# Patient Record
Sex: FEMALE | Race: WHITE | NOT HISPANIC OR LATINO | Employment: OTHER | URBAN - METROPOLITAN AREA
[De-identification: names, ages, dates, MRNs, and addresses within clinical notes are randomized per-mention and may not be internally consistent; named-entity substitution may affect disease eponyms.]

---

## 2017-01-11 ENCOUNTER — COMMUNICATION - HEALTHEAST (OUTPATIENT)
Dept: CARDIOLOGY | Facility: CLINIC | Age: 57
End: 2017-01-11

## 2017-01-11 DIAGNOSIS — I47.10 SVT (SUPRAVENTRICULAR TACHYCARDIA) (H): ICD-10-CM

## 2017-01-25 ENCOUNTER — COMMUNICATION - HEALTHEAST (OUTPATIENT)
Dept: CARDIOLOGY | Facility: CLINIC | Age: 57
End: 2017-01-25

## 2017-01-25 DIAGNOSIS — I25.10 CAD (CORONARY ARTERY DISEASE): ICD-10-CM

## 2017-01-25 DIAGNOSIS — I27.20 PULMONARY HTN (H): ICD-10-CM

## 2017-02-16 ENCOUNTER — OFFICE VISIT - HEALTHEAST (OUTPATIENT)
Dept: CARDIOLOGY | Facility: CLINIC | Age: 57
End: 2017-02-16

## 2017-02-16 ENCOUNTER — AMBULATORY - HEALTHEAST (OUTPATIENT)
Dept: CARDIOLOGY | Facility: CLINIC | Age: 57
End: 2017-02-16

## 2017-02-16 DIAGNOSIS — E66.9 RESTRICTIVE LUNG DISEASE SECONDARY TO OBESITY: ICD-10-CM

## 2017-02-16 DIAGNOSIS — I27.20 PULMONARY HTN (H): ICD-10-CM

## 2017-02-16 DIAGNOSIS — R94.30 ELEVATED LEFT VENTRICULAR END-DIASTOLIC PRESSURE (LVEDP): ICD-10-CM

## 2017-02-16 DIAGNOSIS — I27.20 PULMONARY HYPERTENSION, MILD (H): ICD-10-CM

## 2017-02-16 DIAGNOSIS — J98.4 RESTRICTIVE LUNG DISEASE SECONDARY TO OBESITY: ICD-10-CM

## 2017-02-16 DIAGNOSIS — I51.89 RIGHT VENTRICULAR SYSTOLIC DYSFUNCTION: ICD-10-CM

## 2017-02-16 DIAGNOSIS — G47.33 OSA ON CPAP: ICD-10-CM

## 2017-02-16 DIAGNOSIS — I25.10 CORONARY ARTERY DISEASE INVOLVING NATIVE CORONARY ARTERY OF NATIVE HEART WITHOUT ANGINA PECTORIS: ICD-10-CM

## 2017-02-16 ASSESSMENT — MIFFLIN-ST. JEOR: SCORE: 2161.61

## 2017-03-02 ENCOUNTER — AMBULATORY - HEALTHEAST (OUTPATIENT)
Dept: PULMONOLOGY | Facility: OTHER | Age: 57
End: 2017-03-02

## 2017-03-13 ENCOUNTER — COMMUNICATION - HEALTHEAST (OUTPATIENT)
Dept: ADMINISTRATIVE | Facility: CLINIC | Age: 57
End: 2017-03-13

## 2017-08-14 ENCOUNTER — COMMUNICATION - HEALTHEAST (OUTPATIENT)
Dept: CARDIOLOGY | Facility: CLINIC | Age: 57
End: 2017-08-14

## 2017-08-14 DIAGNOSIS — R94.30 ELEVATED LEFT VENTRICULAR END-DIASTOLIC PRESSURE (LVEDP): ICD-10-CM

## 2017-08-14 DIAGNOSIS — I27.20 PULMONARY HYPERTENSION, MILD (H): ICD-10-CM

## 2017-08-14 DIAGNOSIS — I51.89 RIGHT VENTRICULAR SYSTOLIC DYSFUNCTION: ICD-10-CM

## 2017-09-05 ENCOUNTER — COMMUNICATION - HEALTHEAST (OUTPATIENT)
Dept: CARDIOLOGY | Facility: CLINIC | Age: 57
End: 2017-09-05

## 2017-09-05 ENCOUNTER — AMBULATORY - HEALTHEAST (OUTPATIENT)
Dept: CARDIOLOGY | Facility: CLINIC | Age: 57
End: 2017-09-05

## 2017-09-05 DIAGNOSIS — I25.10 CAD (CORONARY ARTERY DISEASE): ICD-10-CM

## 2017-09-06 ENCOUNTER — OFFICE VISIT - HEALTHEAST (OUTPATIENT)
Dept: CARDIOLOGY | Facility: CLINIC | Age: 57
End: 2017-09-06

## 2017-09-06 DIAGNOSIS — R94.30 ELEVATED LEFT VENTRICULAR END-DIASTOLIC PRESSURE (LVEDP): ICD-10-CM

## 2017-09-06 DIAGNOSIS — I10 ESSENTIAL HYPERTENSION: ICD-10-CM

## 2017-09-06 DIAGNOSIS — R06.09 EXERTIONAL DYSPNEA: ICD-10-CM

## 2017-09-06 DIAGNOSIS — J98.4 RESTRICTIVE LUNG DISEASE SECONDARY TO OBESITY: ICD-10-CM

## 2017-09-06 DIAGNOSIS — E66.9 RESTRICTIVE LUNG DISEASE SECONDARY TO OBESITY: ICD-10-CM

## 2017-09-06 DIAGNOSIS — I27.20 PULMONARY HYPERTENSION, MILD (H): ICD-10-CM

## 2017-09-06 DIAGNOSIS — G47.33 OSA ON CPAP: ICD-10-CM

## 2017-09-06 ASSESSMENT — MIFFLIN-ST. JEOR: SCORE: 2154.8

## 2017-09-07 ENCOUNTER — COMMUNICATION - HEALTHEAST (OUTPATIENT)
Dept: CARDIOLOGY | Facility: CLINIC | Age: 57
End: 2017-09-07

## 2017-09-07 ENCOUNTER — AMBULATORY - HEALTHEAST (OUTPATIENT)
Dept: CARDIOLOGY | Facility: CLINIC | Age: 57
End: 2017-09-07

## 2017-09-07 DIAGNOSIS — R94.30 CARDIOVASCULAR FUNCTION STUDY, ABNORMAL: ICD-10-CM

## 2017-09-07 DIAGNOSIS — E87.5 HYPERKALEMIA: ICD-10-CM

## 2017-09-21 ENCOUNTER — AMBULATORY - HEALTHEAST (OUTPATIENT)
Dept: CARDIOLOGY | Facility: CLINIC | Age: 57
End: 2017-09-21

## 2017-09-21 DIAGNOSIS — E87.5 HYPERKALEMIA: ICD-10-CM

## 2017-11-17 ENCOUNTER — COMMUNICATION - HEALTHEAST (OUTPATIENT)
Dept: CARDIOLOGY | Facility: CLINIC | Age: 57
End: 2017-11-17

## 2017-11-17 DIAGNOSIS — I51.89 RIGHT VENTRICULAR SYSTOLIC DYSFUNCTION: ICD-10-CM

## 2017-11-17 DIAGNOSIS — I27.20 PULMONARY HYPERTENSION, MILD (H): ICD-10-CM

## 2017-11-17 DIAGNOSIS — R94.30 ELEVATED LEFT VENTRICULAR END-DIASTOLIC PRESSURE (LVEDP): ICD-10-CM

## 2017-12-13 ENCOUNTER — COMMUNICATION - HEALTHEAST (OUTPATIENT)
Dept: PULMONOLOGY | Facility: OTHER | Age: 57
End: 2017-12-13

## 2017-12-13 DIAGNOSIS — R06.00 DYSPNEA: ICD-10-CM

## 2017-12-20 ENCOUNTER — COMMUNICATION - HEALTHEAST (OUTPATIENT)
Dept: PULMONOLOGY | Facility: OTHER | Age: 57
End: 2017-12-20

## 2017-12-20 DIAGNOSIS — R06.00 DYSPNEA: ICD-10-CM

## 2018-01-23 ENCOUNTER — RECORDS - HEALTHEAST (OUTPATIENT)
Dept: LAB | Facility: CLINIC | Age: 58
End: 2018-01-23

## 2018-01-23 LAB
ALBUMIN SERPL-MCNC: 3.5 G/DL (ref 3.5–5)
ALP SERPL-CCNC: 114 U/L (ref 45–120)
ALT SERPL W P-5'-P-CCNC: 11 U/L (ref 0–45)
ANION GAP SERPL CALCULATED.3IONS-SCNC: 9 MMOL/L (ref 5–18)
AST SERPL W P-5'-P-CCNC: 12 U/L (ref 0–40)
BILIRUB SERPL-MCNC: 0.4 MG/DL (ref 0–1)
BUN SERPL-MCNC: 26 MG/DL (ref 8–22)
CALCIUM SERPL-MCNC: 9.6 MG/DL (ref 8.5–10.5)
CHLORIDE BLD-SCNC: 105 MMOL/L (ref 98–107)
CO2 SERPL-SCNC: 28 MMOL/L (ref 22–31)
CREAT SERPL-MCNC: 1.12 MG/DL (ref 0.6–1.1)
GFR SERPL CREATININE-BSD FRML MDRD: 50 ML/MIN/1.73M2
GLUCOSE BLD-MCNC: 114 MG/DL (ref 70–125)
POTASSIUM BLD-SCNC: 4.9 MMOL/L (ref 3.5–5)
PROT SERPL-MCNC: 7.4 G/DL (ref 6–8)
SODIUM SERPL-SCNC: 142 MMOL/L (ref 136–145)

## 2018-02-02 ENCOUNTER — COMMUNICATION - HEALTHEAST (OUTPATIENT)
Dept: ADMINISTRATIVE | Facility: CLINIC | Age: 58
End: 2018-02-02

## 2018-02-20 ENCOUNTER — AMBULATORY - HEALTHEAST (OUTPATIENT)
Dept: CARDIOLOGY | Facility: CLINIC | Age: 58
End: 2018-02-20

## 2018-02-20 DIAGNOSIS — I27.20 PULMONARY HYPERTENSION, MILD (H): ICD-10-CM

## 2018-02-20 DIAGNOSIS — I25.10 CORONARY ARTERY DISEASE INVOLVING NATIVE CORONARY ARTERY OF NATIVE HEART WITHOUT ANGINA PECTORIS: ICD-10-CM

## 2018-02-20 DIAGNOSIS — R94.30 ELEVATED LEFT VENTRICULAR END-DIASTOLIC PRESSURE (LVEDP): ICD-10-CM

## 2018-02-20 DIAGNOSIS — I51.89 RIGHT VENTRICULAR SYSTOLIC DYSFUNCTION: ICD-10-CM

## 2018-03-13 ENCOUNTER — COMMUNICATION - HEALTHEAST (OUTPATIENT)
Dept: CARDIOLOGY | Facility: CLINIC | Age: 58
End: 2018-03-13

## 2018-03-13 DIAGNOSIS — I27.20 PULMONARY HYPERTENSION, MILD (H): ICD-10-CM

## 2018-03-13 DIAGNOSIS — I51.89 RIGHT VENTRICULAR SYSTOLIC DYSFUNCTION: ICD-10-CM

## 2018-03-13 DIAGNOSIS — R94.30 ELEVATED LEFT VENTRICULAR END-DIASTOLIC PRESSURE (LVEDP): ICD-10-CM

## 2018-03-22 ENCOUNTER — COMMUNICATION - HEALTHEAST (OUTPATIENT)
Dept: CARDIOLOGY | Facility: CLINIC | Age: 58
End: 2018-03-22

## 2018-03-22 DIAGNOSIS — I51.89 RIGHT VENTRICULAR SYSTOLIC DYSFUNCTION: ICD-10-CM

## 2018-03-22 DIAGNOSIS — R94.30 ELEVATED LEFT VENTRICULAR END-DIASTOLIC PRESSURE (LVEDP): ICD-10-CM

## 2018-03-22 DIAGNOSIS — I27.20 PULMONARY HYPERTENSION, MILD (H): ICD-10-CM

## 2018-03-28 ENCOUNTER — COMMUNICATION - HEALTHEAST (OUTPATIENT)
Dept: CARDIOLOGY | Facility: CLINIC | Age: 58
End: 2018-03-28

## 2018-03-28 DIAGNOSIS — R94.30 CARDIOVASCULAR FUNCTION STUDY, ABNORMAL: ICD-10-CM

## 2018-03-28 DIAGNOSIS — R94.30 ELEVATED LEFT VENTRICULAR END-DIASTOLIC PRESSURE (LVEDP): ICD-10-CM

## 2018-03-28 DIAGNOSIS — I27.20 PULMONARY HYPERTENSION, MILD (H): ICD-10-CM

## 2018-03-28 DIAGNOSIS — I25.10 CORONARY ARTERY DISEASE INVOLVING NATIVE CORONARY ARTERY OF NATIVE HEART WITHOUT ANGINA PECTORIS: ICD-10-CM

## 2018-03-28 DIAGNOSIS — I51.89 RIGHT VENTRICULAR SYSTOLIC DYSFUNCTION: ICD-10-CM

## 2018-03-28 RX ORDER — BUMETANIDE 1 MG/1
1 TABLET ORAL
Qty: 180 TABLET | Refills: 1 | Status: SHIPPED | OUTPATIENT
Start: 2018-03-28 | End: 2022-08-19 | Stop reason: ALTCHOICE

## 2018-03-28 RX ORDER — LISINOPRIL 20 MG/1
20 TABLET ORAL DAILY
Qty: 90 TABLET | Refills: 1 | Status: SHIPPED | OUTPATIENT
Start: 2018-03-28

## 2018-04-12 ENCOUNTER — RECORDS - HEALTHEAST (OUTPATIENT)
Dept: LAB | Facility: CLINIC | Age: 58
End: 2018-04-12

## 2018-04-12 LAB
ALBUMIN SERPL-MCNC: 3.5 G/DL (ref 3.5–5)
ALP SERPL-CCNC: 110 U/L (ref 45–120)
ALT SERPL W P-5'-P-CCNC: <9 U/L (ref 0–45)
ANION GAP SERPL CALCULATED.3IONS-SCNC: 11 MMOL/L (ref 5–18)
AST SERPL W P-5'-P-CCNC: 11 U/L (ref 0–40)
BILIRUB SERPL-MCNC: 0.4 MG/DL (ref 0–1)
BUN SERPL-MCNC: 33 MG/DL (ref 8–22)
CALCIUM SERPL-MCNC: 9.2 MG/DL (ref 8.5–10.5)
CHLORIDE BLD-SCNC: 102 MMOL/L (ref 98–107)
CO2 SERPL-SCNC: 28 MMOL/L (ref 22–31)
CREAT SERPL-MCNC: 1.12 MG/DL (ref 0.6–1.1)
GFR SERPL CREATININE-BSD FRML MDRD: 50 ML/MIN/1.73M2
GLUCOSE BLD-MCNC: 82 MG/DL (ref 70–125)
POTASSIUM BLD-SCNC: 4.5 MMOL/L (ref 3.5–5)
PROT SERPL-MCNC: 7.2 G/DL (ref 6–8)
SODIUM SERPL-SCNC: 141 MMOL/L (ref 136–145)

## 2018-04-20 ENCOUNTER — COMMUNICATION - HEALTHEAST (OUTPATIENT)
Dept: ADMINISTRATIVE | Facility: CLINIC | Age: 58
End: 2018-04-20

## 2018-05-16 ENCOUNTER — HOSPITAL ENCOUNTER (OUTPATIENT)
Dept: MAMMOGRAPHY | Facility: CLINIC | Age: 58
Discharge: HOME | End: 2018-05-16
Attending: FAMILY MEDICINE

## 2018-05-16 DIAGNOSIS — Z12.31 VISIT FOR SCREENING MAMMOGRAM: ICD-10-CM

## 2018-05-18 ENCOUNTER — COMMUNICATION - HEALTHEAST (OUTPATIENT)
Dept: ADMINISTRATIVE | Facility: CLINIC | Age: 58
End: 2018-05-18

## 2018-05-22 ENCOUNTER — COMMUNICATION - HEALTHEAST (OUTPATIENT)
Dept: CARDIOLOGY | Facility: CLINIC | Age: 58
End: 2018-05-22

## 2018-05-22 DIAGNOSIS — R94.30 ELEVATED LEFT VENTRICULAR END-DIASTOLIC PRESSURE (LVEDP): ICD-10-CM

## 2018-05-22 DIAGNOSIS — I51.89 RIGHT VENTRICULAR SYSTOLIC DYSFUNCTION: ICD-10-CM

## 2018-05-22 DIAGNOSIS — I27.20 PULMONARY HYPERTENSION, MILD (H): ICD-10-CM

## 2018-05-24 ENCOUNTER — COMMUNICATION - HEALTHEAST (OUTPATIENT)
Dept: CARDIOLOGY | Facility: CLINIC | Age: 58
End: 2018-05-24

## 2018-05-24 ENCOUNTER — RECORDS - HEALTHEAST (OUTPATIENT)
Dept: ADMINISTRATIVE | Facility: OTHER | Age: 58
End: 2018-05-24

## 2018-05-24 DIAGNOSIS — I51.89 RIGHT VENTRICULAR SYSTOLIC DYSFUNCTION: ICD-10-CM

## 2018-05-24 DIAGNOSIS — R94.30 ELEVATED LEFT VENTRICULAR END-DIASTOLIC PRESSURE (LVEDP): ICD-10-CM

## 2018-05-24 DIAGNOSIS — I27.20 PULMONARY HYPERTENSION, MILD (H): ICD-10-CM

## 2018-05-24 RX ORDER — SPIRONOLACTONE 25 MG/1
25 TABLET ORAL DAILY
Qty: 90 TABLET | Refills: 3 | Status: SHIPPED | OUTPATIENT
Start: 2018-05-24 | End: 2022-01-03

## 2018-05-29 ENCOUNTER — OFFICE VISIT - HEALTHEAST (OUTPATIENT)
Dept: CARDIOLOGY | Facility: CLINIC | Age: 58
End: 2018-05-29

## 2018-05-29 DIAGNOSIS — I51.89 RIGHT VENTRICULAR SYSTOLIC DYSFUNCTION: ICD-10-CM

## 2018-05-29 DIAGNOSIS — G47.33 OSA ON CPAP: ICD-10-CM

## 2018-05-29 DIAGNOSIS — J98.4 RESTRICTIVE LUNG DISEASE SECONDARY TO OBESITY: ICD-10-CM

## 2018-05-29 DIAGNOSIS — I10 ESSENTIAL HYPERTENSION: ICD-10-CM

## 2018-05-29 DIAGNOSIS — E66.9 RESTRICTIVE LUNG DISEASE SECONDARY TO OBESITY: ICD-10-CM

## 2018-05-29 DIAGNOSIS — Z01.810 PREOPERATIVE CARDIOVASCULAR EXAMINATION: ICD-10-CM

## 2018-05-29 DIAGNOSIS — I25.10 CORONARY ARTERY DISEASE INVOLVING NATIVE CORONARY ARTERY OF NATIVE HEART WITHOUT ANGINA PECTORIS: ICD-10-CM

## 2018-05-29 DIAGNOSIS — R06.01 ORTHOPNEA: ICD-10-CM

## 2018-05-29 LAB
ATRIAL RATE - MUSE: 65 BPM
DIASTOLIC BLOOD PRESSURE - MUSE: NORMAL MMHG
INTERPRETATION ECG - MUSE: NORMAL
P AXIS - MUSE: 13 DEGREES
PR INTERVAL - MUSE: 136 MS
QRS DURATION - MUSE: 114 MS
QT - MUSE: 402 MS
QTC - MUSE: 418 MS
R AXIS - MUSE: -2 DEGREES
SYSTOLIC BLOOD PRESSURE - MUSE: NORMAL MMHG
T AXIS - MUSE: 2 DEGREES
VENTRICULAR RATE- MUSE: 65 BPM

## 2018-05-29 ASSESSMENT — MIFFLIN-ST. JEOR: SCORE: 2095.83

## 2018-05-31 ENCOUNTER — RECORDS - HEALTHEAST (OUTPATIENT)
Dept: LAB | Facility: CLINIC | Age: 58
End: 2018-05-31

## 2018-05-31 LAB
ALBUMIN SERPL-MCNC: 3.7 G/DL (ref 3.5–5)
ALP SERPL-CCNC: 103 U/L (ref 45–120)
ALT SERPL W P-5'-P-CCNC: 10 U/L (ref 0–45)
ANION GAP SERPL CALCULATED.3IONS-SCNC: 12 MMOL/L (ref 5–18)
AST SERPL W P-5'-P-CCNC: 13 U/L (ref 0–40)
BILIRUB SERPL-MCNC: 0.6 MG/DL (ref 0–1)
BUN SERPL-MCNC: 31 MG/DL (ref 8–22)
CALCIUM SERPL-MCNC: 9.7 MG/DL (ref 8.5–10.5)
CHLORIDE BLD-SCNC: 102 MMOL/L (ref 98–107)
CO2 SERPL-SCNC: 26 MMOL/L (ref 22–31)
CREAT SERPL-MCNC: 1.4 MG/DL (ref 0.6–1.1)
GFR SERPL CREATININE-BSD FRML MDRD: 38 ML/MIN/1.73M2
GLUCOSE BLD-MCNC: 110 MG/DL (ref 70–125)
POTASSIUM BLD-SCNC: 4.8 MMOL/L (ref 3.5–5)
PROT SERPL-MCNC: 7.1 G/DL (ref 6–8)
SODIUM SERPL-SCNC: 140 MMOL/L (ref 136–145)

## 2018-06-04 ENCOUNTER — SURGERY - HEALTHEAST (OUTPATIENT)
Dept: SURGERY | Facility: CLINIC | Age: 58
End: 2018-06-04

## 2018-06-04 ENCOUNTER — ANESTHESIA - HEALTHEAST (OUTPATIENT)
Dept: SURGERY | Facility: CLINIC | Age: 58
End: 2018-06-04

## 2018-06-04 ASSESSMENT — MIFFLIN-ST. JEOR: SCORE: 2102.64

## 2018-11-27 ENCOUNTER — COMMUNICATION - HEALTHEAST (OUTPATIENT)
Dept: ADMINISTRATIVE | Facility: CLINIC | Age: 58
End: 2018-11-27

## 2019-04-22 ENCOUNTER — RECORDS - HEALTHEAST (OUTPATIENT)
Dept: LAB | Facility: CLINIC | Age: 59
End: 2019-04-22

## 2019-04-22 LAB
ALBUMIN SERPL-MCNC: 3.5 G/DL (ref 3.5–5)
ALP SERPL-CCNC: 106 U/L (ref 45–120)
ALT SERPL W P-5'-P-CCNC: <9 U/L (ref 0–45)
ANION GAP SERPL CALCULATED.3IONS-SCNC: 9 MMOL/L (ref 5–18)
AST SERPL W P-5'-P-CCNC: 11 U/L (ref 0–40)
BASOPHILS # BLD AUTO: 0.1 THOU/UL (ref 0–0.2)
BASOPHILS NFR BLD AUTO: 1 % (ref 0–2)
BILIRUB SERPL-MCNC: 0.6 MG/DL (ref 0–1)
BUN SERPL-MCNC: 34 MG/DL (ref 8–22)
CALCIUM SERPL-MCNC: 9.1 MG/DL (ref 8.5–10.5)
CHLORIDE BLD-SCNC: 102 MMOL/L (ref 98–107)
CHOLEST SERPL-MCNC: 145 MG/DL
CO2 SERPL-SCNC: 29 MMOL/L (ref 22–31)
CREAT SERPL-MCNC: 1.35 MG/DL (ref 0.6–1.1)
EOSINOPHIL # BLD AUTO: 0.2 THOU/UL (ref 0–0.4)
EOSINOPHIL NFR BLD AUTO: 3 % (ref 0–6)
ERYTHROCYTE [DISTWIDTH] IN BLOOD BY AUTOMATED COUNT: 15.6 % (ref 11–14.5)
FASTING STATUS PATIENT QL REPORTED: ABNORMAL
GFR SERPL CREATININE-BSD FRML MDRD: 40 ML/MIN/1.73M2
GLUCOSE BLD-MCNC: 111 MG/DL (ref 70–125)
HCT VFR BLD AUTO: 34.4 % (ref 35–47)
HDLC SERPL-MCNC: 40 MG/DL
HGB BLD-MCNC: 10.3 G/DL (ref 12–16)
LDLC SERPL CALC-MCNC: 86 MG/DL
LYMPHOCYTES # BLD AUTO: 1.7 THOU/UL (ref 0.8–4.4)
LYMPHOCYTES NFR BLD AUTO: 23 % (ref 20–40)
MCH RBC QN AUTO: 26.7 PG (ref 27–34)
MCHC RBC AUTO-ENTMCNC: 29.9 G/DL (ref 32–36)
MCV RBC AUTO: 89 FL (ref 80–100)
MONOCYTES # BLD AUTO: 0.5 THOU/UL (ref 0–0.9)
MONOCYTES NFR BLD AUTO: 7 % (ref 2–10)
NEUTROPHILS # BLD AUTO: 4.9 THOU/UL (ref 2–7.7)
NEUTROPHILS NFR BLD AUTO: 66 % (ref 50–70)
PLATELET # BLD AUTO: 304 THOU/UL (ref 140–440)
PMV BLD AUTO: 9.9 FL (ref 8.5–12.5)
POTASSIUM BLD-SCNC: 4.9 MMOL/L (ref 3.5–5)
PROT SERPL-MCNC: 6.9 G/DL (ref 6–8)
RBC # BLD AUTO: 3.86 MILL/UL (ref 3.8–5.4)
SODIUM SERPL-SCNC: 140 MMOL/L (ref 136–145)
TRIGL SERPL-MCNC: 97 MG/DL
TSH SERPL DL<=0.005 MIU/L-ACNC: 1.03 UIU/ML (ref 0.3–5)
WBC: 7.4 THOU/UL (ref 4–11)

## 2019-05-03 ENCOUNTER — RECORDS - HEALTHEAST (OUTPATIENT)
Dept: LAB | Facility: CLINIC | Age: 59
End: 2019-05-03

## 2019-05-03 ENCOUNTER — RECORDS - HEALTHEAST (OUTPATIENT)
Dept: ADMINISTRATIVE | Facility: OTHER | Age: 59
End: 2019-05-03

## 2019-05-03 LAB
IRON SATN MFR SERPL: 18 % (ref 20–50)
IRON SERPL-MCNC: 75 UG/DL (ref 42–175)
TIBC SERPL-MCNC: 407 UG/DL (ref 313–563)
TRANSFERRIN SERPL-MCNC: 325 MG/DL (ref 212–360)

## 2019-07-02 ENCOUNTER — RECORDS - HEALTHEAST (OUTPATIENT)
Dept: ADMINISTRATIVE | Facility: OTHER | Age: 59
End: 2019-07-02

## 2019-07-02 ENCOUNTER — RECORDS - HEALTHEAST (OUTPATIENT)
Dept: LAB | Facility: CLINIC | Age: 59
End: 2019-07-02

## 2019-07-03 ENCOUNTER — AMBULATORY - HEALTHEAST (OUTPATIENT)
Dept: VASCULAR SURGERY | Facility: CLINIC | Age: 59
End: 2019-07-03

## 2019-07-03 DIAGNOSIS — R60.0 PERIPHERAL EDEMA: ICD-10-CM

## 2019-07-07 LAB
BACTERIA SPEC CULT: ABNORMAL

## 2019-07-23 ENCOUNTER — OFFICE VISIT - HEALTHEAST (OUTPATIENT)
Dept: VASCULAR SURGERY | Facility: CLINIC | Age: 59
End: 2019-07-23

## 2019-07-23 DIAGNOSIS — E66.01 OBESITY, MORBID, BMI 50 OR HIGHER (H): ICD-10-CM

## 2019-07-23 DIAGNOSIS — L97.811 VENOUS STASIS ULCER OF OTHER PART OF RIGHT LOWER LEG LIMITED TO BREAKDOWN OF SKIN WITHOUT VARICOSE VEINS (H): ICD-10-CM

## 2019-07-23 DIAGNOSIS — I89.0 SECONDARY LYMPHEDEMA: ICD-10-CM

## 2019-07-23 DIAGNOSIS — G47.33 OSA ON CPAP: ICD-10-CM

## 2019-07-23 DIAGNOSIS — I87.2 VENOUS STASIS ULCER OF OTHER PART OF RIGHT LOWER LEG LIMITED TO BREAKDOWN OF SKIN WITHOUT VARICOSE VEINS (H): ICD-10-CM

## 2019-07-23 DIAGNOSIS — I87.303 VENOUS HYPERTENSION OF BOTH LOWER EXTREMITIES: ICD-10-CM

## 2019-07-23 DIAGNOSIS — Z98.890 HISTORY OF VEIN STRIPPING: ICD-10-CM

## 2019-07-23 DIAGNOSIS — L90.5 SCAR CONDITION AND FIBROSIS OF SKIN: ICD-10-CM

## 2019-07-23 ASSESSMENT — MIFFLIN-ST. JEOR: SCORE: 2219.21

## 2019-07-26 ENCOUNTER — AMBULATORY - HEALTHEAST (OUTPATIENT)
Dept: VASCULAR SURGERY | Facility: CLINIC | Age: 59
End: 2019-07-26

## 2019-07-30 ENCOUNTER — AMBULATORY - HEALTHEAST (OUTPATIENT)
Dept: VASCULAR SURGERY | Facility: CLINIC | Age: 59
End: 2019-07-30

## 2019-07-30 ENCOUNTER — RECORDS - HEALTHEAST (OUTPATIENT)
Dept: ADMINISTRATIVE | Facility: OTHER | Age: 59
End: 2019-07-30

## 2019-08-02 ENCOUNTER — AMBULATORY - HEALTHEAST (OUTPATIENT)
Dept: VASCULAR SURGERY | Facility: CLINIC | Age: 59
End: 2019-08-02

## 2019-08-02 DIAGNOSIS — L97.811 VENOUS STASIS ULCER OF OTHER PART OF RIGHT LOWER LEG LIMITED TO BREAKDOWN OF SKIN WITHOUT VARICOSE VEINS (H): ICD-10-CM

## 2019-08-02 DIAGNOSIS — I89.0 SECONDARY LYMPHEDEMA: ICD-10-CM

## 2019-08-02 DIAGNOSIS — I87.2 VENOUS STASIS ULCER OF OTHER PART OF RIGHT LOWER LEG LIMITED TO BREAKDOWN OF SKIN WITHOUT VARICOSE VEINS (H): ICD-10-CM

## 2019-08-02 ASSESSMENT — MIFFLIN-ST. JEOR: SCORE: 2218.31

## 2019-08-05 ENCOUNTER — AMBULATORY - HEALTHEAST (OUTPATIENT)
Dept: VASCULAR SURGERY | Facility: CLINIC | Age: 59
End: 2019-08-05

## 2019-08-05 ENCOUNTER — COMMUNICATION - HEALTHEAST (OUTPATIENT)
Dept: VASCULAR SURGERY | Facility: CLINIC | Age: 59
End: 2019-08-05

## 2019-08-05 DIAGNOSIS — I89.0 SECONDARY LYMPHEDEMA: ICD-10-CM

## 2019-08-05 ASSESSMENT — MIFFLIN-ST. JEOR: SCORE: 2139.38

## 2019-08-07 ENCOUNTER — AMBULATORY - HEALTHEAST (OUTPATIENT)
Dept: VASCULAR SURGERY | Facility: CLINIC | Age: 59
End: 2019-08-07

## 2019-08-07 DIAGNOSIS — M79.89 SWELLING OF LOWER EXTREMITY: ICD-10-CM

## 2019-08-09 ENCOUNTER — AMBULATORY - HEALTHEAST (OUTPATIENT)
Dept: VASCULAR SURGERY | Facility: CLINIC | Age: 59
End: 2019-08-09

## 2019-08-09 DIAGNOSIS — M79.89 SWELLING OF LOWER EXTREMITY: ICD-10-CM

## 2019-08-09 ASSESSMENT — MIFFLIN-ST. JEOR: SCORE: 2136.66

## 2019-08-13 ENCOUNTER — OFFICE VISIT - HEALTHEAST (OUTPATIENT)
Dept: VASCULAR SURGERY | Facility: CLINIC | Age: 59
End: 2019-08-13

## 2019-08-13 DIAGNOSIS — M79.89 SWELLING OF LOWER EXTREMITY: ICD-10-CM

## 2019-08-13 DIAGNOSIS — Z98.890 HISTORY OF VEIN STRIPPING: ICD-10-CM

## 2019-08-13 DIAGNOSIS — L97.811 VENOUS STASIS ULCER OF OTHER PART OF RIGHT LOWER LEG LIMITED TO BREAKDOWN OF SKIN WITHOUT VARICOSE VEINS (H): ICD-10-CM

## 2019-08-13 DIAGNOSIS — I87.303 VENOUS HYPERTENSION OF BOTH LOWER EXTREMITIES: ICD-10-CM

## 2019-08-13 DIAGNOSIS — G47.33 OSA ON CPAP: ICD-10-CM

## 2019-08-13 DIAGNOSIS — L90.5 SCAR CONDITION AND FIBROSIS OF SKIN: ICD-10-CM

## 2019-08-13 DIAGNOSIS — I87.2 VENOUS STASIS ULCER OF OTHER PART OF RIGHT LOWER LEG LIMITED TO BREAKDOWN OF SKIN WITHOUT VARICOSE VEINS (H): ICD-10-CM

## 2019-08-13 DIAGNOSIS — I89.0 SECONDARY LYMPHEDEMA: ICD-10-CM

## 2019-08-13 DIAGNOSIS — E66.01 OBESITY, MORBID, BMI 50 OR HIGHER (H): ICD-10-CM

## 2019-08-13 ASSESSMENT — MIFFLIN-ST. JEOR: SCORE: 2119.87

## 2019-08-26 ENCOUNTER — RECORDS - HEALTHEAST (OUTPATIENT)
Dept: LAB | Facility: CLINIC | Age: 59
End: 2019-08-26

## 2019-08-26 LAB
ALBUMIN SERPL-MCNC: 3.8 G/DL (ref 3.5–5)
ANION GAP SERPL CALCULATED.3IONS-SCNC: 10 MMOL/L (ref 5–18)
BUN SERPL-MCNC: 90 MG/DL (ref 8–22)
CALCIUM SERPL-MCNC: 9.5 MG/DL (ref 8.5–10.5)
CHLORIDE BLD-SCNC: 107 MMOL/L (ref 98–107)
CO2 SERPL-SCNC: 22 MMOL/L (ref 22–31)
CREAT SERPL-MCNC: 2.45 MG/DL (ref 0.6–1.1)
GFR SERPL CREATININE-BSD FRML MDRD: 20 ML/MIN/1.73M2
GLUCOSE BLD-MCNC: 86 MG/DL (ref 70–125)
PHOSPHATE SERPL-MCNC: 4.2 MG/DL (ref 2.5–4.5)
POTASSIUM BLD-SCNC: 5.9 MMOL/L (ref 3.5–5)
SODIUM SERPL-SCNC: 139 MMOL/L (ref 136–145)

## 2019-08-27 ENCOUNTER — ANESTHESIA - HEALTHEAST (OUTPATIENT)
Dept: SURGERY | Facility: CLINIC | Age: 59
End: 2019-08-27

## 2019-08-28 ENCOUNTER — SURGERY - HEALTHEAST (OUTPATIENT)
Dept: SURGERY | Facility: CLINIC | Age: 59
End: 2019-08-28

## 2019-08-28 ASSESSMENT — MIFFLIN-ST. JEOR: SCORE: 2078.6

## 2020-10-15 ENCOUNTER — RECORDS - HEALTHEAST (OUTPATIENT)
Dept: LAB | Facility: CLINIC | Age: 60
End: 2020-10-15

## 2020-10-15 LAB
ALBUMIN SERPL-MCNC: 3.4 G/DL (ref 3.5–5)
ALP SERPL-CCNC: 83 U/L (ref 45–120)
ALT SERPL W P-5'-P-CCNC: 10 U/L (ref 0–45)
ANION GAP SERPL CALCULATED.3IONS-SCNC: 13 MMOL/L (ref 5–18)
AST SERPL W P-5'-P-CCNC: 12 U/L (ref 0–40)
BASOPHILS # BLD AUTO: 0.2 THOU/UL (ref 0–0.2)
BASOPHILS NFR BLD AUTO: 2 % (ref 0–2)
BILIRUB SERPL-MCNC: 0.4 MG/DL (ref 0–1)
BUN SERPL-MCNC: 37 MG/DL (ref 8–22)
CALCIUM SERPL-MCNC: 9.2 MG/DL (ref 8.5–10.5)
CHLORIDE BLD-SCNC: 103 MMOL/L (ref 98–107)
CHOLEST SERPL-MCNC: 159 MG/DL
CO2 SERPL-SCNC: 25 MMOL/L (ref 22–31)
CREAT SERPL-MCNC: 1.62 MG/DL (ref 0.6–1.1)
EOSINOPHIL # BLD AUTO: 0.3 THOU/UL (ref 0–0.4)
EOSINOPHIL NFR BLD AUTO: 3 % (ref 0–6)
ERYTHROCYTE [DISTWIDTH] IN BLOOD BY AUTOMATED COUNT: 14.7 % (ref 11–14.5)
FASTING STATUS PATIENT QL REPORTED: NO
GFR SERPL CREATININE-BSD FRML MDRD: 32 ML/MIN/1.73M2
GLUCOSE BLD-MCNC: 128 MG/DL (ref 70–125)
HCT VFR BLD AUTO: 37.1 % (ref 35–47)
HDLC SERPL-MCNC: 35 MG/DL
HGB BLD-MCNC: 11.3 G/DL (ref 12–16)
IMM GRANULOCYTES # BLD: 0 THOU/UL
IMM GRANULOCYTES NFR BLD: 0 %
LDLC SERPL CALC-MCNC: 91 MG/DL
LYMPHOCYTES # BLD AUTO: 2 THOU/UL (ref 0.8–4.4)
LYMPHOCYTES NFR BLD AUTO: 19 % (ref 20–40)
MCH RBC QN AUTO: 28.3 PG (ref 27–34)
MCHC RBC AUTO-ENTMCNC: 30.5 G/DL (ref 32–36)
MCV RBC AUTO: 93 FL (ref 80–100)
MONOCYTES # BLD AUTO: 0.6 THOU/UL (ref 0–0.9)
MONOCYTES NFR BLD AUTO: 6 % (ref 2–10)
NEUTROPHILS # BLD AUTO: 7.3 THOU/UL (ref 2–7.7)
NEUTROPHILS NFR BLD AUTO: 70 % (ref 50–70)
PLATELET # BLD AUTO: 334 THOU/UL (ref 140–440)
PMV BLD AUTO: 10.2 FL (ref 8.5–12.5)
POTASSIUM BLD-SCNC: 4.6 MMOL/L (ref 3.5–5)
PROT SERPL-MCNC: 7.5 G/DL (ref 6–8)
RBC # BLD AUTO: 4 MILL/UL (ref 3.8–5.4)
SODIUM SERPL-SCNC: 141 MMOL/L (ref 136–145)
TRIGL SERPL-MCNC: 165 MG/DL
WBC: 10.4 THOU/UL (ref 4–11)

## 2021-02-03 ENCOUNTER — RECORDS - HEALTHEAST (OUTPATIENT)
Dept: LAB | Facility: CLINIC | Age: 61
End: 2021-02-03

## 2021-02-03 LAB
ALBUMIN SERPL-MCNC: 3.7 G/DL (ref 3.5–5)
ALP SERPL-CCNC: 116 U/L (ref 45–120)
ALT SERPL W P-5'-P-CCNC: <9 U/L (ref 0–45)
ANION GAP SERPL CALCULATED.3IONS-SCNC: 13 MMOL/L (ref 5–18)
AST SERPL W P-5'-P-CCNC: 8 U/L (ref 0–40)
BASOPHILS # BLD AUTO: 0.1 THOU/UL (ref 0–0.2)
BASOPHILS NFR BLD AUTO: 1 % (ref 0–2)
BILIRUB SERPL-MCNC: 0.5 MG/DL (ref 0–1)
BUN SERPL-MCNC: 65 MG/DL (ref 8–22)
CALCIUM SERPL-MCNC: 9.4 MG/DL (ref 8.5–10.5)
CHLORIDE BLD-SCNC: 106 MMOL/L (ref 98–107)
CO2 SERPL-SCNC: 20 MMOL/L (ref 22–31)
CREAT SERPL-MCNC: 2.28 MG/DL (ref 0.6–1.1)
EOSINOPHIL # BLD AUTO: 0.1 THOU/UL (ref 0–0.4)
EOSINOPHIL NFR BLD AUTO: 1 % (ref 0–6)
ERYTHROCYTE [DISTWIDTH] IN BLOOD BY AUTOMATED COUNT: 14.5 % (ref 11–14.5)
GFR SERPL CREATININE-BSD FRML MDRD: 22 ML/MIN/1.73M2
GLUCOSE BLD-MCNC: 111 MG/DL (ref 70–125)
HCT VFR BLD AUTO: 37.5 % (ref 35–47)
HGB BLD-MCNC: 11.8 G/DL (ref 12–16)
IMM GRANULOCYTES # BLD: 0.1 THOU/UL
IMM GRANULOCYTES NFR BLD: 1 %
LYMPHOCYTES # BLD AUTO: 1.6 THOU/UL (ref 0.8–4.4)
LYMPHOCYTES NFR BLD AUTO: 13 % (ref 20–40)
MCH RBC QN AUTO: 29.1 PG (ref 27–34)
MCHC RBC AUTO-ENTMCNC: 31.5 G/DL (ref 32–36)
MCV RBC AUTO: 92 FL (ref 80–100)
MONOCYTES # BLD AUTO: 0.7 THOU/UL (ref 0–0.9)
MONOCYTES NFR BLD AUTO: 5 % (ref 2–10)
NEUTROPHILS # BLD AUTO: 10.4 THOU/UL (ref 2–7.7)
NEUTROPHILS NFR BLD AUTO: 81 % (ref 50–70)
PLATELET # BLD AUTO: 376 THOU/UL (ref 140–440)
PMV BLD AUTO: 10 FL (ref 8.5–12.5)
POTASSIUM BLD-SCNC: 5 MMOL/L (ref 3.5–5)
PROT SERPL-MCNC: 7.6 G/DL (ref 6–8)
RBC # BLD AUTO: 4.06 MILL/UL (ref 3.8–5.4)
SODIUM SERPL-SCNC: 139 MMOL/L (ref 136–145)
WBC: 12.9 THOU/UL (ref 4–11)

## 2021-05-24 ENCOUNTER — RECORDS - HEALTHEAST (OUTPATIENT)
Dept: ADMINISTRATIVE | Facility: CLINIC | Age: 61
End: 2021-05-24

## 2021-05-25 ENCOUNTER — RECORDS - HEALTHEAST (OUTPATIENT)
Dept: ADMINISTRATIVE | Facility: CLINIC | Age: 61
End: 2021-05-25

## 2021-05-26 ENCOUNTER — RECORDS - HEALTHEAST (OUTPATIENT)
Dept: ADMINISTRATIVE | Facility: CLINIC | Age: 61
End: 2021-05-26

## 2021-05-28 ENCOUNTER — RECORDS - HEALTHEAST (OUTPATIENT)
Dept: ADMINISTRATIVE | Facility: CLINIC | Age: 61
End: 2021-05-28

## 2021-05-30 VITALS — BODY MASS INDEX: 45.99 KG/M2 | HEIGHT: 67 IN | WEIGHT: 293 LBS

## 2021-05-30 NOTE — PATIENT INSTRUCTIONS - HE
"Will need to return to clinic twice per week for wound care and wrap changes    We will wash the legs with soap and water  Apply lotion to the legs (remedy)  Apply silvercel to the right medial calf region  ABD  And rolled gauze  2 layer regular bilaterally    Continue to take your water pills    Weigh yourself daily    We will refer you to bariatrics to talk to a physician about weight loss surgery    If the wraps do not work to reduce your swelling we will refer you to lymphedema therapy    Do not get the wraps wet in the shower; wash up at the sink    [facility] and Bariatrics    Your care provider has referred you to [facility] for evaluation of weight loss options. We understand that patients who successfully get rid of excess weight can significantly improve other serious medical conditions.  [facility] offers two choices for patients who require significant weight loss, a surgical program and a non-surgical program.  You don't need to decide which is best for you right now, our team will help you determine the treatment that fits your unique circumstances best.     If you are considering surgical weight loss, the first step is to attend one of our fee Weight Loss Surgery seminars, which are held at [facility], and [facility] on a rotating basis.   During this seminar you will learn about what surgery can and cannot do for you, our program at [facility], and the insurance authorization process.   If you are \"sitting on the fence\" about surgery, we encourage you to attend this seminar so you can make a more informed decision.  After attending the free seminar, your next step is to schedule a comprehensive evaluation with one of our bariatricians.   If you are not ready for a surgical option, we also offer medically assisted weight loss support.  Our team will help you navigate these options.      Our clinic will contact you to get you started in the program.   You are " also welcome to call us at [phone] to schedule or if you have any other questions.     At [facility], we believe that weight loss surgery is only one step in an entire lifestyle plan to treat obesity. Paired with a healthy lifestyle and a commitment to long-term follow up with your bariatric care team, the surgery can jump-start lifestyle changes that lead to improved day-to-day health, self-esteem and quality of life.          Swelling and Compression Therapy    Swelling in the legs can be caused by many reasons. No matter what the reason, treatment usually includes some type of compression. This may be done with a support sock, dressing, ace wrap, or layered wraps.     It is important to treat the swelling for many reasons. If the swelling is not treated you may develop blisters that can lead to ulcerations. This is caused when extra fluid goes into tissue causing damage and blocking blood flow to the tissue.     It is important that you wear your compression every day, including days that you will be seen in clinic.     Compression is often the most important part of treating leg wounds. Without controlling the swelling it is often not possible to heal wounds.     Going without compression for even brief periods of time can be damaging to your legs and your health.  Your compression should be put on first thing in the morning. Take the compression off at night only when instructed by your care provider to do so. Sometimes wearing compression 24 hours a day will be recommended.       If you are having difficulty wearing your compression it is important to notify your care provider so that other options may be reviewed.    Please call us if you have any questions [phone].    Thank you for choosing [facility].Lymphedema Program    Lymphedema is swelling of an arm or leg due to damage to the lymphatic system.  Most lymphedema is seen in patients who have had surgery and/or radiation, which has  removed or damaged the lymph nodes, though there are many causes of lymphedema.    In a healthy person, lymph nodes act like a filter system to remove viruses, bacteria, particles and toxins from the body.  When lymph fluid isn t flowing right, it backs up in the body and swelling occurs.  This swelling can make the arm or leg feel heavy or uncomfortable.  With time the skin can get hard and in some cases the arm or leg can become infected (cellulitis), causing illness.    When lymphedema is diagnosed and treatment begun, patients can enjoy productive lives with few complications.    While lymphedema is not curable, it is treatable.    The lymphedema program educates patients, addresses exercise to increase lymph flow and teaches how to apply compression garments or bandaging techniques.      Miriam Fallon MD, a recognized physician specializing in the assessment and treatment of lymphedema, heads the [facility] s Lymphedema Program.  Thank you for choosing [facility] as partners in your care.  Please read the following information about your treatment.    Treatment:  Layered Compression Bandaging (Two-layer)    What is it?  The layered compression bandaging has a layer of absorbent material that will soak up drainage.     Why we do it.   This is done to treat swelling, wounds, or both.  This will in turn help circulation and healing.    How to care for your bandages.  The wraps need to be kept dry. If  the wraps become wet, remove them and call the clinic to have another wrap applied.    What to expect.  It is common for the wraps to be uncomfortable at the beginning. The first two days are usually the hardest; then they will become more comfortable.       Elevating your legs will help the discomfort. Try to elevate your legs as much as possible.    If rest and elevation does not help your discomfort, call your provider.  If your provider is not available you can remove the wrap and  leave a message for further instructions.      If you have any questions, please contact [facility] at [phone].

## 2021-05-30 NOTE — PROGRESS NOTES
Nurse Visit    Chief Complaint: Patient presents to clinic for assement, and treatment of their ulcer and and swelling    Dressing on Arrival fallen down bilaterally 4 inches    Allergies:   Allergies   Allergen Reactions     Penicillins Rash       Medications:   Current Outpatient Medications:      albuterol (PROVENTIL HFA;VENTOLIN HFA) 90 mcg/actuation inhaler, Inhale 2 puffs every 4 (four) hours as needed for wheezing or shortness of breath., Disp: , Rfl:      aspirin 81 mg chewable tablet, Chew 81 mg daily., Disp: , Rfl:      bumetanide (BUMEX) 1 MG tablet, Take 1 tablet (1 mg total) by mouth 2 (two) times a day at 9am and 6pm., Disp: 180 tablet, Rfl: 1     esomeprazole (NEXIUM) 40 MG capsule, Take 40 mg by mouth daily. , Disp: , Rfl:      fluticasone (FLONASE) 50 mcg/actuation nasal spray, 1 spray into each nostril daily., Disp: , Rfl:      lisinopril (PRINIVIL,ZESTRIL) 20 MG tablet, Take 1 tablet (20 mg total) by mouth daily., Disp: 90 tablet, Rfl: 1     MOMETASONE/FORMOTEROL (DULERA INHL), Inhale 1 Inhalation daily. , Disp: , Rfl:      PARoxetine (PAXIL) 40 MG tablet, Take 40 mg by mouth daily. , Disp: , Rfl:      simvastatin (ZOCOR) 20 MG tablet, Take 20 mg by mouth bedtime., Disp: , Rfl:      spironolactone (ALDACTONE) 25 MG tablet, Take 1 tablet (25 mg total) by mouth daily., Disp: 90 tablet, Rfl: 3    Vital Signs: /78   Pulse 72   Temp 98.4  F (36.9  C)       Assessment:    General:  Patient presents to clinic in no apparent distress.  Psychiatric:  Alert and oriented x3.   Lower extremity:  edema is present.    Integumentary:  Skin is uniformly warm, dry and pink.    Wound size:   Drain Left Breast 10 Fr. (Active)       Drain Right Breast 10 Fr. (Active)       Incision 11/04/16 Vagina (Active)       Incision 06/04/18 Breast Left (Active)       Incision 06/04/18 Breast Right (Active)      Undermining is not present.    The periwoundskin is pink and dry/scaly      Plan:         1. Patient  "will in 3 days         2. Treatment provided will include irrigation and dressings to promote autolytic debridement and will be as listed below     Cleansed with: Normal Saline    Protected skin with: Remedy Skin Repair    Dressings Applied: ABD's\" \"Packing Strips    Compression Applied to the Left Le-Layer Coban    Compression Applied to the Right Le-Layer Coban    Offloading applied: None    Trial Products: no  Provider notified regarding concerns: no  Treatment Changes: yes - no wounds did not apply the silver cell only the abd pad with rolled gauze    Educational Barriers: functional limitation  Taught Regarding: Activity, Compliance and Compression  Teaching Method: Explanation      "

## 2021-05-30 NOTE — PATIENT INSTRUCTIONS - HE
- Please call us if your compression wraps fall more than 1-2 inches below the bend of the knee. Call if they are too painful. Call if they get wet. If it is a weekend and the wraps fall down, are too painful, or get wet take the wraps off and put on another form of compression. Compression such as velcro wraps, compression stockings, short stretch bandages, or tubular compression. Apply one of these until you can be seen in clinic. Please call us if you have any questions [phone].    - Treatment:  Layered Compression Bandaging (2-layer)    What is it?  The layered compression bandaging has a layer of absorbent material that will soak up drainage.     Why we do it.   This is done to treat swelling, wounds, or both.  This will in turn help circulation and healing.    How to care for your bandages.  The wraps need to be kept dry. If  the wraps become wet, remove them and call the clinic to have another wrap applied.    What to expect.  It is common for the wraps to be uncomfortable at the beginning. The first two days are usually the hardest; then they will become more comfortable.       Elevating your legs will help the discomfort. Try to elevate your legs as much as possible.    If rest and elevation does not help your discomfort, call your provider.  If your provider is not available you can remove the wrap and leave a message for further instructions.    - Swelling and Compression Therapy    Swelling in the legs can be caused by many reasons. No matter what the reason, treatment usually includes some type of compression. This may be done with a support sock, dressing, ace wrap, or layered wraps.     It is important to treat the swelling for many reasons. If the swelling is not treated you may develop blisters that can lead to ulcerations. This is caused when extra fluid goes into tissue causing damage and blocking blood flow to the tissue.     It is important that you wear your compression every day,  including days that you will be seen in clinic.     Compression is often the most important part of treating leg wounds. Without controlling the swelling it is often not possible to heal wounds.     Going without compression for even brief periods of time can be damaging to your legs and your health.  Your compression should be put on first thing in the morning. Take the compression off at night only when instructed by your care provider to do so. Sometimes wearing compression 24 hours a day will be recommended.       If you are having difficulty wearing your compression it is important to notify your care provider so that other options may be reviewed.    Thank you for choosing [facility]. Please call us if you have any questions [phone].

## 2021-05-31 VITALS — HEIGHT: 68 IN | BODY MASS INDEX: 44.41 KG/M2 | WEIGHT: 293 LBS

## 2021-05-31 NOTE — ANESTHESIA PREPROCEDURE EVALUATION
Anesthesia Evaluation      No history of anesthetic complications     Airway   Mallampati: II  Neck ROM: full   Pulmonary     breath sounds clear to auscultation  (+) COPD, shortness of breath, sleep apnea on CPAP, , a smoker                         Cardiovascular   (+) hypertension, CAD (Hx of prior stents, stable), , hypercholesterolemia,     Rhythm: regular  Rate: normal,      ROS comment: Mild pulmonary htn       Neuro/Psych      Endo/Other    (+) obesity (BMI 49),      GI/Hepatic/Renal    (+) GERD,   bowel prep          Dental - normal exam                        Anesthesia Plan  Planned anesthetic: MAC  Plan for precedex gtt with ketamine prn for pain.  Fluid bolus pre-op. Pt hypotensive in preop likely from bowel prep. Maintain MAP > 65.   Discussed potential need to convert to GA w/ ETT vs LMA if not tolerating.  Explained that it is possible to remember certain aspects of the OR experience during MAC dependent on the depth of sedation and patient understands this.    ASA 3     Anesthetic plan and risks discussed with: patient    Post-op plan: routine recovery

## 2021-05-31 NOTE — PROGRESS NOTES
"Nurse Visit    Chief Complaint: Patient presents to clinic for assement, and treatment of their and swelling    Dressing on Arrival 2 layer intact    Allergies:   Allergies   Allergen Reactions     Penicillins Rash       Medications:   Current Outpatient Medications:      albuterol (PROVENTIL HFA;VENTOLIN HFA) 90 mcg/actuation inhaler, Inhale 2 puffs every 4 (four) hours as needed for wheezing or shortness of breath., Disp: , Rfl:      ANORO ELLIPTA 62.5-25 mcg/actuation inhaler, , Disp: , Rfl:      aspirin 81 mg chewable tablet, Chew 81 mg daily., Disp: , Rfl:      bumetanide (BUMEX) 1 MG tablet, Take 1 tablet (1 mg total) by mouth 2 (two) times a day at 9am and 6pm., Disp: 180 tablet, Rfl: 1     esomeprazole (NEXIUM) 40 MG capsule, Take 40 mg by mouth daily. , Disp: , Rfl:      fluticasone (FLONASE) 50 mcg/actuation nasal spray, 1 spray into each nostril daily., Disp: , Rfl:      lisinopril (PRINIVIL,ZESTRIL) 20 MG tablet, Take 1 tablet (20 mg total) by mouth daily., Disp: 90 tablet, Rfl: 1     MOMETASONE/FORMOTEROL (DULERA INHL), Inhale 1 Inhalation daily. , Disp: , Rfl:      PARoxetine (PAXIL) 40 MG tablet, Take 40 mg by mouth daily. , Disp: , Rfl:      simvastatin (ZOCOR) 20 MG tablet, Take 20 mg by mouth bedtime., Disp: , Rfl:      spironolactone (ALDACTONE) 25 MG tablet, Take 1 tablet (25 mg total) by mouth daily., Disp: 90 tablet, Rfl: 3    Vital Signs: /60 (Patient Site: Left Arm, Patient Position: Sitting, Cuff Size: Adult Regular)   Pulse 80   Temp 97.6  F (36.4  C) (Oral)   Resp 16   Ht 5' 8\" (1.727 m)   Wt (!) 338 lb (153.3 kg)   BMI 51.39 kg/m        Assessment:    General:  Patient presents to clinic in no apparent distress.  Psychiatric:  Alert and oriented x3.   Lower extremity:  edema is present.    Integumentary:  Skin is uniformly warm, dry and pink.    Wound size:   Drain Left Breast 10 Fr. (Active)       Drain Right Breast 10 Fr. (Active)       Incision 11/04/16 Vagina (Active)     "   Incision 18 Breast Left (Active)       Incision 18 Breast Right (Active)      Undermining is not present.    The periwoundskin is WNL      Plan:         1. Patient will in 4 days         2. Treatment provided will include irrigation and dressings to promote autolytic debridement and will be as listed below     Cleansed with: soap and water    Protected skin with: Remedy Skin Repair    Dressings Applied: none    Compression Applied to the Left Le-Layer Coban    Compression Applied to the Right Le-Layer Coban    Offloading applied: None    Trial Products: no  Provider notified regarding concerns: no  Treatment Changes: no    Educational Barriers: none  Taught Regarding: Activity, Compliance and Compression  Teaching Method: Explanation and Handout

## 2021-05-31 NOTE — TELEPHONE ENCOUNTER
Pt called requesting to be seen for a nurse visit today- her bilateral leg wraps fell down. She is scheduled at 120 today and her appt. For 8/7/19 was rescheduled to 8/9/19.

## 2021-05-31 NOTE — PATIENT INSTRUCTIONS - HE
Thank you for choosing [facility] as partners in your care.  Please read the following information about your treatment.    Treatment:  Layered Compression Bandaging (Two-layer)    What is it?  The layered compression bandaging has a layer of absorbent material that will soak up drainage.     Why we do it.   This is done to treat swelling, wounds, or both.  This will in turn help circulation and healing.    How to care for your bandages.  The wraps need to be kept dry. If  the wraps become wet, remove them and call the clinic to have another wrap applied.    What to expect.  It is common for the wraps to be uncomfortable at the beginning. The first two days are usually the hardest; then they will become more comfortable.       Elevating your legs will help the discomfort. Try to elevate your legs as much as possible.    If rest and elevation does not help your discomfort, call your provider.  If your provider is not available you can remove the wrap and leave a message for further instructions.      If you have any questions, please contact [facility] [place] at [phone].

## 2021-05-31 NOTE — ANESTHESIA POSTPROCEDURE EVALUATION
Patient: Chika Ferguson  COLONOSCOPY  Anesthesia type: No value filed.    Patient location: Phase II Recovery  Last vitals:   Vitals Value Taken Time   BP 99/43 8/28/2019 10:30 AM   Temp 36.6  C (97.8  F) 8/28/2019 10:17 AM   Pulse 88 8/28/2019 11:01 AM   Resp 18 8/28/2019 10:17 AM   SpO2 91 % 8/28/2019 11:01 AM   Vitals shown include unvalidated device data.  Post vital signs: stable  Level of consciousness: awake and responds to simple questions  Post-anesthesia pain: pain controlled  Post-anesthesia nausea and vomiting: no  Pulmonary: unassisted, return to baseline  Cardiovascular: stable and hypotension improving with ongoing fluid administration  Hydration: improving  Anesthetic events: no    QCDR Measures:  ASA# 11 - Ayse-op Cardiac Arrest: ASA11B - Patient did NOT experience unanticipated cardiac arrest  ASA# 12 - Ayse-op Mortality Rate: ASA12B - Patient did NOT die  ASA# 13 - PACU Re-Intubation Rate: NA - No ETT / LMA used for case  ASA# 10 - Composite Anes Safety: ASA10A - No serious adverse event    Additional Notes:

## 2021-05-31 NOTE — PROGRESS NOTES
Nurse Visit    Chief Complaint: Patient presents to clinic for assement, and treatment of their swelling.    Dressing on Arrival: 2 layer wraps on right leg had slipped down 3-4 inches, and skilled nursing down left leg.    Allergies:   Allergies   Allergen Reactions     Penicillins Rash       Medications:   Current Outpatient Medications:      albuterol (PROVENTIL HFA;VENTOLIN HFA) 90 mcg/actuation inhaler, Inhale 2 puffs every 4 (four) hours as needed for wheezing or shortness of breath., Disp: , Rfl:      aspirin 81 mg chewable tablet, Chew 81 mg daily., Disp: , Rfl:      bumetanide (BUMEX) 1 MG tablet, Take 1 tablet (1 mg total) by mouth 2 (two) times a day at 9am and 6pm., Disp: 180 tablet, Rfl: 1     esomeprazole (NEXIUM) 40 MG capsule, Take 40 mg by mouth daily. , Disp: , Rfl:      fluticasone (FLONASE) 50 mcg/actuation nasal spray, 1 spray into each nostril daily., Disp: , Rfl:      lisinopril (PRINIVIL,ZESTRIL) 20 MG tablet, Take 1 tablet (20 mg total) by mouth daily., Disp: 90 tablet, Rfl: 1     MOMETASONE/FORMOTEROL (DULERA INHL), Inhale 1 Inhalation daily. , Disp: , Rfl:      PARoxetine (PAXIL) 40 MG tablet, Take 40 mg by mouth daily. , Disp: , Rfl:      simvastatin (ZOCOR) 20 MG tablet, Take 20 mg by mouth bedtime., Disp: , Rfl:      spironolactone (ALDACTONE) 25 MG tablet, Take 1 tablet (25 mg total) by mouth daily., Disp: 90 tablet, Rfl: 3    Vital Signs: /58 (Patient Site: Left Arm, Patient Position: Sitting, Cuff Size: Adult Regular) Comment (Patient Site): forearm  Pulse 80   Temp 98.2  F (36.8  C) (Oral)   Resp 16       Assessment:    General:  Patient presents to clinic in no apparent distress.  Psychiatric:  Alert and oriented x3.   Lower extremity:  edema is present.    Integumentary:  Skin is uniformly warm, dry and pink.    Wound size:   Drain Left Breast 10 Fr. (Active)       Drain Right Breast 10 Fr. (Active)       Incision 11/04/16 Vagina (Active)       Incision 06/04/18 Breast  Left (Active)       Incision 06/04/18 Breast Right (Active)      Undermining  : NA  The periwoundskin is : NA    Plan:         1. Patient will in 2 days or when needed if wraps slip, otherwise on Tuesday with Concepción Beltrán CNP.         2. Treatment provided will include irrigation and dressings to promote autolytic debridement and will be as listed below     Cleansed with: Washed legs with soap and water    Protected skin with: Remedy Skin Repair    Dressings Applied: NA       Compression Applied to Bilateral Legs:  2-Layer Coban: I Applied the inner foam layer with the foot dorsiflexed and started atthe base of the fifth metatarsal head. I left the bottom of the heel exposed, and proceed by winding the foam up the leg using minimal overlap to just below the fibular head. I then applied the compression layer with the foot dorsiflexed and startingat the base of the fifth metatarsal head. I applied at full stretch and proceeded up the leg using 50% overlap. The bottom of the heel is covered with the compression layer up to the end at the fibular head just below the back of the knee and levelwith the top edge of the foam layer.  I gently pressed and conformed the entire surface of the system to ensurethat the two layers are firmly bound together      Offloading applied: None    Trial Products: no  Provider notified regarding concerns: no  Treatment Changes: no    Educational Barriers: none  Taught Regarding: Compression  Teaching Method: Explanation, Handout and Demo

## 2021-05-31 NOTE — PATIENT INSTRUCTIONS - HE
- Please call us if your compression wraps fall more than 1-2 inches below the bend of the knee. Call if they are too painful. Call if they get wet. If it is a weekend and the wraps fall down, are too painful, or get wet take the wraps off and put on another form of compression. Compression such as velcro wraps, compression stockings, short stretch bandages, or tubular compression. Apply one of these until you can be seen in clinic. Please call us if you have any questions [phone].    - Treatment:  Layered Compression Bandaging (2-layer)    What is it?  The layered compression bandaging has a layer of absorbent material that will soak up drainage.     Why we do it.   This is done to treat swelling, wounds, or both.  This will in turn help circulation and healing.    How to care for your bandages.  The wraps need to be kept dry. If  the wraps become wet, remove them and call the clinic to have another wrap applied.    What to expect.  It is common for the wraps to be uncomfortable at the beginning. The first two days are usually the hardest; then they will become more comfortable.       Elevating your legs will help the discomfort. Try to elevate your legs as much as possible.    If rest and elevation does not help your discomfort, call your provider.  If your provider is not available you can remove the wrap and leave a message for further instructions.    - Swelling and Compression Therapy    Swelling in the legs can be caused by many reasons. No matter what the reason, treatment usually includes some type of compression. This may be done with a support sock, dressing, ace wrap, or layered wraps.     It is important to treat the swelling for many reasons. If the swelling is not treated you may develop blisters that can lead to ulcerations. This is caused when extra fluid goes into tissue causing damage and blocking blood flow to the tissue.     It is important that you wear your compression every day,  including days that you will be seen in clinic.     Compression is often the most important part of treating leg wounds. Without controlling the swelling it is often not possible to heal wounds.     Going without compression for even brief periods of time can be damaging to your legs and your health.  Your compression should be put on first thing in the morning. Take the compression off at night only when instructed by your care provider to do so. Sometimes wearing compression 24 hours a day will be recommended.       If you are having difficulty wearing your compression it is important to notify your care provider so that other options may be reviewed.    Thank you for choosing [facility]. Please call us if you have any questions [phone].

## 2021-05-31 NOTE — PATIENT INSTRUCTIONS - HE
Thank you for choosing [facility] as partners in your care.  Please read the following information about your treatment.    Treatment:  Layered Compression Bandaging (Two-layer)    What is it?  The layered compression bandaging has a layer of absorbent material that will soak up drainage.     Why we do it.   This is done to treat swelling, wounds, or both.  This will in turn help circulation and healing.    How to care for your bandages.  The wraps need to be kept dry. If  the wraps become wet, remove them and call the clinic to have another wrap applied.    What to expect.  It is common for the wraps to be uncomfortable at the beginning. The first two days are usually the hardest; then they will become more comfortable.       Elevating your legs will help the discomfort. Try to elevate your legs as much as possible.    If rest and elevation does not help your discomfort, call your provider.  If your provider is not available you can remove the wrap and leave a message for further instructions.      If you have any questions, please contact [facility] at [phone].

## 2021-05-31 NOTE — PATIENT INSTRUCTIONS - HE
- Please call us if your compression wraps fall more than 1-2 inches below the bend of the knee. Call if they are too painful. Call if they get wet. If it is a weekend and the wraps fall down, are too painful, or get wet take the wraps off and put on another form of compression. Compression such as velcro wraps, compression stockings, short stretch bandages, or tubular compression. Apply one of these until you can be seen in clinic. Please call us if you have any questions [phone].    - Treatment:  Layered Compression Bandaging (2-layer)    What is it?  The layered compression bandaging has a layer of absorbent material that will soak up drainage.     Why we do it.   This is done to treat swelling, wounds, or both.  This will in turn help circulation and healing.    How to care for your bandages.  The wraps need to be kept dry. If  the wraps become wet, remove them and call the clinic to have another wrap applied.    What to expect.  It is common for the wraps to be uncomfortable at the beginning. The first two days are usually the hardest; then they will become more comfortable.       Elevating your legs will help the discomfort. Try to elevate your legs as much as possible.    If rest and elevation does not help your discomfort, call your provider.  If your provider is not available you can remove the wrap and leave a message for further instructions.    - Swelling and Compression Therapy    Swelling in the legs can be caused by many reasons. No matter what the reason, treatment usually includes some type of compression. This may be done with a support sock, dressing, ace wrap, or layered wraps.     It is important to treat the swelling for many reasons. If the swelling is not treated you may develop blisters that can lead to ulcerations. This is caused when extra fluid goes into tissue causing damage and blocking blood flow to the tissue.     It is important that you wear your compression every day,  including days that you will be seen in clinic.     Compression is often the most important part of treating leg wounds. Without controlling the swelling it is often not possible to heal wounds.     Going without compression for even brief periods of time can be damaging to your legs and your health.  Your compression should be put on first thing in the morning. Take the compression off at night only when instructed by your care provider to do so. Sometimes wearing compression 24 hours a day will be recommended.       If you are having difficulty wearing your compression it is important to notify your care provider so that other options may be reviewed.    Thank you for choosing [facility]. Please call us if you have any questions [phone].

## 2021-05-31 NOTE — ANESTHESIA CARE TRANSFER NOTE
Last vitals:   Vitals:    08/28/19 1011   BP: 99/43   Pulse: 78   Resp: 20   Temp:    SpO2: 96%     Patient's level of consciousness is awake  Spontaneous respirations: yes  Maintains airway independently: yes  Dentition unchanged: yes  Oropharynx: oropharynx clear of all foreign objects    QCDR Measures:  ASA# 20 - Surgical Safety Checklist: WHO surgical safety checklist completed prior to induction    PQRS# 430 - Adult PONV Prevention: 4558F - Pt received => 2 anti-emetic agents (different classes) preop & intraop  ASA# 8 - Peds PONV Prevention: NA - Not pediatric patient, not GA or 2 or more risk factors NOT present  PQRS# 424 - Ayse-op Temp Management: 4559F - At least one body temp DOCUMENTED => 35.5C or 95.9F within required timeframe  PQRS# 426 - PACU Transfer Protocol: - Transfer of care checklist used  ASA# 14 - Acute Post-op Pain: ASA14B - Patient did NOT experience pain >= 7 out of 10

## 2021-06-01 VITALS — BODY MASS INDEX: 44.41 KG/M2 | WEIGHT: 293 LBS | HEIGHT: 68 IN

## 2021-06-01 VITALS — WEIGHT: 293 LBS | BODY MASS INDEX: 44.41 KG/M2 | HEIGHT: 68 IN

## 2021-06-03 VITALS — BODY MASS INDEX: 44.41 KG/M2 | WEIGHT: 293 LBS | HEIGHT: 68 IN

## 2021-06-03 VITALS — WEIGHT: 293 LBS | BODY MASS INDEX: 44.41 KG/M2 | HEIGHT: 68 IN

## 2021-06-03 VITALS — HEIGHT: 68 IN | BODY MASS INDEX: 44.41 KG/M2 | WEIGHT: 293 LBS

## 2021-06-03 VITALS — HEIGHT: 68 IN | WEIGHT: 293 LBS | BODY MASS INDEX: 44.41 KG/M2

## 2021-06-09 NOTE — PROGRESS NOTES
Received a PA for pt's Dulera, I called [employer] and initiated a PA over the phone.  This was approved from 1/31/17-3/1/20, case ID: [id number].  I called and informed pharmacy of this approval.

## 2021-06-12 NOTE — PROGRESS NOTES
From: Maykel Vasquez DO     Sent: 9/6/2017   4:17 PM       To: Hyacinth Smith RN    Please inform patient that her K+ was mildly elevated.  I would recommend we repeat in 2 weeks. No change in medications at this time.   Have patient avoid high potassium foods.   Thanks-Froy

## 2021-06-15 PROBLEM — J98.4 RESTRICTIVE LUNG DISEASE SECONDARY TO OBESITY: Status: ACTIVE | Noted: 2017-02-16

## 2021-06-15 PROBLEM — E66.9 RESTRICTIVE LUNG DISEASE SECONDARY TO OBESITY: Status: ACTIVE | Noted: 2017-02-16

## 2021-06-15 PROBLEM — I27.20 PULMONARY HYPERTENSION, MILD (H): Status: ACTIVE | Noted: 2017-02-16

## 2021-06-15 PROBLEM — I51.89 RIGHT VENTRICULAR SYSTOLIC DYSFUNCTION: Status: ACTIVE | Noted: 2017-02-16

## 2021-06-15 PROBLEM — R94.30 ELEVATED LEFT VENTRICULAR END-DIASTOLIC PRESSURE (LVEDP): Status: ACTIVE | Noted: 2017-02-16

## 2021-06-15 PROBLEM — G47.33 OSA ON CPAP: Status: ACTIVE | Noted: 2017-02-16

## 2021-06-17 NOTE — PATIENT INSTRUCTIONS - HE
Patient Instructions by Concepción Beltrán NP at 8/13/2019  7:40 AM     Author: Concepción Beltrán NP Service: -- Author Type: Nurse Practitioner    Filed: 8/13/2019  8:17 AM Encounter Date: 8/13/2019 Status: Addendum    : Majo Flores CMA (Certified Medical Assistant)    Related Notes: Original Note by Concepción Beltrán NP (Nurse Practitioner) filed at 8/13/2019  8:06 AM       Appointment today at 2pm for [facility] in [place] with Pat for velcro compression fitting. Able to shower but please put on your double dermafit when done    Continue to wear your compression stockings or velcro wraps every day; put them on first thing in the morning and remove at bedtime    Replace your compression stockings every 3-4 months; these garments will lose their elasticity and become ineffective    Replace velcro wraps every 1-2 years    Elevate your legs periodically throughout the day, 30-60 minutes 1-3 times per day    Apply lotion to your legs 1-2 times per day; some good name brands are Cetaphil, Sarna, Aveeno, VaniCream    Continue to walk and exercise    If you are taking a diuretic continue to do so at the direction of your primary care provider    Make an appointment at the vascular clinic again if you have worsening swelling, need a prescription for new compression garments; and/or develop new wounds                  DO NOT STRAP VELCRO TO !  1.2.        3.4.

## 2021-06-18 NOTE — ANESTHESIA PREPROCEDURE EVALUATION
Anesthesia Evaluation      Patient summary reviewed     Airway   Mallampati: III  Neck ROM: limited   Pulmonary    (+) shortness of breath, sleep apnea, decreased breath sounds,                          Cardiovascular   (+) hypertension, CAD, ,     Rhythm: irregular  Rate: normal,         Neuro/Psych    (+) neuromuscular disease,      Endo/Other       GI/Hepatic/Renal    (+) hiatal hernia, GERD,        Other findings:   Dyspnea     Anxiety disorder    Tobacco abuse, in remission    Coronary artery disease    Hypertension    Orthopnea    CARL on CPAP    Pulmonary hypertension, mild    Right ventricular systolic dysfunction    Restrictive lung disease secondary to obesity    Elevated left ventricular end-diastolic pressure (LVEDP)               Dental - normal exam                        Anesthesia Plan  Planned anesthetic: general endotracheal  GETA - DLIDESCOPE  / DEC 10 ZOF 4  ASA 3   Induction: intravenous   Anesthetic plan and risks discussed with: patient  Anesthesia plan special considerations: antiemetics,   Post-op plan: routine recovery

## 2021-06-18 NOTE — ANESTHESIA CARE TRANSFER NOTE
Last vitals:   Vitals:    06/04/18 1249   BP: 125/57   Pulse: 92   Resp: 26   Temp: 36.9  C (98.5  F)   SpO2: 94%     Patient's level of consciousness is drowsy  Spontaneous respirations: yes  Maintains airway independently: yes  Dentition unchanged: yes  Oropharynx: oropharynx clear of all foreign objects    QCDR Measures:  ASA# 20 - Surgical Safety Checklist: WHO surgical safety checklist completed prior to induction  PQRS# 430 - Adult PONV Prevention: 4558F - Pt received => 2 anti-emetic agents (different classes) preop & intraop  ASA# 8 - Peds PONV Prevention: NA - Not pediatric patient, not GA or 2 or more risk factors NOT present  PQRS# 424 - Ayse-op Temp Management: 4559F - At least one body temp DOCUMENTED => 35.5C or 95.9F within required timeframe  PQRS# 426 - PACU Transfer Protocol: - Transfer of care checklist used  ASA# 14 - Acute Post-op Pain: ASA14B - Patient did NOT experience pain >= 7 out of 10

## 2021-06-18 NOTE — ANESTHESIA POSTPROCEDURE EVALUATION
Patient: Chika Ferguson  BILATERAL REDUCTION MAMMOPLASTY  Anesthesia type: general    Patient location: PACU  Last vitals:   Vitals:    06/04/18 1600   BP: 104/65   Pulse: 98   Resp: 16   Temp:    SpO2: 93%     Post vital signs: stable  Level of consciousness: awake and responds to simple questions  Post-anesthesia pain: pain controlled  Post-anesthesia nausea and vomiting: no  Pulmonary: unassisted  Cardiovascular: stable  Hydration: adequate  Anesthetic events: no    QCDR Measures:  ASA# 11 - Ayse-op Cardiac Arrest: ASA11B - Patient did NOT experience unanticipated cardiac arrest  ASA# 12 - Ayse-op Mortality Rate: ASA12B - Patient did NOT die  ASA# 13 - PACU Re-Intubation Rate: ASA13B - Patient did NOT require a new airway mgmt  ASA# 10 - Composite Anes Safety: ASA10A - No serious adverse event    Additional Notes: Patient's O2 sats at 93% on RA preoperatively. Able to maintain sats >88% in PACU.

## 2021-06-25 NOTE — PROGRESS NOTES
Progress Notes by Maykel Vasquez DO at 2/16/2017  8:50 AM     Author: Maykel Vasquez DO Service: -- Author Type: Physician    Filed: 2/16/2017  9:36 AM Encounter Date: 2/16/2017 Status: Signed    : Maykel Vasquez DO (Physician)           Click to link to [facility]     [facility] HEART CARE NOTE    Assessment/Recommendations   Assessment:    1.  Coronary artery disease s/p 4.0x12 Promus CHAYO in mid LAD: No anginal symptoms.    - Aspirin 81 mg daily lifelong (do not hold for surgery)  - Discontinue Plavix 75 mg daily  - Holding metoprolol to determine if fatigue improves.     2.  Exertional dyspnea associated with orthopnea with mild pulmonary HTN:  Etiology is likely combination of multiple factors including likely restrictive lung disease related to obesity hypoventilation,CARL now on CPAP, elevated LVEDP .  Right heart catheterization did not demonstrate significant elevation in PA pressures which were only minimally elevated related to elevated LVEDP.  RA pressure was elevated.  LVEDP was modestly elevated.   Sniff test demonstrated normal diaphragm motion.  NM Lexiscan stress LVEF: 65%.   - Continue CPAP at night.  Patient has restrictive lung volumes by PFTs (pulmonay believed this in relate to obesity which could be resulting in dyspnea with supine position (342 lb, BMI:54).         - Continue Iasix to 60 mg BID  - Increase Lisinopril 20 mg daily    - Long discussion regarding need to attempt daily exercises for deconditioned state.   - Must continue to attempt weight loss  - Increase spironolactone  - Hold metoprolol to determine if fatigue improves    3.  Hyperlipidemia  4.  Hypertension  5.  Anxiety disorder    F/U in 3 months           History of Present Illness    Ms. Chika Ferguson is a 56 y.o. female presenting with in follow-up for exertional dyspnea, orthopnea and coronary artery disease.  Patient has presumed significant sleep apnea based on findings  during prior hospitalization.  PFTs clearly demonstrate obesity hypoventilation.  Patient has morbid obesity    Patient underwent left and right heart catheterization January 2016, which demonstrated significant mid left anterior descending stenosis which drug-eluting stent was placed.  Right heart cath demonstrated mild elevation in pulmonary arterial pressures with elevation in right atrial and elevation left end-diastolic pressures.  During hospitalization patient required intubation for heart catheterization during this time anesthesia noted the patient has high likelihood of severe sleep apnea.  Overnight oximetry demonstrated significant nocturnal hypoxemia.  She remains on CPAP with significant improvement in fatigue and sleep quality.      Since last follow-up patient has had continued improvement in her exertional dyspnea.  She has stop smoking completely.  She continues to attempt exercise but has had minimal success in the last few months given the cold weather.  We continue to stress the importance of weight loss given obesity hypoventilation may be the main cause of all of her symptoms.  She denies any anginal chest pain.  She denies any syncope.  Denies any palpitations.  Vaginal bleeding has improved and she underwent successful D&C without significant pathology findings.  Plan to stop Plavix today      Left and right heart catheterization: 1/7/2016  LM minimal disease   LAD 90% discrete stenosis in the mid segment   LCx mild disease   RCA mild disease   Successful PCI of mid LAD with 4.0x12 Promus CHAYO   0% residual, YADY 3 flow pre and post   RA 15   PA 41/23/30   PCWP 19 (no significant v waves)   LVedp 17mmHg    Echocardiogram:  Summary   Normal left ventricular size.   Mild concentric left ventricular hypertrophy.   Possible inferior wall hypokinesis   The right ventricle is not clearly visualized.   Mild tricuspid regurgitation.   Estimated right ventricular systolic pressure is 44 mmHg.    Technically very difficult examination.     NM Lexiscan Stress: 12/7/2015  FINDINGS: The Lexiscan stress and rest images demonstrate normal left ventricular   size and tracer uptake pattern. The gated images reveal normal resting regional   wall motion and global systolic performance. The measured resting ejection fraction   is 69 %.          Physical Examination Review of Systems   Vitals:    02/16/17 0849   BP: 108/76   Pulse: 70   Resp: 18   SpO2: 93%     Body mass index is 54.35 kg/(m^2).  Wt Readings from Last 3 Encounters:   02/16/17 (!) 347 lb (157.4 kg)   11/03/16 (!) 349 lb (158.3 kg)   09/19/16 (!) 342 lb (155.1 kg)       General Appearance:   no distress, morbid obese body habitus   ENT/Mouth: membranes moist, no oral lesions or bleeding gums.      EYES:  no scleral icterus, normal conjunctivae   Neck: no carotid bruits or thyromegaly   Chest/Lungs:   lungs are clear to auscultation, no rales or wheezing, no sternal scar, equal chest wall expansion    Cardiovascular:   Regular. Distant. Normal first and second heart sounds with no murmurs, rubs, or gallops; the carotid, radial and posterior tibial pulses are intact, Jugular venous pressure unable to assess, mild pitting edema bilaterally (stable)   Abdomen:  no organomegaly, masses, bruits, or tenderness; bowel sounds are present   Extremities: no cyanosis or clubbing   Skin: no xanthelasma, warm.    Neurologic: normal gait, normal  bilateral, no tremors     Psychiatric: alert and oriented x3, calm     General: WNL  Eyes: WNL  Ears/Nose/Throat: WNL  Lungs: Shortness of Breath  Heart: Shortness of Breath with activity, Leg Swelling  Stomach: WNL  Bladder: WNL  Muscle/Joints: WNL  Skin: WNL  Nervous System: WNL  Mental Health: WNL     Blood: Easy Bruising     Medical History  Surgical History Family History Social History   Past Medical History:   Diagnosis Date   ? Anxiety    ? Coronary artery disease 1/28/2016   ? GERD (gastroesophageal reflux  disease)    ? HLD (hyperlipidemia)    ? HTN (hypertension)    ? PMB (postmenopausal bleeding)    ? Shortness of breath     with exertion   ? Sleep apnea     CPAP    Past Surgical History:   Procedure Laterality Date   ?  SECTION      X3   ? COMBINED HYSTEROSCOPY DIAGNOSTIC / D&C N/A 2016    Procedure: DILATION AND CURETTAGE WITH HYSTEROSCOPY;  Surgeon: Víctor Tabor MD;  Location: [facility];  Service:    ? CORONARY STENT PLACEMENT  01/2016    X1   ? DILATION AND CURETTAGE OF UTERUS      X2   ? TUBAL LIGATION     ? VARICOSE VEIN SURGERY      bilateral legs    Family History   Problem Relation Age of Onset   ? Snoring Mother    ? Cancer Mother    ? Snoring Sister    ? Snoring Brother    ? Atrial fibrillation Brother     Social History     Social History   ? Marital status: Single     Spouse name: N/A   ? Number of children: N/A   ? Years of education: N/A     Occupational History   ? Not on file.     Social History Main Topics   ? Smoking status: Former Smoker     Quit date: 2015   ? Smokeless tobacco: Not on file   ? Alcohol use No      Comment: occ   ? Drug use: No   ? Sexual activity: Not on file     Other Topics Concern   ? Not on file     Social History Narrative          Medications  Allergies   Current Outpatient Prescriptions   Medication Sig Dispense Refill   ? aspirin 81 mg chewable tablet Chew 81 mg daily.     ? clopidogrel (PLAVIX) 75 mg tablet Take 1 tablet (75 mg total) by mouth daily. 30 tablet 0   ? furosemide (LASIX) 40 MG tablet Take 1.5 tablets (60 mg total) by mouth 2 (two) times a day at 9am and 6pm. 270 tablet 2   ? lisinopril (PRINIVIL,ZESTRIL) 5 MG tablet Take 10 mg by mouth daily.      ? metoprolol succinate (TOPROL XL) 25 MG Take 1 tablet (25 mg total) by mouth daily. 90 tablet 1   ? MOMETASONE/FORMOTEROL (DULERA INHL) Inhale.     ? pantoprazole (PROTONIX) 40 MG tablet Take 40 mg by mouth daily.     ? PARoxetine (PAXIL) 40 MG tablet Take 40 mg by mouth daily.       ? simvastatin (ZOCOR) 20 MG tablet Take 20 mg by mouth bedtime.     ? spironolactone (ALDACTONE) 25 MG tablet Take 0.5 tablets (12.5 mg total) by mouth daily. 45 tablet 2   ? albuterol (PROVENTIL HFA;VENTOLIN HFA) 90 mcg/actuation inhaler Inhale 2 puffs every 4 (four) hours as needed for wheezing or shortness of breath.     ? esomeprazole (NEXIUM) 40 MG capsule Take 40 mg by mouth daily.      ? fluocinonide (LIDEX) 0.05 % cream   1   ? zolpidem (AMBIEN) 5 MG tablet Take 5 mg by mouth bedtime as needed.   2     No current facility-administered medications for this visit.       Allergies   Allergen Reactions   ? Penicillins Rash         Lab Results    Chemistry/lipid CBC Cardiac Enzymes/BNP/TSH/INR   Lab Results   Component Value Date    CHOL 158 05/20/2016    HDL 41 (L) 05/20/2016    LDLCALC 101 05/20/2016    TRIG 79 05/20/2016    CREATININE 0.68 10/31/2016    BUN 16 10/31/2016    K 4.6 10/31/2016     10/31/2016     10/31/2016    CO2 32 (H) 10/31/2016    Lab Results   Component Value Date    WBC 9.4 10/31/2016    HGB 12.7 11/04/2016    HCT 41.9 10/31/2016    MCV 93 10/31/2016     10/31/2016    Lab Results   Component Value Date    CKTOTAL 50 11/04/2015    CKMB 3 09/03/2015    TROPONINI <0.01 09/06/2015    BNP 65 01/08/2016    TSH 2.76 05/20/2016    INR 0.99 09/06/2015   No results found for: HGBA1C

## 2021-06-25 NOTE — PROGRESS NOTES
Progress Notes by Maykel Vasquez DO at 9/6/2017  8:50 AM     Author: Maykel Vasquez DO Service: -- Author Type: Physician    Filed: 9/6/2017  9:23 AM Encounter Date: 9/6/2017 Status: Signed    : Maykel Vasquez DO (Physician)           Click to link to [facility]     [facility] HEART CARE NOTE    Assessment/Recommendations   Assessment:    1.  Coronary artery disease s/p 4.0x12 Promus CHAYO in mid LAD: No anginal symptoms.    - Aspirin 81 mg daily lifelong (do not hold for surgery)  - Simvastatin     2.  Exertional dyspnea associated with orthopnea with mild pulmonary HTN with elevated LVEDp (LV diastolic dysfunction):  Etiology is likely combination of multiple factors including likely restrictive lung disease related to obesity hypoventilation,CARL now on CPAP, elevated LVEDP with LV diastolic dysfunction .  Right heart catheterization in past did not demonstrate significant elevation in PA pressures which were only minimally elevated related to elevated LVEDP.  RA pressure was elevated.  LVEDP was modestly elevated.  LVEF: 60% by echo.    - Continue CPAP at night.     - Continue Iasix to 60 mg BID  - Continue Lisinopril 20 mg daily    - Long discussion regarding need to continue attempts at daily exercises and diet modifications.   - Continue spironolactone  -Check renal function and potassium level given spironolactone and Lasix use    3.  Hyperlipidemia  4.  Hypertension, controlled.  5.  Anxiety disorder    F/U in 3 months     History of Present Illness    Ms. Chika Ferguson is a 57 y.o. female presenting with in follow-up for exertional dyspnea, CARL, HLD and coronary artery disease who present in follow-up for CAD.     Since last fall the patient continues to do well.  She continues to note improving exertional dyspnea.  She did not have any dyspnea with ambulating into the clinic today markable improvement compared to where she was a year and a half ago.   She  continues to remain on her diuretic therapy and lisinopril for elevated LVEDP.  She remains compliant with her CPAP when she notes improvement in her breathing and also fatigue.  She has minimal lower extremity edema.  She has minimal orthopnea.  She does have mild exertional dyspnea with moderate to strenuous activity.  She continues to work on daily exercise with water aerobics.  She has attempted diet modifications for weight loss.  She is quit smoking.  Blood pressure is well controlled today.    Left and right heart catheterization: 1/7/2016  LM minimal disease   LAD 90% discrete stenosis in the mid segment   LCx mild disease   RCA mild disease   Successful PCI of mid LAD with 4.0x12 Promus CHAYO   0% residual, YADY 3 flow pre and post   RA 15   PA 41/23/30   PCWP 19 (no significant v waves)   LVedp 17mmHg    Echocardiogram:  Summary   Normal left ventricular size.   Mild concentric left ventricular hypertrophy.   Possible inferior wall hypokinesis   The right ventricle is not clearly visualized.   Mild tricuspid regurgitation.   Estimated right ventricular systolic pressure is 44 mmHg.   Technically very difficult examination.     NM Lexiscan Stress: 12/7/2015  FINDINGS: The Lexiscan stress and rest images demonstrate normal left ventricular   size and tracer uptake pattern. The gated images reveal normal resting regional   wall motion and global systolic performance. The measured resting ejection fraction   is 69 %.          Physical Examination Review of Systems   Vitals:    09/06/17 0901   BP: 118/74   Pulse: 68   Resp: 16     Body mass index is 52 kg/(m^2).  Wt Readings from Last 3 Encounters:   02/16/17 (!) 347 lb (157.4 kg)   11/03/16 (!) 349 lb (158.3 kg)   09/19/16 (!) 342 lb (155.1 kg)       General Appearance:   no distress, morbid obese body habitus   ENT/Mouth: membranes moist, no oral lesions or bleeding gums.      EYES:  no scleral icterus, normal conjunctivae   Neck: no carotid bruits or  thyromegaly   Chest/Lungs:   lungs are clear to auscultation, no rales or wheezing, no sternal scar, equal chest wall expansion    Cardiovascular:   Regular. Distant. Normal first and second heart sounds with no murmurs, rubs, or gallops; the carotid, radial and posterior tibial pulses are intact, Jugular venous pressure unable to assess, minimal pitting edema bilaterally (improved)   Abdomen:  no organomegaly, masses, bruits, or tenderness; bowel sounds are present   Extremities: no cyanosis or clubbing   Skin: no xanthelasma, warm.    Neurologic: normal gait, normal  bilateral, no tremors     Psychiatric: alert and oriented x3, calm     General: WNL  Eyes: WNL  Ears/Nose/Throat: Nosebleeds  Lungs: Snoring  Heart: WNL  Stomach: WNL  Bladder: WNL  Muscle/Joints: WNL  Skin: WNL  Nervous System: WNL  Mental Health: WNL     Blood: WNL       Medical History  Surgical History Family History Social History   Past Medical History:   Diagnosis Date   ? Anxiety    ? Coronary artery disease 2016   ? GERD (gastroesophageal reflux disease)    ? HLD (hyperlipidemia)    ? HTN (hypertension)    ? PMB (postmenopausal bleeding)    ? Shortness of breath     with exertion   ? Sleep apnea     CPAP    Past Surgical History:   Procedure Laterality Date   ?  SECTION      X3   ? COMBINED HYSTEROSCOPY DIAGNOSTIC / D&C N/A 2016    Procedure: DILATION AND CURETTAGE WITH HYSTEROSCOPY;  Surgeon: Víctor Tabor MD;  Location: [facility];  Service:    ? CORONARY STENT PLACEMENT  01/2016    X1   ? DILATION AND CURETTAGE OF UTERUS      X2   ? TUBAL LIGATION     ? VARICOSE VEIN SURGERY      bilateral legs    Family History   Problem Relation Age of Onset   ? Snoring Mother    ? Cancer Mother    ? Snoring Sister    ? Snoring Brother    ? Atrial fibrillation Brother     Social History     Social History   ? Marital status: Single     Spouse name: N/A   ? Number of children: N/A   ? Years of education: N/A      Occupational History   ? Not on file.     Social History Main Topics   ? Smoking status: Former Smoker     Quit date: 9/1/2015   ? Smokeless tobacco: Not on file   ? Alcohol use No      Comment: occ   ? Drug use: No   ? Sexual activity: Not on file     Other Topics Concern   ? Not on file     Social History Narrative          Medications  Allergies   Current Outpatient Prescriptions   Medication Sig Dispense Refill   ? albuterol (PROVENTIL HFA;VENTOLIN HFA) 90 mcg/actuation inhaler Inhale 2 puffs every 4 (four) hours as needed for wheezing or shortness of breath.     ? aspirin 81 mg chewable tablet Chew 81 mg daily.     ? esomeprazole (NEXIUM) 40 MG capsule Take 40 mg by mouth daily.      ? fluocinonide (LIDEX) 0.05 % cream   1   ? furosemide (LASIX) 40 MG tablet Take 1.5 tablets (60 mg total) by mouth 2 (two) times a day. 270 tablet 1   ? lisinopril (PRINIVIL,ZESTRIL) 20 MG tablet Take 1 tablet (20 mg total) by mouth daily. 90 tablet 3   ? MOMETASONE/FORMOTEROL (DULERA INHL) Inhale.     ? pantoprazole (PROTONIX) 40 MG tablet Take 40 mg by mouth daily.     ? PARoxetine (PAXIL) 40 MG tablet Take 40 mg by mouth daily.      ? simvastatin (ZOCOR) 20 MG tablet Take 20 mg by mouth bedtime.     ? spironolactone (ALDACTONE) 25 MG tablet Take 1 tablet (25 mg total) by mouth daily. 90 tablet 0   ? zolpidem (AMBIEN) 5 MG tablet Take 5 mg by mouth bedtime as needed.   2     No current facility-administered medications for this visit.       Allergies   Allergen Reactions   ? Penicillins Rash         Lab Results    Chemistry/lipid CBC Cardiac Enzymes/BNP/TSH/INR   Lab Results   Component Value Date    CHOL 158 05/20/2016    HDL 41 (L) 05/20/2016    LDLCALC 101 05/20/2016    TRIG 79 05/20/2016    CREATININE 0.68 10/31/2016    BUN 16 10/31/2016    K 4.6 10/31/2016     10/31/2016     10/31/2016    CO2 32 (H) 10/31/2016    Lab Results   Component Value Date    WBC 9.4 10/31/2016    HGB 12.7 11/04/2016    HCT 41.9  10/31/2016    MCV 93 10/31/2016     10/31/2016    Lab Results   Component Value Date    CKTOTAL 50 11/04/2015    CKMB 3 09/03/2015    TROPONINI <0.01 09/06/2015    BNP 65 01/08/2016    TSH 2.76 05/20/2016    INR 0.99 09/06/2015   No results found for: HGBA1C

## 2021-06-26 NOTE — PROGRESS NOTES
Progress Notes by Maykel Vasquez DO at 5/29/2018  7:50 AM     Author: Maykel Vasquez DO Service: -- Author Type: Physician    Filed: 5/29/2018  8:30 AM Encounter Date: 5/29/2018 Status: Signed    : Maykel Vasquez DO (Physician)           Click to link to [facility]     [facility] HEART CARE NOTE    Assessment/Recommendations   Assessment:    1.  Coronary artery disease s/p 4.0x12 Promus CHAYO in mid LAD (1/2016): No active anginal symptoms.    - Aspirin 81 mg daily  - Simvastatin 20 mg daily.     2.  Exertional dyspnea associated with orthopnea with mild pulmonary HTN with elevated LVEDp (LV diastolic dysfunction):  Etiology is likely combination of multiple factors including likely restrictive lung disease related to obesity hypoventilation,CARL now on CPAP, elevated LVEDP with LV diastolic dysfunction.   - Continue CPAP at night.     - Continue Bumex   - Continue Lisinopril 20 mg daily    - Agree that breast reduction surgery may improve breathing difficulties.    - Continue spironolactone  3.  Hyperlipidemia,  (reviewed).   - Consider increase to simvastatin 40 mg daily.   4.  Hypertension, controlled.  5.  Anxiety disorder.     Preoperative Cardiac Risk: AHA/ACC 2014  Assessment of preoperative cardiac risk: She has no active cardiac conditions (unstable coronary syndromes, decompensated HF, significant arrhythmias, or severe valvular disease), has known coronary artery disease, has 2 clinical risk factors (ischemic heart disease, prior heart failure, cerebrovascular disease, diabetes mellitus, and renal insufficiency) and has a functional capacity of 4 METs.     Continue current medications.  Patient has started holding ASA for surgery at request of surgical team.  Continue statin.  Will not initiate beta blocker at this time.  No change in 12 lead ECG compared to 2016.  Recommendation: No further cardiac testing required prior to breast reduction surgery.  She  can proceed from a cardiac standpoint for elective breast reduction surgery.    F/U in 6 months     History of Present Illness    Ms. Chika Ferguson is a 58 y.o. female presenting with in follow-up for exertional dyspnea, CARL, HLD and coronary artery disease who present in follow-up for cardiovascular disease and mild pulmonary hypertension.    Since last evaluation the patient's had a stable course from a cardiac standpoint.  She has been treated for recently for sinus issues related to allergies which has improved. Currently she denies any anginal chest pain.  She continues to have stable dyspnea with exertion which has gradually improved over time.  There is concerned that her dyspnea is related to obesity hypoventilation and breast reduction surgery may help with her breathing.  She is scheduled for elective breast reduction surgery on June 4.  She has stopped her aspirin therapy as directed by the surgical team.  Her EKG today was reviewed which demonstrated normal sinus rhythm poor anterior R-wave progression which is unchanged compared to 2016.  Continues to have orthopnea symptoms.     Patient denies any near syncopal or syncopal episodes, palpitations or chest pain.      Left and right heart catheterization: 1/7/2016  LM minimal disease   LAD 90% discrete stenosis in the mid segment   LCx mild disease   RCA mild disease   Successful PCI of mid LAD with 4.0x12 Promus CHAYO   0% residual, YADY 3 flow pre and post   RA 15   PA 41/23/30   PCWP 19 (no significant v waves)   LVedp 17mmHg    Echocardiogram:  Summary   Normal left ventricular size.   Mild concentric left ventricular hypertrophy.   Possible inferior wall hypokinesis   The right ventricle is not clearly visualized.   Mild tricuspid regurgitation.   Estimated right ventricular systolic pressure is 44 mmHg.   Technically very difficult examination.     NM Lexiscan Stress: 12/7/2015  FINDINGS: The Lexiscan stress and rest images demonstrate normal left  ventricular   size and tracer uptake pattern. The gated images reveal normal resting regional   wall motion and global systolic performance. The measured resting ejection fraction   is 69 %.          Physical Examination Review of Systems   Vitals:    05/29/18 0745   BP: 112/66   Pulse: 72   Resp: 16     Body mass index is 50.02 kg/(m^2).  Wt Readings from Last 3 Encounters:   05/29/18 (!) 329 lb (149.2 kg)   09/06/17 (!) 342 lb (155.1 kg)   02/16/17 (!) 347 lb (157.4 kg)       General Appearance:   no distress, morbidly obese body habitus   ENT/Mouth: membranes moist, no oral lesions or bleeding gums.      EYES:  no scleral icterus, normal conjunctivae   Neck: no carotid bruits or thyromegaly   Chest/Lungs:   lungs are clear to auscultation, no rales or wheezing, no sternal scar, equal chest wall expansion    Cardiovascular:   Regular. Distant. Normal first and second heart sounds with no murmurs, rubs, or gallops; the carotid, radial and posterior tibial pulses are intact, Jugular venous pressure unable to assess, no pitting edema bilaterally (resolved).    Abdomen:  no organomegaly, masses, bruits, or tenderness; bowel sounds are present   Extremities: no cyanosis or clubbing   Skin: no xanthelasma, warm.    Neurologic: normal gait, normal  bilateral, no tremors     Psychiatric: alert and oriented x3, calm     General: WNL  Eyes: WNL  Ears/Nose/Throat: WNL  Lungs: Shortness of Breath  Heart: Shortness of Breath with activity, Leg Swelling  Stomach: WNL  Bladder: WNL  Muscle/Joints: WNL  Skin: WNL  Nervous System: WNL  Mental Health: WNL     Blood: WNL       Medical History  Surgical History Family History Social History   Past Medical History:   Diagnosis Date   ? Anxiety    ? Coronary artery disease 1/28/2016   ? GERD (gastroesophageal reflux disease)    ? HLD (hyperlipidemia)    ? HTN (hypertension)    ? PMB (postmenopausal bleeding)    ? Shortness of breath     with exertion   ? Sleep apnea     CPAP     Past Surgical History:   Procedure Laterality Date   ?  SECTION      X3   ? COMBINED HYSTEROSCOPY DIAGNOSTIC / D&C N/A 2016    Procedure: DILATION AND CURETTAGE WITH HYSTEROSCOPY;  Surgeon: Víctor Tabor MD;  Location: [facility] OR;  Service:    ? CORONARY STENT PLACEMENT  01/2016    X1   ? DILATION AND CURETTAGE OF UTERUS      X2   ? TUBAL LIGATION     ? VARICOSE VEIN SURGERY      bilateral legs    Family History   Problem Relation Age of Onset   ? Snoring Mother    ? Cancer Mother    ? Snoring Sister    ? Snoring Brother    ? Atrial fibrillation Brother     Social History     Social History   ? Marital status: Single     Spouse name: N/A   ? Number of children: N/A   ? Years of education: N/A     Occupational History   ? Not on file.     Social History Main Topics   ? Smoking status: Former Smoker     Quit date: 2015   ? Smokeless tobacco: Never Used   ? Alcohol use No      Comment: occ   ? Drug use: No   ? Sexual activity: Not on file     Other Topics Concern   ? Not on file     Social History Narrative          Medications  Allergies   Current Outpatient Prescriptions   Medication Sig Dispense Refill   ? aspirin 81 mg chewable tablet Chew 81 mg daily.     ? bumetanide (BUMEX) 1 MG tablet Take 1 tablet (1 mg total) by mouth 2 (two) times a day at 9am and 6pm. 180 tablet 1   ? fluticasone (FLONASE) 50 mcg/actuation nasal spray 1 spray into each nostril daily.     ? lisinopril (PRINIVIL,ZESTRIL) 20 MG tablet Take 1 tablet (20 mg total) by mouth daily. 90 tablet 1   ? pantoprazole (PROTONIX) 40 MG tablet Take 40 mg by mouth daily.     ? PARoxetine (PAXIL) 40 MG tablet Take 40 mg by mouth daily.      ? simvastatin (ZOCOR) 20 MG tablet Take 20 mg by mouth bedtime.     ? spironolactone (ALDACTONE) 25 MG tablet Take 1 tablet (25 mg total) by mouth daily. 90 tablet 3   ? albuterol (PROVENTIL HFA;VENTOLIN HFA) 90 mcg/actuation inhaler Inhale 2 puffs every 4 (four) hours as needed for wheezing or  shortness of breath.     ? esomeprazole (NEXIUM) 40 MG capsule Take 40 mg by mouth daily.      ? fluocinonide (LIDEX) 0.05 % cream Apply 1 application topically as needed.   1   ? MOMETASONE/FORMOTEROL (DULERA INHL) Inhale 1 Inhalation daily.      ? zolpidem (AMBIEN) 5 MG tablet Take 5 mg by mouth bedtime as needed.   2     No current facility-administered medications for this visit.       Allergies   Allergen Reactions   ? Penicillins Rash         Lab Results    Chemistry/lipid CBC Cardiac Enzymes/BNP/TSH/INR   Lab Results   Component Value Date    CHOL 158 05/20/2016    HDL 41 (L) 05/20/2016    LDLCALC 101 05/20/2016    TRIG 79 05/20/2016    CREATININE 1.12 (H) 04/12/2018    BUN 33 (H) 04/12/2018    K 4.5 04/12/2018     04/12/2018     04/12/2018    CO2 28 04/12/2018    Lab Results   Component Value Date    WBC 9.4 10/31/2016    HGB 12.7 11/04/2016    HCT 41.9 10/31/2016    MCV 93 10/31/2016     10/31/2016    Lab Results   Component Value Date    CKTOTAL 50 11/04/2015    CKMB 3 09/03/2015    TROPONINI <0.01 09/06/2015    BNP 65 01/08/2016    TSH 2.76 05/20/2016    INR 0.99 09/06/2015   No results found for: HGBA1C

## 2021-06-27 NOTE — PROGRESS NOTES
"Progress Notes by Arina Covington LPN at 8/2/2019  9:20 AM     Author: Arina Covington LPN Service: -- Author Type: Licensed Nurse    Filed: 8/2/2019  9:51 AM Encounter Date: 8/2/2019 Status: Signed    : Arina Covington LPN (Licensed Nurse)                     Nurse Visit    Chief Complaint: Patient presents to clinic for assement, and treatment of their ulcer and and swelling    Dressing on Arrival Bilat 2 layer CDI. No open areas.    Allergies:   Allergies   Allergen Reactions   ? Penicillins Rash       Medications:   Current Outpatient Medications:   ?  albuterol (PROVENTIL HFA;VENTOLIN HFA) 90 mcg/actuation inhaler, Inhale 2 puffs every 4 (four) hours as needed for wheezing or shortness of breath., Disp: , Rfl:   ?  aspirin 81 mg chewable tablet, Chew 81 mg daily., Disp: , Rfl:   ?  bumetanide (BUMEX) 1 MG tablet, Take 1 tablet (1 mg total) by mouth 2 (two) times a day at 9am and 6pm., Disp: 180 tablet, Rfl: 1  ?  esomeprazole (NEXIUM) 40 MG capsule, Take 40 mg by mouth daily. , Disp: , Rfl:   ?  fluticasone (FLONASE) 50 mcg/actuation nasal spray, 1 spray into each nostril daily., Disp: , Rfl:   ?  lisinopril (PRINIVIL,ZESTRIL) 20 MG tablet, Take 1 tablet (20 mg total) by mouth daily., Disp: 90 tablet, Rfl: 1  ?  MOMETASONE/FORMOTEROL (DULERA INHL), Inhale 1 Inhalation daily. , Disp: , Rfl:   ?  PARoxetine (PAXIL) 40 MG tablet, Take 40 mg by mouth daily. , Disp: , Rfl:   ?  simvastatin (ZOCOR) 20 MG tablet, Take 20 mg by mouth bedtime., Disp: , Rfl:   ?  spironolactone (ALDACTONE) 25 MG tablet, Take 1 tablet (25 mg total) by mouth daily., Disp: 90 tablet, Rfl: 3    Vital Signs: /58 (Patient Site: Left Arm, Patient Position: Sitting, Cuff Size: Adult Large)   Pulse 72   Temp 97.6  F (36.4  C) (Oral)   Resp 16   Ht 5' 8\" (1.727 m)   Wt (!) 356 lb (161.5 kg)   BMI 54.13 kg/m        Assessment:    General:  Patient presents to clinic in no apparent distress.  Psychiatric: "  Alert and oriented x3.   Lower extremity:  edema is present.    Integumentary:  Skin is uniformly warm, dry and pink.    Wound size:   Drain Left Breast 10 Fr. (Active)       Drain Right Breast 10 Fr. (Active)       Incision 16 Vagina (Active)       Incision 18 Breast Left (Active)       Incision 18 Breast Right (Active)      Undermining is not present.    The periwoundskin is pink and WNL      Plan:         1. Patient will f/u in 4 days         2. Treatment provided will include irrigation and dressings to promote autolytic debridement and will be as listed below     Cleansed with: Normal Saline    Protected skin with: Remedy Skin Repair    Dressings Applied: none    Compression Applied to the Left Le-Layer Coban    Compression Applied to the Right Le-Layer Coban    Offloading applied: None    Trial Products: no  Provider notified regarding concerns: no  Treatment Changes: no    Educational Barriers: none  Taught Regarding: Activity, Compliance and Compression  Teaching Method: DC sheet and explanation.    Compression Applied:   2-Layer Coban: I Applied the inner foam layer with the foot dorsiflexed and started atthe base of the fifth metatarsal head. I left the bottom of the heel exposed, and proceed by winding the foam up the leg using minimal overlap to just below the fibular head. I then applied the compression layer with the foot dorsiflexed and startingat the base of the fifth metatarsal head. I applied at full stretch and proceeded up the leg using 50% overlap. The bottom of the heel is covered with the compression layer up to the end at the fibular head just below the back of the knee and levelwith the top edge of the foam layer.  I gently pressed and conformed the entire surface of the system to ensurethat the two layers are firmly bound together

## 2021-06-27 NOTE — PROGRESS NOTES
Progress Notes by Concepción Beltrán NP at 8/13/2019  7:40 AM     Author: Concepción Beltrán NP Service: -- Author Type: Nurse Practitioner    Filed: 8/13/2019  8:43 AM Encounter Date: 8/13/2019 Status: Signed    : Concepción Beltrán NP (Nurse Practitioner)       Follow up Vascular Visit       Date of Service:8/13/2019    Date Last Seen: 7/23/2019; 8/9/2019    Chief Complaint: BLE swelling; BLE ulcerations        Pt returns to the [facility] with regards to their BLE swelling and BLE ulcerations. She arrives alone today. I last saw her 7/23/19; she has had nurse visits since then twice per week. Currently they are washing the legs; applying lotion; silvercel to the weeping and ulcerated areas. For compression they are wrapping the legs with 2 layer wraps. We referred her to bariatrics; she is interested in weight loss surgery she has an appt 9/5/19. She is scheduled for colonoscopy at the end of August. She is feeling well, no fevers, chills.     Allergies: Penicillins    Medications:   Current Outpatient Medications:   ?  albuterol (PROVENTIL HFA;VENTOLIN HFA) 90 mcg/actuation inhaler, Inhale 2 puffs every 4 (four) hours as needed for wheezing or shortness of breath., Disp: , Rfl:   ?  ANORO ELLIPTA 62.5-25 mcg/actuation inhaler, , Disp: , Rfl:   ?  aspirin 81 mg chewable tablet, Chew 81 mg daily., Disp: , Rfl:   ?  bumetanide (BUMEX) 1 MG tablet, Take 1 tablet (1 mg total) by mouth 2 (two) times a day at 9am and 6pm., Disp: 180 tablet, Rfl: 1  ?  esomeprazole (NEXIUM) 40 MG capsule, Take 40 mg by mouth daily. , Disp: , Rfl:   ?  fluticasone (FLONASE) 50 mcg/actuation nasal spray, 1 spray into each nostril daily., Disp: , Rfl:   ?  lisinopril (PRINIVIL,ZESTRIL) 20 MG tablet, Take 1 tablet (20 mg total) by mouth daily., Disp: 90 tablet, Rfl: 1  ?  MOMETASONE/FORMOTEROL (DULERA INHL), Inhale 1 Inhalation daily. , Disp: , Rfl:   ?  PARoxetine (PAXIL) 40 MG tablet,  "Take 40 mg by mouth daily. , Disp: , Rfl:   ?  simvastatin (ZOCOR) 20 MG tablet, Take 20 mg by mouth bedtime., Disp: , Rfl:   ?  spironolactone (ALDACTONE) 25 MG tablet, Take 1 tablet (25 mg total) by mouth daily., Disp: 90 tablet, Rfl: 3    History:   Past Medical History:   Diagnosis Date   ? Anxiety associated with depression    ? Anxiety disorder 9/7/2015   ? Atherosclerosis of arteries    ? Coronary artery disease 1/28/2016   ? Diastolic dysfunction    ? Dyspnea 9/6/2015   ? Elevated left ventricular end-diastolic pressure (LVEDP) 2/16/2017   ? Elevated serum creatinine    ? Exertional dyspnea     associated w/orthopnea with mild pulmonary HTN   ? GERD (gastroesophageal reflux disease)    ? Hiatal hernia     heartburn   ? History of tobacco use    ? HLD (hyperlipidemia)    ? HTN (hypertension)    ? Hypertension 1/28/2016   ? Macromastia    ? Morbid obesity (H)    ? Neck pain     \"ongoing\"   ? Orthopnea 1/28/2016   ? CARL on CPAP 2/16/2017   ? PMB (postmenopausal bleeding)    ? Pulmonary hypertension, mild (H) 2/16/2017   ? Restrictive lung disease secondary to obesity 2/16/2017   ? Right ventricular systolic dysfunction 2/16/2017   ? Shortness of breath     with exertion   ? Sleep apnea     CPAP   ? Tobacco abuse, in remission 9/7/2015   ? Tobacco use        Physical Exam:    /68   Pulse 62   Temp 97.9  F (36.6  C)   Resp 18   Ht 5' 8\" (1.727 m)   Wt (!) 334 lb 4.8 oz (151.6 kg)   BMI 50.83 kg/m      General:  Patient presents to clinic in no apparent distress.  Head: normocephalic atraumatic  Psychiatric:  Alert and oriented x3.   Respiratory: unlabored breathing; no cough  Integumentary:  Skin is uniformly warm, dry and pink.    Extremities: BLE with nicely reduced swelling; skin intact; nicely moisturized; no pain with palpation    Circumferential volume measures:    Vasc Edema 7/23/2019 7/26/2019 7/30/2019 8/2/2019 8/13/2019   Right just above MTP 26 25.7 24.5 24.6 24.6   Right Ankle 33.8 30.5 " 26.8 26.8 26.3   Right Widest Calf 64.5 60 57.2 53.3 52.2   Left - just above MTP 26.8 25.8 25.1 25.1 25.3   Left Ankle 35.5 29.7 27.1 27.4 26.6   Left Widest Calf 66.4 61.5 57.2 57.3 59.2       Ulceration(s)/Wound(s):     Drain Left Breast 10 Fr. (Active)       Drain Right Breast 10 Fr. (Active)       Incision 11/04/16 Vagina (Active)       Incision 06/04/18 Breast Left (Active)       Incision 06/04/18 Breast Right (Active)        Lab Values    No results found for: SEDRATE  Lab Results   Component Value Date    CREATININE 1.35 (H) 04/22/2019     No results found for: HGBA1C  Lab Results   Component Value Date    BUN 34 (H) 04/22/2019     Lab Results   Component Value Date    ALBUMIN 3.5 04/22/2019     No results found for: [id number]          Impression:  1. Swelling of lower extremity     2. Secondary lymphedema     3. Venous stasis ulcer of other part of right lower leg limited to breakdown of skin without varicose veins (H)     4. CARL on CPAP     5. Obesity, morbid, BMI 50 or higher (H)     6. Venous hypertension of both lower extremities     7. History of vein stripping     8. Scar condition and fibrosis of skin         8/5/19 bilateral legs       7/23/19 bilateral legs            Are any of these wounds new today: No; Location: na    Assessment/Plan:          1. Debridement: na     2.  Wound treatment: wound treatment will include irrigation and dressings to promote autolytic debridement which will include:lotion daily; wounds healed           3. Edema: ready to transition into compression; will send to [facility] to get sized for these today. Will apply tubular compression bilaterally until she can get to that appt. The compression wraps were applied today in clinic.              Compression Applied: Tubigrip           4. Nutrition: we spoke about her weight and how this contributes to her swelling; she has appt with bariatrics; she has already lost some weight           5. Offloading: na     Patient will  follow up with me PRN for reevaluation. They were instructed to call the clinic sooner with any signs or symptoms of infection or any further questions/concerns. Answered all questions.    Concepción Beltrán DNP, RN, CNP, [facility]  [phone]        This note was electronically signed by Concepción Beltrán

## 2021-06-27 NOTE — PROGRESS NOTES
Progress Notes by Concepción Beltrán NP at 7/23/2019  7:40 AM     Author: Concepción Beltrán NP Service: -- Author Type: Nurse Practitioner    Filed: 7/23/2019  8:53 AM Encounter Date: 7/23/2019 Status: Signed    : Concepción Beltrán NP (Nurse Practitioner)       [facility] Consult Note    Date of Service:  7/23/2019    Requesting Provider: Dr. Parviz Samayoa    Chief Complaint: BLE swelling; BLE ulcerations    History of Present Illness: Chika Ferguson is being seen at the [facility] today regarding BLE swelling and BLE ulcerations. They arrive to the clinic today alone. The patient reports that she first started noting swelling in the legs for the past few years. The crusting and ulcerations started about 3 weeks ago. She sought medical attention; culture was done this grew out enterococcus; e. Coli, and staph; was started on keflex required x2 courses of keflex; tolerated well; she felt this helped some of the wounds clear up. Was currently/previously using shower water to wash daily; nothing else. Reports pain of 3/10 burning in the crusted areas; intermittent; flexion and extension at the ankles makes this worse; currently using ibuprofen for pain. Has used nothing as compression in the past, is currently using nothing for compression. Denies any fevers, chills, or generalized ill feeling. Denies history of cancer. Sleeps in a recliner with legs elevated this is due to her breathing issues; feels like she can't breath when flat. She does have CARL and wear cpap. Pt still has their uterus and ovaries, +abnormal vaginal bleeding she believes this was due to being on a blood thinner; this was a few years ago; had u/s and this was benign. Denies history of DVT and joint replacement. Positive history of cellulitis and vein procedures; had veins stripped bilaterally when she was in her 30s; had recent cellulitis. I personally reviewed outside  "imaging, lab work, and progress noted through Care Everywhere and outside records. She works at [employer]; works 12 hours per week; she is on partial disability due to lung issues.  Former smoker; smoked for a total of 21 years; max 1.5 ppd. Takes bumex and spironolactone; she is typically good about taking these; occasionally will miss her afternoon dose of bumex.         Review of Systems:   Constitutional:  negative  for fever, chills or night sweats  EENTM: negative for glasses;  negative [place]  GI:  negative for nausea/vomiting;  negative for constipation  negative diarrhea;  negative for fecal incontinence negative weight loss  :  negative dysuria, negative incontinence  MS: patient is ambulatory;  does not use assistive devices  Cardiac:  positive chf; cad; x1 stent  Respiratory:  positive shortness of breath; pulmonary htn; copd; asthma  Endocrine:  negative diabetes  Psych:  positive depression/anxiety    Past Medical History:    Past Medical History:   Diagnosis Date   ? Anxiety associated with depression    ? Anxiety disorder 9/7/2015   ? Atherosclerosis of arteries    ? Coronary artery disease 1/28/2016   ? Diastolic dysfunction    ? Dyspnea 9/6/2015   ? Elevated left ventricular end-diastolic pressure (LVEDP) 2/16/2017   ? Elevated serum creatinine    ? Exertional dyspnea     associated w/orthopnea with mild pulmonary HTN   ? GERD (gastroesophageal reflux disease)    ? Hiatal hernia     heartburn   ? History of tobacco use    ? HLD (hyperlipidemia)    ? HTN (hypertension)    ? Hypertension 1/28/2016   ? Macromastia    ? Morbid obesity (H)    ? Neck pain     \"ongoing\"   ? Orthopnea 1/28/2016   ? CARL on CPAP 2/16/2017   ? PMB (postmenopausal bleeding)    ? Pulmonary hypertension, mild (H) 2/16/2017   ? Restrictive lung disease secondary to obesity 2/16/2017   ? Right ventricular systolic dysfunction 2/16/2017   ? Shortness of breath     with exertion   ? Sleep apnea     CPAP   ? Tobacco abuse, in " remission 2015   ? Tobacco use         Surgical History:   Past Surgical History:   Procedure Laterality Date   ?  SECTION      X3   ? COMBINED HYSTEROSCOPY DIAGNOSTIC / D&C N/A 2016    Procedure: DILATION AND CURETTAGE WITH HYSTEROSCOPY;  Surgeon: Víctor Tabor MD;  Location: [facility];  Service:    ? CORONARY STENT PLACEMENT  01/2016    X1   ? DILATION AND CURETTAGE OF UTERUS      X2   ? ENDOMETRIAL ABLATION  2016   ? [place] REDUCTION OF LARGE BREAST Bilateral 2018    Procedure: BILATERAL REDUCTION MAMMOPLASTY;  Surgeon: Jaya Leong MD;  Location: [facility];  Service: Plastics   ? TUBAL LIGATION     ? VARICOSE VEIN SURGERY Bilateral     bilateral legs        Medications:    Current Outpatient Medications:   ?  albuterol (PROVENTIL HFA;VENTOLIN HFA) 90 mcg/actuation inhaler, Inhale 2 puffs every 4 (four) hours as needed for wheezing or shortness of breath., Disp: , Rfl:   ?  aspirin 81 mg chewable tablet, Chew 81 mg daily., Disp: , Rfl:   ?  bumetanide (BUMEX) 1 MG tablet, Take 1 tablet (1 mg total) by mouth 2 (two) times a day at 9am and 6pm., Disp: 180 tablet, Rfl: 1  ?  esomeprazole (NEXIUM) 40 MG capsule, Take 40 mg by mouth daily. , Disp: , Rfl:   ?  fluticasone (FLONASE) 50 mcg/actuation nasal spray, 1 spray into each nostril daily., Disp: , Rfl:   ?  lisinopril (PRINIVIL,ZESTRIL) 20 MG tablet, Take 1 tablet (20 mg total) by mouth daily., Disp: 90 tablet, Rfl: 1  ?  MOMETASONE/FORMOTEROL (DULERA INHL), Inhale 1 Inhalation daily. , Disp: , Rfl:   ?  PARoxetine (PAXIL) 40 MG tablet, Take 40 mg by mouth daily. , Disp: , Rfl:   ?  simvastatin (ZOCOR) 20 MG tablet, Take 20 mg by mouth bedtime., Disp: , Rfl:   ?  spironolactone (ALDACTONE) 25 MG tablet, Take 1 tablet (25 mg total) by mouth daily., Disp: 90 tablet, Rfl: 3    Allergies:   Allergies   Allergen Reactions   ? Penicillins Rash       Family history:   Family History   Problem Relation Age of Onset   ?  "Snoring Mother    ? Cancer Mother         lung   ? Snoring Sister    ? ALS Father    ? Snoring Brother    ? Atrial fibrillation Brother    ? Diabetes Maternal Uncle    ? Diabetes Maternal Grandfather    ? Diabetes Maternal Uncle         Social History:   Social History     Socioeconomic History   ? Marital status: Single     Spouse name: Not on file   ? Number of children: Not on file   ? Years of education: Not on file   ? Highest education level: Not on file   Occupational History   ? Not on file   Social Needs   ? Financial resource strain: Not on file   ? Food insecurity:     Worry: Not on file     Inability: Not on file   ? Transportation needs:     Medical: Not on file     Non-medical: Not on file   Tobacco Use   ? Smoking status: Former Smoker     Last attempt to quit: 9/1/2015     Years since quitting: 3.8   ? Smokeless tobacco: Never Used   Substance and Sexual Activity   ? Alcohol use: No     Comment: occ   ? Drug use: No   ? Sexual activity: Not on file   Lifestyle   ? Physical activity:     Days per week: Not on file     Minutes per session: Not on file   ? Stress: Not on file   Relationships   ? Social connections:     Talks on phone: Not on file     Gets together: Not on file     Attends Oriental orthodox service: Not on file     Active member of club or organization: Not on file     Attends meetings of clubs or organizations: Not on file     Relationship status: Not on file   ? Intimate partner violence:     Fear of current or ex partner: Not on file     Emotionally abused: Not on file     Physically abused: Not on file     Forced sexual activity: Not on file   Other Topics Concern   ? Not on file   Social History Narrative   ? Not on file        Physical Exam  Vitals: Blood pressure 128/78, pulse 64, temperature 98  F (36.7  C), height 5' 8\" (1.727 m), weight (!) 356 lb 3.2 oz (161.6 kg), not currently breastfeeding.  General: This is a 59 y.o. female who appears their reported age, not in acute " distress  Head: normocephalic, Atraumatic; not wearing glasses; non-icteric sclera; no exudate; mild hearing loss   Respiratory: diminished but Clear throughout all lung fields; unlabored breathing; no cough   Cardiac: Regular, Rate and Rhythm, no murmurs appreciated   Skin: Uniformly warm and dry  Psych: Alert and oriented x4; calm and cooperative throughout exam  Abdomen: Normal bowel sounds. Soft, symmetric, no guarding or rigidity, nontender with palpation.  No organomegaly or masses palpated. Exam limited due to body habitus  Lymphatics: MARQUES due to body habitus  Extremities: BLE with brawny edema; tissues are firm and fibrotic; the right medial leg with boggy crusting; nails well trimmed; webbing clear; skin is overly dry; no rash; no erythema; no warmth to the tissues; minimal tenderness to palpation; strength testing revealed 4/4 to BLEs.    Sensation: Intact to pinprick and light touch throughout lower extremities bilaterally       Peripheral Vascular: normal dorsalis pedis, posterior tibial pulses to bilateral feet , using a handheld doppler these were easily found; strong; and biphasic in nature.  Good capillary refill. No unusual venous distention. Positive for spider veins, hemosiderin deposition or hyperpigmentation and fibrosis or scarring  Negative for telangiectasias      Circumferential volume measures:    Vasc Edema 7/23/2019   Right just above MTP 26   Right Ankle 33.8   Right Widest Calf 64.5   Left - just above MTP 26.8   Left Ankle 35.5   Left Widest Calf 66.4       Ulceration(s)/Wound(s):     Drain Left Breast 10 Fr. (Active)       Drain Right Breast 10 Fr. (Active)       Incision 11/04/16 Vagina (Active)       Incision 06/04/18 Breast Left (Active)       Incision 06/04/18 Breast Right (Active)       Laboratory studies:   No results found for: SEDRATE  Lab Results   Component Value Date    CREATININE 1.35 (H) 04/22/2019     No results found for: HGBA1C  Lab Results   Component Value Date     BUN 34 (H) 04/22/2019     Lab Results   Component Value Date    ALBUMIN 3.5 04/22/2019     No results found for: [id number]          Impression:   1. CARL on CPAP  Ambulatory referral to Bariatric Care: Surgical and Non-Surgical   2. Secondary lymphedema  Ambulatory referral to Bariatric Care: Surgical and Non-Surgical   3. Obesity, morbid, BMI 50 or higher (H)  Ambulatory referral to Bariatric Care: Surgical and Non-Surgical   4. Venous hypertension of both lower extremities  Ambulatory referral to Bariatric Care: Surgical and Non-Surgical   5. History of vein stripping  Ambulatory referral to Bariatric Care: Surgical and Non-Surgical   6. Venous stasis ulcer of other part of right lower leg limited to breakdown of skin without varicose veins (H)  Ambulatory referral to Bariatric Care: Surgical and Non-Surgical   7. Scar condition and fibrosis of skin         7/23/19 bilaterally      7/23/19 right medial leg          Assessment/Plan:  1. Debridement: After discussion of risk factors and verbal consent was obtained 2% Lidocaine HCL jelly was applied, under clean conditions, the right leg ulceration(s) were debrided using currette. Devitalized and nonviable tissue, along with any fibrin and slough, was removed to improve granulation tissue formation, stimulate wound healing, decrease overall bacteria load, disrupt biofilm formation and decrease edge senescence.  Total excisional debridement was 16 sq cm from the epidermis/dermis area with a depth of 0 cm.   Ulcers were improved afterwards and .  Measures were unchanged after debridement.    2. Treatment: wound treatment will include irrigation and dressings to promote autolytic debridement which will include: twice per week at the clinic; wash the legs with soap and water; lotion to the skin; silvercel to the right calf; ABD ;rolled gauze; she does not need another course of antibiotics.     3. Edema. We spoke at length regarding their swelling, potential  causes, and treatment options. Answered all questions. Their swelling is likely multifactorial including but not limited to the following: history of vein stripping, their weight, dependency of the limbs for most hours of the day, reduced activity with reduced calf pump mechanism, congestive heart failure, kidney disease, venous hypertension and valvular incompetence. I would like to first reduce their swelling down using 2 layer compression wraps and then transition them into compression garments; such as compression stockings or velcro wraps. If she fails conservative management recommend referral for lymphedema therapy; we will see how she does. The patient should continue to elevate their legs periodically throughout the day. Continue to take their diuretics per their PMD recommendations.       Compression Applied: 2-Layer Coban:Applied the inner foam layer with the foot dorsiflexed and starting atthe base of the fifth metatarsal head. Leaving the bottom of the heelexposed, proceed by winding the foam up the leg using minimaloverlap to just below the fibular head. Apply the compression layer with the foot dorsiflexed and startingat the base of the fifth metatarsal head. Apply at full stretch and proceed up the leg using 50% overlap. The bottom of the heel is covered with the compression layer. The end at the fibular head or just below the back of the knee and levelwith the top edge of the foam layer.  Gently pressed and conformed the entire surface of the system to ensurethat the two layers are firmly bound together    4. Offloading: na    5. Nutrition: we spoke about her weight and how this contributes to her swelling; she is interested in weight loss surgery I will send the referral to bariatrics; they will call to schedule    6. CARL: she is consistent with wearing her cpap    Patient to return to clinic twice per week with nurse visits or with myself for re-evaluation. They were instructed to call the clinic  sooner with any further questions or concerns. Answered all questions.    Concepción Beltrán DNP, RN, CNP, [facility]  [phone]      This note was electronically signed by Concepción Beltrán

## 2021-06-27 NOTE — PROGRESS NOTES
"Progress Notes by Ángela Rome LPN at 8/5/2019 10:40 AM     Author: Ángela Rome LPN Service: -- Author Type: Licensed Nurse    Filed: 8/5/2019 12:01 PM Encounter Date: 8/5/2019 Status: Signed    : Ángela Rome LPN (Licensed Nurse)       Nurse Visit            Chief Complaint: Patient presents to clinic for assement, and treatment of their and swelling    Dressing on Arrival 2 layer, both had fallen but the left side was more than [facility] down her leg    Allergies:   Allergies   Allergen Reactions   ? Penicillins Rash       Medications:   Current Outpatient Medications:   ?  albuterol (PROVENTIL HFA;VENTOLIN HFA) 90 mcg/actuation inhaler, Inhale 2 puffs every 4 (four) hours as needed for wheezing or shortness of breath., Disp: , Rfl:   ?  aspirin 81 mg chewable tablet, Chew 81 mg daily., Disp: , Rfl:   ?  bumetanide (BUMEX) 1 MG tablet, Take 1 tablet (1 mg total) by mouth 2 (two) times a day at 9am and 6pm., Disp: 180 tablet, Rfl: 1  ?  esomeprazole (NEXIUM) 40 MG capsule, Take 40 mg by mouth daily. , Disp: , Rfl:   ?  fluticasone (FLONASE) 50 mcg/actuation nasal spray, 1 spray into each nostril daily., Disp: , Rfl:   ?  lisinopril (PRINIVIL,ZESTRIL) 20 MG tablet, Take 1 tablet (20 mg total) by mouth daily., Disp: 90 tablet, Rfl: 1  ?  MOMETASONE/FORMOTEROL (DULERA INHL), Inhale 1 Inhalation daily. , Disp: , Rfl:   ?  PARoxetine (PAXIL) 40 MG tablet, Take 40 mg by mouth daily. , Disp: , Rfl:   ?  simvastatin (ZOCOR) 20 MG tablet, Take 20 mg by mouth bedtime., Disp: , Rfl:   ?  spironolactone (ALDACTONE) 25 MG tablet, Take 1 tablet (25 mg total) by mouth daily., Disp: 90 tablet, Rfl: 3    Vital Signs: /50 (Patient Site: Left Arm, Patient Position: Sitting, Cuff Size: Adult Regular)   Pulse 68   Temp 98.1  F (36.7  C) (Oral)   Resp 16   Ht 5' 8\" (1.727 m)   Wt (!) 338 lb 9.6 oz (153.6 kg)   BMI 51.48 kg/m        Assessment:    General:  Patient presents to clinic in no apparent " distress.  Psychiatric:  Alert and oriented x3.   Lower extremity:  edema is present.    Integumentary:  Skin is uniformly warm, dry and pink.    Wound size:   Drain Left Breast 10 Fr. (Active)       Drain Right Breast 10 Fr. (Active)       Incision 16 Vagina (Active)       Incision 18 Breast Left (Active)       Incision 18 Breast Right (Active)      Undermining is not present.    The periwoundskin is WNL      Plan:         1. Patient will in 4 days         2. Treatment provided will include irrigation and dressings to promote autolytic debridement and will be as listed below     Cleansed with: Normal Saline    Protected skin with: Remedy Skin Repair    Dressings Applied: NA    Compression Applied to the Left Le-Layer Coban    Compression Applied to the Right Le-Layer Coban    Offloading applied: None    Trial Products: no  Provider notified regarding concerns: no  Treatment Changes: no    Educational Barriers: none  Taught Regarding: Activity, Compliance, Compression and Dressing  Teaching Method: Explanation and Handout

## 2021-06-27 NOTE — PROGRESS NOTES
Progress Notes by Chelle Parker LPN at 7/26/2019  9:20 AM     Author: Chelle Parker LPN Service: -- Author Type: Licensed Nurse    Filed: 7/26/2019 11:16 AM Encounter Date: 7/26/2019 Status: Signed    : Chelle Parker LPN (Licensed Nurse)                             Nurse Visit    Chief Complaint: Patient presents to clinic for assement, and treatment of swollen leg     Dressing on Arrival 2 layer wrap, rolled down 4 inch bilateral leg    Allergies:   Allergies   Allergen Reactions   ? Penicillins Rash       Medications:   Current Outpatient Medications:   ?  albuterol (PROVENTIL HFA;VENTOLIN HFA) 90 mcg/actuation inhaler, Inhale 2 puffs every 4 (four) hours as needed for wheezing or shortness of breath., Disp: , Rfl:   ?  aspirin 81 mg chewable tablet, Chew 81 mg daily., Disp: , Rfl:   ?  bumetanide (BUMEX) 1 MG tablet, Take 1 tablet (1 mg total) by mouth 2 (two) times a day at 9am and 6pm., Disp: 180 tablet, Rfl: 1  ?  esomeprazole (NEXIUM) 40 MG capsule, Take 40 mg by mouth daily. , Disp: , Rfl:   ?  fluticasone (FLONASE) 50 mcg/actuation nasal spray, 1 spray into each nostril daily., Disp: , Rfl:   ?  lisinopril (PRINIVIL,ZESTRIL) 20 MG tablet, Take 1 tablet (20 mg total) by mouth daily., Disp: 90 tablet, Rfl: 1  ?  MOMETASONE/FORMOTEROL (DULERA INHL), Inhale 1 Inhalation daily. , Disp: , Rfl:   ?  PARoxetine (PAXIL) 40 MG tablet, Take 40 mg by mouth daily. , Disp: , Rfl:   ?  simvastatin (ZOCOR) 20 MG tablet, Take 20 mg by mouth bedtime., Disp: , Rfl:   ?  spironolactone (ALDACTONE) 25 MG tablet, Take 1 tablet (25 mg total) by mouth daily., Disp: 90 tablet, Rfl: 3    Vital Signs: /64 (Patient Site: Left Arm, Patient Position: Sitting, Cuff Size: Adult Large)   Pulse 68   Temp 97.6  F (36.4  C) (Oral)   Resp 24       Assessment:    General:  Patient presents to clinic in no apparent distress.  Psychiatric:  Alert and oriented x3.   Lower extremity:  edema is present.    Integumentary:   Skin is uniformly warm, dry and pink.    Wound size:   Drain Left Breast 10 Fr. (Active)       Drain Right Breast 10 Fr. (Active)       Incision 16 Vagina (Active)       Incision 18 Breast Left (Active)       Incision 18 Breast Right (Active)      Undermining is not present.    The periwoundskin is WNL      Plan:         1. Patient will follow up in 4 days with nurse visit          2. Treatment provided will include irrigation and dressings to promote autolytic debridement and will be as listed below     Cleansed with: Normal Saline    Protected skin with: Remedy Skin Repair    Dressings Applied: Silver alginate    Compression Applied to the Left Le-Layer Coban    Compression Applied to the Right Le-Layer Coban    Offloading applied: None    Trial Products: no  Provider notified regarding concerns: no  Treatment Changes: no    Educational Barriers: none  Taught Regarding: Activity and Compression  Teaching Method: Explanation, Demo and DC sheet    Pt came in for rewrap and dressing change on right medial ankle.  Wrap rolled down for 4 inch in both leg.Wound is scab, applied silvercel for comfort. Noted decrease number in bilateral leg. Pt denied pain or  discomfort, and no wheepping noted. Pt tolerated well dressing change, and she was remind to watch wrap ; if wrap roll down below two inch, or wet  to cut off and apply tubular compression and call clinic to get in for rewrap.

## 2021-10-06 ENCOUNTER — APPOINTMENT (OUTPATIENT)
Dept: GENERAL RADIOLOGY | Facility: CLINIC | Age: 61
DRG: 207 | End: 2021-10-06
Attending: ANESTHESIOLOGY
Payer: MEDICARE

## 2021-10-06 ENCOUNTER — APPOINTMENT (OUTPATIENT)
Dept: CARDIOLOGY | Facility: CLINIC | Age: 61
DRG: 207 | End: 2021-10-06
Attending: ANESTHESIOLOGY
Payer: MEDICARE

## 2021-10-06 ENCOUNTER — APPOINTMENT (OUTPATIENT)
Dept: GENERAL RADIOLOGY | Facility: CLINIC | Age: 61
DRG: 207 | End: 2021-10-06
Attending: INTERNAL MEDICINE
Payer: MEDICARE

## 2021-10-06 ENCOUNTER — APPOINTMENT (OUTPATIENT)
Dept: CT IMAGING | Facility: CLINIC | Age: 61
DRG: 207 | End: 2021-10-06
Attending: ANESTHESIOLOGY
Payer: MEDICARE

## 2021-10-06 ENCOUNTER — APPOINTMENT (OUTPATIENT)
Dept: ULTRASOUND IMAGING | Facility: CLINIC | Age: 61
DRG: 207 | End: 2021-10-06
Attending: ANESTHESIOLOGY
Payer: MEDICARE

## 2021-10-06 ENCOUNTER — HOSPITAL ENCOUNTER (INPATIENT)
Facility: CLINIC | Age: 61
LOS: 21 days | Discharge: NURSING, REHAB OR LONG-TERM CARE | DRG: 207 | End: 2021-10-27
Attending: ANESTHESIOLOGY | Admitting: ANESTHESIOLOGY
Payer: MEDICARE

## 2021-10-06 DIAGNOSIS — U07.1 PNEUMONIA DUE TO 2019 NOVEL CORONAVIRUS: ICD-10-CM

## 2021-10-06 DIAGNOSIS — I27.20 PULMONARY HYPERTENSION (H): ICD-10-CM

## 2021-10-06 DIAGNOSIS — J15.211 PNEUMONIA OF BOTH LOWER LOBES DUE TO METHICILLIN SUSCEPTIBLE STAPHYLOCOCCUS AUREUS (MSSA) (H): ICD-10-CM

## 2021-10-06 DIAGNOSIS — G47.33 OSA (OBSTRUCTIVE SLEEP APNEA): ICD-10-CM

## 2021-10-06 DIAGNOSIS — J12.82 PNEUMONIA DUE TO 2019 NOVEL CORONAVIRUS: ICD-10-CM

## 2021-10-06 DIAGNOSIS — G47.9 SLEEP DISTURBANCE: ICD-10-CM

## 2021-10-06 DIAGNOSIS — I50.33 ACUTE ON CHRONIC DIASTOLIC HEART FAILURE (H): ICD-10-CM

## 2021-10-06 DIAGNOSIS — J96.01 ACUTE RESPIRATORY FAILURE WITH HYPOXIA (H): Primary | ICD-10-CM

## 2021-10-06 DIAGNOSIS — L30.4 INTERTRIGO: ICD-10-CM

## 2021-10-06 LAB
ALBUMIN SERPL-MCNC: 2.4 G/DL (ref 3.4–5)
ALBUMIN UR-MCNC: 10 MG/DL
ALP SERPL-CCNC: 103 U/L (ref 40–150)
ALT SERPL W P-5'-P-CCNC: 60 U/L (ref 0–50)
ANION GAP SERPL CALCULATED.3IONS-SCNC: 6 MMOL/L (ref 3–14)
ANION GAP SERPL CALCULATED.3IONS-SCNC: 6 MMOL/L (ref 3–14)
APPEARANCE UR: ABNORMAL
APTT PPP: 27 SECONDS (ref 22–38)
AST SERPL W P-5'-P-CCNC: 90 U/L (ref 0–45)
ATRIAL RATE - MUSE: 67 BPM
BASE EXCESS BLDA CALC-SCNC: 5.9 MMOL/L (ref -9–1.8)
BASE EXCESS BLDA CALC-SCNC: 6.4 MMOL/L (ref -9–1.8)
BASE EXCESS BLDA CALC-SCNC: 6.5 MMOL/L (ref -9–1.8)
BASE EXCESS BLDA CALC-SCNC: 6.9 MMOL/L (ref -9–1.8)
BASE EXCESS BLDV CALC-SCNC: 6.1 MMOL/L (ref -7.7–1.9)
BASE EXCESS BLDV CALC-SCNC: 6.9 MMOL/L (ref -7.7–1.9)
BILIRUB SERPL-MCNC: 0.4 MG/DL (ref 0.2–1.3)
BILIRUB UR QL STRIP: NEGATIVE
BUN SERPL-MCNC: 33 MG/DL (ref 7–30)
BUN SERPL-MCNC: 34 MG/DL (ref 7–30)
CALCIUM SERPL-MCNC: 8.4 MG/DL (ref 8.5–10.1)
CALCIUM SERPL-MCNC: 8.8 MG/DL (ref 8.5–10.1)
CHLORIDE BLD-SCNC: 104 MMOL/L (ref 94–109)
CHLORIDE BLD-SCNC: 104 MMOL/L (ref 94–109)
CK SERPL-CCNC: 873 U/L (ref 30–225)
CO2 SERPL-SCNC: 30 MMOL/L (ref 20–32)
CO2 SERPL-SCNC: 32 MMOL/L (ref 20–32)
COLOR UR AUTO: ABNORMAL
CREAT SERPL-MCNC: 1.63 MG/DL (ref 0.52–1.04)
CREAT SERPL-MCNC: 1.68 MG/DL (ref 0.52–1.04)
DIASTOLIC BLOOD PRESSURE - MUSE: NORMAL MMHG
ERYTHROCYTE [DISTWIDTH] IN BLOOD BY AUTOMATED COUNT: 16.1 % (ref 10–15)
GFR SERPL CREATININE-BSD FRML MDRD: 32 ML/MIN/1.73M2
GFR SERPL CREATININE-BSD FRML MDRD: 33 ML/MIN/1.73M2
GLUCOSE BLD-MCNC: 102 MG/DL (ref 70–99)
GLUCOSE BLD-MCNC: 105 MG/DL (ref 70–99)
GLUCOSE BLDC GLUCOMTR-MCNC: 101 MG/DL (ref 70–99)
GLUCOSE BLDC GLUCOMTR-MCNC: 101 MG/DL (ref 70–99)
GLUCOSE BLDC GLUCOMTR-MCNC: 90 MG/DL (ref 70–99)
GLUCOSE BLDC GLUCOMTR-MCNC: 91 MG/DL (ref 70–99)
GLUCOSE BLDC GLUCOMTR-MCNC: 93 MG/DL (ref 70–99)
GLUCOSE BLDC GLUCOMTR-MCNC: 99 MG/DL (ref 70–99)
GLUCOSE UR STRIP-MCNC: NEGATIVE MG/DL
HCO3 BLD-SCNC: 32 MMOL/L (ref 21–28)
HCO3 BLD-SCNC: 33 MMOL/L (ref 21–28)
HCO3 BLDV-SCNC: 33 MMOL/L (ref 21–28)
HCO3 BLDV-SCNC: 34 MMOL/L (ref 21–28)
HCT VFR BLD AUTO: 33.6 % (ref 35–47)
HGB BLD-MCNC: 10.3 G/DL (ref 11.7–15.7)
HGB UR QL STRIP: ABNORMAL
HYALINE CASTS: 23 /LPF
INR PPP: 1.19 (ref 0.85–1.15)
INTERPRETATION ECG - MUSE: NORMAL
KETONES UR STRIP-MCNC: NEGATIVE MG/DL
LACTATE SERPL-SCNC: 1.5 MMOL/L (ref 0.7–2)
LEUKOCYTE ESTERASE UR QL STRIP: ABNORMAL
LVEF ECHO: NORMAL
MAGNESIUM SERPL-MCNC: 2.3 MG/DL (ref 1.6–2.3)
MCH RBC QN AUTO: 26.1 PG (ref 26.5–33)
MCHC RBC AUTO-ENTMCNC: 30.7 G/DL (ref 31.5–36.5)
MCV RBC AUTO: 85 FL (ref 78–100)
MRSA DNA SPEC QL NAA+PROBE: NEGATIVE
MUCOUS THREADS #/AREA URNS LPF: PRESENT /LPF
NITRATE UR QL: NEGATIVE
O2/TOTAL GAS SETTING VFR VENT: 100 %
O2/TOTAL GAS SETTING VFR VENT: 45 %
O2/TOTAL GAS SETTING VFR VENT: 50 %
O2/TOTAL GAS SETTING VFR VENT: 50 %
O2/TOTAL GAS SETTING VFR VENT: 60 %
O2/TOTAL GAS SETTING VFR VENT: 60 %
OXYHGB MFR BLD: 91 % (ref 92–100)
OXYHGB MFR BLDV: 68 % (ref 70–75)
OXYHGB MFR BLDV: 71 % (ref 70–75)
P AXIS - MUSE: 73 DEGREES
PCO2 BLD: 54 MM HG (ref 35–45)
PCO2 BLD: 55 MM HG (ref 35–45)
PCO2 BLD: 56 MM HG (ref 35–45)
PCO2 BLD: 57 MM HG (ref 35–45)
PCO2 BLDV: 61 MM HG (ref 40–50)
PCO2 BLDV: 62 MM HG (ref 40–50)
PH BLD: 7.37 [PH] (ref 7.35–7.45)
PH BLD: 7.38 [PH] (ref 7.35–7.45)
PH BLD: 7.38 [PH] (ref 7.35–7.45)
PH BLD: 7.39 [PH] (ref 7.35–7.45)
PH BLDV: 7.35 [PH] (ref 7.32–7.43)
PH BLDV: 7.35 [PH] (ref 7.32–7.43)
PH UR STRIP: 5 [PH] (ref 5–7)
PHOSPHATE SERPL-MCNC: 4.6 MG/DL (ref 2.5–4.5)
PLATELET # BLD AUTO: 283 10E3/UL (ref 150–450)
PO2 BLD: 100 MM HG (ref 80–105)
PO2 BLD: 58 MM HG (ref 80–105)
PO2 BLD: 67 MM HG (ref 80–105)
PO2 BLD: 71 MM HG (ref 80–105)
PO2 BLDV: 40 MM HG (ref 25–47)
PO2 BLDV: 41 MM HG (ref 25–47)
POTASSIUM BLD-SCNC: 2.9 MMOL/L (ref 3.4–5.3)
POTASSIUM BLD-SCNC: 3.4 MMOL/L (ref 3.4–5.3)
POTASSIUM BLD-SCNC: 3.6 MMOL/L (ref 3.4–5.3)
POTASSIUM BLD-SCNC: 3.7 MMOL/L (ref 3.4–5.3)
PR INTERVAL - MUSE: 186 MS
PROCALCITONIN SERPL-MCNC: 0.85 NG/ML
PROT SERPL-MCNC: 7.6 G/DL (ref 6.8–8.8)
QRS DURATION - MUSE: 120 MS
QT - MUSE: 456 MS
QTC - MUSE: 481 MS
R AXIS - MUSE: 4 DEGREES
RBC # BLD AUTO: 3.94 10E6/UL (ref 3.8–5.2)
RBC URINE: 11 /HPF
SA TARGET DNA: POSITIVE
SARS-COV-2 RNA RESP QL NAA+PROBE: POSITIVE
SODIUM SERPL-SCNC: 140 MMOL/L (ref 133–144)
SODIUM SERPL-SCNC: 142 MMOL/L (ref 133–144)
SP GR UR STRIP: 1.02 (ref 1–1.03)
SQUAMOUS EPITHELIAL: 1 /HPF
SYSTOLIC BLOOD PRESSURE - MUSE: NORMAL MMHG
T AXIS - MUSE: -5 DEGREES
TRANSITIONAL EPI: <1 /HPF
TROPONIN I SERPL-MCNC: 0.2 UG/L (ref 0–0.04)
TROPONIN I SERPL-MCNC: 0.25 UG/L (ref 0–0.04)
TROPONIN I SERPL-MCNC: 0.28 UG/L (ref 0–0.04)
TSH SERPL DL<=0.005 MIU/L-ACNC: 2.58 MU/L (ref 0.4–4)
UROBILINOGEN UR STRIP-MCNC: NORMAL MG/DL
VENTRICULAR RATE- MUSE: 67 BPM
WBC # BLD AUTO: 18.6 10E3/UL (ref 4–11)
WBC URINE: 7 /HPF

## 2021-10-06 PROCEDURE — 84100 ASSAY OF PHOSPHORUS: CPT | Performed by: STUDENT IN AN ORGANIZED HEALTH CARE EDUCATION/TRAINING PROGRAM

## 2021-10-06 PROCEDURE — 71045 X-RAY EXAM CHEST 1 VIEW: CPT | Mod: 26 | Performed by: RADIOLOGY

## 2021-10-06 PROCEDURE — 85610 PROTHROMBIN TIME: CPT | Performed by: STUDENT IN AN ORGANIZED HEALTH CARE EDUCATION/TRAINING PROGRAM

## 2021-10-06 PROCEDURE — 200N000002 HC R&B ICU UMMC

## 2021-10-06 PROCEDURE — 82550 ASSAY OF CK (CPK): CPT | Performed by: STUDENT IN AN ORGANIZED HEALTH CARE EDUCATION/TRAINING PROGRAM

## 2021-10-06 PROCEDURE — 999N000065 XR ABDOMEN PORT 1 VIEWS

## 2021-10-06 PROCEDURE — 5A1955Z RESPIRATORY VENTILATION, GREATER THAN 96 CONSECUTIVE HOURS: ICD-10-PCS | Performed by: ANESTHESIOLOGY

## 2021-10-06 PROCEDURE — 93970 EXTREMITY STUDY: CPT | Mod: 26 | Performed by: RADIOLOGY

## 2021-10-06 PROCEDURE — 80053 COMPREHEN METABOLIC PANEL: CPT | Performed by: STUDENT IN AN ORGANIZED HEALTH CARE EDUCATION/TRAINING PROGRAM

## 2021-10-06 PROCEDURE — 250N000011 HC RX IP 250 OP 636: Performed by: STUDENT IN AN ORGANIZED HEALTH CARE EDUCATION/TRAINING PROGRAM

## 2021-10-06 PROCEDURE — 82962 GLUCOSE BLOOD TEST: CPT

## 2021-10-06 PROCEDURE — 71275 CT ANGIOGRAPHY CHEST: CPT | Mod: 26 | Performed by: RADIOLOGY

## 2021-10-06 PROCEDURE — 250N000011 HC RX IP 250 OP 636

## 2021-10-06 PROCEDURE — 84132 ASSAY OF SERUM POTASSIUM: CPT | Performed by: STUDENT IN AN ORGANIZED HEALTH CARE EDUCATION/TRAINING PROGRAM

## 2021-10-06 PROCEDURE — 999N000208 ECHOCARDIOGRAM COMPLETE

## 2021-10-06 PROCEDURE — 87340 HEPATITIS B SURFACE AG IA: CPT

## 2021-10-06 PROCEDURE — 255N000002 HC RX 255 OP 636: Performed by: INTERNAL MEDICINE

## 2021-10-06 PROCEDURE — 250N000009 HC RX 250

## 2021-10-06 PROCEDURE — 999N000203 HC STATISTICAL VASC ACCESS NURSE TIME, 16-31 MINUTES

## 2021-10-06 PROCEDURE — 999N000157 HC STATISTIC RCP TIME EA 10 MIN

## 2021-10-06 PROCEDURE — 85027 COMPLETE CBC AUTOMATED: CPT | Performed by: STUDENT IN AN ORGANIZED HEALTH CARE EDUCATION/TRAINING PROGRAM

## 2021-10-06 PROCEDURE — 74018 RADEX ABDOMEN 1 VIEW: CPT | Mod: 26 | Performed by: STUDENT IN AN ORGANIZED HEALTH CARE EDUCATION/TRAINING PROGRAM

## 2021-10-06 PROCEDURE — 86704 HEP B CORE ANTIBODY TOTAL: CPT

## 2021-10-06 PROCEDURE — 76770 US EXAM ABDO BACK WALL COMP: CPT | Mod: 26 | Performed by: RADIOLOGY

## 2021-10-06 PROCEDURE — 36415 COLL VENOUS BLD VENIPUNCTURE: CPT | Performed by: ANESTHESIOLOGY

## 2021-10-06 PROCEDURE — 84132 ASSAY OF SERUM POTASSIUM: CPT | Performed by: INTERNAL MEDICINE

## 2021-10-06 PROCEDURE — 93005 ELECTROCARDIOGRAM TRACING: CPT

## 2021-10-06 PROCEDURE — 250N000013 HC RX MED GY IP 250 OP 250 PS 637

## 2021-10-06 PROCEDURE — 99223 1ST HOSP IP/OBS HIGH 75: CPT | Mod: 25 | Performed by: INTERNAL MEDICINE

## 2021-10-06 PROCEDURE — 93970 EXTREMITY STUDY: CPT | Mod: XS

## 2021-10-06 PROCEDURE — 84484 ASSAY OF TROPONIN QUANT: CPT

## 2021-10-06 PROCEDURE — 74018 RADEX ABDOMEN 1 VIEW: CPT | Mod: 26 | Performed by: RADIOLOGY

## 2021-10-06 PROCEDURE — 999N000127 HC STATISTIC PERIPHERAL IV START W US GUIDANCE

## 2021-10-06 PROCEDURE — 258N000003 HC RX IP 258 OP 636: Performed by: STUDENT IN AN ORGANIZED HEALTH CARE EDUCATION/TRAINING PROGRAM

## 2021-10-06 PROCEDURE — 86803 HEPATITIS C AB TEST: CPT

## 2021-10-06 PROCEDURE — 81001 URINALYSIS AUTO W/SCOPE: CPT | Performed by: STUDENT IN AN ORGANIZED HEALTH CARE EDUCATION/TRAINING PROGRAM

## 2021-10-06 PROCEDURE — 83605 ASSAY OF LACTIC ACID: CPT | Performed by: STUDENT IN AN ORGANIZED HEALTH CARE EDUCATION/TRAINING PROGRAM

## 2021-10-06 PROCEDURE — 82803 BLOOD GASES ANY COMBINATION: CPT | Performed by: INTERNAL MEDICINE

## 2021-10-06 PROCEDURE — 82805 BLOOD GASES W/O2 SATURATION: CPT

## 2021-10-06 PROCEDURE — 36600 WITHDRAWAL OF ARTERIAL BLOOD: CPT

## 2021-10-06 PROCEDURE — 71045 X-RAY EXAM CHEST 1 VIEW: CPT

## 2021-10-06 PROCEDURE — 86038 ANTINUCLEAR ANTIBODIES: CPT

## 2021-10-06 PROCEDURE — 86431 RHEUMATOID FACTOR QUANT: CPT

## 2021-10-06 PROCEDURE — U0003 INFECTIOUS AGENT DETECTION BY NUCLEIC ACID (DNA OR RNA); SEVERE ACUTE RESPIRATORY SYNDROME CORONAVIRUS 2 (SARS-COV-2) (CORONAVIRUS DISEASE [COVID-19]), AMPLIFIED PROBE TECHNIQUE, MAKING USE OF HIGH THROUGHPUT TECHNOLOGIES AS DESCRIBED BY CMS-2020-01-R: HCPCS | Performed by: ANESTHESIOLOGY

## 2021-10-06 PROCEDURE — 82805 BLOOD GASES W/O2 SATURATION: CPT | Performed by: INTERNAL MEDICINE

## 2021-10-06 PROCEDURE — 87389 HIV-1 AG W/HIV-1&-2 AB AG IA: CPT

## 2021-10-06 PROCEDURE — G0463 HOSPITAL OUTPT CLINIC VISIT: HCPCS

## 2021-10-06 PROCEDURE — 87106 FUNGI IDENTIFICATION YEAST: CPT | Performed by: STUDENT IN AN ORGANIZED HEALTH CARE EDUCATION/TRAINING PROGRAM

## 2021-10-06 PROCEDURE — C8929 TTE W OR WO FOL WCON,DOPPLER: HCPCS

## 2021-10-06 PROCEDURE — 250N000011 HC RX IP 250 OP 636: Performed by: INTERNAL MEDICINE

## 2021-10-06 PROCEDURE — 250N000013 HC RX MED GY IP 250 OP 250 PS 637: Performed by: INTERNAL MEDICINE

## 2021-10-06 PROCEDURE — 85730 THROMBOPLASTIN TIME PARTIAL: CPT | Performed by: STUDENT IN AN ORGANIZED HEALTH CARE EDUCATION/TRAINING PROGRAM

## 2021-10-06 PROCEDURE — 250N000013 HC RX MED GY IP 250 OP 250 PS 637: Performed by: STUDENT IN AN ORGANIZED HEALTH CARE EDUCATION/TRAINING PROGRAM

## 2021-10-06 PROCEDURE — 84145 PROCALCITONIN (PCT): CPT | Performed by: STUDENT IN AN ORGANIZED HEALTH CARE EDUCATION/TRAINING PROGRAM

## 2021-10-06 PROCEDURE — 93010 ELECTROCARDIOGRAM REPORT: CPT | Performed by: INTERNAL MEDICINE

## 2021-10-06 PROCEDURE — 93306 TTE W/DOPPLER COMPLETE: CPT | Mod: 26 | Performed by: INTERNAL MEDICINE

## 2021-10-06 PROCEDURE — C9113 INJ PANTOPRAZOLE SODIUM, VIA: HCPCS | Performed by: STUDENT IN AN ORGANIZED HEALTH CARE EDUCATION/TRAINING PROGRAM

## 2021-10-06 PROCEDURE — 87040 BLOOD CULTURE FOR BACTERIA: CPT | Performed by: ANESTHESIOLOGY

## 2021-10-06 PROCEDURE — 99292 CRITICAL CARE ADDL 30 MIN: CPT | Mod: 25 | Performed by: INTERNAL MEDICINE

## 2021-10-06 PROCEDURE — 36556 INSERT NON-TUNNEL CV CATH: CPT | Mod: GC | Performed by: ANESTHESIOLOGY

## 2021-10-06 PROCEDURE — 250N000011 HC RX IP 250 OP 636: Performed by: ANESTHESIOLOGY

## 2021-10-06 PROCEDURE — 36592 COLLECT BLOOD FROM PICC: CPT

## 2021-10-06 PROCEDURE — 84484 ASSAY OF TROPONIN QUANT: CPT | Performed by: STUDENT IN AN ORGANIZED HEALTH CARE EDUCATION/TRAINING PROGRAM

## 2021-10-06 PROCEDURE — 87641 MR-STAPH DNA AMP PROBE: CPT | Performed by: STUDENT IN AN ORGANIZED HEALTH CARE EDUCATION/TRAINING PROGRAM

## 2021-10-06 PROCEDURE — 71275 CT ANGIOGRAPHY CHEST: CPT

## 2021-10-06 PROCEDURE — 99291 CRITICAL CARE FIRST HOUR: CPT | Mod: 25 | Performed by: ANESTHESIOLOGY

## 2021-10-06 PROCEDURE — 80048 BASIC METABOLIC PNL TOTAL CA: CPT

## 2021-10-06 PROCEDURE — 02HV33Z INSERTION OF INFUSION DEVICE INTO SUPERIOR VENA CAVA, PERCUTANEOUS APPROACH: ICD-10-PCS | Performed by: ANESTHESIOLOGY

## 2021-10-06 PROCEDURE — 84443 ASSAY THYROID STIM HORMONE: CPT

## 2021-10-06 PROCEDURE — 999N000065 XR CHEST PORT 1 VIEW

## 2021-10-06 PROCEDURE — 76770 US EXAM ABDO BACK WALL COMP: CPT

## 2021-10-06 PROCEDURE — 83735 ASSAY OF MAGNESIUM: CPT | Performed by: STUDENT IN AN ORGANIZED HEALTH CARE EDUCATION/TRAINING PROGRAM

## 2021-10-06 PROCEDURE — 3E043XZ INTRODUCTION OF VASOPRESSOR INTO CENTRAL VEIN, PERCUTANEOUS APPROACH: ICD-10-PCS | Performed by: ANESTHESIOLOGY

## 2021-10-06 PROCEDURE — 82803 BLOOD GASES ANY COMBINATION: CPT | Performed by: STUDENT IN AN ORGANIZED HEALTH CARE EDUCATION/TRAINING PROGRAM

## 2021-10-06 PROCEDURE — 94002 VENT MGMT INPAT INIT DAY: CPT

## 2021-10-06 PROCEDURE — 93970 EXTREMITY STUDY: CPT

## 2021-10-06 PROCEDURE — 82805 BLOOD GASES W/O2 SATURATION: CPT | Performed by: STUDENT IN AN ORGANIZED HEALTH CARE EDUCATION/TRAINING PROGRAM

## 2021-10-06 PROCEDURE — 86706 HEP B SURFACE ANTIBODY: CPT

## 2021-10-06 RX ORDER — PROPOFOL 10 MG/ML
10-20 INJECTION, EMULSION INTRAVENOUS EVERY 30 MIN PRN
Status: DISCONTINUED | OUTPATIENT
Start: 2021-10-06 | End: 2021-10-07

## 2021-10-06 RX ORDER — GUAIFENESIN 600 MG/1
15 TABLET, EXTENDED RELEASE ORAL DAILY
Status: DISCONTINUED | OUTPATIENT
Start: 2021-10-06 | End: 2021-10-14

## 2021-10-06 RX ORDER — CARVEDILOL 3.12 MG/1
3.12 TABLET ORAL 2 TIMES DAILY WITH MEALS
Status: ON HOLD | COMMUNITY
End: 2021-11-04

## 2021-10-06 RX ORDER — AMOXICILLIN 250 MG
2 CAPSULE ORAL 2 TIMES DAILY PRN
Status: DISCONTINUED | OUTPATIENT
Start: 2021-10-06 | End: 2021-10-27 | Stop reason: HOSPADM

## 2021-10-06 RX ORDER — CHOLECALCIFEROL (VITAMIN D3) 50 MCG
1 TABLET ORAL DAILY
COMMUNITY

## 2021-10-06 RX ORDER — POTASSIUM CHLORIDE 29.8 MG/ML
20 INJECTION INTRAVENOUS
Status: COMPLETED | OUTPATIENT
Start: 2021-10-06 | End: 2021-10-06

## 2021-10-06 RX ORDER — CEFTRIAXONE 2 G/1
2 INJECTION, POWDER, FOR SOLUTION INTRAMUSCULAR; INTRAVENOUS EVERY 24 HOURS
Status: COMPLETED | OUTPATIENT
Start: 2021-10-06 | End: 2021-10-12

## 2021-10-06 RX ORDER — BISACODYL 10 MG
10 SUPPOSITORY, RECTAL RECTAL DAILY PRN
Status: DISCONTINUED | OUTPATIENT
Start: 2021-10-06 | End: 2021-10-07

## 2021-10-06 RX ORDER — MULTIVIT WITH MINERALS/LUTEIN
1 TABLET ORAL DAILY
COMMUNITY

## 2021-10-06 RX ORDER — NALOXONE HYDROCHLORIDE 0.4 MG/ML
0.2 INJECTION, SOLUTION INTRAMUSCULAR; INTRAVENOUS; SUBCUTANEOUS
Status: DISCONTINUED | OUTPATIENT
Start: 2021-10-06 | End: 2021-10-26

## 2021-10-06 RX ORDER — MICONAZOLE NITRATE 20 MG/G
CREAM TOPICAL 2 TIMES DAILY
Status: DISCONTINUED | OUTPATIENT
Start: 2021-10-06 | End: 2021-10-06

## 2021-10-06 RX ORDER — ASPIRIN 81 MG/1
81 TABLET, CHEWABLE ORAL DAILY
Status: DISCONTINUED | OUTPATIENT
Start: 2021-10-06 | End: 2021-10-27 | Stop reason: HOSPADM

## 2021-10-06 RX ORDER — PAROXETINE 10 MG/5ML
40 SUSPENSION ORAL DAILY
Status: DISCONTINUED | OUTPATIENT
Start: 2021-10-06 | End: 2021-10-15

## 2021-10-06 RX ORDER — SIMVASTATIN 20 MG
20 TABLET ORAL DAILY
Status: ON HOLD | COMMUNITY
End: 2021-11-04

## 2021-10-06 RX ORDER — PROPOFOL 10 MG/ML
5-75 INJECTION, EMULSION INTRAVENOUS CONTINUOUS
Status: DISCONTINUED | OUTPATIENT
Start: 2021-10-06 | End: 2021-10-06

## 2021-10-06 RX ORDER — BUMETANIDE 0.25 MG/ML
2 INJECTION INTRAMUSCULAR; INTRAVENOUS ONCE
Status: COMPLETED | OUTPATIENT
Start: 2021-10-06 | End: 2021-10-06

## 2021-10-06 RX ORDER — HEPARIN SODIUM 5000 [USP'U]/.5ML
5000 INJECTION, SOLUTION INTRAVENOUS; SUBCUTANEOUS EVERY 8 HOURS
Status: DISCONTINUED | OUTPATIENT
Start: 2021-10-06 | End: 2021-10-07

## 2021-10-06 RX ORDER — MICONAZOLE NITRATE 20 MG/G
CREAM TOPICAL 2 TIMES DAILY
Status: DISCONTINUED | OUTPATIENT
Start: 2021-10-06 | End: 2021-10-27 | Stop reason: HOSPADM

## 2021-10-06 RX ORDER — PROPOFOL 10 MG/ML
5-75 INJECTION, EMULSION INTRAVENOUS CONTINUOUS
Status: DISCONTINUED | OUTPATIENT
Start: 2021-10-06 | End: 2021-10-07

## 2021-10-06 RX ORDER — SIMVASTATIN 20 MG
20 TABLET ORAL EVERY EVENING
Status: DISCONTINUED | OUTPATIENT
Start: 2021-10-06 | End: 2021-10-06 | Stop reason: ALTCHOICE

## 2021-10-06 RX ORDER — DEXTROSE MONOHYDRATE 25 G/50ML
25-50 INJECTION, SOLUTION INTRAVENOUS
Status: DISCONTINUED | OUTPATIENT
Start: 2021-10-06 | End: 2021-10-27 | Stop reason: HOSPADM

## 2021-10-06 RX ORDER — AMOXICILLIN 250 MG
1 CAPSULE ORAL 2 TIMES DAILY PRN
Status: DISCONTINUED | OUTPATIENT
Start: 2021-10-06 | End: 2021-10-27 | Stop reason: HOSPADM

## 2021-10-06 RX ORDER — PAROXETINE 40 MG/1
40 TABLET, FILM COATED ORAL EVERY MORNING
COMMUNITY
End: 2022-01-03

## 2021-10-06 RX ORDER — BUMETANIDE 2 MG/1
2 TABLET ORAL 2 TIMES DAILY
Status: ON HOLD | COMMUNITY
End: 2021-10-27

## 2021-10-06 RX ORDER — NALOXONE HYDROCHLORIDE 0.4 MG/ML
0.4 INJECTION, SOLUTION INTRAMUSCULAR; INTRAVENOUS; SUBCUTANEOUS
Status: DISCONTINUED | OUTPATIENT
Start: 2021-10-06 | End: 2021-10-26

## 2021-10-06 RX ORDER — BUMETANIDE 0.25 MG/ML
3 INJECTION INTRAMUSCULAR; INTRAVENOUS ONCE
Status: COMPLETED | OUTPATIENT
Start: 2021-10-06 | End: 2021-10-06

## 2021-10-06 RX ORDER — PANTOPRAZOLE SODIUM 40 MG/1
40 TABLET, DELAYED RELEASE ORAL DAILY
Status: ON HOLD | COMMUNITY
End: 2021-11-04

## 2021-10-06 RX ORDER — POTASSIUM CHLORIDE 1500 MG/1
20 TABLET, EXTENDED RELEASE ORAL 2 TIMES DAILY
Status: ON HOLD | COMMUNITY
End: 2021-11-04

## 2021-10-06 RX ORDER — ATORVASTATIN CALCIUM 40 MG/1
40 TABLET, FILM COATED ORAL DAILY
Status: DISCONTINUED | OUTPATIENT
Start: 2021-10-06 | End: 2021-10-27 | Stop reason: HOSPADM

## 2021-10-06 RX ORDER — NOREPINEPHRINE BITARTRATE 0.06 MG/ML
.01-.6 INJECTION, SOLUTION INTRAVENOUS CONTINUOUS
Status: DISCONTINUED | OUTPATIENT
Start: 2021-10-06 | End: 2021-10-13

## 2021-10-06 RX ORDER — PROPOFOL 10 MG/ML
INJECTION, EMULSION INTRAVENOUS
Status: COMPLETED
Start: 2021-10-06 | End: 2021-10-06

## 2021-10-06 RX ORDER — NICOTINE POLACRILEX 4 MG
15-30 LOZENGE BUCCAL
Status: DISCONTINUED | OUTPATIENT
Start: 2021-10-06 | End: 2021-10-27 | Stop reason: HOSPADM

## 2021-10-06 RX ORDER — POLYETHYLENE GLYCOL 3350 17 G/17G
17 POWDER, FOR SOLUTION ORAL DAILY PRN
Status: DISCONTINUED | OUTPATIENT
Start: 2021-10-06 | End: 2021-10-07

## 2021-10-06 RX ORDER — IOPAMIDOL 755 MG/ML
77 INJECTION, SOLUTION INTRAVASCULAR ONCE
Status: COMPLETED | OUTPATIENT
Start: 2021-10-06 | End: 2021-10-06

## 2021-10-06 RX ORDER — BUMETANIDE 0.25 MG/ML
1 INJECTION INTRAMUSCULAR; INTRAVENOUS ONCE
Status: COMPLETED | OUTPATIENT
Start: 2021-10-06 | End: 2021-10-06

## 2021-10-06 RX ORDER — NOREPINEPHRINE BITARTRATE 0.06 MG/ML
INJECTION, SOLUTION INTRAVENOUS
Status: COMPLETED
Start: 2021-10-06 | End: 2021-10-06

## 2021-10-06 RX ORDER — PROPOFOL 10 MG/ML
10-20 INJECTION, EMULSION INTRAVENOUS EVERY 30 MIN PRN
Status: DISCONTINUED | OUTPATIENT
Start: 2021-10-06 | End: 2021-10-06

## 2021-10-06 RX ORDER — DEXTROSE MONOHYDRATE 100 MG/ML
INJECTION, SOLUTION INTRAVENOUS CONTINUOUS PRN
Status: DISCONTINUED | OUTPATIENT
Start: 2021-10-06 | End: 2021-10-27 | Stop reason: HOSPADM

## 2021-10-06 RX ADMIN — PROPOFOL 50 MCG/KG/MIN: 10 INJECTION, EMULSION INTRAVENOUS at 17:32

## 2021-10-06 RX ADMIN — POTASSIUM CHLORIDE 20 MEQ: 29.8 INJECTION, SOLUTION INTRAVENOUS at 15:24

## 2021-10-06 RX ADMIN — MICONAZOLE NITRATE: 20 CREAM TOPICAL at 12:12

## 2021-10-06 RX ADMIN — POTASSIUM CHLORIDE 20 MEQ: 29.8 INJECTION, SOLUTION INTRAVENOUS at 16:31

## 2021-10-06 RX ADMIN — MICONAZOLE NITRATE: 20 CREAM TOPICAL at 20:35

## 2021-10-06 RX ADMIN — Medication 50 MCG/HR: at 04:43

## 2021-10-06 RX ADMIN — ASPIRIN 81 MG CHEWABLE TABLET 81 MG: 81 TABLET CHEWABLE at 17:32

## 2021-10-06 RX ADMIN — HEPARIN SODIUM 5000 UNITS: 10000 INJECTION, SOLUTION INTRAVENOUS; SUBCUTANEOUS at 06:08

## 2021-10-06 RX ADMIN — PROPOFOL 50 MCG/KG/MIN: 10 INJECTION, EMULSION INTRAVENOUS at 19:27

## 2021-10-06 RX ADMIN — PROPOFOL 50 MCG/KG/MIN: 10 INJECTION, EMULSION INTRAVENOUS at 11:18

## 2021-10-06 RX ADMIN — NOREPINEPHRINE BITARTRATE 0.03 MCG/KG/MIN: 0.06 INJECTION, SOLUTION INTRAVENOUS at 10:35

## 2021-10-06 RX ADMIN — BUMETANIDE 1 MG: 0.25 INJECTION INTRAMUSCULAR; INTRAVENOUS at 17:33

## 2021-10-06 RX ADMIN — HEPARIN SODIUM 5000 UNITS: 10000 INJECTION, SOLUTION INTRAVENOUS; SUBCUTANEOUS at 22:04

## 2021-10-06 RX ADMIN — HEPARIN SODIUM 5000 UNITS: 10000 INJECTION, SOLUTION INTRAVENOUS; SUBCUTANEOUS at 15:24

## 2021-10-06 RX ADMIN — Medication 0.03 MCG/KG/MIN: at 10:35

## 2021-10-06 RX ADMIN — CEFTRIAXONE SODIUM 2 G: 2 INJECTION, POWDER, FOR SOLUTION INTRAMUSCULAR; INTRAVENOUS at 22:04

## 2021-10-06 RX ADMIN — PROPOFOL 30 MCG/KG/MIN: 10 INJECTION, EMULSION INTRAVENOUS at 04:12

## 2021-10-06 RX ADMIN — Medication 15 ML: at 20:36

## 2021-10-06 RX ADMIN — BUMETANIDE 3 MG: 0.25 INJECTION, SOLUTION INTRAMUSCULAR; INTRAVENOUS at 06:07

## 2021-10-06 RX ADMIN — POTASSIUM CHLORIDE 20 MEQ: 29.8 INJECTION, SOLUTION INTRAVENOUS at 10:04

## 2021-10-06 RX ADMIN — BUMETANIDE 2 MG: 0.25 INJECTION, SOLUTION INTRAMUSCULAR; INTRAVENOUS at 11:11

## 2021-10-06 RX ADMIN — AZITHROMYCIN MONOHYDRATE 500 MG: 500 INJECTION, POWDER, LYOPHILIZED, FOR SOLUTION INTRAVENOUS at 23:09

## 2021-10-06 RX ADMIN — PROPOFOL 50 MCG/KG/MIN: 10 INJECTION, EMULSION INTRAVENOUS at 13:23

## 2021-10-06 RX ADMIN — Medication 20 NG/KG/MIN: at 17:13

## 2021-10-06 RX ADMIN — PROPOFOL 50 MCG/KG/MIN: 10 INJECTION, EMULSION INTRAVENOUS at 23:08

## 2021-10-06 RX ADMIN — PROPOFOL 50 MCG/KG/MIN: 10 INJECTION, EMULSION INTRAVENOUS at 15:24

## 2021-10-06 RX ADMIN — HUMAN ALBUMIN MICROSPHERES AND PERFLUTREN 4 ML: 10; .22 INJECTION, SOLUTION INTRAVENOUS at 09:59

## 2021-10-06 RX ADMIN — PANTOPRAZOLE SODIUM 40 MG: 40 INJECTION, POWDER, FOR SOLUTION INTRAVENOUS at 08:38

## 2021-10-06 RX ADMIN — POTASSIUM CHLORIDE 20 MEQ: 29.8 INJECTION, SOLUTION INTRAVENOUS at 12:12

## 2021-10-06 RX ADMIN — POTASSIUM CHLORIDE 20 MEQ: 29.8 INJECTION, SOLUTION INTRAVENOUS at 11:10

## 2021-10-06 RX ADMIN — PAROXETINE HYDROCHLORIDE 40 MG: 10 SUSPENSION ORAL at 20:35

## 2021-10-06 RX ADMIN — IOPAMIDOL 77 ML: 755 INJECTION, SOLUTION INTRAVENOUS at 07:02

## 2021-10-06 RX ADMIN — PROPOFOL 50 MCG/KG/MIN: 10 INJECTION, EMULSION INTRAVENOUS at 21:04

## 2021-10-06 RX ADMIN — PROPOFOL 15 MCG/KG/MIN: 10 INJECTION, EMULSION INTRAVENOUS at 06:50

## 2021-10-06 ASSESSMENT — ACTIVITIES OF DAILY LIVING (ADL)
ADLS_ACUITY_SCORE: 22

## 2021-10-06 ASSESSMENT — MIFFLIN-ST. JEOR: SCORE: 2225.5

## 2021-10-06 NOTE — H&P
MEDICAL ICU H&P  10/06/2021    Date of Hospital Admission: 10/6/21  Date of ICU Admission: 10/6/21  Reason for Critical Care Admission: Acute hypoxic and hypercapnic respiratory failure  Date of Service (when I saw the patient): 10/06/2021    ASSESSMENT: Chika Ferguson is a 61 year old female with PMH suspected CAD, HFrEF, COPD, obesity who was admitted on 10/6/2021 with acute hypoxic and hypercapnic respiratory failure.      PLAN:    Neuro:  # Pain and sedation  Propofol   Fentanyl   - RASS goal -2 to -3    # Depression/anxiety  Holding selective serotonin reuptake inhibitor until can verify patient taking and doses  - Pharmacy med rec recommended    Pulmonary:  # Acute hypoxic respiratory failure   # Hx COPD?  CHF vs. PE vs. COPD exacerbation vs. pneumonia  - CT PE study  - Diuresis with 3 mg IV bumex to start and monitor response  - Consider steroids if concern for COPD exacerbation  - Strict I/O  - Holding PTA carvedilol and dosing bumetanide with UOP response  - Will not continue abxs for now (received in [place]), monitor fever curve   - Trending ABGs, following lactates, repeating CBC, BMP    Ventilation Mode: CMV/AC  (Continuous Mandatory Ventilation/ Assist Control)  FiO2 (%): 100 %  Rate Set (breaths/minute): 14 breaths/min  Tidal Volume Set (mL): 400 mL  PEEP (cm H2O): (S) 14 cmH2O  Oxygen Concentration (%): 100 %  Resp: 19      Cardiovascular:  # Hx HFrEF?  # Hx CAD  No word about prior ASA therapy. Takes bumex daily, missed few doses in past week. Unclear on dry weight here  - Volume status: hypervolemic  - Afterload reduction: none ain PTA med list  - BB: holding PTA carvedilol 3.125 mg BID  - MRA: none  - SCD prevention: none  - Strict I/O as above  - Echocardiogram     GI/Nutrition:  # Nutrition  - consulted    # Suspected GERD  - pantoprazole ordered     # Bowel regimen  - Senna/miralax prn    Renal/Fluids/Electrolytes:  # TIMMY   Unclear baseline Cr, likely cardiorenal in context of CHF  -  Tarik and assess response  - Consider renal ultrasound, will obtain UA    Endocrine:  # Hyperglycemia  Unclear diabetes history  - Insulin gtt for now, decide on need   - Hypoglycemia protocol    ID:  # ?Community acquired pneumonia  Initial abxs at outside hospital, will hold for now and monitor fever curve/oxygen requirements  - Sputum culture  - Blood cultures  - Urinalysis and culture    Hematology:    # Leukocytosis  Stress vs. Infection as above  - Monitor    Musculoskeletal:  # PT/OT    Skin:  # Pressure ulcers  Multiple observed, likely with long amount of time in chair  - WOC consult    General Cares/Prophylaxis:    DVT Prophylaxis: Heparin SQ  GI Prophylaxis: PPI  Restraints: ordered  Family Communication: updated alexi Clemens  Code Status: Full    Lines/tubes/drains:  - PIVx2  - Will work on obtaining arterial line and central line    Disposition:  - Medical ICU    Patient seen and findings/plan discussed with medical ICU staff, Dr. Miranda.    Hung Rosa MD  Internal Medicine, PGY-2  Pager: [phone]    -----------------------------------------------------------------------    HISTORY PRESENTING ILLNESS:   Chika Ferguson is a 62 yo woman with past medical history of CAD s/p PCI, HFrEF, COPD who presents with     Family reports patient was sitting in chair for past 4 days with significant anxiety around lying flat due to fear of shortness of breath and problems with lungs. Has had increasing difficulty lifting and bending lower extremities in last 4-5 days. As son was trying to help her out of chair, she slid to the ground. Son called ambulance, paramedics arrived and in effort to have patient lie back, she developed significant anxiety and became apneic. She received bag ventilation en route, was intubated on arrival to ED.     Labs at outside hospital significant for initial lactic acid 8.4, WBC 19.6, NT Pro ,     Received Succ and etomidate with subsequent versed 2 mg, 2.5 mg bumex at  2127 10/5, fentanyl 50 mcg.    Medications per son (reading medication bottles):  simvastatin 20 mg daily  paroxetine 40 mg daily  carvedilol 3.125 mg BID  potassium 20 mEq BID  bumetanide 2 mg BID, missed a few doses this past week  pantoprazole 40 mg daily  Calcium, multivitamin, D3     REVIEW OF SYSTEMS: negative other than noted in HPI    PAST MEDICAL HISTORY:   No past medical history on file.  SURGICAL HISTORY:  No past surgical history on file.  SOCIAL HISTORY:  Social History     Socioeconomic History     Marital status: Single     Spouse name: Not on file     Number of children: Not on file     Years of education: Not on file     Highest education level: Not on file   Occupational History     Not on file   Tobacco Use     Smoking status: Not on file   Substance and Sexual Activity     Alcohol use: Not on file     Drug use: Not on file     Sexual activity: Not on file   Other Topics Concern     Not on file   Social History Narrative     Not on file     Social Determinants of Health     Financial Resource Strain:      Difficulty of Paying Living Expenses:    Food Insecurity:      Worried About Running Out of Food in the Last Year:      Ran Out of Food in the Last Year:    Transportation Needs:      Lack of Transportation (Medical):      Lack of Transportation (Non-Medical):    Physical Activity:      Days of Exercise per Week:      Minutes of Exercise per Session:    Stress:      Feeling of Stress :    Social Connections:      Frequency of Communication with Friends and Family:      Frequency of Social Gatherings with Friends and Family:      Attends Gnosticist Services:      Active Member of Clubs or Organizations:      Attends Club or Organization Meetings:      Marital Status:    Intimate Partner Violence:      Fear of Current or Ex-Partner:      Emotionally Abused:      Physically Abused:      Sexually Abused:      FAMILY HISTORY:   No family history on file.  ALLERGIES:   Not on File  MEDICATIONS:  No  current facility-administered medications on file prior to encounter.  No current outpatient medications on file prior to encounter.      PHYSICAL EXAMINATION:  Temp:  [97.7  F (36.5  C)] 97.7  F (36.5  C)  Pulse:  [65] 65  Resp:  [19] 19  BP: (95)/(62) 95/62  FiO2 (%):  [70 %-100 %] 100 %  SpO2:  [89 %] 89 %  General: intubated and sedated  HEENT: ET tube in, no bleeding  Neuro: sedated, withdraws to pain, somewhat follows commands   Pulm/Resp: reduced sounds in bases, crackles in upper lung fields  CV: RRR  Abdomen: Soft, non-distended, non-tender  Incisions/Skin: coccyx erythema and skin maceration    LABS: Reviewed.   Arterial Blood Gases   Recent Labs   Lab 10/06/21  0411   PH 7.38   PCO2 54*   PO2 67*   HCO3 32*     Complete Blood Count   No lab results found in last 7 days.  Basic Metabolic Panel  Recent Labs   Lab 10/06/21  0348   *     Liver Function Tests  No lab results found in last 7 days.  Coagulation Profile  No lab results found in last 7 days.    IMAGING:  No results found for this or any previous visit (from the past 24 hour(s)).

## 2021-10-06 NOTE — PROGRESS NOTES
Vascular Access Services Notes:    Ultrasound-guided lab draw done without complication. Attempted x1 in the LEFT UPPER FOREARM using a 20 gauge x 1.75 inch BB PIV needle.  was present to assist.    Time spent with pt - 30 minutes      KIKI Yeager, RN [facility]

## 2021-10-06 NOTE — PLAN OF CARE
Admitted/transferred from: [facility]  Reason for admission/transfer: ICU level of care   2 RN skin assessment: completed by Kimberley FRIED RN and Catherine CARBAJAL RN  Result of skin assessment and interventions/actions: WOC consulted. R heel pressure injury, abrasion R elbow, BUE/bilateral hand bruising, open sore on lower lip/cracked lips, coccyx/primo area excoriated/red/raw, dry/flaky skin  Patient belongings: no belongings     Major Shift Events: arrived at 0400  Neuro: sedated with propofol and fentanyl gtt. RASS -2, arouses to vigorous stimulation.  Intermittently responds to simple commands. Pupils 3mm, brisk, reactive.   CV: NSR/SB. MAP >65. Pulses +2. Generalized edema. Afebrile.  Resp: CMV FiO2 100% R 14  PEEP 14. Scant secretions. Clear/extremely diminished lungs.  GI: NPO. OG - LIS. BM PTA. Hypoactive bowel sounds.   : arthur. 3mg bumex administered at 0607.   Skin: see above.    X2 PIV  RIJ CVC    Plan: hemodynamic monitoring.   For vital signs and complete assessments, please see documentation flowsheets.    ?

## 2021-10-06 NOTE — PROGRESS NOTES
"CLINICAL NUTRITION SERVICES - ASSESSMENT NOTE     Nutrition Prescription    RECOMMENDATIONS FOR MDs/PROVIDERS TO ORDER:  --Additional water flushes and bowel regimen as per primary team.     Malnutrition Status:    Patient does not meet two of the established criteria necessary for diagnosing malnutrition    Recommendations already ordered by Registered Dietitian (RD):  TF:  --Enteral access: OGT  --GOAL: Vital High Protein @ goal of 60ml/hr (1440ml/day) will provide: 1440 kcals (22.5 kcal/kg IBW), 125 g PRO (2 g/kg IBW), 1203 ml free H20, 159 g CHO, and 0 g fiber daily.       --Additional kcal from Propofol.   --Start TF @ 10 ml/hr and advance by 10 ml q 8 hrs until goal rate.  --Do not start or advance TF rate unless K+ >3.0, Mg++ > 1.5,  and Phos > 1.9.  --Minimum 30 ml q 4 hrs water flushes for tube patency.  --If gastric enteral access: HOB > 30 degrees.  --Certavite daily.     Future/Additional Recommendations:  --TF tolerance, renal function.  --Propofol.   --Skin status.     REASON FOR ASSESSMENT  Chika Ferguson is a/an 61 year old female assessed by the dietitian for Provider Order - Registered Dietitian to Assess and Order TF per Medical Nutrition Therapy Protocol    NUTRITION HISTORY  PMH suspected CAD, HFrEF, COPD, obesity who was admitted on 10/6/2021 with acute hypoxic and hypercapnic respiratory failure. Pt is intubated, COVID rule out in progress. Per RN, pt was chair-bound for 5 days.     CURRENT NUTRITION ORDERS  Diet: NPO    LABS  Cr 1.63 (H)  K+ 2.9 (L)  Phos 4.6 (H)    MEDICATIONS  Fentanyl  Propofol @ 19.5 ml/hr = 515 kcal/day    ANTHROPOMETRICS  Height: 172.7 cm (5' 8\")  Most Recent Weight: (!) 162.2 kg (357 lb 9.4 oz)    IBW: 63.6 kg (255% IBW)  BMI: Obesity Grade III BMI >40 (BMI = 54.25 kg/m2)  Weight History:   Wt Readings from Last 30 Encounters:   10/06/21 (!) 162.2 kg (357 lb 9.4 oz)   No other weights available per Care Everywhere    Dosing Weight: 64 kg IBW    ASSESSED NUTRITION " NEEDS  Estimated Energy Needs: 2975-2385 kcals/day (22 - 25 kcal/kg IBW)  Justification: Obese and Vented  Estimated Protein Needs: 125-160 grams protein/day (2 - 2.5 grams of pro/kg IBW)  Justification: Increased needs and Obesity guidelines  Estimated Fluid Needs: (1 mL/kcal)   Justification: Maintenance and Per provider pending fluid status    PHYSICAL FINDINGS  See malnutrition section below.  ETT  OGT (CXR)  WOCN consult for LE/back/buttocks wounds -> RN reports wounds everywhere    MALNUTRITION  % Intake: Unable to assess  % Weight Loss: Unable to assess  Subcutaneous Fat Loss: None observed on visual inspection (COVID precautions)  Muscle Loss: None observed on visual inspection (COVID precautions)  Fluid Accumulation/Edema: trace 1+ edema  Malnutrition Diagnosis: Patient does not meet two of the established criteria necessary for diagnosing malnutrition    NUTRITION DIAGNOSIS  Inadequate oral intake related to mechanical ventilation inhibiting ability to take nutrition PO as evidenced by NPO status x at least 1 day requiring the start of enteral nutrition to meet 100% of needs.       INTERVENTIONS  Implementation  Collaboration with other providers - rounds  Enteral Nutrition - Initiate     Goals  Total avg nutritional intake to meet a minimum of 22 kcal/kg and 2 g PRO/kg daily (per dosing wt 64 kg IBW).     Monitoring/Evaluation  Progress toward goals will be monitored and evaluated per protocol.    Rita Hall, MS, RD, LD, [facility] pager: [phone]  ASCOM: [id number]

## 2021-10-06 NOTE — LETTER
Transition Communication Hand-off for Care Transitions to Next Level of Care Provider    Name: Chika Ferguson  : 1960  MRN #: [id number]  Primary Care Provider: Edwin Joshi     Primary Clinic: [facility] [address]  [place] [address]     Reason for Hospitalization:  Acute respiratory failure with hypoxia (H) [J96.01]  Admit Date/Time: 10/6/2021  3:38 AM  Discharge Date: 10/27/21  Payor Source: Payor: MEDICARE / Plan: MEDICARE / Product Type: Medicare /     Readmission Assessment Measure (STEFAN) Risk Score/category: 13%         Reason for Communication Hand-off Referral: Other care coordination    Discharge Plan: Acute rehab  [facility] [id number] [place] [address] [address]  [place]  [address]  P: [phone]  F: [phone]     Concern for non-adherence with plan of care:   No  Discharge Needs Assessment:  Needs      Most Recent Value   Equipment Currently Used at Home  none          Already enrolled in Tele-monitoring program and name of program:  unknown  Follow-up specialty is recommended: Yes    Follow-up plan:    Future Appointments   Date Time Provider Department Center   10/27/2021 11:30 AM Baltazar Johnson, OT [facility] O   10/27/2021  2:15 PM Mateusz Chambers, PT [facility] O   10/28/2021  2:15 PM Jamal Sam, PT [facility] O       Any outstanding tests or procedures:              Key Recommendations:      TALYA Medley    AVS/Discharge Summary is the source of truth; this is a helpful guide for improved communication of patient story

## 2021-10-06 NOTE — PLAN OF CARE
Neuro: Sedated to goal RASS -3 to -4. Moves all four extremities to painful/vigorous stimulation. Perrl-2mm.   Cardiac: SB-SR 50-70s w/ occasional PAC. Small amts (0.02-0.03) needed to keep MAP >65. Pt becomes hypotensive (70-80s/50s) and HR drops to low 50s with turns. Afebrile.  CVP trended - 16.   Resp: CMV/AC 50%/14/14/400. ABG trended throughout the day. Scant amt of thick/creamy secretions. Lungs diminished. Patient desaturates (mid 80s) while lying flat and with turns (even with pre-oxygenation). During turns and when stimulated patient becomes dyssynchronous with the ventilator, has tidal volume mismatch and PIP rise to 40s.  GI: OG repositioned x2, AXR done to confirm placment. TF started at 1830 at 10ml/hr, to be increased by 10ml q8h until goal of 60ml/hr. +BS/-flatus, LBM unknown.  : Lopes w/ -150ml/hr, total of 6mg bumex given throughout the day.  LADs: R IJx3, PIVx2, R Rad ART,   Gtts:  -Propofol at 50mcg/kg/hr  -Fentanyl at 50mcg/hr  -Levophed at 0.03mcg/kg/hr  Integ: WOC saw patient and assessed areas of concern  -R knee drsg (r/t I/O catheter that was discontinued), leaking serous fluid  -R elbow abrasion  -R heel suspected PU (foam in place)  -Ayse-area reddened/exoriated with yeast-like odor, antifungal ordered and applied.  -Generalized ecchymosis  Labs: Lytes rechecked/replaced throughout the day. Blood cultures, sputum culture and urine culture sent.   -Patient Covid swab came back positive at 1717, sample sent at 0615, MD notified  Tests/Procedures:  -Bedside Echo complete  -BLE/BUE & Kidney US complete  Consults: Cardiology consulted. Look to their note for further recommendations.      Plan: Continue to monitor for respiratory changes, trend ABGs as needed. Update MD with changes.

## 2021-10-06 NOTE — PHARMACY-ADMISSION MEDICATION HISTORY
Admission Medication History Completed by Pharmacy    See [facility] Admission Navigator for allergy information, preferred outpatient pharmacy, prior to admission medications and immunization status.     Medication History Sources:     Nursing was able to talk to son and get a detailed list of the patient's home medications (read off prescription bottles). Appreciate nursing help with this medication history.    Changes made to PTA medication list (reason):    Added: All    Deleted: None    Changed: None    Additional Information:    Son reports that patient missed the past few days of bumetanide    Prior to Admission medications    Medication Sig Last Dose Taking? Auth Provider   bumetanide (BUMEX) 2 MG tablet Take 2 mg by mouth 2 times daily Unknown at Unknown time Yes Reported, Patient   calcium carbonate (OS-NAE) 1500 (600 Ca) MG tablet Take 600 mg by mouth daily Unknown at Unknown time Yes Reported, Patient   carvedilol (COREG) 3.125 MG tablet Take 3.125 mg by mouth 2 times daily (with meals) Unknown at Unknown time Yes Reported, Patient   multivitamin (CENTRUM SILVER) tablet Take 1 tablet by mouth daily Unknown at Unknown time Yes Reported, Patient   pantoprazole (PROTONIX) 40 MG EC tablet Take 40 mg by mouth daily Unknown at Unknown time Yes Reported, Patient   PARoxetine (PAXIL) 40 MG tablet Take 40 mg by mouth every morning Unknown at Unknown time Yes Reported, Patient   potassium chloride ER (KLOR-CON M) 20 MEQ CR tablet Take 20 mEq by mouth 2 times daily Unknown at Unknown time Yes Reported, Patient   simvastatin (ZOCOR) 20 MG tablet Take 20 mg by mouth daily Unknown at Unknown time Yes Reported, Patient   vitamin D3 (CHOLECALCIFEROL) 50 mcg (2000 units) tablet Take 1 tablet by mouth daily Unknown at Unknown time Yes Reported, Patient       Date completed: 10/06/21    Medication history completed by: Maria Fernanda Hernández [facility]

## 2021-10-06 NOTE — CONSULTS
BRIEF SW NOTE    SW spoke to bedside RN who was able to contact pt's son and updated the emergency contact information.     SW will continue to follow for additional needs.     TALYA Bustillo, [facility]  ICU   [facility]  Ph: [phone]  [phone]

## 2021-10-06 NOTE — CONSULTS
WO Nurse Inpatient Wound Assessment   Reason for consultation: Evaluate and treat  Buttock, pannus and heel wounds    Assessment  Buttock and pannus wounds due to Incontinence Associated Dermatitis (IAD), Intertrigo and Moisture Associated Skin Damage (MASD) with fungal involvement   Status: initial assessment     R plantar heel wound due to pressure injury, present on admission  Pressure injury is stage 2   Status: initial assessment     Treatment Plan  Buttock wounds: BID apply Osmar antifungal (antifungal with skin protectant) paste (MD order) over wound sites. With incontinence, cleanse with Osmar cleanse and protect and paper washclothes. Can apply critic-aid paste in between antifungal applications to maintain barrier. If full removal needed, please use baby oil to reduce friction forces to the skin.     Pannus rash: BID cleanse with microKlenz and dry thouroughly, then apply Osmar antifungal paste (MD order) over red areas in a thin layer    R plantar heel: Every third day cleanse with microKlenz and dry, apply Cavilon no sting barrier film to skin around wound and let dry, then place mepilex. Ensure heel elevated off mattress at all times.      Orders Written  Recommended provider order: Osmar antifungal to buttock and pannus (antifungal cream with skin protectant)   WO Nurse follow-up plan:weekly  Nursing to notify the Provider(s) and re-consult the WOC Nurse if wound(s) deteriorates or new skin concern.    Patient History  According to provider note(s):  Chika Ferguson is a 61 year old female who presents with acute hypoxic respiratory failure. Her PMH includes pulmonary hypertension, history of Covid at the end of 2020, COPD, CARL, CAD with history of stenting (unknown vessel-no records). Cards consulted for evaluation of severe RV dysfunction noted on echo.    Objective Data  Containment of urine/stool: Incontinence Protocol    Active Diet Order  Orders Placed This Encounter      NPO for Medical/Clinical  Reasons Except for: Meds      Output:   I/O last 3 completed shifts:  In: 568.92 [I.V.:568.92]  Out: 2290 [Urine:2190; Emesis/NG output:100]    Risk Assessment:   Sensory Perception: 1-->completely limited  Moisture: 2-->very moist  Activity: 1-->bedfast  Mobility: 2-->very limited  Nutrition: 2-->probably inadequate  Friction and Shear: 1-->problem  Mychal Score: 9                          Labs: Recent Labs   Lab 10/06/21  0500   ALBUMIN 2.4*   HGB 10.3*   INR 1.19*   WBC 18.6*       Physical Exam  Areas of skin assessed: focused buttock, pannus and heels    Wound Location:  R plantar heel   Date of last photo 10/6  Wound History: patient found stuck in chair for extended period of time likely causing plantar heel pressure injury         Wound Base: 100 % dermis     Palpation of the wound bed: normal      Drainage: moderate     Description of drainage: serous     Measurements (length x width x depth, in cm) 3  x 4  x  0.1 cm      Tunneling N/A     Undermining N/A  Periwound skin: dry/scaly and hyperkeratosis      Color: normal and consistent with surrounding tissue      Temperature: normal   Odor: mild  Pain: unable to assess due to  sedation ,     Pannus and buttock skin folds with bright red erythema, satellite lesions and yeast like odor consistent with fungal rash due to incontinence while stuck in chair for a number of days.     Interventions  Visual inspection and assessment completed   Wound Care Rationale Promote moist wound healing without tissue dehydration , Provide protection  and Decrease bacterial load  Wound Care: completed by RN  Supplies: floor stock and discussed with RN  Current off-loading measures: Pillows  Current support surface: Standard  Low air loss mattress  Education provided to: plan of care  Discussed plan of care with Nurse    Dafne Gonzáles RN, CWOCN

## 2021-10-06 NOTE — PROCEDURES
Procedure: Central Line Placement    Consent:   1. Obtained from son after discussion of risk/benefit/alternatives) and all questions answered to the best of my knowledge. This signed consent is in the chart.   2. Could not be obtained from patient and next of kin and procedure felt to be urgent and could not posptoned any further to get consent     Universal Protocol:   Patient Identification was verified, time out was performed, site marking N/A, Imaging data N/A. Full Barrier precaution done: Hands washed, mask, gloves, gown, cap and eye protection all used.    Indication: IV access for medication(s) and CVP monitoring.    Narrative: The vein was identified with portable ultrasound device.  Area prepped with chlorhexedine and draped. Sterile technique and full barrier precautions used. 1% lidocaine injected subcutaneously for local anesthesia. A triple lumen catheter was inserted using standard Seldinger technique under real time US guidance. The central line was sutured at 15 cm depth.    Post-procedure imaging:  Central line is in good position and no visible pneumothorax per my review.     Complications: No apparent complication.    Procedure performed by Hung Rosa MD  on October 6, 2021    Hung Rosa MD  Internal Medicine, PGY-2  Pager: [phone]

## 2021-10-06 NOTE — PROGRESS NOTES
MEDICAL ICU H&P  10/06/2021    Date of Hospital Admission: 10/6/21  Date of ICU Admission: 10/6/21  Reason for Critical Care Admission: Acute hypoxic and hypercapnic respiratory failure  Date of Service (when I saw the patient): 10/06/2021    ASSESSMENT: Chika Ferguson is a 61 year old female with PMH of hyperlipidemia, GERD, obesity, hiatal hernia, CARL, HFpEF, Pulm HTN, CAD s/p PCI mlad 2016, former smoker, who was admitted on 10/6/2021 with acute hypoxic and hypercapnic respiratory failure.      PLAN:    Neuro:  # Pain and sedation  Propofol   Fentanyl   - RASS goal -3 to -4    # Depression/anxiety  - Continue PTA paroxetine    Pulmonary:  # Acute hypoxic respiratory failure   # Hx COPD?  CHF vs. PE vs. COPD exacerbation vs. pneumonia  - CT PE study negative  - Diuresis with 3 mg IV bumex to start and monitor response  - Consider steroids if concern for COPD exacerbation  - Strict I/O  - Holding PTA carvedilol and dosing bumetanide with UOP response  - monitor fever curve   - Trending ABGs, following lactates, repeating CBC, BMP  - started Flolan    Ventilation Mode: CMV/AC  (Continuous Mandatory Ventilation/ Assist Control)  FiO2 (%): 70 %  Rate Set (breaths/minute): 14 breaths/min  Tidal Volume Set (mL): 400 mL  PEEP (cm H2O): (S) 14 cmH2O  Oxygen Concentration (%): 70 %  Resp: 14        Cardiovascular:  # Hx CAD  Past cardiovascular history notable for confirmed, spoke with primary care provider to obtain further medical records. CTA was negative for pulmonary embolism. TTE was notable for severe right ventricular dilation, consistent with chronic Group III pulmonary hypetension. Consulted cardiology, appreciate recs   - Volume status: hypervolemic  - BB: holding PTA carvedilol 3.125 mg BID  - MRA: none  - SCD prevention: none  - Strict I/O as above  - Echocardiogram  (ordered)  - CTA negative for pulmonary embolism  - Ordered repeat echo for bubble study  - V/Q scan  - CVP monitoring, reported q4h, titrate  diuresis based on readings    - Continue PTA Aspirin  - Atorvastatin 40mg for hx of hyperlipidemia    GI/Nutrition:  # Nutrition  - consulted  - OG tube placed, start feeding today    # Suspected GERD  - pantoprazole     # Bowel regimen  - Senna/miralax prn    Renal/Fluids/Electrolytes:  # TIMMY   Unclear baseline Cr, likely cardiorenal in context of CHF  - Diurese and assess response  - Obtained UA, notable for trace hematuria  - Titrate diuresis according to CVP aiming for <15mm.    Endocrine:  # Hyperglycemia  Unclear diabetes history  - Hypoglycemia protocol    ID:  # Community acquired pneumonia  Initial abxs at outside hospital, suspected CAP  -Continue ceftriaxone azithromycin  - Sputum culture  - Blood cultures  - Urinalysis and culture    Hematology:    # Leukocytosis  Stress vs. Infection as above  - Monitor    Musculoskeletal:  # PT/OT    Skin:  # Pressure ulcers  Multiple observed, likely with long amount of time in chair  - WOC consult    General Cares/Prophylaxis:    DVT Prophylaxis: Heparin SQ  GI Prophylaxis: PPI  Restraints: ordered  Family Communication: sonSarath, updated by me over the phone  Code Status: Full    Lines/tubes/drains:  - PIVx2  - CVC RIJ  - Art Line  - ETT 7mm  - Lopes catheter in place  - Will monitor arterial pressure and obtain arterial line if pressors needed    Disposition:  - Medical ICU    Patient seen and findings/plan discussed with medical ICU staff, Dr. Fulton.    Jl Jean-Baptiste MD  Internal Medicine, PGY-1  Pager: [phone]    -----------------------------------------------------------------------  Interval History: Patient intubated, blood pressures currently stable, now undergoing CTA chest.    PHYSICAL EXAMINATION:  Temp:  [97.7  F (36.5  C)] 97.7  F (36.5  C)  Pulse:  [60-66] 60  Resp:  [16-19] 16  BP: (87-95)/(62-66) 87/62  MAP:  [61 mmHg-73 mmHg] 61 mmHg  Arterial Line BP: ()/(53-60) 84/53  FiO2 (%):  [70 %-100 %] 100 %  SpO2:  [89 %-100 %] 100  %  General: intubated and sedated  HEENT: ET tube in, no bleeding  Neuro: sedated, withdrawing to pain, does not follow commands  Pulm/Resp: reduced sounds in bases, crackles in upper lung fields, negative for wheezing  CV: RRR  Abdomen: Soft, non-distended, non-tender  Incisions/Skin: coccyx erythema and skin maceration, significant lower extremity edema bilaterally    LABS: Reviewed.   Arterial Blood Gases   Recent Labs   Lab 10/06/21  0411   PH 7.38   PCO2 54*   PO2 67*   HCO3 32*     Complete Blood Count   Recent Labs   Lab 10/06/21  0500   WBC 18.6*   HGB 10.3*        Basic Metabolic Panel  Recent Labs   Lab 10/06/21  0500 10/06/21  0348     --    POTASSIUM 3.7  --    CHLORIDE 104  --    CO2 30  --    BUN 33*  --    CR 1.68*  --    * 101*     Liver Function Tests  Recent Labs   Lab 10/06/21  0500   AST 90*   ALT 60*   ALKPHOS 103   BILITOTAL 0.4   ALBUMIN 2.4*   INR 1.19*     Coagulation Profile  Recent Labs   Lab 10/06/21  0500   INR 1.19*   PTT 27       IMAGING:  Recent Results (from the past 24 hour(s))   XR Chest Port 1 View    Impression    RESIDENT PRELIMINARY INTERPRETATION  IMPRESSION: Endotracheal tube 3 cm from the jayden. Streaky bibasilar  atelectasis.   XR Chest Port 1 View    Impression    RESIDENT PRELIMINARY INTERPRETATION  IMPRESSION: New right IJ venous catheter tip in the mid to low SVC.  Streaky bibasilar atelectasis.

## 2021-10-06 NOTE — CONSULTS
Cardiology Consult - Cards2  Date of Service: 10/06/21    ASSESSMENT:   Chika Ferguson is a 61 year old female who presents with acute hypoxic respiratory failure. Her PMH includes pulmonary hypertension, history of Covid at the end of 2020, COPD, CARL, CAD with history of stenting (unknown vessel-no records). Cards consulted for evaluation of severe RV dysfunction noted on echo.    # Severe RV dysfunction and mod-severe RV dilation  # Pulmonary Hypertension - Likely Group III  # Obesity hypoventilation syndrome  # Acute on chronic hypoxemic and hypercapnic respiratory failure   # Leukocytosis - being treated for CAP  # h/o COVID at the end of 2020  # h/o COPD  # CARL - cpap with oxygen use at night. Son states she has been using her mask with oxygen for up to 16 hours per day (including sleeping).  # h/o CAD - h/o stent placement several years ago. No records regarding.  # TIMMY   - CTA PE: no acute PE.  Pulmonary trunk is severely dilated consistent with a diagnosis of hypertension.  No significant emphysema noted on her CT PE.    Patient's severe hypoxia and hypercapnia requiring intubation in the setting of pulmonary hypertension is consistent with group III pulmonary hypertension. Her PH is likely chronic given the findings of RV dilation/reduced function with a large pulmonary artrial trunk. There may be a component of group II as well given her history of CAD (likely , however her diastolic parameters are not significant abnormal. Her acute respiratory issues are likely acute exacerbation of chronic pulmonary disease (COPD, obesity hypoventilation). It is also unclear if the patient has intra-cardiac or extra-cardiac shunting as contributor of severe respiratory distress.     RECOMMEND:  - ASHANTI, Rheumatoid factor, HIV antibodies, TSH, HBV (HBsAg, HBsAb, HBcAb), HCV Ab  - Limited echo with bubble study  - V/Q Scan  - RHC when more stable from a respiratory perspective  - Calculate cardiac output ([place]) - please  obtain a PICC sat (Venous blood gas w oxyhemoglobin)  - Please transduce continuous CVP and diurese to a CVP of 15    Discussed with Dr. Thennapan Mohsan Chaudhry, MD  Division of Cardiology, PGY-5    REASON FOR CONSULT: Right heart failure    History of Present Illness   Chika Ferguson is a 61 year old female who presents with acute hypoxic respiratory failure. Her PMH includes pulmonary hypertension, history of Covid at the end of 2020, COPD, CARL, CAD with history of stenting (unknown vessel-no records). Cards consulted for evaluation of severe RV dysfunction noted on echo.    Limited history available in the chart.  Patient intubated and sedated.  History obtained by the son.    Patient son reports that she has been functionally declining over the past several months.  At the end of 2020 patient was infected with Covid but she could still go up to flight of stairs and walk a few blocks as of May 2021.  Since then she has been progressively declining in her functional capacity, recently she has been primarily spending her time sitting in a recliner.  For the past 5 days patient has been primarily bedbound.  Son called EMS for concern related to her mother's functional decline.  He reports she had no acute shortness of breath.  With the help of EMS patient walked to the ambulance, but became severely short of breath when she lie down on the stretcher.  Son reports the patient has not been seek medical treatment because she is deathly afraid of lying down due to severe shortness of breath with her orthopnea.    She is also been experiencing significantly worsening bilateral lower extremity edema, they are reports that she stopped taking her medications.  No other significant symptoms recently including fevers chills myalgias, URI symptoms, abdominal pain, nausea vomiting, dysuria, hematuria, confusion.    Otherwise son also reports a patient in spending up to 16-17 hrs per day (including sleeping) in the chair  with oxygenation infusing through her CPAP. Unclear if she sees a cardiologist on a regular basis.    Primary care:   [facility] - unclear about specific provider    Social:  Moved to [place] 1 year ago.  Lived with her fiance before that who  of COVID. Moved to [place] to live with her son.  Former smoker. No h/o illicit drug use.    Functional capacity:  Can walk around the house. Can walk when going grocery shopping and other stores without stopping but at a slower pace. Does not use the stairs. In Oct 2020 to May 2021 she could go up 2 flight of stairs.      PAST MEDICAL HISTORY:  No past medical history on file.    CURRENT MEDICATIONS:  No current outpatient medications on file.       PAST SURGICAL HISTORY:  No past surgical history on file.    ALLERGIES   Not on File    FAMILY HISTORY:  No family history on file.    SOCIAL HISTORY:  Social History     Socioeconomic History     Marital status: Single     Spouse name: Not on file     Number of children: Not on file     Years of education: Not on file     Highest education level: Not on file   Occupational History     Not on file   Tobacco Use     Smoking status: Not on file   Substance and Sexual Activity     Alcohol use: Not on file     Drug use: Not on file     Sexual activity: Not on file   Other Topics Concern     Not on file   Social History Narrative     Not on file     Social Determinants of Health     Financial Resource Strain:      Difficulty of Paying Living Expenses:    Food Insecurity:      Worried About Running Out of Food in the Last Year:      Ran Out of Food in the Last Year:    Transportation Needs:      Lack of Transportation (Medical):      Lack of Transportation (Non-Medical):    Physical Activity:      Days of Exercise per Week:      Minutes of Exercise per Session:    Stress:      Feeling of Stress :    Social Connections:      Frequency of Communication with Friends and Family:      Frequency of Social Gatherings with Friends  and Family:      Attends Anab[employer]ist Services:      Active Member of Clubs or Organizations:      Attends Club or Organization Meetings:      Marital Status:    Intimate Partner Violence:      Fear of Current or Ex-Partner:      Emotionally Abused:      Physically Abused:      Sexually Abused:        Review of Systems   HPI and ROS limited by intubation and sedation.    Physical Exam   Temp: 98.4  F (36.9  C) Temp src: Oral BP: 102/81 Pulse: 58   Resp: 14 SpO2: 98 % O2 Device: Mechanical Ventilator    Vital Signs with Ranges  Temp:  [97.7  F (36.5  C)-98.4  F (36.9  C)] 98.4  F (36.9  C)  Pulse:  [58-69] 58  Resp:  [14-19] 14  BP: ()/(62-81) 102/81  MAP:  [61 mmHg-94 mmHg] 76 mmHg  Arterial Line BP: ()/(25-79) 101/64  FiO2 (%):  [60 %-100 %] 80 %  SpO2:  [89 %-100 %] 98 %  357 lbs 9.38 oz    GEN: intubated and sedated. Morbidly obese.  HEENT: no icterus  CV: RRR, normal s1/s2, no murmurs/rubs/s3/s4, no heave. JVP >15  CHEST: Crackles diffusely, mild end expiratory wheezing  ABD: soft, NT/ND, NABS  : no flank/suprapubic tenderness  NEURO: Intubated and sedated. Pupils response to light and symmetric.  PSYCH: unable to assess    Data   Data reviewed today:  I personally reviewed the EKG tracing showing NSR, inferior q waves. poor R wave progression. normal axis. no evidence of acute ischemia.  Recent Labs   Lab 10/06/21  0919 10/06/21  0837 10/06/21  0500   WBC  --   --  18.6*   HGB  --   --  10.3*   MCV  --   --  85   PLT  --   --  283   INR  --   --  1.19*   NA  --  142 140   POTASSIUM  --  2.9* 3.7   CHLORIDE  --  104 104   CO2  --  32 30   BUN  --  34* 33*   CR  --  1.63* 1.68*   ANIONGAP  --  6 6   NAE  --  8.4* 8.8   * 105* 102*   ALBUMIN  --   --  2.4*   PROTTOTAL  --   --  7.6   BILITOTAL  --   --  0.4   ALKPHOS  --   --  103   ALT  --   --  60*   AST  --   --  90*   TROPONIN  --   --  0.276*       Recent Results (from the past 24 hour(s))   XR Chest Port 1 View    Narrative    XR CHEST PORT  1 VIEW10/6/2021 4:45 AM    INDICATION: Eval ET tube placement, cardiopulmonary pathology    COMPARISON:  Care Everywhere read from same day.    FINDINGS: AP view of the chest. Endotracheal tube tip 3 cm from the  jayden. Enteric tube tip and sidehole projecting over the left upper  quadrant, likely within the lumen of the stomach.    Low lung volumes. Mild cardiomegaly. Calcific atherosclerotic disease  of the aortic arch which is somewhat tortuous. Streaky bibasilar  opacities, likely atelectasis. Upper lobes are clear. No acute osseous  abnormalities.      Impression    IMPRESSION: Endotracheal tube 3 cm from the jayden. Streaky bibasilar  atelectasis.    I have personally reviewed the examination and initial interpretation  and I agree with the findings.    CASSANDRA TARANGO MD         SYSTEM ID:  [id number]   XR Chest Port 1 View    Narrative    XR CHEST PORT 1 VIEW10/6/2021 6:11 AM    INDICATION: new RIJ CVL    COMPARISON:  Same-day radiograph    FINDINGS: AP view of the chest. Endotracheal tube tip 3 cm from the  jayden. Enteric tube tip and sidehole projecting over the left upper  quadrant, likely within the lumen of the stomach. Interval placement  of right IJ venous catheter with tip in the mid to lower SVC.    Low lung volumes. Mild cardiomegaly. Calcific atherosclerotic disease  of the aortic arch which is somewhat tortuous. Streaky bibasilar  opacities, likely atelectasis. Upper lobes are clear. No acute osseous  abnormalities.      Impression    IMPRESSION: New right IJ venous catheter tip in the mid to low SVC.  Streaky bibasilar atelectasis.    I have personally reviewed the examination and initial interpretation  and I agree with the findings.    CASSANDRA TARANGO MD         SYSTEM ID:  [id number]   CT Chest Pulmonary Embolism w Contrast    Narrative    Examination: CT CHEST PULMONARY EMBOLISM W CONTRAST, 10/6/2021 7:12 AM    Comparison: Same-day radiograph.    History: PE suspected, high prob.    Technique:  Axial thin slice images from the lung apices to the  diaphragm were obtained following the injection of intravenous  contrast media in the pulmonary arterial phase.     Contrast dose: iopamidol (ISOVUE-370) solution 77 mL    Findings:   Support devices: Endotracheal tube tip in the mid thoracic trachea.  Enteric tube tip in the gastric fundus. Right IJ venous catheter tip  in the low SVC.    Mediastinum/hilum: The heart size is enlarged. Main pulmonary artery  measuring up to 3.7 cm. Right ventricle is slightly dilated and there  is reflux of contrast into the IVC. There is no evidence of a filling  defect in the pulmonary arteries to suggest a pulmonary embolism.     No evidence of axillary, mediastinal or hilar lymphadenopathy by size  criteria. No pericardial effusion is noted.    Lungs/pleura: There are low lung volumes. Bibasilar areas of  atelectasis. Small amount of dependent atelectasis as well in the  upper lobes.     Upper Abdomen: Partially visualized solid organs of the upper abdomen  are unremarkable.    Bones/Soft tissues: Degenerative changes of the spine.      Impression    IMPRESSION:   1. No evidence for pulmonary embolism.  2. Evidence for elevated right heart pressures with a dilated right  atrium and ventricle and a main pulmonary artery that measures up to  3.7 cm.  3. Low lung volumes with bibasilar and dependent areas of atelectasis.    I have personally reviewed the examination and initial interpretation  and I agree with the findings.    CASSANDRA TARANGO MD         SYSTEM ID:  [id number]   Echocardiogram Complete   Result Value    LVEF  55-60%    Narrative    [id number]  [id number]  [id number]  [id number]^TONIA^TERRANCE^MILE     [facility],[place]  Echocardiography Laboratory  [address] [place] [id number]     Name: JASKARAN JOSEPH  MRN: [id number]  : 1960  Study Date: 10/06/2021 08:37 AM  Age: 61 yrs  Gender: Female  Patient Location: [facility]  Reason For Study:  Respiratory Failure  Ordering Physician: TERRANCE RANDALL  Referring Physician: JONELLE CORRALES  Performed By: Puma Damian     BSA: 2.6 m2  Height: 68 in  Weight: 357 lb  HR: 72  BP: 102/81 mmHg  ______________________________________________________________________________  Procedure  Complete Portable Echo Adult. Technically difficult study.Extremely poor  acoustic windows. Optison (NDC #2710-5695-70) given intravenously. Patient was  given 4 ml mixture of 3 ml Optison and 6 ml saline. 5 ml wasted.  ______________________________________________________________________________  Interpretation Summary  Global and regional left ventricular function is normal with an EF of 55-60%.  Technically difficult study.Extremely poor acoustic windows.  Moderate to severe right ventricular dilation is present.  Global right ventricular function is moderately to severely reduced.  Pulmonary artery systolic pressure cannot be assessed.  Dilation of the inferior vena cava is present with abnormal respiratory  variation in diameter.  No pericardial effusion is present.  There is no prior study for direct comparison.  ______________________________________________________________________________  Left Ventricle  Global and regional left ventricular function is normal with an EF of 55-60%.  Left ventricular size is normal. Mild concentric wall thickening consistent  with left ventricular hypertrophy is present. Left ventricular diastolic  function is indeterminate. No regional wall motion abnormalities are seen.     Right Ventricle  Moderate to severe right ventricular dilation is present. Global right  ventricular function is moderately to severely reduced.     Atria  The atria cannot be assessed.     Mitral Valve  The mitral valve is normal.     Aortic Valve  The valve leaflets are not well visualized. On Doppler interrogation, there is  no significant stenosis or regurgitation.     Tricuspid Valve  The valve leaflets are not well  visualized. Trace tricuspid insufficiency is  present. Pulmonary artery systolic pressure cannot be assessed.     Pulmonic Valve  The valve leaflets are not well visualized. On Doppler interrogation, there is  no significant stenosis or regurgitation.     Vessels  The thoracic aorta is normal. The pulmonary artery cannot be assessed.  Ascending aorta 4.2 cm. Dilation of the inferior vena cava is present with  abnormal respiratory variation in diameter.     Pericardium  No pericardial effusion is present.     Compared to Previous Study  There is no prior study for direct comparison.  ______________________________________________________________________________  MMode/2D Measurements & Calculations  IVSd: 1.3 cm     LVIDd: 5.0 cm  LVIDs: 2.9 cm  LVPWd: 1.1 cm  FS: 43.3 %  LV mass(C)d: 242.3 grams  LV mass(C)dI: 92.7 grams/m2  Ao root diam: 3.3 cm  asc Aorta Diam: 4.2 cm  LVOT diam: 2.3 cm  LVOT area: 4.2 cm2  RWT: 0.44     Doppler Measurements & Calculations  MV E max heavenly: 44.1 cm/sec  MV A max heavenly: 69.8 cm/sec  MV E/A: 0.63  MV dec slope: 127.0 cm/sec2     ______________________________________________________________________________  Report approved by: Jevon Boo 10/06/2021 10:15 AM               Staff Attestation:   I have seen and examined this patient on October 6, 2021. I have discussed with the team and agree with the findings and plan in this note. I have personally reviewed vital signs, hemodynamics, medications, laboratory values, and diagnostic testing.     She very likely has pulmonary hypertension due to obesity hypoventilation/COPD. To be thorough, we will complete the work up including RHC and serological assays. Her CT pulmonary angiogram does not show any proximal CTEPH. Will get SPECT V/Q to exclude distal CTEPH once she is extubated and stable. No indication for pulmonary vasodilator therapy at present.     Mainly supportive care. Gentle diuresis. Wean off pressors as tolerated.       30 min  of critical care time was spent today reviewing data,  managing respiratory status, communicating with consultants, and in serially assessing hemodynamics.      Saul Hughes MD   Cardiovascular Division  [facility]   [phone]

## 2021-10-07 ENCOUNTER — APPOINTMENT (OUTPATIENT)
Dept: CARDIOLOGY | Facility: CLINIC | Age: 61
DRG: 207 | End: 2021-10-07
Attending: ANESTHESIOLOGY
Payer: MEDICARE

## 2021-10-07 LAB
ANA SER QL IF: NEGATIVE
ANION GAP SERPL CALCULATED.3IONS-SCNC: 5 MMOL/L (ref 3–14)
APTT PPP: 34 SECONDS (ref 22–38)
BASE EXCESS BLDA CALC-SCNC: 10 MMOL/L (ref -9–1.8)
BASE EXCESS BLDA CALC-SCNC: 10.2 MMOL/L (ref -9–1.8)
BASE EXCESS BLDA CALC-SCNC: 10.5 MMOL/L (ref -9–1.8)
BASE EXCESS BLDA CALC-SCNC: 7.4 MMOL/L (ref -9–1.8)
BUN SERPL-MCNC: 29 MG/DL (ref 7–30)
CALCIUM SERPL-MCNC: 8.6 MG/DL (ref 8.5–10.1)
CHLORIDE BLD-SCNC: 105 MMOL/L (ref 94–109)
CO2 SERPL-SCNC: 32 MMOL/L (ref 20–32)
CREAT SERPL-MCNC: 1.37 MG/DL (ref 0.52–1.04)
CRP SERPL-MCNC: 130 MG/L (ref 0–8)
CRP SERPL-MCNC: 140 MG/L (ref 0–8)
D DIMER PPP FEU-MCNC: 1.84 UG/ML FEU (ref 0–0.5)
ERYTHROCYTE [DISTWIDTH] IN BLOOD BY AUTOMATED COUNT: 16.7 % (ref 10–15)
FIBRINOGEN PPP-MCNC: 632 MG/DL (ref 170–490)
GFR SERPL CREATININE-BSD FRML MDRD: 41 ML/MIN/1.73M2
GLUCOSE BLD-MCNC: 121 MG/DL (ref 70–99)
GLUCOSE BLDC GLUCOMTR-MCNC: 114 MG/DL (ref 70–99)
GLUCOSE BLDC GLUCOMTR-MCNC: 147 MG/DL (ref 70–99)
GLUCOSE BLDC GLUCOMTR-MCNC: 156 MG/DL (ref 70–99)
GLUCOSE BLDC GLUCOMTR-MCNC: 173 MG/DL (ref 70–99)
HBA1C MFR BLD: 6.7 % (ref 0–5.6)
HBV CORE AB SERPL QL IA: NONREACTIVE
HBV SURFACE AB SERPL IA-ACNC: 1.12 M[IU]/ML
HBV SURFACE AG SERPL QL IA: NONREACTIVE
HCO3 BLD-SCNC: 33 MMOL/L (ref 21–28)
HCO3 BLD-SCNC: 36 MMOL/L (ref 21–28)
HCO3 BLD-SCNC: 36 MMOL/L (ref 21–28)
HCO3 BLD-SCNC: 37 MMOL/L (ref 21–28)
HCT VFR BLD AUTO: 31.1 % (ref 35–47)
HCV AB SERPL QL IA: NONREACTIVE
HGB BLD-MCNC: 9.6 G/DL (ref 11.7–15.7)
HIV 1+2 AB+HIV1 P24 AG SERPL QL IA: NONREACTIVE
INR PPP: 1.25 (ref 0.85–1.15)
LDH SERPL L TO P-CCNC: 220 U/L (ref 81–234)
MAGNESIUM SERPL-MCNC: 2.1 MG/DL (ref 1.6–2.3)
MAGNESIUM SERPL-MCNC: 2.2 MG/DL (ref 1.6–2.3)
MCH RBC QN AUTO: 26.7 PG (ref 26.5–33)
MCHC RBC AUTO-ENTMCNC: 30.9 G/DL (ref 31.5–36.5)
MCV RBC AUTO: 86 FL (ref 78–100)
NT-PROBNP SERPL-MCNC: 2264 PG/ML (ref 0–900)
O2/TOTAL GAS SETTING VFR VENT: 50 %
O2/TOTAL GAS SETTING VFR VENT: 60 %
PCO2 BLD: 54 MM HG (ref 35–45)
PCO2 BLD: 57 MM HG (ref 35–45)
PCO2 BLD: 58 MM HG (ref 35–45)
PCO2 BLD: 59 MM HG (ref 35–45)
PH BLD: 7.4 [PH] (ref 7.35–7.45)
PH BLD: 7.4 [PH] (ref 7.35–7.45)
PH BLD: 7.41 [PH] (ref 7.35–7.45)
PH BLD: 7.41 [PH] (ref 7.35–7.45)
PHOSPHATE SERPL-MCNC: 3.2 MG/DL (ref 2.5–4.5)
PLATELET # BLD AUTO: 281 10E3/UL (ref 150–450)
PO2 BLD: 112 MM HG (ref 80–105)
PO2 BLD: 140 MM HG (ref 80–105)
PO2 BLD: 82 MM HG (ref 80–105)
PO2 BLD: 88 MM HG (ref 80–105)
POTASSIUM BLD-SCNC: 2.9 MMOL/L (ref 3.4–5.3)
POTASSIUM BLD-SCNC: 3.5 MMOL/L (ref 3.4–5.3)
POTASSIUM BLD-SCNC: 3.5 MMOL/L (ref 3.4–5.3)
RBC # BLD AUTO: 3.6 10E6/UL (ref 3.8–5.2)
RHEUMATOID FACT SER NEPH-ACNC: 9 IU/ML
SODIUM SERPL-SCNC: 142 MMOL/L (ref 133–144)
TROPONIN I SERPL-MCNC: 0.06 UG/L (ref 0–0.04)
TROPONIN I SERPL-MCNC: 0.07 UG/L (ref 0–0.04)
TROPONIN I SERPL-MCNC: 0.07 UG/L (ref 0–0.04)
TROPONIN I SERPL-MCNC: 0.09 UG/L (ref 0–0.04)
TROPONIN I SERPL-MCNC: 0.13 UG/L (ref 0–0.04)
WBC # BLD AUTO: 12.8 10E3/UL (ref 4–11)

## 2021-10-07 PROCEDURE — 250N000011 HC RX IP 250 OP 636

## 2021-10-07 PROCEDURE — 80048 BASIC METABOLIC PNL TOTAL CA: CPT | Performed by: STUDENT IN AN ORGANIZED HEALTH CARE EDUCATION/TRAINING PROGRAM

## 2021-10-07 PROCEDURE — 84484 ASSAY OF TROPONIN QUANT: CPT | Performed by: INTERNAL MEDICINE

## 2021-10-07 PROCEDURE — 82803 BLOOD GASES ANY COMBINATION: CPT | Performed by: STUDENT IN AN ORGANIZED HEALTH CARE EDUCATION/TRAINING PROGRAM

## 2021-10-07 PROCEDURE — 86431 RHEUMATOID FACTOR QUANT: CPT

## 2021-10-07 PROCEDURE — 258N000003 HC RX IP 258 OP 636: Performed by: STUDENT IN AN ORGANIZED HEALTH CARE EDUCATION/TRAINING PROGRAM

## 2021-10-07 PROCEDURE — 250N000012 HC RX MED GY IP 250 OP 636 PS 637: Performed by: STUDENT IN AN ORGANIZED HEALTH CARE EDUCATION/TRAINING PROGRAM

## 2021-10-07 PROCEDURE — 83615 LACTATE (LD) (LDH) ENZYME: CPT | Performed by: STUDENT IN AN ORGANIZED HEALTH CARE EDUCATION/TRAINING PROGRAM

## 2021-10-07 PROCEDURE — XW043E5 INTRODUCTION OF REMDESIVIR ANTI-INFECTIVE INTO CENTRAL VEIN, PERCUTANEOUS APPROACH, NEW TECHNOLOGY GROUP 5: ICD-10-PCS | Performed by: INTERNAL MEDICINE

## 2021-10-07 PROCEDURE — 85384 FIBRINOGEN ACTIVITY: CPT

## 2021-10-07 PROCEDURE — 85027 COMPLETE CBC AUTOMATED: CPT | Performed by: STUDENT IN AN ORGANIZED HEALTH CARE EDUCATION/TRAINING PROGRAM

## 2021-10-07 PROCEDURE — 99233 SBSQ HOSP IP/OBS HIGH 50: CPT | Mod: 25 | Performed by: INTERNAL MEDICINE

## 2021-10-07 PROCEDURE — 250N000013 HC RX MED GY IP 250 OP 250 PS 637: Performed by: INTERNAL MEDICINE

## 2021-10-07 PROCEDURE — 36592 COLLECT BLOOD FROM PICC: CPT | Performed by: STUDENT IN AN ORGANIZED HEALTH CARE EDUCATION/TRAINING PROGRAM

## 2021-10-07 PROCEDURE — 85610 PROTHROMBIN TIME: CPT

## 2021-10-07 PROCEDURE — 84484 ASSAY OF TROPONIN QUANT: CPT

## 2021-10-07 PROCEDURE — 250N000009 HC RX 250: Performed by: STUDENT IN AN ORGANIZED HEALTH CARE EDUCATION/TRAINING PROGRAM

## 2021-10-07 PROCEDURE — 258N000001 HC RX 258: Performed by: INTERNAL MEDICINE

## 2021-10-07 PROCEDURE — 86140 C-REACTIVE PROTEIN: CPT

## 2021-10-07 PROCEDURE — 99291 CRITICAL CARE FIRST HOUR: CPT | Mod: GC | Performed by: INTERNAL MEDICINE

## 2021-10-07 PROCEDURE — 200N000002 HC R&B ICU UMMC

## 2021-10-07 PROCEDURE — 250N000013 HC RX MED GY IP 250 OP 250 PS 637: Performed by: STUDENT IN AN ORGANIZED HEALTH CARE EDUCATION/TRAINING PROGRAM

## 2021-10-07 PROCEDURE — 83735 ASSAY OF MAGNESIUM: CPT | Performed by: STUDENT IN AN ORGANIZED HEALTH CARE EDUCATION/TRAINING PROGRAM

## 2021-10-07 PROCEDURE — 93308 TTE F-UP OR LMTD: CPT | Mod: 26 | Performed by: INTERNAL MEDICINE

## 2021-10-07 PROCEDURE — 250N000013 HC RX MED GY IP 250 OP 250 PS 637

## 2021-10-07 PROCEDURE — 83735 ASSAY OF MAGNESIUM: CPT | Performed by: INTERNAL MEDICINE

## 2021-10-07 PROCEDURE — 36592 COLLECT BLOOD FROM PICC: CPT | Performed by: INTERNAL MEDICINE

## 2021-10-07 PROCEDURE — 93308 TTE F-UP OR LMTD: CPT

## 2021-10-07 PROCEDURE — 83880 ASSAY OF NATRIURETIC PEPTIDE: CPT | Performed by: INTERNAL MEDICINE

## 2021-10-07 PROCEDURE — 85379 FIBRIN DEGRADATION QUANT: CPT

## 2021-10-07 PROCEDURE — 94003 VENT MGMT INPAT SUBQ DAY: CPT

## 2021-10-07 PROCEDURE — 84132 ASSAY OF SERUM POTASSIUM: CPT | Performed by: INTERNAL MEDICINE

## 2021-10-07 PROCEDURE — 94645 CONT INHLJ TX EACH ADDL HOUR: CPT

## 2021-10-07 PROCEDURE — 999N000015 HC STATISTIC ARTERIAL MONITORING DAILY

## 2021-10-07 PROCEDURE — 84100 ASSAY OF PHOSPHORUS: CPT | Performed by: STUDENT IN AN ORGANIZED HEALTH CARE EDUCATION/TRAINING PROGRAM

## 2021-10-07 PROCEDURE — 85730 THROMBOPLASTIN TIME PARTIAL: CPT

## 2021-10-07 PROCEDURE — 83036 HEMOGLOBIN GLYCOSYLATED A1C: CPT | Performed by: STUDENT IN AN ORGANIZED HEALTH CARE EDUCATION/TRAINING PROGRAM

## 2021-10-07 PROCEDURE — 999N000157 HC STATISTIC RCP TIME EA 10 MIN

## 2021-10-07 PROCEDURE — 86140 C-REACTIVE PROTEIN: CPT | Performed by: STUDENT IN AN ORGANIZED HEALTH CARE EDUCATION/TRAINING PROGRAM

## 2021-10-07 PROCEDURE — 250N000011 HC RX IP 250 OP 636: Performed by: STUDENT IN AN ORGANIZED HEALTH CARE EDUCATION/TRAINING PROGRAM

## 2021-10-07 RX ORDER — DEXAMETHASONE SODIUM PHOSPHATE 4 MG/ML
6 INJECTION, SOLUTION INTRA-ARTICULAR; INTRALESIONAL; INTRAMUSCULAR; INTRAVENOUS; SOFT TISSUE DAILY
Status: COMPLETED | OUTPATIENT
Start: 2021-10-07 | End: 2021-10-16

## 2021-10-07 RX ORDER — POTASSIUM CHLORIDE 1.5 G/1.58G
20 POWDER, FOR SOLUTION ORAL ONCE
Status: COMPLETED | OUTPATIENT
Start: 2021-10-07 | End: 2021-10-07

## 2021-10-07 RX ORDER — PROPOFOL 10 MG/ML
5-75 INJECTION, EMULSION INTRAVENOUS CONTINUOUS
Status: DISCONTINUED | OUTPATIENT
Start: 2021-10-07 | End: 2021-10-09

## 2021-10-07 RX ORDER — POTASSIUM CHLORIDE 1.5 G/1.58G
40 POWDER, FOR SOLUTION ORAL ONCE
Status: COMPLETED | OUTPATIENT
Start: 2021-10-07 | End: 2021-10-07

## 2021-10-07 RX ORDER — BUMETANIDE 0.25 MG/ML
2 INJECTION INTRAMUSCULAR; INTRAVENOUS ONCE
Status: COMPLETED | OUTPATIENT
Start: 2021-10-07 | End: 2021-10-07

## 2021-10-07 RX ORDER — ACYCLOVIR 200 MG/1
10 CAPSULE ORAL ONCE
Status: COMPLETED | OUTPATIENT
Start: 2021-10-07 | End: 2021-10-07

## 2021-10-07 RX ORDER — BUMETANIDE 0.25 MG/ML
2 INJECTION INTRAMUSCULAR; INTRAVENOUS ONCE
Status: DISCONTINUED | OUTPATIENT
Start: 2021-10-07 | End: 2021-10-07

## 2021-10-07 RX ORDER — POLYETHYLENE GLYCOL 3350 17 G/17G
17 POWDER, FOR SOLUTION ORAL DAILY
Status: DISCONTINUED | OUTPATIENT
Start: 2021-10-07 | End: 2021-10-08

## 2021-10-07 RX ORDER — BUMETANIDE 0.25 MG/ML
4 INJECTION INTRAMUSCULAR; INTRAVENOUS EVERY 12 HOURS
Status: DISCONTINUED | OUTPATIENT
Start: 2021-10-07 | End: 2021-10-09

## 2021-10-07 RX ORDER — BISACODYL 10 MG
10 SUPPOSITORY, RECTAL RECTAL DAILY
Status: DISCONTINUED | OUTPATIENT
Start: 2021-10-07 | End: 2021-10-13

## 2021-10-07 RX ORDER — BUMETANIDE 0.25 MG/ML
4 INJECTION INTRAMUSCULAR; INTRAVENOUS ONCE
Status: COMPLETED | OUTPATIENT
Start: 2021-10-07 | End: 2021-10-07

## 2021-10-07 RX ORDER — PROPOFOL 10 MG/ML
10-20 INJECTION, EMULSION INTRAVENOUS EVERY 30 MIN PRN
Status: DISCONTINUED | OUTPATIENT
Start: 2021-10-07 | End: 2021-10-09

## 2021-10-07 RX ADMIN — BUMETANIDE 4 MG: 0.25 INJECTION, SOLUTION INTRAMUSCULAR; INTRAVENOUS at 09:58

## 2021-10-07 RX ADMIN — CEFTRIAXONE SODIUM 2 G: 2 INJECTION, POWDER, FOR SOLUTION INTRAMUSCULAR; INTRAVENOUS at 22:03

## 2021-10-07 RX ADMIN — POTASSIUM CHLORIDE 40 MEQ: 1.5 POWDER, FOR SOLUTION ORAL at 06:04

## 2021-10-07 RX ADMIN — ENOXAPARIN SODIUM 80 MG: 80 INJECTION SUBCUTANEOUS at 20:16

## 2021-10-07 RX ADMIN — SODIUM CHLORIDE 50 ML: 9 INJECTION, SOLUTION INTRAVENOUS at 15:30

## 2021-10-07 RX ADMIN — MICONAZOLE NITRATE: 20 CREAM TOPICAL at 07:40

## 2021-10-07 RX ADMIN — HEPARIN SODIUM 5000 UNITS: 10000 INJECTION, SOLUTION INTRAVENOUS; SUBCUTANEOUS at 05:08

## 2021-10-07 RX ADMIN — Medication 15 ML: at 07:37

## 2021-10-07 RX ADMIN — BISACODYL 10 MG: 10 SUPPOSITORY RECTAL at 16:36

## 2021-10-07 RX ADMIN — BUMETANIDE 4 MG: 0.25 INJECTION, SOLUTION INTRAMUSCULAR; INTRAVENOUS at 21:56

## 2021-10-07 RX ADMIN — PROPOFOL 50 MCG/KG/MIN: 10 INJECTION, EMULSION INTRAVENOUS at 02:04

## 2021-10-07 RX ADMIN — POTASSIUM CHLORIDE 20 MEQ: 1.5 POWDER, FOR SOLUTION ORAL at 19:48

## 2021-10-07 RX ADMIN — TOCILIZUMAB 800 MG: 20 INJECTION, SOLUTION, CONCENTRATE INTRAVENOUS at 13:04

## 2021-10-07 RX ADMIN — PROPOFOL 50 MCG/KG/MIN: 10 INJECTION, EMULSION INTRAVENOUS at 06:50

## 2021-10-07 RX ADMIN — AZITHROMYCIN MONOHYDRATE 500 MG: 500 INJECTION, POWDER, LYOPHILIZED, FOR SOLUTION INTRAVENOUS at 23:50

## 2021-10-07 RX ADMIN — INSULIN ASPART 1 UNITS: 100 INJECTION, SOLUTION INTRAVENOUS; SUBCUTANEOUS at 19:47

## 2021-10-07 RX ADMIN — Medication 50 MCG/HR: at 20:16

## 2021-10-07 RX ADMIN — DEXAMETHASONE SODIUM PHOSPHATE 6 MG: 4 INJECTION, SOLUTION INTRA-ARTICULAR; INTRALESIONAL; INTRAMUSCULAR; INTRAVENOUS; SOFT TISSUE at 09:58

## 2021-10-07 RX ADMIN — Medication 40 MG: at 07:37

## 2021-10-07 RX ADMIN — PROPOFOL 50 MCG/KG/MIN: 10 INJECTION, EMULSION INTRAVENOUS at 05:08

## 2021-10-07 RX ADMIN — PROPOFOL 50 MCG/KG/MIN: 10 INJECTION, EMULSION INTRAVENOUS at 19:47

## 2021-10-07 RX ADMIN — Medication 20 NG/KG/MIN: at 05:11

## 2021-10-07 RX ADMIN — INSULIN ASPART 1 UNITS: 100 INJECTION, SOLUTION INTRAVENOUS; SUBCUTANEOUS at 23:54

## 2021-10-07 RX ADMIN — SODIUM CHLORIDE 10 ML: 9 INJECTION, SOLUTION INTRAMUSCULAR; INTRAVENOUS; SUBCUTANEOUS at 13:10

## 2021-10-07 RX ADMIN — POTASSIUM CHLORIDE 20 MEQ: 1.5 POWDER, FOR SOLUTION ORAL at 07:37

## 2021-10-07 RX ADMIN — PROPOFOL 50 MCG/KG/MIN: 10 INJECTION, EMULSION INTRAVENOUS at 04:02

## 2021-10-07 RX ADMIN — ASPIRIN 81 MG CHEWABLE TABLET 81 MG: 81 TABLET CHEWABLE at 07:37

## 2021-10-07 RX ADMIN — REMDESIVIR 200 MG: 100 INJECTION, POWDER, LYOPHILIZED, FOR SOLUTION INTRAVENOUS at 14:30

## 2021-10-07 RX ADMIN — PROPOFOL 50 MCG/KG/MIN: 10 INJECTION, EMULSION INTRAVENOUS at 14:36

## 2021-10-07 RX ADMIN — POLYETHYLENE GLYCOL 3350 17 G: 17 POWDER, FOR SOLUTION ORAL at 09:57

## 2021-10-07 RX ADMIN — BUMETANIDE 2 MG: 0.25 INJECTION INTRAMUSCULAR; INTRAVENOUS at 01:44

## 2021-10-07 RX ADMIN — ATORVASTATIN CALCIUM 40 MG: 40 TABLET, FILM COATED ORAL at 07:37

## 2021-10-07 RX ADMIN — PROPOFOL 50 MCG/KG/MIN: 10 INJECTION, EMULSION INTRAVENOUS at 21:56

## 2021-10-07 RX ADMIN — PAROXETINE HYDROCHLORIDE 40 MG: 10 SUSPENSION ORAL at 07:37

## 2021-10-07 RX ADMIN — BUMETANIDE 4 MG: 0.25 INJECTION, SOLUTION INTRAMUSCULAR; INTRAVENOUS at 06:04

## 2021-10-07 RX ADMIN — MICONAZOLE NITRATE: 20 CREAM TOPICAL at 19:47

## 2021-10-07 RX ADMIN — PROPOFOL 50 MCG/KG/MIN: 10 INJECTION, EMULSION INTRAVENOUS at 08:48

## 2021-10-07 RX ADMIN — PROPOFOL 50 MCG/KG/MIN: 10 INJECTION, EMULSION INTRAVENOUS at 16:35

## 2021-10-07 ASSESSMENT — MIFFLIN-ST. JEOR: SCORE: 2196.5

## 2021-10-07 ASSESSMENT — ACTIVITIES OF DAILY LIVING (ADL)
ADLS_ACUITY_SCORE: 22

## 2021-10-07 NOTE — PLAN OF CARE
NEURO: RASS -4, withdraws in all extrem. PERRLA 2mm; Prop@50 and Fent@50  RESP: CMV 50%/14/14/400, LS dim, following ABGs, minimal secretions, PIPs 30-40s, infrequent episodes of vent dyssynchrony.   CARDIAC: SB in the 50's, when flat bradys down to 40s; occasional PVCs. Levo @0.03; Total of 6mg Bumex given for CVP >15 (22,26,27) . Afebrile. Attempted to stop pressor, Pt BP dropped to 70s/40s and delmer down to HR of 40bpm  GI: TF@20 d/t advance to 30 at 1000. No BM.  : Lopes intact, UO adequate, Bumex given x2    Plan: Trend ABGs, wean Levo, wean O2/peep, Monitor CVP    For vital signs and complete assessments, please see documentation flowsheets.

## 2021-10-07 NOTE — PROGRESS NOTES
MEDICAL ICU H&P  10/07/2021    Date of Hospital Admission: 10/6/21  Date of ICU Admission: 10/6/21  Reason for Critical Care Admission: Acute hypoxic and hypercapnic respiratory failure  Date of Service (when I saw the patient): 10/07/2021    ASSESSMENT: Chika Ferguson is a 61 year old female with PMH of hyperlipidemia, GERD, obesity, hiatal hernia, CARL, HFpEF, Pulm HTN, CAD s/p PCI mlad 2016, former smoker, who was admitted on 10/6/2021 with acute hypoxic and hypercapnic respiratory failure.    Major changes today:  Covid-19 positive  Started covid-19 treatment  Echo negative for bubble study  Repeat diuresis targeting CVP  Starting ssi  Switched anticoagulation to lovenox    PLAN:    Neuro:  # Pain and sedation  Propofol   Fentanyl   - RASS goal -4 to -5    # Depression/anxiety  - Continue PTA paroxetine    Pulmonary:  # Acute hypoxic respiratory failure   CHF vs. PE vs. COPD exacerbation vs. Covid 19 pneumonia  - CT PE study negative  - Diuresis with 3 mg IV bumex to start and monitor response  - Consider steroids if concern for COPD exacerbation  - Strict I/O  - Holding PTA carvedilol and dosing bumetanide with UOP response  - monitor fever curve   - Trending ABGs, following lactates, repeating CBC, BMP  - started Flolan  - Prone this AM due to worsening ABG    Ventilation Mode: CMV/AC  (Continuous Mandatory Ventilation/ Assist Control)  FiO2 (%): 70 %  Rate Set (breaths/minute): 14 breaths/min  Tidal Volume Set (mL): 400 mL  PEEP (cm H2O): (S) 14 cmH2O  Oxygen Concentration (%): 70 %  Resp: 14    #Positive COVID-19 test  The patient had hx of Covid 19 infection in 2021. Tested negative in outside hospital, positive in this admission. Bilateral opacities in admission CXR in the absence of LV dysfunction concerning for Covid 19 ARDS  - COVID-19 precautions  - Dexamethasome 6mg qday (10/7-10/17)  - Remdesivir (10/7-)  - Tocilizumab one dose  -   - keep trending CRP  - ABG  (6/22/2015); eye surgery; Coronary angioplasty with stent (June 2015); Cardiac surgery; and total nephrectomy (Right, 05/13/16). Restrictions  Restrictions/Precautions  Restrictions/Precautions: Up as Tolerated, Weight Bearing  Required Braces or Orthoses?: Yes  Lower Extremity Weight Bearing Restrictions  Right Lower Extremity Weight Bearing: Non Weight Bearing  Required Braces or Orthoses  Right Lower Extremity Brace: Knee Immobilizer  Position Activity Restriction  Other position/activity restrictions: up with assist  Subjective   General  Chart Reviewed: Yes  Referring Practitioner: NAYANA Salamanca  Subjective  Subjective: pt agreeable to therapy  General Comment  Comments: Pt sleeping upon PT entry and agreeable to therapy  Pain Screening  Patient Currently in Pain: No  Pain Assessment  Non-Pharmaceutical Pain Intervention(s): Cold applied;Elevation  Vital Signs  Patient Currently in Pain: No          Cognition      Objective   Bed mobility  Supine to Sit: Minimal assistance  Sit to Supine: Minimal assistance  Scooting: Stand by assistance  Comment: Assistance required for L LE only  Transfers  Sit to Stand: Minimal Assistance  Stand to sit: Contact guard assistance  Comment: Pt was supine with HOB ~45 degrees upon PT entry and transferred from bed>sit EOB>chair requiring Jhoan for sit>stand and CGA for stand>sit. Pt required min cues for hand placement and safety  Ambulation  Ambulation?: Yes  WB Status: NWB RLE  Ambulation 1  Surface: level tile  Device: Standard Walker  Other Apparatus: Knee Immobilizer  Assistance: Minimal assistance;Contact guard assistance  Quality of Gait: Hop-to gait pattern  Gait Deviations: Decreased step length; Slow Josefina  Distance: 50ft   Comments: Pt able to maintain RLE NWB, slightly unsteady with std walker but no LOB.  Pt required cues on walker placement and safety     Balance  Posture: Good  Sitting - Static: Good  Sitting - Dynamic: Good  Standing - Static: Fair  Standing q8h      Cardiovascular:  #Pulmonary Hypertension/ Right Ventricular Failure  Past cardiovascular history notable for confirmed pulmonary hypertension, spoke with primary care provider to obtain further medical records. TTE was notable for severe right ventricular dilation, consistent with chronic Group III pulmonary hypetension. Consulted cardiology, appreciate recs   - Volume status: hypervolemic  - BB: holding PTA carvedilol 3.125 mg BID  - MRA: none  - SCD prevention: none  - Strict I/O as above  - V/Q scan  - CVP monitoring, reported q4h, titrate diuresis based on readings  - start diuresis with Bumex 4mg BID  - CTA on admission negative for PE.   - V/Q scan pending  - Echo negative for bubble study  - Flolan     # PMH of CAD  Continue PTA aspirin 81 mg  Continue PTA atorvastatin 40 mg    # Hypotension/ Shock  Continue NE infusion aiming for MAP >65mm Hg    GI/Nutrition:  # Nutrition  - consulted  - OG tube placed, start feeding today    # Suspected GERD/ mechanical ventilation  - pantoprazole     # Bowel regimen  - Senna/miralax scheduled    Renal/Fluids/Electrolytes:  # TIMMY   Outside records notable for history of CKD, cause unspecified, unclear baseline creatinine  - Diurese and assess response  - Obtained UA, notable for trace hematuria  - Renal U/S w/o evidence of hydronephrosis. No parenchymal abnormalities  - Titrate diuresis according to CVP aiming for <15mm. Received 8mg of Bumex in admission, 4mg this AM.  - Negative 2L from admission. Unclear cause for rising CVP.   - V/Q scan   - Appreciate cardiology recs  - Continue diuresis with Bumex 4mg BID aiming for net negative, CVP <15 mm Hg    Endocrine:  # Hyperglycemia  Unclear diabetes history  - Hypoglycemia protocol  - medium ssi due to starting of steroids     ID:  # Community acquired pneumonia  Initial abxs at outside hospital, continued treatment with ceftriaxone azithromycin in the setting of suspected CAP  - Sputum culture  - Blood cultures  -  Urinalysis and culture    NGT    #Covid 19 pneumonia  As above    Hematology:    # Leukocytosis  Stress vs. Infection as above  - Monitor    Musculoskeletal:  # PT/OT    Skin:  # Pressure ulcers  Multiple observed, likely with long amount of time in chair  - WOC consult    General Cares/Prophylaxis:    DVT Prophylaxis: Enoxaparin SQ  GI Prophylaxis: PPI  Restraints: ordered  Family Communication: sonSarath, updated by me over the phone  Code Status: Full    Lines/tubes/drains:  - PIVx2  - CVC RIJ  - ETT 7mm  - Art. Line  - Lopes catheter in place  - Will monitor arterial pressure and obtain arterial line if pressors needed    Disposition:  - Medical ICU    Patient seen and findings/plan discussed with medical ICU staff, Dr. Fulton.    Jl Jean-Baptiste MD  Internal Medicine, PGY-1  Pager: [phone]    -----------------------------------------------------------------------  Interval History: Patient intubated, blood pressures currently stable, now undergoing CTA chest.    PHYSICAL EXAMINATION:  Temp:  [97.6  F (36.4  C)-98.4  F (36.9  C)] 97.6  F (36.4  C)  Pulse:  [53-69] 57  Resp:  [14-17] 14  BP: ()/(77-81) 85/77  MAP:  [61 mmHg-94 mmHg] 73 mmHg  Arterial Line BP: ()/(25-82) 106/57  FiO2 (%):  [45 %-80 %] 50 %  SpO2:  [87 %-99 %] 95 %  General: intubated and sedated  HEENT: ET tube in, no bleeding  Neuro: sedated, withdrawing to pain, does not follow commands  Pulm/Resp: reduced sounds in bases, crackles in upper lung fields, negative for wheezing  CV: RRR  Abdomen: Soft, non-distended, non-tender  Incisions/Skin: coccyx erythema and skin maceration, significant lower extremity edema bilaterally    LABS: Reviewed.   Arterial Blood Gases   Recent Labs   Lab 10/07/21  0405 10/06/21  2212 10/06/21  1320 10/06/21  0935   PH 7.40 7.39 7.37 7.38   PCO2 54* 55* 57* 56*   PO2 82 100 71* 58*   HCO3 33* 33* 33* 33*     Complete Blood Count   Recent Labs   Lab 10/07/21  0405 10/06/21  0500   WBC 12.8*  18.6*   HGB 9.6* 10.3*    283     Basic Metabolic Panel  Recent Labs   Lab 10/07/21  0417 10/07/21  0405 10/06/21  2305 10/06/21  2033 10/06/21  1705 10/06/21  1657 10/06/21  1318 10/06/21  0919 10/06/21  0837 10/06/21  0500 10/06/21  0500   NA  --  142  --   --   --   --   --   --  142  --  140   POTASSIUM  --  2.9*  --   --   --  3.6 3.4  --  2.9*   < > 3.7   CHLORIDE  --  105  --   --   --   --   --   --  104  --  104   CO2  --  32  --   --   --   --   --   --  32  --  30   BUN  --  29  --   --   --   --   --   --  34*  --  33*   CR  --  1.37*  --   --   --   --   --   --  1.63*  --  1.68*   * 121* 91 90   < >  --   --    < > 105*   < > 102*    < > = values in this interval not displayed.     Liver Function Tests  Recent Labs   Lab 10/06/21  0500   AST 90*   ALT 60*   ALKPHOS 103   BILITOTAL 0.4   ALBUMIN 2.4*   INR 1.19*     Coagulation Profile  Recent Labs   Lab 10/06/21  0500   INR 1.19*   PTT 27       IMAGING:  Recent Results (from the past 24 hour(s))   XR Chest Port 1 View    Narrative    XR CHEST PORT 1 VIEW10/6/2021 6:11 AM    INDICATION: new RIJ CVL    COMPARISON:  Same-day radiograph    FINDINGS: AP view of the chest. Endotracheal tube tip 3 cm from the  jayden. Enteric tube tip and sidehole projecting over the left upper  quadrant, likely within the lumen of the stomach. Interval placement  of right IJ venous catheter with tip in the mid to lower SVC.    Low lung volumes. Mild cardiomegaly. Calcific atherosclerotic disease  of the aortic arch which is somewhat tortuous. Streaky bibasilar  opacities, likely atelectasis. Upper lobes are clear. No acute osseous  abnormalities.      Impression    IMPRESSION: New right IJ venous catheter tip in the mid to low SVC.  Streaky bibasilar atelectasis.    I have personally reviewed the examination and initial interpretation  and I agree with the findings.    CASSANDRA TARANGO MD         SYSTEM ID:  [id number]   CT Chest Pulmonary Embolism w Contrast     steps while NWB RLE. Pt would prefer to use the \"scooting method\" (i.e. scooting up/down steps on her hips) that she used prior to hospital admission. PT educated pt on a variety of topics including home DME recommendations, benefits of w/c use for community mobility while NWB, home safety, and post-tib/fib fx rehab progression. Pt appears safe to return home today from a PT standpoint with recommended 24-hr sup/assist from spouse and home PT. Pt has already been issued standard walker for home use. Will continue to follow pt in acute setting until officially discharged home by ortho team.    Pt seen from [phone] (charged 1 unit).       Cristhian Matson, PT, DPT #[id number] Narrative    Examination: CT CHEST PULMONARY EMBOLISM W CONTRAST, 10/6/2021 7:12 AM    Comparison: Same-day radiograph.    History: PE suspected, high prob.    Technique: Axial thin slice images from the lung apices to the  diaphragm were obtained following the injection of intravenous  contrast media in the pulmonary arterial phase.     Contrast dose: iopamidol (ISOVUE-370) solution 77 mL    Findings:   Support devices: Endotracheal tube tip in the mid thoracic trachea.  Enteric tube tip in the gastric fundus. Right IJ venous catheter tip  in the low SVC.    Mediastinum/hilum: The heart size is enlarged. Main pulmonary artery  measuring up to 3.7 cm. Right ventricle is slightly dilated and there  is reflux of contrast into the IVC. There is no evidence of a filling  defect in the pulmonary arteries to suggest a pulmonary embolism.     No evidence of axillary, mediastinal or hilar lymphadenopathy by size  criteria. No pericardial effusion is noted.    Lungs/pleura: There are low lung volumes. Bibasilar areas of  atelectasis. Small amount of dependent atelectasis as well in the  upper lobes.     Upper Abdomen: Partially visualized solid organs of the upper abdomen  are unremarkable.    Bones/Soft tissues: Degenerative changes of the spine.      Impression    IMPRESSION:   1. No evidence for pulmonary embolism.  2. Evidence for elevated right heart pressures with a dilated right  atrium and ventricle and a main pulmonary artery that measures up to  3.7 cm.  3. Low lung volumes with bibasilar and dependent areas of atelectasis.    I have personally reviewed the examination and initial interpretation  and I agree with the findings.    CASSANDRA TARANGO MD         SYSTEM ID:  [id number]   Echocardiogram Complete   Result Value    LVEF  55-60%    Narrative    [id number]  [id number]  [id number]  [id number]^TONIA^TERRANCE^A     [facility],[place]  Echocardiography Laboratory  [address] [place] [address]  [id number]     Name: JASKARAN JOSEPH  MRN: [id number]  : 1960  Study Date: 10/06/2021 08:37 AM  Age: 61 yrs  Gender: Female  Patient Location: [facility]  Reason For Study: Respiratory Failure  Ordering Physician: TERRANCE RANDALL  Referring Physician: JONELLE CORRALES  Performed By: Puma Damian     BSA: 2.6 m2  Height: 68 in  Weight: 357 lb  HR: 72  BP: 102/81 mmHg  ______________________________________________________________________________  Procedure  Complete Portable Echo Adult. Technically difficult study.Extremely poor  acoustic windows. Optison (NDC #3012-4671-01) given intravenously. Patient was  given 4 ml mixture of 3 ml Optison and 6 ml saline. 5 ml wasted.  ______________________________________________________________________________  Interpretation Summary  Global and regional left ventricular function is normal with an EF of 55-60%.  Technically difficult study.Extremely poor acoustic windows.  Moderate to severe right ventricular dilation is present.  Global right ventricular function is moderately to severely reduced.  Pulmonary artery systolic pressure cannot be assessed.  Dilation of the inferior vena cava is present with abnormal respiratory  variation in diameter.  No pericardial effusion is present.  There is no prior study for direct comparison.  ______________________________________________________________________________  Left Ventricle  Global and regional left ventricular function is normal with an EF of 55-60%.  Left ventricular size is normal. Mild concentric wall thickening consistent  with left ventricular hypertrophy is present. Left ventricular diastolic  function is indeterminate. No regional wall motion abnormalities are seen.     Right Ventricle  Moderate to severe right ventricular dilation is present. Global right  ventricular function is moderately to severely reduced.     Atria  The atria cannot be assessed.     Mitral Valve  The mitral valve is normal.     Aortic Valve  The  valve leaflets are not well visualized. On Doppler interrogation, there is  no significant stenosis or regurgitation.     Tricuspid Valve  The valve leaflets are not well visualized. Trace tricuspid insufficiency is  present. Pulmonary artery systolic pressure cannot be assessed.     Pulmonic Valve  The valve leaflets are not well visualized. On Doppler interrogation, there is  no significant stenosis or regurgitation.     Vessels  The thoracic aorta is normal. The pulmonary artery cannot be assessed.  Ascending aorta 4.2 cm. Dilation of the inferior vena cava is present with  abnormal respiratory variation in diameter.     Pericardium  No pericardial effusion is present.     Compared to Previous Study  There is no prior study for direct comparison.  ______________________________________________________________________________  MMode/2D Measurements & Calculations  IVSd: 1.3 cm     LVIDd: 5.0 cm  LVIDs: 2.9 cm  LVPWd: 1.1 cm  FS: 43.3 %  LV mass(C)d: 242.3 grams  LV mass(C)dI: 92.7 grams/m2  Ao root diam: 3.3 cm  asc Aorta Diam: 4.2 cm  LVOT diam: 2.3 cm  LVOT area: 4.2 cm2  RWT: 0.44     Doppler Measurements & Calculations  MV E max heavenly: 44.1 cm/sec  MV A max heavenly: 69.8 cm/sec  MV E/A: 0.63  MV dec slope: 127.0 cm/sec2     ______________________________________________________________________________  Report approved by: Jevon Boo 10/06/2021 10:15 AM         XR Abdomen Port 1 View    Narrative    Exam: XR ABDOMEN PORT 1 VIEWS, 10/6/2021 1:17 PM    Indication: Feeding tube placement    Comparison: None    Findings:   Portable AP abdominal radiograph. The right abdomen and lower abdomen  and pelvis excluded from field-of-view. Gastric tube tip points  superiorly overlying the left upper quadrant with side hole likely  past the GE junction. No distended loops of bowel in the included  field. Degenerative changes in the spine.      Impression    Impression: Gastric tube tip and sidehole overlying the  proximal  stomach.    NADEGE DEL RIO MD         SYSTEM ID:  [id number]   US Renal Complete    Narrative    EXAMINATION: US RENAL COMPLETE, 10/6/2021 2:45 PM     COMPARISON: None.    HISTORY: Volume overload and hematuria    TECHNIQUE: The kidneys and bladder were scanned in the standard  fashion with specialized ultrasound transducer(s) using both gray  scale and limited color/spectral Doppler techniques.    FINDINGS:    Right kidney: Measures 12.2 cm in length. Parenchyma is of normal  thickness and echogenicity. No focal mass. No hydronephrosis.    Left kidney: The left kidney is partially obscured due to overlying  bowel gas, no hydronephrosis is observed.     Bladder: Unremarkable.      Impression    IMPRESSION:  1.  Normal appearance of the right kidney.  2.  Partial nonvisualization of the left kidney. No hydronephrosis  observed.    I have personally reviewed the examination and initial interpretation  and I agree with the findings.    CASSANDRA TARANGO MD         SYSTEM ID:  [id number]   US Lower Extremity Venous Duplex Bilateral    Narrative    EXAMINATION: DOPPLER VENOUS ULTRASOUND OF BILATERAL LOWER EXTREMITIES,  10/6/2021 2:45 PM     COMPARISON: None.    HISTORY: Hypoxia    TECHNIQUE:  Gray-scale evaluation with compression, spectral flow and  color Doppler assessment of the deep venous system of both legs from  groin to knee, and then at the ankles.    FINDINGS:  In both lower extremities, the common femoral, femoral, and popliteal  veins demonstrate normal compressibility and blood flow.      Impression    IMPRESSION:  No evidence of deep venous thrombosis in either lower extremity.    I have personally reviewed the examination and initial interpretation  and I agree with the findings.    SKINNY VILLAR DO         SYSTEM ID:  [id number]   US Upper Extremity Venous Duplex Bilat    Narrative    EXAMINATION: DOPPLER VENOUS ULTRASOUND OF BILATERAL UPPER EXTREMITIES,  10/6/2021 2:45 PM     COMPARISON:  None.    HISTORY: Hypoxia    TECHNIQUE:  Gray-scale evaluation with compression, spectral flow, and  color Doppler assessment of the deep venous system of both upper  extremities.    FINDINGS:  Normal blood flow and waveforms are demonstrated in the internal  jugular, innominate, subclavian, and axillary veins bilaterally.      Impression    IMPRESSION:  1.  No evidence of deep venous thrombosis in either upper extremity.    I have personally reviewed the examination and initial interpretation  and I agree with the findings.    NADEGE DEL RIO MD         SYSTEM ID:  [id number]   XR Abdomen Port 1 View    Narrative    EXAM: XR ABDOMEN PORT 1 VIEWS, XR ABDOMEN PORT 1 VIEWS  10/6/2021 4:57  PM     HISTORY:  NG tube placement verification       TECHNIQUE: Multiple frontal radiographs of the abdomen taken at 1629  and 1635 hours    COMPARISON:  None    FINDINGS:     Enteric tube projects over the left upper quadrant.    Nonobstructive bowel gas pattern. No pneumatosis, portal venous gas.      Impression    IMPRESSION: Enteric tube is coiled with tip and sidehole projecting  over the expected location of proximal stomach. Persistent coiling is  seen on the most recent x-ray at 1635 hours. Nonobstructive bowel gas  pattern.     I have personally reviewed the examination and initial interpretation  and I agree with the findings.    KARSTEN BRADSHAW MD         SYSTEM ID:  [id number]   XR Abdomen Port 1 View    Narrative    EXAM: XR ABDOMEN PORT 1 VIEWS, XR ABDOMEN PORT 1 VIEWS  10/6/2021 4:57  PM     HISTORY:  NG tube placement verification       TECHNIQUE: Multiple frontal radiographs of the abdomen taken at 1629  and 1635 hours    COMPARISON:  None    FINDINGS:     Enteric tube projects over the left upper quadrant.    Nonobstructive bowel gas pattern. No pneumatosis, portal venous gas.      Impression    IMPRESSION: Enteric tube is coiled with tip and sidehole projecting  over the expected location of proximal stomach.  Persistent coiling is  seen on the most recent x-ray at 1635 hours. Nonobstructive bowel gas  pattern.     I have personally reviewed the examination and initial interpretation  and I agree with the findings.    KARSTEN BRADSHAW MD         SYSTEM ID:  [id number]

## 2021-10-07 NOTE — PROGRESS NOTES
Cardiology Consult - Cards2  Date of Service: 10/06/21    ASSESSMENT:   Chika Ferguson is a 61 year old female who presents with acute hypoxic respiratory failure. Her PMH includes pulmonary hypertension, history of Covid at the end of 2020, COPD, CARL, CAD with history of stenting (unknown vessel-no records). Cards consulted for evaluation of severe RV dysfunction noted on echo.    # Severe RV dysfunction and mod-severe RV dilation  # Pulmonary Hypertension - Likely Group III  # Obesity hypoventilation syndrome  # Acute on chronic hypoxemic and hypercapnic respiratory failure   # Leukocytosis - being treated for CAP  # h/o COVID at the end of 2020  # h/o COPD  # CARL - cpap with oxygen use at night. Son states she has been using her mask with oxygen for up to 16 hours per day (including sleeping).  # h/o CAD - h/o stent placement several years ago. No records regarding.  # TIMMY     Patient's severe hypoxia and hypercapnia requiring intubation in the setting of pulmonary hypertension is consistent with group III pulmonary hypertension. Her PH is likely chronic given the findings of RV dilation/reduced function with a large pulmonary artrial trunk. There may be a component of group II as well given her history of CAD (likely , however her diastolic parameters are not significant abnormal). Her acute respiratory issues are likely acute exacerbation of chronic pulmonary disease (COPD, obesity hypoventilation).     Hemodynamically patient has a vasodilatory shock with adequate cardiac output as measured via PICC sats. Recommend weaning off veletri as she has preserved cardiac output. Will determine additional therapies after workup complete.    Workup:  - CTA PE: no proximal PE.  Pulmonary trunk is severely dilated consistent with a diagnosis of hypertension.  No significant emphysema noted on her CT PE. SPECT V/Q scan pending for evaluation of distal CTEPH  - SPECT V/Q Scan pending  - Bubble study: no intracardiac or  extracardiac shunt  - Labs: ASHANTI (pending), HIV (nonreactive), HBV (surface ag, core ab negative. Surface ab 1.12), HCV negative.    Date RA CO/CI SVR MAP Therapies   10/6/21 18 10.4/3.7 430 74 NE 0.03   10/7/21 16 11.1/4 454 79 Veletri 20ng/kg/min, NE 0.03     RECOMMEND:  - ASHANTI, Rheumatoid factor pending  - Spect V/Q Scan  - RHC when more stable from a respiratory perspective  - CVP of 16 this AM, continue diuresis with goal net negative 1-2 L    Discussed with Dr. Thennapan Mohsan Chaudhry, MD  Division of Cardiology, PGY-5    ===========================================================  Subjective:  No acute events overnight. Started on low dose NE. Oxygen requirements decreasing.      -----------------------  History of Present Illness  on initial evaluation  Chika Ferguson is a 61 year old female who presents with acute hypoxic respiratory failure. Her PMH includes pulmonary hypertension, history of Covid at the end of 2020, COPD, CARL, CAD with history of stenting (unknown vessel-no records). Cards consulted for evaluation of severe RV dysfunction noted on echo.    Limited history available in the chart.  Patient intubated and sedated.  History obtained by the son.    Patient son reports that she has been functionally declining over the past several months.  At the end of 2020 patient was infected with Covid but she could still go up to flight of stairs and walk a few blocks as of May 2021.  Since then she has been progressively declining in her functional capacity, recently she has been primarily spending her time sitting in a recliner.  For the past 5 days patient has been primarily bedbound.  Son called EMS for concern related to her mother's functional decline.  He reports she had no acute shortness of breath.  With the help of EMS patient walked to the ambulance, but became severely short of breath when she lie down on the stretcher.  Son reports the patient has not been seek medical treatment because  she is deathly afraid of lying down due to severe shortness of breath with her orthopnea.    She is also been experiencing significantly worsening bilateral lower extremity edema, they are reports that she stopped taking her medications.  No other significant symptoms recently including fevers chills myalgias, URI symptoms, abdominal pain, nausea vomiting, dysuria, hematuria, confusion.    Otherwise son also reports a patient in spending up to 16-17 hrs per day (including sleeping) in the chair with oxygenation infusing through her CPAP. Unclear if she sees a cardiologist on a regular basis.    Primary care:   [facility] - unclear about specific provider    Social:  Moved to [place] 1 year ago.  Lived with her fiance before that who  of COVID. Moved to [place] to live with her son.  Former smoker. No h/o illicit drug use.    Functional capacity:  Can walk around the house. Can walk when going grocery shopping and other stores without stopping but at a slower pace. Does not use the stairs. In Oct 2020 to May 2021 she could go up 2 flight of stairs.      PAST MEDICAL HISTORY:  No past medical history on file.    CURRENT MEDICATIONS:  No current outpatient medications on file.       PAST SURGICAL HISTORY:  No past surgical history on file.    ALLERGIES   Not on File    FAMILY HISTORY:  No family history on file.    SOCIAL HISTORY:  Social History     Socioeconomic History     Marital status: Single     Spouse name: Not on file     Number of children: Not on file     Years of education: Not on file     Highest education level: Not on file   Occupational History     Not on file   Tobacco Use     Smoking status: Not on file   Substance and Sexual Activity     Alcohol use: Not on file     Drug use: Not on file     Sexual activity: Not on file   Other Topics Concern     Not on file   Social History Narrative     Not on file     Social Determinants of Health     Financial Resource Strain:      Difficulty of Paying  Living Expenses:    Food Insecurity:      Worried About Running Out of Food in the Last Year:      Ran Out of Food in the Last Year:    Transportation Needs:      Lack of Transportation (Medical):      Lack of Transportation (Non-Medical):    Physical Activity:      Days of Exercise per Week:      Minutes of Exercise per Session:    Stress:      Feeling of Stress :    Social Connections:      Frequency of Communication with Friends and Family:      Frequency of Social Gatherings with Friends and Family:      Attends Anabaptism Services:      Active Member of Clubs or Organizations:      Attends Club or Organization Meetings:      Marital Status:    Intimate Partner Violence:      Fear of Current or Ex-Partner:      Emotionally Abused:      Physically Abused:      Sexually Abused:        Review of Systems   HPI and ROS limited by intubation and sedation.    Physical Exam   Temp: 97.8  F (36.6  C) Temp src: Axillary  Pulse: 57   Resp: 14 SpO2: 96 % O2 Device: Mechanical Ventilator    Vital Signs with Ranges  Temp:  [97.6  F (36.4  C)-98.2  F (36.8  C)] 97.8  F (36.6  C)  Pulse:  [53-65] 57  Resp:  [14-16] 14  MAP:  [61 mmHg-89 mmHg] 89 mmHg  Arterial Line BP: ()/(36-70) 123/70  FiO2 (%):  [45 %-60 %] 60 %  SpO2:  [87 %-99 %] 96 %  351 lbs 3.08 oz    GEN: intubated and sedated. Morbidly obese.  HEENT: no icterus  CV: RRR, normal s1/s2, no murmurs/rubs/s3/s4, no heave. JVP >15  CHEST: Crackles diffusely, mild end expiratory wheezing  ABD: soft, NT/ND, NABS  : no flank/suprapubic tenderness  NEURO: Intubated and sedated. Pupils response to light and symmetric.  PSYCH: unable to assess    Data   Data reviewed today:  I personally reviewed the EKG tracing showing NSR, inferior q waves. poor R wave progression. normal axis. no evidence of acute ischemia.  Recent Labs   Lab 10/07/21  1312 10/07/21  0954 10/07/21  0417 10/07/21  0405 10/06/21  2305 10/06/21  2213 10/06/21  1705 10/06/21  1657 10/06/21  1318  10/06/21  0919 10/06/21  0837 10/06/21  0500 10/06/21  0500   WBC  --   --   --  12.8*  --   --   --   --   --   --   --   --  18.6*   HGB  --   --   --  9.6*  --   --   --   --   --   --   --   --  10.3*   MCV  --   --   --  86  --   --   --   --   --   --   --   --  85   PLT  --   --   --  281  --   --   --   --   --   --   --   --  283   INR  --   --   --   --   --   --   --   --   --   --   --   --  1.19*   NA  --   --   --  142  --   --   --   --   --   --  142  --  140   POTASSIUM  --   --   --  2.9*  --   --   --  3.6 3.4   < > 2.9*   < > 3.7   CHLORIDE  --   --   --  105  --   --   --   --   --   --  104  --  104   CO2  --   --   --  32  --   --   --   --   --   --  32  --  30   BUN  --   --   --  29  --   --   --   --   --   --  34*  --  33*   CR  --   --   --  1.37*  --   --   --   --   --   --  1.63*  --  1.68*   ANIONGAP  --   --   --  5  --   --   --   --   --   --  6  --  6   NAE  --   --   --  8.6  --   --   --   --   --   --  8.4*  --  8.8   *  --  114* 121*   < >  --    < >  --   --    < > 105*   < > 102*   ALBUMIN  --   --   --   --   --   --   --   --   --   --   --   --  2.4*   PROTTOTAL  --   --   --   --   --   --   --   --   --   --   --   --  7.6   BILITOTAL  --   --   --   --   --   --   --   --   --   --   --   --  0.4   ALKPHOS  --   --   --   --   --   --   --   --   --   --   --   --  103   ALT  --   --   --   --   --   --   --   --   --   --   --   --  60*   AST  --   --   --   --   --   --   --   --   --   --   --   --  90*   TROPONIN  --  0.085*  --  0.132*  --  0.201*   < > 0.248*  --   --   --    < > 0.276*    < > = values in this interval not displayed.       Recent Results (from the past 24 hour(s))   US Renal Complete    Narrative    EXAMINATION: US RENAL COMPLETE, 10/6/2021 2:45 PM     COMPARISON: None.    HISTORY: Volume overload and hematuria    TECHNIQUE: The kidneys and bladder were scanned in the standard  fashion with specialized ultrasound transducer(s) using both  gray  scale and limited color/spectral Doppler techniques.    FINDINGS:    Right kidney: Measures 12.2 cm in length. Parenchyma is of normal  thickness and echogenicity. No focal mass. No hydronephrosis.    Left kidney: The left kidney is partially obscured due to overlying  bowel gas, no hydronephrosis is observed.     Bladder: Unremarkable.      Impression    IMPRESSION:  1.  Normal appearance of the right kidney.  2.  Partial nonvisualization of the left kidney. No hydronephrosis  observed.    I have personally reviewed the examination and initial interpretation  and I agree with the findings.    CASSANDRA TARANGO MD         SYSTEM ID:  [id number]   US Lower Extremity Venous Duplex Bilateral    Narrative    EXAMINATION: DOPPLER VENOUS ULTRASOUND OF BILATERAL LOWER EXTREMITIES,  10/6/2021 2:45 PM     COMPARISON: None.    HISTORY: Hypoxia    TECHNIQUE:  Gray-scale evaluation with compression, spectral flow and  color Doppler assessment of the deep venous system of both legs from  groin to knee, and then at the ankles.    FINDINGS:  In both lower extremities, the common femoral, femoral, and popliteal  veins demonstrate normal compressibility and blood flow.      Impression    IMPRESSION:  No evidence of deep venous thrombosis in either lower extremity.    I have personally reviewed the examination and initial interpretation  and I agree with the findings.    SKINNY VILLAR DO         SYSTEM ID:  [id number]   US Upper Extremity Venous Duplex Bilat    Narrative    EXAMINATION: DOPPLER VENOUS ULTRASOUND OF BILATERAL UPPER EXTREMITIES,  10/6/2021 2:45 PM     COMPARISON: None.    HISTORY: Hypoxia    TECHNIQUE:  Gray-scale evaluation with compression, spectral flow, and  color Doppler assessment of the deep venous system of both upper  extremities.    FINDINGS:  Normal blood flow and waveforms are demonstrated in the internal  jugular, innominate, subclavian, and axillary veins bilaterally.      Impression    IMPRESSION:  1.   No evidence of deep venous thrombosis in either upper extremity.    I have personally reviewed the examination and initial interpretation  and I agree with the findings.    NADEGE DEL RIO MD         SYSTEM ID:  [id number]   XR Abdomen Port 1 View    Narrative    EXAM: XR ABDOMEN PORT 1 VIEWS, XR ABDOMEN PORT 1 VIEWS  10/6/2021 4:57  PM     HISTORY:  NG tube placement verification       TECHNIQUE: Multiple frontal radiographs of the abdomen taken at 1629  and 1635 hours    COMPARISON:  None    FINDINGS:     Enteric tube projects over the left upper quadrant.    Nonobstructive bowel gas pattern. No pneumatosis, portal venous gas.      Impression    IMPRESSION: Enteric tube is coiled with tip and sidehole projecting  over the expected location of proximal stomach. Persistent coiling is  seen on the most recent x-ray at 1635 hours. Nonobstructive bowel gas  pattern.     I have personally reviewed the examination and initial interpretation  and I agree with the findings.    KARSTEN BRADSHAW MD         SYSTEM ID:  [id number]   XR Abdomen Port 1 View    Narrative    EXAM: XR ABDOMEN PORT 1 VIEWS, XR ABDOMEN PORT 1 VIEWS  10/6/2021 4:57  PM     HISTORY:  NG tube placement verification       TECHNIQUE: Multiple frontal radiographs of the abdomen taken at 1629  and 1635 hours    COMPARISON:  None    FINDINGS:     Enteric tube projects over the left upper quadrant.    Nonobstructive bowel gas pattern. No pneumatosis, portal venous gas.      Impression    IMPRESSION: Enteric tube is coiled with tip and sidehole projecting  over the expected location of proximal stomach. Persistent coiling is  seen on the most recent x-ray at 1635 hours. Nonobstructive bowel gas  pattern.     I have personally reviewed the examination and initial interpretation  and I agree with the findings.    KARSTEN BRADSHAW MD         SYSTEM ID:  [id number]   Echo Limited with Bubble Study    Narrative     [id number]  [id number]  [id number]  [id number]^[email or web address]^JACKY     [facility],[place]  Echocardiography Laboratory  [address] [place] [address]     Name: JASKARAN JOSEPH  MRN: [id number]  : 1960  Study Date: 10/07/2021 09:38 AM  Age: 61 yrs  Gender: Female  Patient Location: [facility]  Reason For Study: Pulmonary Hypertension  Ordering Physician: JACKY CANTOR  Referring Physician: JONELLE CORRALES  Performed By: Puma Damian     BSA: 2.6 m2  Height: 68 in  Weight: 357 lb  HR: 58  BP: 113/68 mmHg  ______________________________________________________________________________  Procedure  Limited Portable Echo Adult. Bubble study only.  ______________________________________________________________________________  Interpretation Summary  Limited study to evaluate for PFO.  Bubble study negative, no intracardiac shunting noted.  ______________________________________________________________________________  ______________________________________________________________________________  Report approved by: PARKER KLEBER, MDon 10/07/2021 10:32 AM     ______________________________________________________________________________

## 2021-10-07 NOTE — PLAN OF CARE
Major Shift Events:  Propofol at 50, Fentanyl at 50.  RASS goal achieved.  Levo at 0.03.  Remdesivir and Toci started.  PEEP 14, FIO2 60%.  Tube feeds advancing to goal, bowel regimen started.  Bumex continued.  Family updated by team and bedside RN.  Transferred to bariatric bed.    Plan: Decide whether or not to prone.  Wean Flolan, Vent support, Levo, Sedation as able.  For vital signs and complete assessments, please see documentation flowsheets.

## 2021-10-08 ENCOUNTER — APPOINTMENT (OUTPATIENT)
Dept: OCCUPATIONAL THERAPY | Facility: CLINIC | Age: 61
DRG: 207 | End: 2021-10-08
Attending: ANESTHESIOLOGY
Payer: MEDICARE

## 2021-10-08 LAB
ALBUMIN SERPL-MCNC: 2.4 G/DL (ref 3.4–5)
ALP SERPL-CCNC: 94 U/L (ref 40–150)
ALT SERPL W P-5'-P-CCNC: 40 U/L (ref 0–50)
ANION GAP SERPL CALCULATED.3IONS-SCNC: 2 MMOL/L (ref 3–14)
AST SERPL W P-5'-P-CCNC: 31 U/L (ref 0–45)
BASE EXCESS BLDA CALC-SCNC: 13.8 MMOL/L (ref -9–1.8)
BASE EXCESS BLDA CALC-SCNC: 14.9 MMOL/L (ref -9–1.8)
BASE EXCESS BLDA CALC-SCNC: 18.2 MMOL/L (ref -9–1.8)
BASE EXCESS BLDA CALC-SCNC: 18.5 MMOL/L (ref -9–1.8)
BILIRUB DIRECT SERPL-MCNC: 0.1 MG/DL (ref 0–0.2)
BILIRUB SERPL-MCNC: 0.3 MG/DL (ref 0.2–1.3)
BUN SERPL-MCNC: 26 MG/DL (ref 7–30)
CALCIUM SERPL-MCNC: 8.9 MG/DL (ref 8.5–10.1)
CHLORIDE BLD-SCNC: 103 MMOL/L (ref 94–109)
CK SERPL-CCNC: 81 U/L (ref 30–225)
CO2 SERPL-SCNC: 38 MMOL/L (ref 20–32)
CREAT SERPL-MCNC: 1.1 MG/DL (ref 0.52–1.04)
CRP SERPL-MCNC: 170 MG/L (ref 0–8)
D DIMER PPP FEU-MCNC: 1.69 UG/ML FEU (ref 0–0.5)
ERYTHROCYTE [DISTWIDTH] IN BLOOD BY AUTOMATED COUNT: 16.8 % (ref 10–15)
FIBRINOGEN PPP-MCNC: 677 MG/DL (ref 170–490)
GFR SERPL CREATININE-BSD FRML MDRD: 54 ML/MIN/1.73M2
GLUCOSE BLD-MCNC: 164 MG/DL (ref 70–99)
GLUCOSE BLDC GLUCOMTR-MCNC: 122 MG/DL (ref 70–99)
GLUCOSE BLDC GLUCOMTR-MCNC: 140 MG/DL (ref 70–99)
GLUCOSE BLDC GLUCOMTR-MCNC: 142 MG/DL (ref 70–99)
GLUCOSE BLDC GLUCOMTR-MCNC: 143 MG/DL (ref 70–99)
GLUCOSE BLDC GLUCOMTR-MCNC: 154 MG/DL (ref 70–99)
GLUCOSE BLDC GLUCOMTR-MCNC: 161 MG/DL (ref 70–99)
GLUCOSE BLDC GLUCOMTR-MCNC: 176 MG/DL (ref 70–99)
HCO3 BLD-SCNC: 40 MMOL/L (ref 21–28)
HCO3 BLD-SCNC: 40 MMOL/L (ref 21–28)
HCO3 BLD-SCNC: 44 MMOL/L (ref 21–28)
HCO3 BLD-SCNC: 44 MMOL/L (ref 21–28)
HCT VFR BLD AUTO: 31.3 % (ref 35–47)
HGB BLD-MCNC: 9.6 G/DL (ref 11.7–15.7)
MAGNESIUM SERPL-MCNC: 2 MG/DL (ref 1.6–2.3)
MCH RBC QN AUTO: 25.9 PG (ref 26.5–33)
MCHC RBC AUTO-ENTMCNC: 30.7 G/DL (ref 31.5–36.5)
MCV RBC AUTO: 84 FL (ref 78–100)
O2/TOTAL GAS SETTING VFR VENT: 50 %
PCO2 BLD: 55 MM HG (ref 35–45)
PCO2 BLD: 57 MM HG (ref 35–45)
PCO2 BLD: 59 MM HG (ref 35–45)
PCO2 BLD: 61 MM HG (ref 35–45)
PH BLD: 7.45 [PH] (ref 7.35–7.45)
PH BLD: 7.47 [PH] (ref 7.35–7.45)
PH BLD: 7.48 [PH] (ref 7.35–7.45)
PH BLD: 7.49 [PH] (ref 7.35–7.45)
PHOSPHATE SERPL-MCNC: 2.9 MG/DL (ref 2.5–4.5)
PLATELET # BLD AUTO: 282 10E3/UL (ref 150–450)
PO2 BLD: 111 MM HG (ref 80–105)
PO2 BLD: 78 MM HG (ref 80–105)
PO2 BLD: 91 MM HG (ref 80–105)
PO2 BLD: 98 MM HG (ref 80–105)
POTASSIUM BLD-SCNC: 3.4 MMOL/L (ref 3.4–5.3)
POTASSIUM BLD-SCNC: 3.7 MMOL/L (ref 3.4–5.3)
PROT SERPL-MCNC: 7.4 G/DL (ref 6.8–8.8)
RBC # BLD AUTO: 3.71 10E6/UL (ref 3.8–5.2)
SODIUM SERPL-SCNC: 143 MMOL/L (ref 133–144)
TROPONIN I SERPL-MCNC: 0.03 UG/L (ref 0–0.04)
TROPONIN I SERPL-MCNC: 0.03 UG/L (ref 0–0.04)
TROPONIN I SERPL-MCNC: 0.04 UG/L (ref 0–0.04)
TROPONIN I SERPL-MCNC: 0.04 UG/L (ref 0–0.04)
WBC # BLD AUTO: 13.1 10E3/UL (ref 4–11)

## 2021-10-08 PROCEDURE — 84132 ASSAY OF SERUM POTASSIUM: CPT | Performed by: INTERNAL MEDICINE

## 2021-10-08 PROCEDURE — 200N000002 HC R&B ICU UMMC

## 2021-10-08 PROCEDURE — 80048 BASIC METABOLIC PNL TOTAL CA: CPT | Performed by: STUDENT IN AN ORGANIZED HEALTH CARE EDUCATION/TRAINING PROGRAM

## 2021-10-08 PROCEDURE — 97165 OT EVAL LOW COMPLEX 30 MIN: CPT | Mod: GO | Performed by: OCCUPATIONAL THERAPIST

## 2021-10-08 PROCEDURE — 97110 THERAPEUTIC EXERCISES: CPT | Mod: GO | Performed by: OCCUPATIONAL THERAPIST

## 2021-10-08 PROCEDURE — 85379 FIBRIN DEGRADATION QUANT: CPT | Performed by: STUDENT IN AN ORGANIZED HEALTH CARE EDUCATION/TRAINING PROGRAM

## 2021-10-08 PROCEDURE — 82248 BILIRUBIN DIRECT: CPT | Performed by: NURSE PRACTITIONER

## 2021-10-08 PROCEDURE — 82803 BLOOD GASES ANY COMBINATION: CPT | Performed by: NURSE PRACTITIONER

## 2021-10-08 PROCEDURE — 250N000013 HC RX MED GY IP 250 OP 250 PS 637: Performed by: INTERNAL MEDICINE

## 2021-10-08 PROCEDURE — 85027 COMPLETE CBC AUTOMATED: CPT | Performed by: STUDENT IN AN ORGANIZED HEALTH CARE EDUCATION/TRAINING PROGRAM

## 2021-10-08 PROCEDURE — 86140 C-REACTIVE PROTEIN: CPT | Performed by: STUDENT IN AN ORGANIZED HEALTH CARE EDUCATION/TRAINING PROGRAM

## 2021-10-08 PROCEDURE — 94003 VENT MGMT INPAT SUBQ DAY: CPT

## 2021-10-08 PROCEDURE — 82803 BLOOD GASES ANY COMBINATION: CPT | Performed by: STUDENT IN AN ORGANIZED HEALTH CARE EDUCATION/TRAINING PROGRAM

## 2021-10-08 PROCEDURE — 999N000155 HC STATISTIC RAPCV CVP MONITORING

## 2021-10-08 PROCEDURE — 250N000011 HC RX IP 250 OP 636: Performed by: STUDENT IN AN ORGANIZED HEALTH CARE EDUCATION/TRAINING PROGRAM

## 2021-10-08 PROCEDURE — 250N000009 HC RX 250: Performed by: STUDENT IN AN ORGANIZED HEALTH CARE EDUCATION/TRAINING PROGRAM

## 2021-10-08 PROCEDURE — 250N000013 HC RX MED GY IP 250 OP 250 PS 637: Performed by: STUDENT IN AN ORGANIZED HEALTH CARE EDUCATION/TRAINING PROGRAM

## 2021-10-08 PROCEDURE — 999N000157 HC STATISTIC RCP TIME EA 10 MIN

## 2021-10-08 PROCEDURE — 999N000015 HC STATISTIC ARTERIAL MONITORING DAILY

## 2021-10-08 PROCEDURE — 99291 CRITICAL CARE FIRST HOUR: CPT | Mod: GC | Performed by: INTERNAL MEDICINE

## 2021-10-08 PROCEDURE — 83735 ASSAY OF MAGNESIUM: CPT | Performed by: STUDENT IN AN ORGANIZED HEALTH CARE EDUCATION/TRAINING PROGRAM

## 2021-10-08 PROCEDURE — 84100 ASSAY OF PHOSPHORUS: CPT | Performed by: STUDENT IN AN ORGANIZED HEALTH CARE EDUCATION/TRAINING PROGRAM

## 2021-10-08 PROCEDURE — 250N000013 HC RX MED GY IP 250 OP 250 PS 637

## 2021-10-08 PROCEDURE — 82550 ASSAY OF CK (CPK): CPT | Performed by: NURSE PRACTITIONER

## 2021-10-08 PROCEDURE — 84484 ASSAY OF TROPONIN QUANT: CPT

## 2021-10-08 PROCEDURE — 85384 FIBRINOGEN ACTIVITY: CPT | Performed by: STUDENT IN AN ORGANIZED HEALTH CARE EDUCATION/TRAINING PROGRAM

## 2021-10-08 PROCEDURE — 250N000013 HC RX MED GY IP 250 OP 250 PS 637: Performed by: NURSE PRACTITIONER

## 2021-10-08 PROCEDURE — 36592 COLLECT BLOOD FROM PICC: CPT | Performed by: STUDENT IN AN ORGANIZED HEALTH CARE EDUCATION/TRAINING PROGRAM

## 2021-10-08 PROCEDURE — 250N000011 HC RX IP 250 OP 636

## 2021-10-08 PROCEDURE — 94645 CONT INHLJ TX EACH ADDL HOUR: CPT

## 2021-10-08 RX ORDER — AMOXICILLIN 250 MG
2 CAPSULE ORAL 2 TIMES DAILY
Status: DISCONTINUED | OUTPATIENT
Start: 2021-10-08 | End: 2021-10-16

## 2021-10-08 RX ORDER — POLYETHYLENE GLYCOL 3350 17 G/17G
17 POWDER, FOR SOLUTION ORAL 2 TIMES DAILY
Status: DISCONTINUED | OUTPATIENT
Start: 2021-10-08 | End: 2021-10-13

## 2021-10-08 RX ORDER — BUMETANIDE 0.25 MG/ML
4 INJECTION INTRAMUSCULAR; INTRAVENOUS ONCE
Status: COMPLETED | OUTPATIENT
Start: 2021-10-08 | End: 2021-10-08

## 2021-10-08 RX ORDER — POTASSIUM CHLORIDE 1.5 G/1.58G
40 POWDER, FOR SOLUTION ORAL ONCE
Status: COMPLETED | OUTPATIENT
Start: 2021-10-08 | End: 2021-10-08

## 2021-10-08 RX ORDER — POTASSIUM CHLORIDE 1.5 G/1.58G
20 POWDER, FOR SOLUTION ORAL ONCE
Status: COMPLETED | OUTPATIENT
Start: 2021-10-08 | End: 2021-10-08

## 2021-10-08 RX ADMIN — PROPOFOL 50 MCG/KG/MIN: 10 INJECTION, EMULSION INTRAVENOUS at 01:40

## 2021-10-08 RX ADMIN — PROPOFOL 50 MCG/KG/MIN: 10 INJECTION, EMULSION INTRAVENOUS at 22:13

## 2021-10-08 RX ADMIN — INSULIN ASPART 1 UNITS: 100 INJECTION, SOLUTION INTRAVENOUS; SUBCUTANEOUS at 20:37

## 2021-10-08 RX ADMIN — DOCUSATE SODIUM 50 MG AND SENNOSIDES 8.6 MG 2 TABLET: 8.6; 5 TABLET, FILM COATED ORAL at 11:37

## 2021-10-08 RX ADMIN — BUMETANIDE 4 MG: 0.25 INJECTION, SOLUTION INTRAMUSCULAR; INTRAVENOUS at 11:37

## 2021-10-08 RX ADMIN — MICONAZOLE NITRATE: 20 CREAM TOPICAL at 20:31

## 2021-10-08 RX ADMIN — POTASSIUM CHLORIDE 40 MEQ: 1.5 POWDER, FOR SOLUTION ORAL at 05:35

## 2021-10-08 RX ADMIN — Medication 15 ML: at 07:28

## 2021-10-08 RX ADMIN — DEXAMETHASONE SODIUM PHOSPHATE 6 MG: 4 INJECTION, SOLUTION INTRA-ARTICULAR; INTRALESIONAL; INTRAMUSCULAR; INTRAVENOUS; SOFT TISSUE at 07:28

## 2021-10-08 RX ADMIN — PROPOFOL 50 MCG/KG/MIN: 10 INJECTION, EMULSION INTRAVENOUS at 03:36

## 2021-10-08 RX ADMIN — PROPOFOL 50 MCG/KG/MIN: 10 INJECTION, EMULSION INTRAVENOUS at 20:32

## 2021-10-08 RX ADMIN — BUMETANIDE 1 MG/HR: 0.25 INJECTION INTRAMUSCULAR; INTRAVENOUS at 02:34

## 2021-10-08 RX ADMIN — MICONAZOLE NITRATE: 20 CREAM TOPICAL at 07:28

## 2021-10-08 RX ADMIN — INSULIN ASPART 1 UNITS: 100 INJECTION, SOLUTION INTRAVENOUS; SUBCUTANEOUS at 11:45

## 2021-10-08 RX ADMIN — Medication 40 MG: at 07:48

## 2021-10-08 RX ADMIN — INSULIN ASPART 1 UNITS: 100 INJECTION, SOLUTION INTRAVENOUS; SUBCUTANEOUS at 23:50

## 2021-10-08 RX ADMIN — ENOXAPARIN SODIUM 80 MG: 80 INJECTION SUBCUTANEOUS at 11:38

## 2021-10-08 RX ADMIN — POTASSIUM CHLORIDE 20 MEQ: 1.5 POWDER, FOR SOLUTION ORAL at 16:30

## 2021-10-08 RX ADMIN — PROPOFOL 50 MCG/KG/MIN: 10 INJECTION, EMULSION INTRAVENOUS at 16:30

## 2021-10-08 RX ADMIN — PROPOFOL 50 MCG/KG/MIN: 10 INJECTION, EMULSION INTRAVENOUS at 08:51

## 2021-10-08 RX ADMIN — ATORVASTATIN CALCIUM 40 MG: 40 TABLET, FILM COATED ORAL at 07:27

## 2021-10-08 RX ADMIN — INSULIN ASPART 1 UNITS: 100 INJECTION, SOLUTION INTRAVENOUS; SUBCUTANEOUS at 16:46

## 2021-10-08 RX ADMIN — POLYETHYLENE GLYCOL 3350 17 G: 17 POWDER, FOR SOLUTION ORAL at 20:32

## 2021-10-08 RX ADMIN — BISACODYL 10 MG: 10 SUPPOSITORY RECTAL at 07:28

## 2021-10-08 RX ADMIN — POLYETHYLENE GLYCOL 3350 17 G: 17 POWDER, FOR SOLUTION ORAL at 07:27

## 2021-10-08 RX ADMIN — ENOXAPARIN SODIUM 80 MG: 80 INJECTION SUBCUTANEOUS at 21:55

## 2021-10-08 RX ADMIN — BUMETANIDE 4 MG: 0.25 INJECTION, SOLUTION INTRAMUSCULAR; INTRAVENOUS at 01:27

## 2021-10-08 RX ADMIN — PROPOFOL 50 MCG/KG/MIN: 10 INJECTION, EMULSION INTRAVENOUS at 05:34

## 2021-10-08 RX ADMIN — PROPOFOL 50 MCG/KG/MIN: 10 INJECTION, EMULSION INTRAVENOUS at 01:26

## 2021-10-08 RX ADMIN — PROPOFOL 50 MCG/KG/MIN: 10 INJECTION, EMULSION INTRAVENOUS at 14:44

## 2021-10-08 RX ADMIN — DOCUSATE SODIUM 50 MG AND SENNOSIDES 8.6 MG 2 TABLET: 8.6; 5 TABLET, FILM COATED ORAL at 20:32

## 2021-10-08 RX ADMIN — ASPIRIN 81 MG CHEWABLE TABLET 81 MG: 81 TABLET CHEWABLE at 07:28

## 2021-10-08 RX ADMIN — INSULIN ASPART 1 UNITS: 100 INJECTION, SOLUTION INTRAVENOUS; SUBCUTANEOUS at 03:44

## 2021-10-08 RX ADMIN — PROPOFOL 50 MCG/KG/MIN: 10 INJECTION, EMULSION INTRAVENOUS at 06:54

## 2021-10-08 RX ADMIN — PAROXETINE HYDROCHLORIDE 40 MG: 10 SUSPENSION ORAL at 07:48

## 2021-10-08 RX ADMIN — PROPOFOL 50 MCG/KG/MIN: 10 INJECTION, EMULSION INTRAVENOUS at 18:29

## 2021-10-08 ASSESSMENT — ACTIVITIES OF DAILY LIVING (ADL)
ADLS_ACUITY_SCORE: 22

## 2021-10-08 ASSESSMENT — MIFFLIN-ST. JEOR: SCORE: 2127.59

## 2021-10-08 NOTE — PLAN OF CARE
NEURO: RASS -4, withdraws in all extrem, grimaces to pain. PERRLA 2mm; Prop@50 and Fent@50  RESP: CMV 50%/14/14/400, LS dim, following ABGs, minimal secretions, PIPs 30-40s;   CARDIAC: SB in the 40's- MICU notified, no intervention at this time; occasional PVCs. Levo @0.02; Total of 8mg Bumex given and Bumex gtt started at 1mg/hr. Afebrile.  GI: TF@50 d/t advance to 60 (goal) at 1000. No BM.  : Lopes intact, UO adequate.     Plan: Trend ABGs, wean Levo, wean O2/peep     For vital signs and complete assessments, please see documentation flowsheets.

## 2021-10-08 NOTE — PLAN OF CARE
Major Shift Events:  Propofol at 50, Fentanyl at 50.  RASS goal met.  SB.  Flolan cut to 10 from 20.  PEEP 14, 50%.  Secretions creamy tan.  No BM.  Bumex d/c'd.  Tube feeds at goal.    Plan: Family updated by bedside RN.  Wean vent and sedation as able.    For vital signs and complete assessments, please see documentation flowsheets.

## 2021-10-08 NOTE — PROGRESS NOTES
MEDICAL ICU H&P  10/08/2021    Date of Hospital Admission: 10/6/21  Date of ICU Admission: 10/6/21  Reason for Critical Care Admission: Acute hypoxic and hypercapnic respiratory failure  Date of Service (when I saw the patient): 10/08/2021    ASSESSMENT: Chika Ferguson is a 61 year old female with PMH of hyperlipidemia, GERD, obesity, hiatal hernia, CARL, HFpEF, Pulm HTN, CAD s/p PCI mlad 2016, former smoker, who was admitted on 10/6/2021 with acute hypoxic and hypercapnic respiratory failure.    Major changes today:  Bumex drip started overnight for diuresis, now held to repeat diuresis with Bumex 4mg BID      PLAN:    Neuro:  # Pain and sedation  Propofol   Fentanyl   - RASS goal -4 to -5    # Depression/anxiety  - Continue PTA paroxetine    Pulmonary:  # Acute hypoxic respiratory failure   CHF vs. PE vs. COPD exacerbation vs. Covid 19 pneumonia  - CT PE study negative  - Started bumex drip for diuresis overnight, now changed to 4mg BID aiming for net negative of 1-2 L  - Strict I/O  - Holding PTA carvedilol and dosing bumetanide with UOP response  - monitor fever curve   - Trending ABGs, following lactates, repeating CBC, BMP  - started Flolan-> trial of halfing the dose  - Blood gases stable overnight, patient was not proned    Ventilation Mode: CMV/AC  (Continuous Mandatory Ventilation/ Assist Control)  FiO2 (%): 70 %  Rate Set (breaths/minute): 14 breaths/min  Tidal Volume Set (mL): 400 mL  PEEP (cm H2O): (S) 10 cmH2O  Oxygen Concentration (%): 70 %  Resp: 14    #Positive COVID-19 test  The patient had hx of Covid 19 infection in 2021. Tested negative in outside hospital, positive in this admission. Bilateral opacities in admission CXR in the absence of LV dysfunction concerning for Covid 19 ARDS  - COVID-19 precautions  - Dexamethasome 6mg qday (10/7-10/17)  - Remdesivir (10/7-)  - Tocilizumab one dose  -   - keep trending CRP  - ABG q8h      Cardiovascular:  #Pulmonary Hypertension/ Right  Ventricular Failure  Past cardiovascular history notable for confirmed pulmonary hypertension, spoke with primary care provider to obtain further medical records. TTE was notable for severe right ventricular dilation, consistent with chronic Group III pulmonary hypetension. Consulted cardiology, appreciate recs   - Volume status: hypervolemic  - BB: holding PTA carvedilol 3.125 mg BID  - MRA: none  - SCD prevention: none  - Strict I/O as above  - V/Q scan pending  - CVP monitoring, reported q4h, titrate diuresis based on readings  - start diuresis with Bumex 4mg BID  - CTA on admission negative for PE.   - V/Q scan pending  - Echo negative for bubble study  - Flolan   - Hold bumex drip   - HCV, HBC, ASHANTI negative    # PMH of CAD  Continue PTA aspirin 81 mg  Continue PTA atorvastatin 40 mg    # Hypotension/ Shock  Continue NE infusion aiming for MAP >65mm Hg    GI/Nutrition:  # Nutrition  - consulted  - OG tube placed, start feeding today      # Suspected GERD/ mechanical ventilation  - pantoprazole     # Bowel regimen  - Senna/miralax scheduled  - BID Miralax   - Bisacodyl daily    Renal/Fluids/Electrolytes:  # TIMMY   Outside records notable for history of CKD, cause unspecified, unclear baseline creatinine  - Diurese and assess response  - Creatinine stable at 1.1  - Obtained UA, notable for trace hematuria  - Renal U/S w/o evidence of hydronephrosis. No parenchymal abnormalities  - Titrate diuresis according to CVP aiming for <15mm. Started on Bumex drip  - Negative 4.3L from admission.   - V/Q scan   - Appreciate cardiology recs  - Continue diuresis with Bumex aiming for net negative, CVP <15 mm Hg    #Hypokalemia (resolved)  Potassium replacement protocol    Endocrine:  # Hyperglycemia (steroid-induced)  # T2DM  Unclear diabetes history, but HbA1c of 6.7% c/w prediabetes/T2DM. No PTA antidiabetic medications.  - Hypoglycemia protocol  - medium ssi    - HbA1c 6.7%    ID:  # Community acquired pneumonia  Initial  abxs at outside hospital, continued treatment with ceftriaxone azithromycin in the setting of suspected CAP  - Sputum culture positive for S. Aureus (also on nares, colonization vs true infection)  - Blood cultures  - Urinalysis and culture  - Continue treatment with ceftriaxone    #Covid 19 pneumonia  As above    Hematology:    # Leukocytosis  Stress vs. Infection as above (stable)  - Monitor    Musculoskeletal:  # PT/OT    Skin:  # Pressure ulcers  Multiple observed, likely with long amount of time in chair  - WOC consult    General Cares/Prophylaxis:    DVT Prophylaxis: Enoxaparin SQ  GI Prophylaxis: PPI  Restraints: ordered  Family Communication: SonSarath, updated over the phone by me  Code Status: Full    Lines/tubes/drains:  - PIVx2  - CVC RIJ  - ETT 7mm  - Art. Line  - Lopes catheter in place  - Will monitor arterial pressure and obtain arterial line if pressors needed    Disposition:  - Medical ICU    Patient seen and findings/plan discussed with medical ICU staff, Dr. Fulton.    Jl Jean-Baptiste MD  Internal Medicine, PGY-1  Pager: [phone]    -----------------------------------------------------------------------  Interval History: Patient intubated, blood pressures currently stable, now undergoing CTA chest.    PHYSICAL EXAMINATION:  Temp:  [97.4  F (36.3  C)-98.9  F (37.2  C)] 97.4  F (36.3  C)  Pulse:  [45-59] 49  Resp:  [14] 14  MAP:  [71 mmHg-106 mmHg] 80 mmHg  Arterial Line BP: (105-149)/(56-77) 118/60  FiO2 (%):  [50 %-60 %] 50 %  SpO2:  [94 %-99 %] 98 %  General: intubated and sedated  HEENT: ET tube in, no bleeding  Neuro: sedated, withdrawing to pain, does not follow commands  Pulm/Resp: reduced sounds in bases, crackles in upper lung fields, negative for wheezing  CV: RRR  Abdomen: Soft, non-distended, non-tender  Incisions/Skin: coccyx erythema and skin maceration, significant lower extremity edema bilaterally    LABS: Reviewed.   Arterial Blood Gases   Recent Labs   Lab  10/08/21  0354 10/07/21  2059 10/07/21  1808 10/07/21  1336   PH 7.45 7.41 7.40 7.41   PCO2 57* 58* 59* 57*   PO2 111* 140* 112* 88   HCO3 40* 36* 37* 36*     Complete Blood Count   Recent Labs   Lab 10/08/21  0354 10/07/21  0405 10/06/21  0500   WBC 13.1* 12.8* 18.6*   HGB 9.6* 9.6* 10.3*    281 283     Basic Metabolic Panel  Recent Labs   Lab 10/08/21  0354 10/08/21  0343 10/07/21  2353 10/07/21  1943 10/07/21  1635 10/07/21  1635 10/07/21  1312 10/07/21  0954 10/07/21  0417 10/07/21  0405 10/06/21  0919 10/06/21  0837 10/06/21  0500 10/06/21  0500     --   --   --   --   --   --   --   --  142  --  142  --  140   POTASSIUM 3.4  --   --   --   --  3.5  --  3.5  --  2.9*   < > 2.9*   < > 3.7   CHLORIDE 103  --   --   --   --   --   --   --   --  105  --  104  --  104   CO2 38*  --   --   --   --   --   --   --   --  32  --  32  --  30   BUN 26  --   --   --   --   --   --   --   --  29  --  34*  --  33*   CR 1.10*  --   --   --   --   --   --   --   --  1.37*  --  1.63*  --  1.68*   * 140* 154* 156*   < > 173*   < >  --    < > 121*   < > 105*   < > 102*    < > = values in this interval not displayed.     Liver Function Tests  Recent Labs   Lab 10/07/21  1332 10/06/21  0500   AST  --  90*   ALT  --  60*   ALKPHOS  --  103   BILITOTAL  --  0.4   ALBUMIN  --  2.4*   INR 1.25* 1.19*     Coagulation Profile  Recent Labs   Lab 10/07/21  1332 10/06/21  0500   INR 1.25* 1.19*   PTT 34 27       IMAGING:  Recent Results (from the past 24 hour(s))   Echo Limited with Bubble Study    Narrative    [id number]  [id number]  [id number]  [id number]^[email or web address]     [facility],[place]  Echocardiography Laboratory  [address] [place] [id number]     Name: JASKARAN JOSEPH  MRN: [id number]  : 1960  Study Date: 10/07/2021 09:38 AM  Age: 61 yrs  Gender: Female  Patient Location: [facility]  Reason For Study: Pulmonary Hypertension  Ordering Physician: DEVAUGHN  JACKY  Referring Physician: JONELLE CORRALES  Performed By: Puma Damian     BSA: 2.6 m2  Height: 68 in  Weight: 357 lb  HR: 58  BP: 113/68 mmHg  ______________________________________________________________________________  Procedure  Limited Portable Echo Adult. Bubble study only.  ______________________________________________________________________________  Interpretation Summary  Limited study to evaluate for PFO.  Bubble study negative, no intracardiac shunting noted.  ______________________________________________________________________________  ______________________________________________________________________________  Report approved by: Jevon LYMAN 10/07/2021 10:32 AM     ______________________________________________________________________________

## 2021-10-08 NOTE — PROGRESS NOTES
10/08/21 1216   Quick Adds   Type of Visit Initial Occupational Therapy Evaluation   Living Environment   Living Environment Comments Moved to [place] 1 year ago.  Lived with her fiance before that who  of COVID. Moved to [place] to live with her son.   Self-Care   Activity/Exercise/Self-Care Comment Can walk around the house. Can walk when going grocery shopping and other stores without stopping but at a slower pace. Does not use the stairs. In Oct 2020 to May 2021 she could go up 2 flight of stairs.   General Information   Onset of Illness/Injury or Date of Surgery 10/06/21   Referring Physician Hung Rosa MD   Patient/Family Therapy Goal Statement (OT) pt. unable to state.   Additional Occupational Profile Info/Pertinent History of Current Problem 61 yof h/o obesity, HFpEF, CAD and COPD and likely OHS admitted on 10/6 to ICU with acute hypercarbic and hypercapneic respiratory failure now known to be 2/2 COVID pneumonia with ARDS.   General Observations and Info activity order; bedrest   Cognitive Status Examination   Cognitive Status Comments intubated/sedated   Range of Motion Comprehensive   Comment, General Range of Motion BUE/LE PROM WFL/habitus   Clinical Impression   Criteria for Skilled Therapeutic Interventions Met (OT) yes   OT Diagnosis impaired ADLs/mobility   OT Problem List-Impairments impacting ADL activity tolerance impaired;cognition;balance;range of motion (ROM);strength   ADL comments/analysis below baseline with cogntion, act. leon., ADLs/mobility   Assessment of Occupational Performance 3-5 Performance Deficits   Identified Performance Deficits dressing, toileting, bathing, home mgmt.   Planned Therapy Interventions (OT) ADL retraining;ROM;strengthening;transfer training;home program guidelines;progressive activity/exercise   Clinical Decision Making Complexity (OT) low complexity   Therapy Frequency (OT) 3x/week   Predicted Duration of Therapy ~ 3weeks   Anticipated Equipment  Needs Upon Discharge (OT)   (TBD)   Risk & Benefits of therapy have been explained evaluation/treatment results reviewed;care plan/treatment goals reviewed;risks/benefits reviewed;current/potential barriers reviewed;participants voiced agreement with care plan;participants included;patient   OT Discharge Planning    OT Discharge Recommendation (DC Rec) Transitional Care Facility   OT Rationale for DC Rec continue to assess d/c rec. at this time anticipate rehab stay   OT Brief overview of current status  dep. with all cares/mob.; intubated/sedated   Total Evaluation Time (Minutes)   Total Evaluation Time (Minutes) 8

## 2021-10-09 LAB
ANION GAP SERPL CALCULATED.3IONS-SCNC: 2 MMOL/L (ref 3–14)
BACTERIA SPT CULT: ABNORMAL
BASE EXCESS BLDA CALC-SCNC: 17.5 MMOL/L (ref -9–1.8)
BASE EXCESS BLDA CALC-SCNC: 17.6 MMOL/L (ref -9–1.8)
BASE EXCESS BLDA CALC-SCNC: 18.1 MMOL/L (ref -9–1.8)
BASE EXCESS BLDA CALC-SCNC: 18.7 MMOL/L (ref -9–1.8)
BASE EXCESS BLDA CALC-SCNC: 19.2 MMOL/L (ref -9–1.8)
BUN SERPL-MCNC: 41 MG/DL (ref 7–30)
CALCIUM SERPL-MCNC: 8.6 MG/DL (ref 8.5–10.1)
CHLORIDE BLD-SCNC: 103 MMOL/L (ref 94–109)
CO2 SERPL-SCNC: 39 MMOL/L (ref 20–32)
CREAT SERPL-MCNC: 1.1 MG/DL (ref 0.52–1.04)
CRP SERPL-MCNC: 91 MG/L (ref 0–8)
D DIMER PPP FEU-MCNC: 1.19 UG/ML FEU (ref 0–0.5)
ERYTHROCYTE [DISTWIDTH] IN BLOOD BY AUTOMATED COUNT: 16.8 % (ref 10–15)
FIBRINOGEN PPP-MCNC: 555 MG/DL (ref 170–490)
GFR SERPL CREATININE-BSD FRML MDRD: 54 ML/MIN/1.73M2
GLUCOSE BLD-MCNC: 147 MG/DL (ref 70–99)
GLUCOSE BLDC GLUCOMTR-MCNC: 130 MG/DL (ref 70–99)
GLUCOSE BLDC GLUCOMTR-MCNC: 140 MG/DL (ref 70–99)
GLUCOSE BLDC GLUCOMTR-MCNC: 149 MG/DL (ref 70–99)
GLUCOSE BLDC GLUCOMTR-MCNC: 156 MG/DL (ref 70–99)
GLUCOSE BLDC GLUCOMTR-MCNC: 167 MG/DL (ref 70–99)
GRAM STAIN RESULT: ABNORMAL
GRAM STAIN RESULT: ABNORMAL
HCO3 BLD-SCNC: 43 MMOL/L (ref 21–28)
HCO3 BLD-SCNC: 44 MMOL/L (ref 21–28)
HCO3 BLD-SCNC: 44 MMOL/L (ref 21–28)
HCO3 BLD-SCNC: 45 MMOL/L (ref 21–28)
HCO3 BLD-SCNC: 46 MMOL/L (ref 21–28)
HCT VFR BLD AUTO: 28.3 % (ref 35–47)
HGB BLD-MCNC: 8.6 G/DL (ref 11.7–15.7)
MCH RBC QN AUTO: 26.1 PG (ref 26.5–33)
MCHC RBC AUTO-ENTMCNC: 30.4 G/DL (ref 31.5–36.5)
MCV RBC AUTO: 86 FL (ref 78–100)
O2/TOTAL GAS SETTING VFR VENT: 50 %
PCO2 BLD: 55 MM HG (ref 35–45)
PCO2 BLD: 56 MM HG (ref 35–45)
PCO2 BLD: 60 MM HG (ref 35–45)
PCO2 BLD: 67 MM HG (ref 35–45)
PCO2 BLD: 70 MM HG (ref 35–45)
PH BLD: 7.41 [PH] (ref 7.35–7.45)
PH BLD: 7.45 [PH] (ref 7.35–7.45)
PH BLD: 7.47 [PH] (ref 7.35–7.45)
PH BLD: 7.5 [PH] (ref 7.35–7.45)
PH BLD: 7.51 [PH] (ref 7.35–7.45)
PLATELET # BLD AUTO: 255 10E3/UL (ref 150–450)
PO2 BLD: 70 MM HG (ref 80–105)
PO2 BLD: 78 MM HG (ref 80–105)
PO2 BLD: 88 MM HG (ref 80–105)
PO2 BLD: 96 MM HG (ref 80–105)
PO2 BLD: 99 MM HG (ref 80–105)
POTASSIUM BLD-SCNC: 3.5 MMOL/L (ref 3.4–5.3)
POTASSIUM BLD-SCNC: 4.1 MMOL/L (ref 3.4–5.3)
RBC # BLD AUTO: 3.29 10E6/UL (ref 3.8–5.2)
RETICS # AUTO: 0.04 10E6/UL (ref 0.03–0.1)
RETICS/RBC NFR AUTO: 1.4 % (ref 0.5–2)
SODIUM SERPL-SCNC: 144 MMOL/L (ref 133–144)
TRIGL SERPL-MCNC: 129 MG/DL
TROPONIN I SERPL-MCNC: 0.02 UG/L (ref 0–0.04)
TROPONIN I SERPL-MCNC: 0.03 UG/L (ref 0–0.04)
TROPONIN I SERPL-MCNC: 0.03 UG/L (ref 0–0.04)
WBC # BLD AUTO: 11 10E3/UL (ref 4–11)

## 2021-10-09 PROCEDURE — 84484 ASSAY OF TROPONIN QUANT: CPT

## 2021-10-09 PROCEDURE — 250N000011 HC RX IP 250 OP 636: Performed by: STUDENT IN AN ORGANIZED HEALTH CARE EDUCATION/TRAINING PROGRAM

## 2021-10-09 PROCEDURE — 250N000013 HC RX MED GY IP 250 OP 250 PS 637: Performed by: INTERNAL MEDICINE

## 2021-10-09 PROCEDURE — 84132 ASSAY OF SERUM POTASSIUM: CPT | Performed by: STUDENT IN AN ORGANIZED HEALTH CARE EDUCATION/TRAINING PROGRAM

## 2021-10-09 PROCEDURE — 94003 VENT MGMT INPAT SUBQ DAY: CPT

## 2021-10-09 PROCEDURE — 250N000013 HC RX MED GY IP 250 OP 250 PS 637: Performed by: STUDENT IN AN ORGANIZED HEALTH CARE EDUCATION/TRAINING PROGRAM

## 2021-10-09 PROCEDURE — 85027 COMPLETE CBC AUTOMATED: CPT | Performed by: STUDENT IN AN ORGANIZED HEALTH CARE EDUCATION/TRAINING PROGRAM

## 2021-10-09 PROCEDURE — 250N000011 HC RX IP 250 OP 636

## 2021-10-09 PROCEDURE — 200N000002 HC R&B ICU UMMC

## 2021-10-09 PROCEDURE — 250N000013 HC RX MED GY IP 250 OP 250 PS 637

## 2021-10-09 PROCEDURE — 36592 COLLECT BLOOD FROM PICC: CPT | Performed by: STUDENT IN AN ORGANIZED HEALTH CARE EDUCATION/TRAINING PROGRAM

## 2021-10-09 PROCEDURE — 85384 FIBRINOGEN ACTIVITY: CPT | Performed by: STUDENT IN AN ORGANIZED HEALTH CARE EDUCATION/TRAINING PROGRAM

## 2021-10-09 PROCEDURE — 84478 ASSAY OF TRIGLYCERIDES: CPT | Performed by: INTERNAL MEDICINE

## 2021-10-09 PROCEDURE — 80048 BASIC METABOLIC PNL TOTAL CA: CPT | Performed by: INTERNAL MEDICINE

## 2021-10-09 PROCEDURE — 82803 BLOOD GASES ANY COMBINATION: CPT | Performed by: INTERNAL MEDICINE

## 2021-10-09 PROCEDURE — 85379 FIBRIN DEGRADATION QUANT: CPT | Performed by: STUDENT IN AN ORGANIZED HEALTH CARE EDUCATION/TRAINING PROGRAM

## 2021-10-09 PROCEDURE — 999N000157 HC STATISTIC RCP TIME EA 10 MIN

## 2021-10-09 PROCEDURE — 85045 AUTOMATED RETICULOCYTE COUNT: CPT

## 2021-10-09 PROCEDURE — 99291 CRITICAL CARE FIRST HOUR: CPT | Mod: GC | Performed by: INTERNAL MEDICINE

## 2021-10-09 PROCEDURE — 250N000011 HC RX IP 250 OP 636: Performed by: NURSE PRACTITIONER

## 2021-10-09 PROCEDURE — 82803 BLOOD GASES ANY COMBINATION: CPT | Performed by: STUDENT IN AN ORGANIZED HEALTH CARE EDUCATION/TRAINING PROGRAM

## 2021-10-09 PROCEDURE — 86140 C-REACTIVE PROTEIN: CPT | Performed by: STUDENT IN AN ORGANIZED HEALTH CARE EDUCATION/TRAINING PROGRAM

## 2021-10-09 PROCEDURE — 250N000013 HC RX MED GY IP 250 OP 250 PS 637: Performed by: NURSE PRACTITIONER

## 2021-10-09 RX ORDER — PROPOFOL 10 MG/ML
10-20 INJECTION, EMULSION INTRAVENOUS EVERY 30 MIN PRN
Status: DISCONTINUED | OUTPATIENT
Start: 2021-10-09 | End: 2021-10-11

## 2021-10-09 RX ORDER — POTASSIUM CHLORIDE 1.5 G/1.58G
20 POWDER, FOR SOLUTION ORAL ONCE
Status: COMPLETED | OUTPATIENT
Start: 2021-10-09 | End: 2021-10-09

## 2021-10-09 RX ORDER — BUMETANIDE 0.25 MG/ML
2 INJECTION INTRAMUSCULAR; INTRAVENOUS EVERY 12 HOURS
Status: DISCONTINUED | OUTPATIENT
Start: 2021-10-09 | End: 2021-10-12

## 2021-10-09 RX ORDER — PROPOFOL 10 MG/ML
5-75 INJECTION, EMULSION INTRAVENOUS CONTINUOUS
Status: DISCONTINUED | OUTPATIENT
Start: 2021-10-09 | End: 2021-10-11

## 2021-10-09 RX ADMIN — BISACODYL 10 MG: 10 SUPPOSITORY RECTAL at 10:12

## 2021-10-09 RX ADMIN — PAROXETINE HYDROCHLORIDE 40 MG: 10 SUSPENSION ORAL at 08:07

## 2021-10-09 RX ADMIN — INSULIN ASPART 1 UNITS: 100 INJECTION, SOLUTION INTRAVENOUS; SUBCUTANEOUS at 03:37

## 2021-10-09 RX ADMIN — PROPOFOL 40 MCG/KG/MIN: 10 INJECTION, EMULSION INTRAVENOUS at 21:31

## 2021-10-09 RX ADMIN — PROPOFOL 30 MCG/KG/MIN: 10 INJECTION, EMULSION INTRAVENOUS at 16:02

## 2021-10-09 RX ADMIN — DEXAMETHASONE SODIUM PHOSPHATE 6 MG: 4 INJECTION, SOLUTION INTRA-ARTICULAR; INTRALESIONAL; INTRAMUSCULAR; INTRAVENOUS; SOFT TISSUE at 08:08

## 2021-10-09 RX ADMIN — Medication 40 MG: at 08:07

## 2021-10-09 RX ADMIN — PROPOFOL 50 MCG/KG/MIN: 10 INJECTION, EMULSION INTRAVENOUS at 04:23

## 2021-10-09 RX ADMIN — POTASSIUM CHLORIDE 20 MEQ: 1.5 POWDER, FOR SOLUTION ORAL at 06:27

## 2021-10-09 RX ADMIN — PROPOFOL 30 MCG/KG/MIN: 10 INJECTION, EMULSION INTRAVENOUS at 13:15

## 2021-10-09 RX ADMIN — PROPOFOL 50 MCG/KG/MIN: 10 INJECTION, EMULSION INTRAVENOUS at 02:23

## 2021-10-09 RX ADMIN — POLYETHYLENE GLYCOL 3350 17 G: 17 POWDER, FOR SOLUTION ORAL at 20:17

## 2021-10-09 RX ADMIN — DOCUSATE SODIUM 50 MG AND SENNOSIDES 8.6 MG 2 TABLET: 8.6; 5 TABLET, FILM COATED ORAL at 08:08

## 2021-10-09 RX ADMIN — INSULIN ASPART 1 UNITS: 100 INJECTION, SOLUTION INTRAVENOUS; SUBCUTANEOUS at 12:27

## 2021-10-09 RX ADMIN — ATORVASTATIN CALCIUM 40 MG: 40 TABLET, FILM COATED ORAL at 08:08

## 2021-10-09 RX ADMIN — CEFTRIAXONE SODIUM 2 G: 2 INJECTION, POWDER, FOR SOLUTION INTRAMUSCULAR; INTRAVENOUS at 23:01

## 2021-10-09 RX ADMIN — MICONAZOLE NITRATE: 20 CREAM TOPICAL at 08:28

## 2021-10-09 RX ADMIN — Medication 50 MCG/HR: at 21:48

## 2021-10-09 RX ADMIN — POLYETHYLENE GLYCOL 3350 17 G: 17 POWDER, FOR SOLUTION ORAL at 08:07

## 2021-10-09 RX ADMIN — BUMETANIDE 2 MG: 0.25 INJECTION INTRAMUSCULAR; INTRAVENOUS at 23:01

## 2021-10-09 RX ADMIN — PROPOFOL 30 MCG/KG/MIN: 10 INJECTION, EMULSION INTRAVENOUS at 16:33

## 2021-10-09 RX ADMIN — BUMETANIDE 2 MG: 0.25 INJECTION INTRAMUSCULAR; INTRAVENOUS at 12:16

## 2021-10-09 RX ADMIN — CEFTRIAXONE SODIUM 2 G: 2 INJECTION, POWDER, FOR SOLUTION INTRAMUSCULAR; INTRAVENOUS at 00:29

## 2021-10-09 RX ADMIN — Medication 15 ML: at 08:07

## 2021-10-09 RX ADMIN — ENOXAPARIN SODIUM 80 MG: 80 INJECTION SUBCUTANEOUS at 10:03

## 2021-10-09 RX ADMIN — PROPOFOL 50 MCG/KG/MIN: 10 INJECTION, EMULSION INTRAVENOUS at 00:28

## 2021-10-09 RX ADMIN — DOCUSATE SODIUM 50 MG AND SENNOSIDES 8.6 MG 2 TABLET: 8.6; 5 TABLET, FILM COATED ORAL at 20:16

## 2021-10-09 RX ADMIN — ENOXAPARIN SODIUM 80 MG: 80 INJECTION SUBCUTANEOUS at 20:16

## 2021-10-09 RX ADMIN — PROPOFOL 50 MCG/KG/MIN: 10 INJECTION, EMULSION INTRAVENOUS at 08:09

## 2021-10-09 RX ADMIN — ASPIRIN 81 MG CHEWABLE TABLET 81 MG: 81 TABLET CHEWABLE at 08:08

## 2021-10-09 RX ADMIN — INSULIN ASPART 1 UNITS: 100 INJECTION, SOLUTION INTRAVENOUS; SUBCUTANEOUS at 08:26

## 2021-10-09 RX ADMIN — MICONAZOLE NITRATE: 20 CREAM TOPICAL at 20:17

## 2021-10-09 RX ADMIN — PROPOFOL 30 MCG/KG/MIN: 10 INJECTION, EMULSION INTRAVENOUS at 10:01

## 2021-10-09 RX ADMIN — INSULIN ASPART 1 UNITS: 100 INJECTION, SOLUTION INTRAVENOUS; SUBCUTANEOUS at 15:48

## 2021-10-09 RX ADMIN — PROPOFOL 50 MCG/KG/MIN: 10 INJECTION, EMULSION INTRAVENOUS at 06:27

## 2021-10-09 ASSESSMENT — MIFFLIN-ST. JEOR: SCORE: 2117.5

## 2021-10-09 ASSESSMENT — ACTIVITIES OF DAILY LIVING (ADL)
ADLS_ACUITY_SCORE: 22
ADLS_ACUITY_SCORE: 24
ADLS_ACUITY_SCORE: 22
ADLS_ACUITY_SCORE: 24
ADLS_ACUITY_SCORE: 24
ADLS_ACUITY_SCORE: 22

## 2021-10-09 NOTE — PLAN OF CARE
Major Shift Events: Lightened sedation to new RASS goal of -2 to -3. Pt now opening eyes and answering yes/no questions. Not following commands, weak in all extremities. Levo turned off, maintaining MAP > 65. Propofol at 30. Fentanyl at 50. One large BM today, first since admission. Continue bowel meds. TF at goal of 60ml/hr. Lopes with adequate output 100/hr. Weaned vent settings to new PEEP 12 and . P/R ratio 156. FiO2 remains at 50% and rate of 14. T/R Q2 and interdry placed in folds with Osmar antifungal applied.      Plan: Wean vent as tolerating. Continue diuresing with bumex. Continue with bowel meds.     For vital signs and complete assessments, please see documentation flowsheets.

## 2021-10-09 NOTE — PLAN OF CARE
Major Shift Events:  No significant changes in pt condition overnight. Pt remains at RASS -4. Pt does move legs, possible tremor doesn't seem purposeful. Pt on 50 of prop and 50 of fent. VSS. PF ratio improved now near 190. No BM since arrival continue with bowel meds. Lopes in place output adequate. Interdry placed in skin folds to protect from moisture.   Plan: Wean PEEP as able.   For vital signs and complete assessments, please see documentation flowsheets.    Problem: Adult Inpatient Plan of Care  Goal: Plan of Care Review  Outcome: No Change

## 2021-10-09 NOTE — PROGRESS NOTES
MEDICAL ICU H&P  10/09/2021    Date of Hospital Admission: 10/6/21  Date of ICU Admission: 10/6/21  Reason for Critical Care Admission: Acute hypoxic and hypercapnic respiratory failure  Date of Service (when I saw the patient): 10/09/2021    ASSESSMENT: Chika Ferguson is a 61 year old female with PMH of hyperlipidemia, GERD, obesity, hiatal hernia, CARL, HFpEF, Pulm HTN, CAD s/p PCI mlad 2016, former smoker, who was admitted on 10/6/2021 with acute hypoxic and hypercapnic respiratory failure.    Major changes today:  Titrate diuresis to -1 L  Decreased sedation depth -2 to -3  Decreased PEEP to 12 cm H2O    PLAN:    Neuro:  # Pain and sedation  Propofol   Fentanyl       # Depression/anxiety  - Continue PTA paroxetine    Pulmonary:  # Acute hypoxic respiratory failure   CHF vs. PE vs. COPD exacerbation vs. Covid 19 pneumonia  - CT PE study negative  - CVP goal reached, will aim for net even  - Strict I/O  - Holding PTA carvedilol and dosing bumetanide with UOP response  - monitor fever curve   - Trending ABGs, following lactates, repeating CBC, BMP  - stopped Flolan yesterday, stable  - Blood gases stable overnight, patient was not proned    Ventilation Mode: CMV/AC  (Continuous Mandatory Ventilation/ Assist Control)  FiO2 (%): 50 %  Rate Set (breaths/minute): 14 breaths/min  Tidal Volume Set (mL):  ml  PEEP (cm H2O): (S) 12 cmH2O  Oxygen Concentration (%): 50 %  Resp: 14    #Positive COVID-19 test  The patient had hx of Covid 19 infection in 2021. Tested negative in outside hospital, positive in this admission. Bilateral opacities in admission CXR in the absence of LV dysfunction concerning for Covid 19 ARDS  - COVID-19 precautions  - Dexamethasome 6mg qday (10/7-10/17)  - Remdesivir (10/7-10/12 ask Gared)  - Tocilizumab one dose  - keep trending CRP, now decreasing, 91 today  - ABG q8h      Cardiovascular:  #Pulmonary Hypertension/ Right Ventricular Failure  Past cardiovascular history notable for  confirmed pulmonary hypertension, spoke with primary care provider to obtain further medical records. TTE was notable for severe right ventricular dilation, consistent with chronic Group III pulmonary hypetension. Consulted cardiology, appreciate recs   - Volume status: hypervolemic  - BB: holding PTA carvedilol 3.125 mg BID  - MRA: none  - SCD prevention: none  - Strict I/O as above (-500 to -1L)  - V/Q scan pending  - CVP monitoring, reported q4h, titrate diuresis based on readings  - start diuresis with Bumex 4mg BID, CVP goal achieved, will aim for net even for rest of stay  - CTA on admission negative for PE.   - V/Q scan pending  - Echo negative for bubble study  - Flolan stopped   - HCV, HBC, ASHANTI negative, R. Factor normal    # PMH of CAD  Continue PTA aspirin 81 mg  Continuing PTA atorvastatin    # Hypotension/ Shock  Now stable off pressors    GI/Nutrition:  # Nutrition  - consulted  - OG tube placed, start feeding today    # Suspected GERD/ mechanical ventilation  - pantoprazole     # Bowel regimen  - Senna/miralax scheduled  - BID Miralax   - Bisacodyl daily    # Elevated liver function tests  Transaminemia on admission. Now resolved. Will continue atorvastatin and repeat as resolved prior to holding    Renal/Fluids/Electrolytes:  # TIMMY   Outside records notable for history of CKD, cause unspecified, unclear baseline creatinine  - Diurese and assess response  - Creatinine stable at 1.1  - Obtained UA, notable for trace hematuria  - Renal U/S w/o evidence of hydronephrosis. No parenchymal abnormalities  - Titrate diuresis according to CVP aiming for <15mm. Started on Bumex drip  - Negative 4.3L from admission.   - V/Q scan   - Appreciate cardiology recs  - Continue diuresis with Bumex aiming for net negative, CVP <15mm Hg. Goal achieved yesterday, continue diuresis with 2 mg BID for net negative 500ml to 1L.    #Hypokalemia (resolved)  Potassium replacement protocol    Endocrine:  # Hyperglycemia  (steroid-induced)  # T2DM  Unclear diabetes history, but HbA1c of 6.7% c/w prediabetes/T2DM. No PTA antidiabetic medications.  - Hypoglycemia protocol  - medium ssi    - HbA1c 6.7%    ID:  # Community acquired pneumonia  Initial abxs at outside hospital, continued treatment with ceftriaxone azithromycin in the setting of suspected CAP  - Sputum culture positive for S. Aureus (also on nares, colonization vs true infection)  - Blood cultures  - Urinalysis and culture  - Continue treatment with ceftriaxone     #Covid 19 pneumonia  As above    Hematology:    # Leukocytosis (resplving)  Stress vs. Infection as above (stable)  - Monitor  # Anemia  Hemoglobin drop from admission  - Ordered Reticulocytes  - Continue Monitoring  - No identifiable bleeding on physical examination    Musculoskeletal:  # PT/OT    Skin:  # Pressure ulcers  Multiple observed, likely with long amount of time in chair  - WOC consult    General Cares/Prophylaxis:    DVT Prophylaxis: Enoxaparin SQ  GI Prophylaxis: PPI  Restraints: ordered  Family Communication: SonSarath, updated by me over the phone  Code Status: Full    Lines/tubes/drains:  - PIVx2  - CVC RIJ  - ETT 7mm  - Art. Line  - Lopes catheter in place    Disposition:  - Medical ICU    Patient seen and findings/plan discussed with medical ICU staff, Dr. Fulton.    Jl Jean-Baptiste MD  Internal Medicine, PGY-1  Pager: [phone]    -----------------------------------------------------------------------  Interval History: Patient intubated, blood pressures currently stable, now undergoing CTA chest.    PHYSICAL EXAMINATION:  Temp:  [97.6  F (36.4  C)-98.5  F (36.9  C)] 97.8  F (36.6  C)  Pulse:  [48-59] 50  Resp:  [14] 14  MAP:  [63 mmHg-96 mmHg] 67 mmHg  Arterial Line BP: ()/(50-75) 96/53  FiO2 (%):  [50 %] 50 %  SpO2:  [95 %-99 %] 98 %   General: intubated and sedated  HEENT: ET tube in, no bleeding  Neuro: sedated, withdrawing to pain, does not follow commands  Pulm/Resp:  reduced sounds in bases, crackles in upper lung fields, negative for wheezing  CV: RRR  Abdomen: Soft, mildly distended, bowel sounds decreased  Incisions/Skin: coccyx erythema and skin maceration, significant lower extremity edema bilaterally, improving    LABS: Reviewed.   Arterial Blood Gases   Recent Labs   Lab 10/09/21  0330 10/08/21  2349 10/08/21  2038 10/08/21  1405   PH 7.50* 7.51* 7.49* 7.47*   PCO2 56* 55* 59* 61*   PO2 96 88 91 78*   HCO3 44* 44* 44* 44*     Complete Blood Count   Recent Labs   Lab 10/09/21  0336 10/08/21  0354 10/07/21  0405 10/06/21  0500   WBC 11.0 13.1* 12.8* 18.6*   HGB 8.6* 9.6* 9.6* 10.3*    282 281 283     Basic Metabolic Panel  Recent Labs   Lab 10/09/21  0336 10/09/21  0333 10/08/21  2347 10/08/21  2037 10/08/21  1644 10/08/21  1349 10/08/21  1144 10/08/21  0742 10/08/21  0354 10/07/21  1943 10/07/21  1635 10/07/21  0417 10/07/21  0405 10/06/21  0919 10/06/21  0837 10/06/21  0500 10/06/21  0500   NA  --   --   --   --   --   --   --   --  143  --   --   --  142  --  142  --  140   POTASSIUM 3.5  --   --   --   --  3.7  --   --  3.4  --  3.5   < > 2.9*   < > 2.9*   < > 3.7   CHLORIDE  --   --   --   --   --   --   --   --  103  --   --   --  105  --  104  --  104   CO2  --   --   --   --   --   --   --   --  38*  --   --   --  32  --  32  --  30   BUN  --   --   --   --   --   --   --   --  26  --   --   --  29  --  34*  --  33*   CR  --   --   --   --   --   --   --   --  1.10*  --   --   --  1.37*  --  1.63*  --  1.68*   GLC  --  156* 143* 142* 161*  --    < >   < > 164*   < > 173*   < > 121*   < > 105*   < > 102*    < > = values in this interval not displayed.     Liver Function Tests  Recent Labs   Lab 10/08/21  0354 10/07/21  1332 10/06/21  0500   AST 31  --  90*   ALT 40  --  60*   ALKPHOS 94  --  103   BILITOTAL 0.3  --  0.4   ALBUMIN 2.4*  --  2.4*   INR  --  1.25* 1.19*     Coagulation Profile  Recent Labs   Lab 10/07/21  1332 10/06/21  0500   INR 1.25* 1.19*    PTT 34 27       IMAGING:  No results found for this or any previous visit (from the past 24 hour(s)).

## 2021-10-10 LAB
ANION GAP SERPL CALCULATED.3IONS-SCNC: 3 MMOL/L (ref 3–14)
BASE EXCESS BLDA CALC-SCNC: 17.3 MMOL/L (ref -9–1.8)
BASE EXCESS BLDA CALC-SCNC: 20.7 MMOL/L (ref -9–1.8)
BASE EXCESS BLDA CALC-SCNC: 21.4 MMOL/L (ref -9–1.8)
BUN SERPL-MCNC: 46 MG/DL (ref 7–30)
CALCIUM SERPL-MCNC: 8.7 MG/DL (ref 8.5–10.1)
CHLORIDE BLD-SCNC: 98 MMOL/L (ref 94–109)
CO2 SERPL-SCNC: 44 MMOL/L (ref 20–32)
CREAT SERPL-MCNC: 1.01 MG/DL (ref 0.52–1.04)
CRP SERPL-MCNC: 57 MG/L (ref 0–8)
D DIMER PPP FEU-MCNC: 1.73 UG/ML FEU (ref 0–0.5)
ERYTHROCYTE [DISTWIDTH] IN BLOOD BY AUTOMATED COUNT: 16.7 % (ref 10–15)
FIBRINOGEN PPP-MCNC: 511 MG/DL (ref 170–490)
GFR SERPL CREATININE-BSD FRML MDRD: 59 ML/MIN/1.73M2
GLUCOSE BLD-MCNC: 142 MG/DL (ref 70–99)
GLUCOSE BLDC GLUCOMTR-MCNC: 135 MG/DL (ref 70–99)
GLUCOSE BLDC GLUCOMTR-MCNC: 139 MG/DL (ref 70–99)
GLUCOSE BLDC GLUCOMTR-MCNC: 140 MG/DL (ref 70–99)
GLUCOSE BLDC GLUCOMTR-MCNC: 142 MG/DL (ref 70–99)
GLUCOSE BLDC GLUCOMTR-MCNC: 144 MG/DL (ref 70–99)
GLUCOSE BLDC GLUCOMTR-MCNC: 168 MG/DL (ref 70–99)
HCO3 BLD-SCNC: 45 MMOL/L (ref 21–28)
HCO3 BLD-SCNC: 47 MMOL/L (ref 21–28)
HCO3 BLD-SCNC: 49 MMOL/L (ref 21–28)
HCT VFR BLD AUTO: 30.2 % (ref 35–47)
HGB BLD-MCNC: 8.9 G/DL (ref 11.7–15.7)
MCH RBC QN AUTO: 25.9 PG (ref 26.5–33)
MCHC RBC AUTO-ENTMCNC: 29.5 G/DL (ref 31.5–36.5)
MCV RBC AUTO: 88 FL (ref 78–100)
O2/TOTAL GAS SETTING VFR VENT: 50 %
PCO2 BLD: 63 MM HG (ref 35–45)
PCO2 BLD: 70 MM HG (ref 35–45)
PCO2 BLD: 73 MM HG (ref 35–45)
PH BLD: 7.4 [PH] (ref 7.35–7.45)
PH BLD: 7.45 [PH] (ref 7.35–7.45)
PH BLD: 7.48 [PH] (ref 7.35–7.45)
PLATELET # BLD AUTO: 276 10E3/UL (ref 150–450)
PO2 BLD: 77 MM HG (ref 80–105)
PO2 BLD: 88 MM HG (ref 80–105)
PO2 BLD: 95 MM HG (ref 80–105)
POTASSIUM BLD-SCNC: 3.6 MMOL/L (ref 3.4–5.3)
POTASSIUM BLD-SCNC: 3.6 MMOL/L (ref 3.4–5.3)
RBC # BLD AUTO: 3.44 10E6/UL (ref 3.8–5.2)
SODIUM SERPL-SCNC: 145 MMOL/L (ref 133–144)
WBC # BLD AUTO: 9 10E3/UL (ref 4–11)

## 2021-10-10 PROCEDURE — 86140 C-REACTIVE PROTEIN: CPT | Performed by: STUDENT IN AN ORGANIZED HEALTH CARE EDUCATION/TRAINING PROGRAM

## 2021-10-10 PROCEDURE — 250N000011 HC RX IP 250 OP 636: Performed by: INTERNAL MEDICINE

## 2021-10-10 PROCEDURE — 250N000013 HC RX MED GY IP 250 OP 250 PS 637: Performed by: STUDENT IN AN ORGANIZED HEALTH CARE EDUCATION/TRAINING PROGRAM

## 2021-10-10 PROCEDURE — 250N000011 HC RX IP 250 OP 636: Performed by: STUDENT IN AN ORGANIZED HEALTH CARE EDUCATION/TRAINING PROGRAM

## 2021-10-10 PROCEDURE — 84132 ASSAY OF SERUM POTASSIUM: CPT | Performed by: INTERNAL MEDICINE

## 2021-10-10 PROCEDURE — 99291 CRITICAL CARE FIRST HOUR: CPT | Mod: GC | Performed by: INTERNAL MEDICINE

## 2021-10-10 PROCEDURE — 999N000157 HC STATISTIC RCP TIME EA 10 MIN

## 2021-10-10 PROCEDURE — 85379 FIBRIN DEGRADATION QUANT: CPT | Performed by: STUDENT IN AN ORGANIZED HEALTH CARE EDUCATION/TRAINING PROGRAM

## 2021-10-10 PROCEDURE — 80048 BASIC METABOLIC PNL TOTAL CA: CPT | Performed by: STUDENT IN AN ORGANIZED HEALTH CARE EDUCATION/TRAINING PROGRAM

## 2021-10-10 PROCEDURE — 85027 COMPLETE CBC AUTOMATED: CPT | Performed by: STUDENT IN AN ORGANIZED HEALTH CARE EDUCATION/TRAINING PROGRAM

## 2021-10-10 PROCEDURE — 250N000013 HC RX MED GY IP 250 OP 250 PS 637

## 2021-10-10 PROCEDURE — 250N000013 HC RX MED GY IP 250 OP 250 PS 637: Performed by: INTERNAL MEDICINE

## 2021-10-10 PROCEDURE — 85384 FIBRINOGEN ACTIVITY: CPT | Performed by: STUDENT IN AN ORGANIZED HEALTH CARE EDUCATION/TRAINING PROGRAM

## 2021-10-10 PROCEDURE — 94003 VENT MGMT INPAT SUBQ DAY: CPT

## 2021-10-10 PROCEDURE — 200N000002 HC R&B ICU UMMC

## 2021-10-10 PROCEDURE — 82803 BLOOD GASES ANY COMBINATION: CPT | Performed by: STUDENT IN AN ORGANIZED HEALTH CARE EDUCATION/TRAINING PROGRAM

## 2021-10-10 PROCEDURE — 250N000013 HC RX MED GY IP 250 OP 250 PS 637: Performed by: NURSE PRACTITIONER

## 2021-10-10 PROCEDURE — 250N000011 HC RX IP 250 OP 636

## 2021-10-10 RX ORDER — ACETAZOLAMIDE 250 MG/1
500 TABLET ORAL ONCE
Status: COMPLETED | OUTPATIENT
Start: 2021-10-10 | End: 2021-10-10

## 2021-10-10 RX ORDER — FENTANYL CITRATE 50 UG/ML
12.5-25 INJECTION, SOLUTION INTRAMUSCULAR; INTRAVENOUS
Status: DISCONTINUED | OUTPATIENT
Start: 2021-10-10 | End: 2021-10-10 | Stop reason: CLARIF

## 2021-10-10 RX ORDER — POTASSIUM CHLORIDE 29.8 MG/ML
20 INJECTION INTRAVENOUS ONCE
Status: COMPLETED | OUTPATIENT
Start: 2021-10-10 | End: 2021-10-10

## 2021-10-10 RX ADMIN — Medication 40 MG: at 07:45

## 2021-10-10 RX ADMIN — BUMETANIDE 2 MG: 0.25 INJECTION INTRAMUSCULAR; INTRAVENOUS at 10:16

## 2021-10-10 RX ADMIN — PROPOFOL 40 MCG/KG/MIN: 10 INJECTION, EMULSION INTRAVENOUS at 05:00

## 2021-10-10 RX ADMIN — POTASSIUM CHLORIDE 20 MEQ: 29.8 INJECTION, SOLUTION INTRAVENOUS at 04:41

## 2021-10-10 RX ADMIN — INSULIN ASPART 1 UNITS: 100 INJECTION, SOLUTION INTRAVENOUS; SUBCUTANEOUS at 12:03

## 2021-10-10 RX ADMIN — ENOXAPARIN SODIUM 80 MG: 80 INJECTION SUBCUTANEOUS at 07:46

## 2021-10-10 RX ADMIN — MICONAZOLE NITRATE: 20 CREAM TOPICAL at 07:46

## 2021-10-10 RX ADMIN — DOCUSATE SODIUM 50 MG AND SENNOSIDES 8.6 MG 2 TABLET: 8.6; 5 TABLET, FILM COATED ORAL at 21:44

## 2021-10-10 RX ADMIN — ATORVASTATIN CALCIUM 40 MG: 40 TABLET, FILM COATED ORAL at 07:46

## 2021-10-10 RX ADMIN — PROPOFOL 30 MCG/KG/MIN: 10 INJECTION, EMULSION INTRAVENOUS at 13:25

## 2021-10-10 RX ADMIN — POLYETHYLENE GLYCOL 3350 17 G: 17 POWDER, FOR SOLUTION ORAL at 07:45

## 2021-10-10 RX ADMIN — INSULIN ASPART 1 UNITS: 100 INJECTION, SOLUTION INTRAVENOUS; SUBCUTANEOUS at 00:11

## 2021-10-10 RX ADMIN — PROPOFOL 30 MCG/KG/MIN: 10 INJECTION, EMULSION INTRAVENOUS at 10:15

## 2021-10-10 RX ADMIN — INSULIN ASPART 1 UNITS: 100 INJECTION, SOLUTION INTRAVENOUS; SUBCUTANEOUS at 04:49

## 2021-10-10 RX ADMIN — PROPOFOL 40 MCG/KG/MIN: 10 INJECTION, EMULSION INTRAVENOUS at 02:54

## 2021-10-10 RX ADMIN — MICONAZOLE NITRATE: 20 CREAM TOPICAL at 20:04

## 2021-10-10 RX ADMIN — ACETAZOLAMIDE 500 MG: 250 TABLET ORAL at 04:41

## 2021-10-10 RX ADMIN — DOCUSATE SODIUM 50 MG AND SENNOSIDES 8.6 MG 2 TABLET: 8.6; 5 TABLET, FILM COATED ORAL at 07:45

## 2021-10-10 RX ADMIN — PAROXETINE HYDROCHLORIDE 40 MG: 10 SUSPENSION ORAL at 07:45

## 2021-10-10 RX ADMIN — ASPIRIN 81 MG CHEWABLE TABLET 81 MG: 81 TABLET CHEWABLE at 07:45

## 2021-10-10 RX ADMIN — PROPOFOL 35 MCG/KG/MIN: 10 INJECTION, EMULSION INTRAVENOUS at 22:30

## 2021-10-10 RX ADMIN — DEXAMETHASONE SODIUM PHOSPHATE 6 MG: 4 INJECTION, SOLUTION INTRA-ARTICULAR; INTRALESIONAL; INTRAMUSCULAR; INTRAVENOUS; SOFT TISSUE at 07:45

## 2021-10-10 RX ADMIN — Medication 15 ML: at 07:45

## 2021-10-10 RX ADMIN — ENOXAPARIN SODIUM 80 MG: 80 INJECTION SUBCUTANEOUS at 21:44

## 2021-10-10 RX ADMIN — POLYETHYLENE GLYCOL 3350 17 G: 17 POWDER, FOR SOLUTION ORAL at 20:03

## 2021-10-10 RX ADMIN — PROPOFOL 30 MCG/KG/MIN: 10 INJECTION, EMULSION INTRAVENOUS at 16:52

## 2021-10-10 RX ADMIN — INSULIN ASPART 1 UNITS: 100 INJECTION, SOLUTION INTRAVENOUS; SUBCUTANEOUS at 16:55

## 2021-10-10 ASSESSMENT — ACTIVITIES OF DAILY LIVING (ADL)
ADLS_ACUITY_SCORE: 22

## 2021-10-10 ASSESSMENT — MIFFLIN-ST. JEOR: SCORE: 2095.83

## 2021-10-10 NOTE — PROGRESS NOTES
MEDICAL ICU H&P  10/10/2021    Date of Hospital Admission: 10/6/21  Date of ICU Admission: 10/6/21  Reason for Critical Care Admission: Acute hypoxic and hypercapnic respiratory failure  Date of Service (when I saw the patient): 10/10/2021    ASSESSMENT: Chika Ferguson is a 62 YO F with a h/o HLD, GERD, obesity, hiatal hernia, CARL, HFpEF, pHTN, CAD s/p PCI mlad (2016), and former tobacco abuse, admitted on 10/6/21 with acute hypoxic and hypercapnic respiratory failure. S/p intubation 10/6/21.    Major changes:  - Hold on further diuresis today. Goal net even.  - Ctn antibiotic regimen for CAP  - Ctn COVID treatment    PLAN:    Neuro:  # Pain and sedation  Propofol gtt and prn  Fentanyl gtt and prn    # Depression/anxiety  - Continue PTA paroxetine    Pulmonary:  # Acute hypoxic and hypercarbic respiratory failure, multifactorial  # ARDS  # CARL  Treating for HF exacerbation, COVID pneumonia, CAP, and suspected OHS. CT PE study negative. Patient wears nocturnal CPAP at baseline. Former smoker.  - Strict I/O, aiming for net even. Dosing Bumetanide based on UOP response. Will hold PM dose.  - Ctn to hold PTA carvedilol.   - S/p Flolan  - S/p proning, currently supine  - Ventilation Mode: CMV/AC  (Continuous Mandatory Ventilation/ Assist Control)  FiO2 (%): 50 %  Rate Set (breaths/minute): 14 breaths/min  Tidal Volume Set (mL): 360 mL  PEEP (cm H2O): 12 cmH2O  Oxygen Concentration (%): 50 %  Resp: 14  - Antibiotic regimen below  - COVID regimen below    # COVID Pneumonia  - COVID-19 precautions  - Dexamethasome 6mg qday (10/7-10/17)  - S/p Remdesivir   - S/p Tocilizumab x1    Cardiovascular:  # Shock, distributive  - MAP goal >65  - Off NE since 10/9/21    # pHTN  # Right Ventricular Failure  10/6/21 TTE: mod-severe RV dilation and mod-severely reduced function, consistent with chronic Group III pulmonary hypetension.   - Consulted cardiology, appreciate recs   - BB: holding PTA carvedilol 3.125 mg BID  - Diuresis  plan above   - V/Q scan pending  - TTE 10/7/21: negative bubble study  - Labs: HCV, HBC, ASHANTI negative, R. Factor normal    # CAD  - Ctn PTA aspirin 81 mg  - Ctn PTA atorvastatin    GI/Nutrition:  # NPO  Does not meet criteria for malnutrition.  - Intubated. S/p OG tube   - Consulted nutrition, ctn TF's    # Constipation, resolved  - Senna/miralax scheduled  - BID Miralax   - Bisacodyl daily    # Suspected GERD/ mechanical ventilation  - Ctn PPI    # Elevated LFT's, resolved  Present on admission, now resolved.   - Restarted PTA statin    Renal/Fluids/Electrolytes:  # Non-oliguric TIMMY on CKD, resolved  Unclear baseline creatinine. Admission Cre 1.68, now 1.01. Producing appropriate urine in setting of ongoing diuresis. UA with hyaline casts suggestive of pre-renal etiology. Also considering cardiorenal etiology given improvement s/p diuresis. Renal U/S w/o evidence of hydronephrosis.   - Strict I's and O's, avoid nephrotoxic agents, arthur inserted    #Hypokalemia, resolved  Potassium replacement protocol    # Mild Hypernatremia  - FWF, increased to 60 mL q4 on 10/10/21    # Metabolic alkalosis  - S/p Diamox x1    Endocrine:  # Steroid-induced hyperglycemia  HbA1c of 6.7% c/w prediabetes/T2DM. No PTA antidiabetic medications.  - Hypoglycemia protocol  - Medium ssi      ID:  # Covid 19 pneumonia  COVID positive after prior infection unclear whether this is a reinfection or residual viral shedding.   - Treatment above    # CAP  Sputum culture 10/6/21 with 4+ S Aureus.  - CTX, course ends 10/11/21   - S/p Azithromycin 10/6-10/7    Hematology:    # Leukocytosis, improved  Stress-induced, infection, and steroids.    # Anemia of chronic disease, stable  - CTM    Musculoskeletal:  # PT/OT    Skin:  # Pressure ulcers  Multiple observed, likely with long amount of time in chair  - WOC consult    General Cares/Prophylaxis:    DVT Prophylaxis: Enoxaparin SQ  GI Prophylaxis: PPI  Restraints: ordered  Family Communication: Son,  Sarath (phone)  Code Status: Full    Lines/tubes/drains:  - PIVx2  - CVC RIJ  - ETT 7mm  - Art. Line  - Lopes catheter in place    Disposition:  - Medical ICU    Patient seen and findings/plan discussed with medical ICU staff, Dr. Fulton.    Navi Higgins Jr., MD  Internal Medicine, PGY-3  [phone]  [facility], [id number]    -----------------------------------------------------------------------  Interval History:   - Yest: Off NE gtt since 10/9/21. 1 large BM.  - Overnight: Received Acetazolamide x1 for metabolic alkalosis  - Wt 149 kg (admission 162 kg), -400 ml urine yest and -1L this AM (-7 L since admit); Unclear baseline wt.    PHYSICAL EXAMINATION:  Temp:  [97.6  F (36.4  C)-98.5  F (36.9  C)] 98  F (36.7  C)  Pulse:  [48-75] 48  Resp:  [14-17] 14  MAP:  [67 mmHg-193 mmHg] 75 mmHg  Arterial Line BP: ()/() 109/58  FiO2 (%):  [50 %] 50 %  SpO2:  [92 %-99 %] 97 %   General: intubated and sedated  HEENT: ET tube in, NC, AT, MMM  Neuro: sedated, withdrawing to pain, following commands  Pulm/Resp: reduced sounds in bases, crackles in upper lung fields, negative for wheezing  CV: RRR, bradycardic  Abdomen: Soft, mildly distended, bowel sounds decreased  Incisions/Skin: coccyx erythema and skin maceration, significant lower extremity edema bilaterally, improving    LABS: Reviewed.   Arterial Blood Gases   Recent Labs   Lab 10/10/21  0255 10/10/21  0007 10/09/21  1546 10/09/21  1221   PH 7.45 7.48* 7.45 7.41   PCO2 70* 63* 67* 70*   PO2 88 95 78* 70*   HCO3 49* 47* 46* 45*     Complete Blood Count   Recent Labs   Lab 10/10/21  0443 10/09/21  0336 10/08/21  0354 10/07/21  0405   WBC 9.0 11.0 13.1* 12.8*   HGB 8.9* 8.6* 9.6* 9.6*    255 282 281     Basic Metabolic Panel  Recent Labs   Lab 10/10/21  0445 10/10/21  0255 10/10/21  0010 10/09/21  2029 10/09/21  1227 10/09/21  1013 10/09/21  0826 10/09/21  0336 10/08/21  1644 10/08/21  1349 10/08/21  0742 10/08/21  0354 10/07/21  0417 10/07/21  0405    NA  --  145*  --   --   --  144  --   --   --   --   --  143  --  142   POTASSIUM  --  3.6  3.6  --   --   --  4.1  --  3.5  --  3.7   < > 3.4   < > 2.9*   CHLORIDE  --  98  --   --   --  103  --   --   --   --   --  103  --  105   CO2  --  44*  --   --   --  39*  --   --   --   --   --  38*  --  32   BUN  --  46*  --   --   --  41*  --   --   --   --   --  26  --  29   CR  --  1.01  --   --   --  1.10*  --   --   --   --   --  1.10*  --  1.37*   * 142* 142* 130*   < > 147*   < >  --    < >  --    < > 164*   < > 121*    < > = values in this interval not displayed.     Liver Function Tests  Recent Labs   Lab 10/08/21  0354 10/07/21  1332 10/06/21  0500   AST 31  --  90*   ALT 40  --  60*   ALKPHOS 94  --  103   BILITOTAL 0.3  --  0.4   ALBUMIN 2.4*  --  2.4*   INR  --  1.25* 1.19*     Coagulation Profile  Recent Labs   Lab 10/07/21  1332 10/06/21  0500   INR 1.25* 1.19*   PTT 34 27     IMAGING:  No results found for this or any previous visit (from the past 24 hour(s)).

## 2021-10-11 ENCOUNTER — APPOINTMENT (OUTPATIENT)
Dept: OCCUPATIONAL THERAPY | Facility: CLINIC | Age: 61
DRG: 207 | End: 2021-10-11
Attending: ANESTHESIOLOGY
Payer: MEDICARE

## 2021-10-11 ENCOUNTER — APPOINTMENT (OUTPATIENT)
Dept: GENERAL RADIOLOGY | Facility: CLINIC | Age: 61
DRG: 207 | End: 2021-10-11
Attending: ANESTHESIOLOGY
Payer: MEDICARE

## 2021-10-11 LAB
ANION GAP SERPL CALCULATED.3IONS-SCNC: 2 MMOL/L (ref 3–14)
BACTERIA BLD CULT: NO GROWTH
BASE EXCESS BLDA CALC-SCNC: 14.7 MMOL/L (ref -9–1.8)
BUN SERPL-MCNC: 53 MG/DL (ref 7–30)
CALCIUM SERPL-MCNC: 9.5 MG/DL (ref 8.5–10.1)
CHLORIDE BLD-SCNC: 100 MMOL/L (ref 94–109)
CO2 SERPL-SCNC: 42 MMOL/L (ref 20–32)
CREAT SERPL-MCNC: 0.99 MG/DL (ref 0.52–1.04)
CRP SERPL-MCNC: 38 MG/L (ref 0–8)
D DIMER PPP FEU-MCNC: 2.23 UG/ML FEU (ref 0–0.5)
ERYTHROCYTE [DISTWIDTH] IN BLOOD BY AUTOMATED COUNT: 16.4 % (ref 10–15)
FIBRINOGEN PPP-MCNC: 546 MG/DL (ref 170–490)
GFR SERPL CREATININE-BSD FRML MDRD: 61 ML/MIN/1.73M2
GLUCOSE BLD-MCNC: 139 MG/DL (ref 70–99)
GLUCOSE BLDC GLUCOMTR-MCNC: 119 MG/DL (ref 70–99)
GLUCOSE BLDC GLUCOMTR-MCNC: 121 MG/DL (ref 70–99)
GLUCOSE BLDC GLUCOMTR-MCNC: 131 MG/DL (ref 70–99)
GLUCOSE BLDC GLUCOMTR-MCNC: 133 MG/DL (ref 70–99)
GLUCOSE BLDC GLUCOMTR-MCNC: 164 MG/DL (ref 70–99)
GLUCOSE BLDC GLUCOMTR-MCNC: 194 MG/DL (ref 70–99)
HCO3 BLD-SCNC: 42 MMOL/L (ref 21–28)
HCT VFR BLD AUTO: 31.7 % (ref 35–47)
HGB BLD-MCNC: 9.4 G/DL (ref 11.7–15.7)
MAGNESIUM SERPL-MCNC: 2.6 MG/DL (ref 1.6–2.3)
MCH RBC QN AUTO: 26 PG (ref 26.5–33)
MCHC RBC AUTO-ENTMCNC: 29.7 G/DL (ref 31.5–36.5)
MCV RBC AUTO: 88 FL (ref 78–100)
NT-PROBNP SERPL-MCNC: 835 PG/ML (ref 0–900)
O2/TOTAL GAS SETTING VFR VENT: 50 %
PCO2 BLD: 70 MM HG (ref 35–45)
PH BLD: 7.39 [PH] (ref 7.35–7.45)
PHOSPHATE SERPL-MCNC: 3.9 MG/DL (ref 2.5–4.5)
PLATELET # BLD AUTO: 278 10E3/UL (ref 150–450)
PO2 BLD: 94 MM HG (ref 80–105)
POTASSIUM BLD-SCNC: 3.8 MMOL/L (ref 3.4–5.3)
RBC # BLD AUTO: 3.62 10E6/UL (ref 3.8–5.2)
SODIUM SERPL-SCNC: 144 MMOL/L (ref 133–144)
WBC # BLD AUTO: 9.3 10E3/UL (ref 4–11)

## 2021-10-11 PROCEDURE — 94003 VENT MGMT INPAT SUBQ DAY: CPT

## 2021-10-11 PROCEDURE — 250N000011 HC RX IP 250 OP 636: Performed by: STUDENT IN AN ORGANIZED HEALTH CARE EDUCATION/TRAINING PROGRAM

## 2021-10-11 PROCEDURE — 80048 BASIC METABOLIC PNL TOTAL CA: CPT | Performed by: STUDENT IN AN ORGANIZED HEALTH CARE EDUCATION/TRAINING PROGRAM

## 2021-10-11 PROCEDURE — 250N000013 HC RX MED GY IP 250 OP 250 PS 637

## 2021-10-11 PROCEDURE — 74018 RADEX ABDOMEN 1 VIEW: CPT | Mod: 26 | Performed by: RADIOLOGY

## 2021-10-11 PROCEDURE — 250N000011 HC RX IP 250 OP 636

## 2021-10-11 PROCEDURE — 85379 FIBRIN DEGRADATION QUANT: CPT | Performed by: STUDENT IN AN ORGANIZED HEALTH CARE EDUCATION/TRAINING PROGRAM

## 2021-10-11 PROCEDURE — 71045 X-RAY EXAM CHEST 1 VIEW: CPT

## 2021-10-11 PROCEDURE — 36592 COLLECT BLOOD FROM PICC: CPT | Performed by: STUDENT IN AN ORGANIZED HEALTH CARE EDUCATION/TRAINING PROGRAM

## 2021-10-11 PROCEDURE — 82803 BLOOD GASES ANY COMBINATION: CPT

## 2021-10-11 PROCEDURE — 97110 THERAPEUTIC EXERCISES: CPT | Mod: GO

## 2021-10-11 PROCEDURE — 999N000157 HC STATISTIC RCP TIME EA 10 MIN

## 2021-10-11 PROCEDURE — 86140 C-REACTIVE PROTEIN: CPT | Performed by: STUDENT IN AN ORGANIZED HEALTH CARE EDUCATION/TRAINING PROGRAM

## 2021-10-11 PROCEDURE — 83880 ASSAY OF NATRIURETIC PEPTIDE: CPT | Performed by: STUDENT IN AN ORGANIZED HEALTH CARE EDUCATION/TRAINING PROGRAM

## 2021-10-11 PROCEDURE — 250N000011 HC RX IP 250 OP 636: Performed by: NURSE PRACTITIONER

## 2021-10-11 PROCEDURE — 250N000013 HC RX MED GY IP 250 OP 250 PS 637: Performed by: STUDENT IN AN ORGANIZED HEALTH CARE EDUCATION/TRAINING PROGRAM

## 2021-10-11 PROCEDURE — 99291 CRITICAL CARE FIRST HOUR: CPT | Performed by: STUDENT IN AN ORGANIZED HEALTH CARE EDUCATION/TRAINING PROGRAM

## 2021-10-11 PROCEDURE — 250N000013 HC RX MED GY IP 250 OP 250 PS 637: Performed by: NURSE PRACTITIONER

## 2021-10-11 PROCEDURE — 250N000013 HC RX MED GY IP 250 OP 250 PS 637: Performed by: INTERNAL MEDICINE

## 2021-10-11 PROCEDURE — 200N000002 HC R&B ICU UMMC

## 2021-10-11 PROCEDURE — 85384 FIBRINOGEN ACTIVITY: CPT | Performed by: STUDENT IN AN ORGANIZED HEALTH CARE EDUCATION/TRAINING PROGRAM

## 2021-10-11 PROCEDURE — 85027 COMPLETE CBC AUTOMATED: CPT | Performed by: STUDENT IN AN ORGANIZED HEALTH CARE EDUCATION/TRAINING PROGRAM

## 2021-10-11 PROCEDURE — 84100 ASSAY OF PHOSPHORUS: CPT | Performed by: INTERNAL MEDICINE

## 2021-10-11 PROCEDURE — 71045 X-RAY EXAM CHEST 1 VIEW: CPT | Mod: 26 | Performed by: RADIOLOGY

## 2021-10-11 PROCEDURE — 74018 RADEX ABDOMEN 1 VIEW: CPT

## 2021-10-11 PROCEDURE — 97535 SELF CARE MNGMENT TRAINING: CPT | Mod: GO

## 2021-10-11 PROCEDURE — 83735 ASSAY OF MAGNESIUM: CPT | Performed by: INTERNAL MEDICINE

## 2021-10-11 RX ORDER — PROPOFOL 10 MG/ML
5-75 INJECTION, EMULSION INTRAVENOUS CONTINUOUS
Status: DISCONTINUED | OUTPATIENT
Start: 2021-10-11 | End: 2021-10-12

## 2021-10-11 RX ORDER — PROPOFOL 10 MG/ML
10-20 INJECTION, EMULSION INTRAVENOUS EVERY 30 MIN PRN
Status: DISCONTINUED | OUTPATIENT
Start: 2021-10-11 | End: 2021-10-12

## 2021-10-11 RX ADMIN — Medication 15 ML: at 09:05

## 2021-10-11 RX ADMIN — PROPOFOL 10 MCG/KG/MIN: 10 INJECTION, EMULSION INTRAVENOUS at 22:09

## 2021-10-11 RX ADMIN — PROPOFOL 35 MCG/KG/MIN: 10 INJECTION, EMULSION INTRAVENOUS at 09:23

## 2021-10-11 RX ADMIN — MICONAZOLE NITRATE: 20 CREAM TOPICAL at 20:20

## 2021-10-11 RX ADMIN — INSULIN ASPART 1 UNITS: 100 INJECTION, SOLUTION INTRAVENOUS; SUBCUTANEOUS at 20:19

## 2021-10-11 RX ADMIN — MICONAZOLE NITRATE: 20 CREAM TOPICAL at 09:32

## 2021-10-11 RX ADMIN — PAROXETINE HYDROCHLORIDE 40 MG: 10 SUSPENSION ORAL at 09:06

## 2021-10-11 RX ADMIN — DEXAMETHASONE SODIUM PHOSPHATE 6 MG: 4 INJECTION, SOLUTION INTRA-ARTICULAR; INTRALESIONAL; INTRAMUSCULAR; INTRAVENOUS; SOFT TISSUE at 09:06

## 2021-10-11 RX ADMIN — BISACODYL 10 MG: 10 SUPPOSITORY RECTAL at 09:06

## 2021-10-11 RX ADMIN — ASPIRIN 81 MG CHEWABLE TABLET 81 MG: 81 TABLET CHEWABLE at 09:06

## 2021-10-11 RX ADMIN — ENOXAPARIN SODIUM 80 MG: 80 INJECTION SUBCUTANEOUS at 20:19

## 2021-10-11 RX ADMIN — ENOXAPARIN SODIUM 80 MG: 80 INJECTION SUBCUTANEOUS at 09:28

## 2021-10-11 RX ADMIN — PROPOFOL 20 MCG/KG/MIN: 10 INJECTION, EMULSION INTRAVENOUS at 14:27

## 2021-10-11 RX ADMIN — POLYETHYLENE GLYCOL 3350 17 G: 17 POWDER, FOR SOLUTION ORAL at 09:06

## 2021-10-11 RX ADMIN — PROPOFOL 35 MCG/KG/MIN: 10 INJECTION, EMULSION INTRAVENOUS at 03:49

## 2021-10-11 RX ADMIN — ATORVASTATIN CALCIUM 40 MG: 40 TABLET, FILM COATED ORAL at 09:06

## 2021-10-11 RX ADMIN — Medication 40 MG: at 09:06

## 2021-10-11 RX ADMIN — INSULIN ASPART 2 UNITS: 100 INJECTION, SOLUTION INTRAVENOUS; SUBCUTANEOUS at 12:51

## 2021-10-11 RX ADMIN — DOCUSATE SODIUM 50 MG AND SENNOSIDES 8.6 MG 2 TABLET: 8.6; 5 TABLET, FILM COATED ORAL at 09:05

## 2021-10-11 RX ADMIN — DOCUSATE SODIUM 50 MG AND SENNOSIDES 8.6 MG 2 TABLET: 8.6; 5 TABLET, FILM COATED ORAL at 20:18

## 2021-10-11 RX ADMIN — PROPOFOL 35 MCG/KG/MIN: 10 INJECTION, EMULSION INTRAVENOUS at 06:30

## 2021-10-11 RX ADMIN — Medication 50 MCG/HR: at 10:56

## 2021-10-11 RX ADMIN — CEFTRIAXONE SODIUM 2 G: 2 INJECTION, POWDER, FOR SOLUTION INTRAMUSCULAR; INTRAVENOUS at 00:20

## 2021-10-11 ASSESSMENT — ACTIVITIES OF DAILY LIVING (ADL)
ADLS_ACUITY_SCORE: 16
ADLS_ACUITY_SCORE: 14

## 2021-10-11 NOTE — PROGRESS NOTES
MEDICAL ICU H&P  10/11/2021    Date of Hospital Admission: 10/6/21  Date of ICU Admission: 10/6/21  Reason for Critical Care Admission: Acute hypoxic and hypercapnic respiratory failure  Date of Service (when I saw the patient): 10/11/2021    ASSESSMENT: Chika Ferguson is a 60 YO F with a h/o HLD, GERD, obesity, hiatal hernia, CARL, HFpEF, pHTN, CAD s/p PCI mlad (2016), and former tobacco abuse, admitted on 10/6/21 with acute hypoxic and hypercapnic respiratory failure. S/p intubation 10/6/21.    Major changes:  - Hold on further diuresis today. Goal net even.  - Ctn antibiotic regimen for CAP  - Ctn COVID treatment    PLAN:    Neuro:  # Pain and sedation  Propofol gtt and prn  Fentanyl gtt and prn    # Depression/anxiety  - Continue PTA paroxetine    Pulmonary:  # Acute hypoxic and hypercarbic respiratory failure, multifactorial  # ARDS  # CARL  Treating for HF exacerbation, COVID pneumonia, CAP, and suspected OHS. CT PE study negative. Patient wears nocturnal CPAP at baseline. Former smoker.  - Strict I/O, aiming for net even. Dosing Bumetanide based on UOP response. Will hold PM dose.  - Ctn to hold PTA carvedilol.   - S/p Flolan  - S/p proning, currently supine  - Ventilation Mode: CMV/AC  (Continuous Mandatory Ventilation/ Assist Control)  FiO2 (%): 50 %  Rate Set (breaths/minute): 14 breaths/min  Tidal Volume Set (mL): 360 mL  PEEP (cm H2O): 12 cmH2O  Oxygen Concentration (%): 50 %  Resp: 14  - Antibiotic regimen below  - COVID regimen below    # COVID Pneumonia  - COVID-19 precautions  - Dexamethasome 6mg qday (10/7-10/17)  - S/p Remdesivir   - S/p Tocilizumab x1    Cardiovascular:  # Shock, distributive  - MAP goal >65  - Off NE since 10/9/21    # pHTN  # Right Ventricular Failure  10/6/21 TTE: mod-severe RV dilation and mod-severely reduced function, consistent with chronic Group III pulmonary hypetension.   - Consulted cardiology, appreciate recs   - BB: holding PTA carvedilol 3.125 mg BID  - Diuresis  plan above   - V/Q scan pending  - TTE 10/7/21: negative bubble study  - Labs: HCV, HBC, ASHANTI negative, R. Factor normal    # CAD  - Ctn PTA aspirin 81 mg  - Ctn PTA atorvastatin    GI/Nutrition:  # NPO  Does not meet criteria for malnutrition.  - Intubated. S/p OG tube   - Consulted nutrition, ctn TF's    # Constipation, resolved  - Senna/miralax scheduled  - BID Miralax   - Bisacodyl daily    # Suspected GERD/ mechanical ventilation  - Ctn PPI    # Elevated LFT's, resolved  Present on admission, now resolved.   - Restarted PTA statin    Renal/Fluids/Electrolytes:  # Non-oliguric TIMMY on CKD, resolved  Unclear baseline creatinine. Admission Cre 1.68, now 1.01. Producing appropriate urine in setting of ongoing diuresis. UA with hyaline casts suggestive of pre-renal etiology. Also considering cardiorenal etiology given improvement s/p diuresis. Renal U/S w/o evidence of hydronephrosis.   - Strict I's and O's, avoid nephrotoxic agents, arthur inserted    #Hypokalemia, resolved  Potassium replacement protocol    # Mild Hypernatremia  - FWF, increased to 60 mL q4 on 10/10/21    # Metabolic alkalosis  - S/p Diamox x1    Endocrine:  # Steroid-induced hyperglycemia  HbA1c of 6.7% c/w prediabetes/T2DM. No PTA antidiabetic medications.  - Hypoglycemia protocol  - Medium ssi      ID:  # Covid 19 pneumonia  COVID positive after prior infection unclear whether this is a reinfection or residual viral shedding.   - Treatment above    # CAP  Sputum culture 10/6/21 with 4+ S Aureus.  - CTX, course ends 10/11/21   - S/p Azithromycin 10/6-10/7    Hematology:    # Leukocytosis, improved  Stress-induced, infection, and steroids.    # Anemia of chronic disease, stable  - CTM    Musculoskeletal:  # PT/OT    Skin:  # Pressure ulcers  Multiple observed, likely with long amount of time in chair  - WOC consult    General Cares/Prophylaxis:    DVT Prophylaxis: Enoxaparin SQ  GI Prophylaxis: PPI  Restraints: ordered   Family Communication:   Code  Status: Full    Lines/tubes/drains:  - PIVx2  - CVC RIJ  - ETT 7mm  - Art. Line  - Lopes catheter in place    Disposition:  - Medical ICU    Patient seen and findings/plan discussed with medical ICU staff, Dr. Fulton.    Jl Jean-Baptiste MD  Internal Medicine, PGY-1  [phone]  [facility] [id number]    -----------------------------------------------------------------------  Interval History:   - Yest: Off NE gtt since 10/9/21. 1 large BM.  - Overnight: Received Acetazolamide x1 for metabolic alkalosis  - Wt 149 kg (admission 162 kg), -400 ml urine yest and -1L this AM (-7 L since admit); Unclear baseline wt.    PHYSICAL EXAMINATION:  Temp:  [97.3  F (36.3  C)-98.5  F (36.9  C)] 98.1  F (36.7  C)  Pulse:  [48-67] 64  Resp:  [14-16] 14  MAP:  [63 mmHg-85 mmHg] 75 mmHg  Arterial Line BP: ()/(46-67) 107/57  FiO2 (%):  [50 %] 50 %  SpO2:  [94 %-100 %] 97 %   General: intubated and sedated  HEENT: ET tube in, NC, AT, MMM  Neuro: sedated, withdrawing to pain, following commands  Pulm/Resp: reduced sounds in bases, crackles in upper lung fields, negative for wheezing  CV: RRR, bradycardic  Abdomen: Soft, mildly distended, bowel sounds decreased  Incisions/Skin: coccyx erythema and skin maceration, significant lower extremity edema bilaterally, improving    LABS: Reviewed.   Arterial Blood Gases   Recent Labs   Lab 10/10/21  1158 10/10/21  0255 10/10/21  0007 10/09/21  1546   PH 7.40 7.45 7.48* 7.45   PCO2 73* 70* 63* 67*   PO2 77* 88 95 78*   HCO3 45* 49* 47* 46*     Complete Blood Count   Recent Labs   Lab 10/11/21  0449 10/10/21  0443 10/09/21  0336 10/08/21  0354   WBC 9.3 9.0 11.0 13.1*   HGB 9.4* 8.9* 8.6* 9.6*    276 255 282     Basic Metabolic Panel  Recent Labs   Lab 10/11/21  0449 10/11/21  0448 10/11/21  0013 10/10/21  2002 10/10/21  0445 10/10/21  0255 10/09/21  1227 10/09/21  1013 10/09/21  0826 10/09/21  0336 10/08/21  0742 10/08/21  0354     --   --   --   --  145*  --  144  --   --   --   143   POTASSIUM 3.8  --   --   --   --  3.6  3.6  --  4.1  --  3.5   < > 3.4   CHLORIDE 100  --   --   --   --  98  --  103  --   --   --  103   CO2 42*  --   --   --   --  44*  --  39*  --   --   --  38*   BUN 53*  --   --   --   --  46*  --  41*  --   --   --  26   CR 0.99  --   --   --   --  1.01  --  1.10*  --   --   --  1.10*   * 133* 119* 139*   < > 142*   < > 147*   < >  --    < > 164*    < > = values in this interval not displayed.     Liver Function Tests  Recent Labs   Lab 10/08/21  0354 10/07/21  1332 10/06/21  0500   AST 31  --  90*   ALT 40  --  60*   ALKPHOS 94  --  103   BILITOTAL 0.3  --  0.4   ALBUMIN 2.4*  --  2.4*   INR  --  1.25* 1.19*     Coagulation Profile  Recent Labs   Lab 10/07/21  1332 10/06/21  0500   INR 1.25* 1.19*   PTT 34 27     IMAGING:  No results found for this or any previous visit (from the past 24 hour(s)).

## 2021-10-11 NOTE — PLAN OF CARE
Major Shift Events:  Wakes up, follows commands. Rass about -1. No vent changes made, propofol @35, fentanyl still 50. No BM. Bumex given, 2 L put out from bumex  Plan: Continue to wean as tolerated   For vital signs and complete assessments, please see documentation flowsheets.

## 2021-10-11 NOTE — PLAN OF CARE
Major Shift Events: No acute events overnight. Intermittently following commands. Propofol @ 35, fentanyl @ 50. Adequate UO. No BM overnight.   Plan: Wound dressing changes to be done today. Wean sedation as tolerated.   For vital signs and complete assessments, please see documentation flowsheets.

## 2021-10-11 NOTE — PROGRESS NOTES
ICU Progress Note  10/11/2021    Date of Hospital Admission: 10/6/21  Date of ICU Admission: 10/6/21  Reason for Critical Care Admission: Acute hypoxic and hypercapnic respiratory failure  Date of Service (when I saw the patient): 10/11/2021    ASSESSMENT: Chika Ferguson is a 62 YO F with a h/o HLD, GERD, obesity, hiatal hernia, CARL, HFpEF, pHTN, CAD s/p PCI mlad (2016), and former tobacco abuse, admitted on 10/6/21 with acute hypoxic and hypercapnic respiratory failure. S/p intubation 10/6/21.    Major changes:  - Hold on further diuresis today. Goal -500 to -1L.  - Ctn antibiotic regimen for CAP  - Ctn COVID treatment  - PEEP to 10  - RASS goal -1 to -2  - Completed ceftriaxone for CAP    PLAN:    Neuro:  # Pain and sedation  Propofol gtt and prn  Fentanyl gtt and prn  RASS -1 to -2    # Depression/anxiety  - Continue PTA paroxetine    Pulmonary:  # Acute hypoxic and hypercarbic respiratory failure, multifactorial  # ARDS  # CARL  Treating for HF exacerbation, COVID pneumonia, CAP, and suspected OHS. CT PE study negative. Patient wears nocturnal CPAP at baseline. Former smoker.  - Strict I/O, aiming for -0.5 to -1L. Dosing Bumetanide based on UOP response.   - Hold this morning. Assess I/Os this evening.  - Ctn to hold PTA carvedilol.   - S/p Flolan  - S/p proning, currently supine  - Ventilation Mode: CMV/AC  (Continuous Mandatory Ventilation/ Assist Control)  FiO2 (%): 50 %  Rate Set (breaths/minute): 14 breaths/min  Tidal Volume Set (mL): 360 mL  PEEP (cm H2O): 12 cmH2O  Oxygen Concentration (%): 50 %  Resp: 14  - Antibiotic regimen below  - COVID regimen below  - Decreased PEEP to 10  - Repeat CXR today    # COVID Pneumonia  - COVID-19 precautions  - Dexamethasome 6mg qday (10/7-10/17)  - S/p Remdesivir   - S/p Tocilizumab x1    Cardiovascular:  # Shock, distributive  - MAP goal >65  - Off NE since 10/9/21    # pHTN  # Right Ventricular Failure  10/6/21 TTE: mod-severe RV dilation and mod-severely reduced  function, consistent with chronic Group III pulmonary hypetension.   - Consulted cardiology, appreciate recs   - BB: holding PTA carvedilol 3.125 mg BID  - Diuresis plan above   - V/Q scan pending  - TTE 10/7/21: negative bubble study  - Labs: HCV, HBC, ASHANTI negative, R. Factor normal    # CAD  - Ctn PTA aspirin 81 mg  - Ctn PTA atorvastatin    GI/Nutrition:  # NPO  Does not meet criteria for malnutrition.  - Intubated. S/p OG tube   - Consulted nutrition, ctn TF's    # Constipation, resolved  - Senna/miralax scheduled  - BID Miralax   - Bisacodyl daily  - Obtained AXR, demetrio obstruction, large bowel movement this AM s/p suppository    # Suspected GERD/ mechanical ventilation  - Ctn PPI    # Elevated LFT's, resolved  Present on admission, now resolved.   - Restarted PTA statin    Renal/Fluids/Electrolytes:  # Non-oliguric TIMMY on CKD, resolved  Unclear baseline creatinine. Admission Cre 1.68, now 1.01. Producing appropriate urine in setting of ongoing diuresis. UA with hyaline casts suggestive of pre-renal etiology. Also considering cardiorenal etiology given improvement s/p diuresis. Renal U/S w/o evidence of hydronephrosis.   - Strict I's and O's, avoid nephrotoxic agents, arthur inserted    #Hypokalemia, resolved  Potassium replacement protocol    # Mild Hypernatremia (resolving)  - FWF, increased to 60 mL q4 on 10/10/21    # Metabolic alkalosis  - S/p Diamox x1    Endocrine:  # Steroid-induced hyperglycemia  HbA1c of 6.7% c/w prediabetes/T2DM. No PTA antidiabetic medications.  - Hypoglycemia protocol  - Medium ssi      ID:  # Covid 19 pneumonia  COVID positive after prior infection unclear whether this is a reinfection or residual viral shedding.   - Treatment above    # CAP  Sputum culture 10/6/21 with 4+ S Aureus.  - CTX, course ends 10/11/21   - S/p Azithromycin 10/6-10/7    Hematology:    # Leukocytosis, resolved  Stress-induced, infection, and steroids.    # Anemia of chronic disease, stable  -  CTM    Musculoskeletal:  # PT/OT    Skin:  # Pressure ulcers  Multiple observed, likely with long amount of time in chair  - WOC consult    General Cares/Prophylaxis:    DVT Prophylaxis: Enoxaparin SQ  GI Prophylaxis: PPI  Restraints: ordered  Family Communication: SonParviz, updated over the phone by me  Code Status: Full    Lines/tubes/drains:  - PIVx2  - CVC RIJ  - ETT 7mm  - Art. Line  - Lopes catheter in place    Disposition:  - Medical ICU    Patient seen and findings/plan discussed with medical ICU staff, Dr. Cast.    Jl Hermosillo MD  Internal Medicine, PGY-1  [phone]  [facility] 1, [id number]    -----------------------------------------------------------------------  Interval History:   No acute events overnight, I/O -3L yesterday after 4mg Bumex,500 mg Diamox  PHYSICAL EXAMINATION:  Temp:  [97.3  F (36.3  C)-98.5  F (36.9  C)] 98.1  F (36.7  C)  Pulse:  [48-67] 65  Resp:  [14-16] 14  MAP:  [63 mmHg-85 mmHg] 72 mmHg  Arterial Line BP: ()/(46-67) 103/56  FiO2 (%):  [50 %] 50 %  SpO2:  [94 %-100 %] 97 %   General: intubated and sedated  HEENT: ET tube in, NC, AT, MMM  Neuro: sedated, responds to stimuli, patellar reflexes present   Pulm/Resp: reduced sounds in bases, crackles in upper lung fields, negative for wheezing  CV: RRR, bradycardic  Abdomen: Soft, mildly distended, bowel sounds decreased  Incisions/Skin: coccyx erythema and skin maceration, significant lower extremity edema bilaterally, improving    LABS: Reviewed.   Arterial Blood Gases   Recent Labs   Lab 10/10/21  1158 10/10/21  0255 10/10/21  0007 10/09/21  1546   PH 7.40 7.45 7.48* 7.45   PCO2 73* 70* 63* 67*   PO2 77* 88 95 78*   HCO3 45* 49* 47* 46*     Complete Blood Count   Recent Labs   Lab 10/11/21  0449 10/10/21  0443 10/09/21  0336 10/08/21  0354   WBC 9.3 9.0 11.0 13.1*   HGB 9.4* 8.9* 8.6* 9.6*    276 255 282     Basic Metabolic Panel  Recent Labs   Lab 10/11/21  0449 10/11/21  0448 10/11/21  0013 10/10/21  2002  10/10/21  0445 10/10/21  0255 10/09/21  1227 10/09/21  1013 10/09/21  0826 10/09/21  0336 10/08/21  0742 10/08/21  0354     --   --   --   --  145*  --  144  --   --   --  143   POTASSIUM 3.8  --   --   --   --  3.6  3.6  --  4.1  --  3.5   < > 3.4   CHLORIDE 100  --   --   --   --  98  --  103  --   --   --  103   CO2 42*  --   --   --   --  44*  --  39*  --   --   --  38*   BUN 53*  --   --   --   --  46*  --  41*  --   --   --  26   CR 0.99  --   --   --   --  1.01  --  1.10*  --   --   --  1.10*   * 133* 119* 139*   < > 142*   < > 147*   < >  --    < > 164*    < > = values in this interval not displayed.     Liver Function Tests  Recent Labs   Lab 10/08/21  0354 10/07/21  1332 10/06/21  0500   AST 31  --  90*   ALT 40  --  60*   ALKPHOS 94  --  103   BILITOTAL 0.3  --  0.4   ALBUMIN 2.4*  --  2.4*   INR  --  1.25* 1.19*     Coagulation Profile  Recent Labs   Lab 10/07/21  1332 10/06/21  0500   INR 1.25* 1.19*   PTT 34 27     IMAGING:  No results found for this or any previous visit (from the past 24 hour(s)).

## 2021-10-11 NOTE — PROGRESS NOTES
[facility] MICU STAFF NOTE    Chika Ferguson remains critically ill with hypoxic respiratory failure/ ARDs 2/2 COVID pneumonia / MSSA pneumonia      I personally examined and evaluated the patient today. I have evaluated all laboratory values and imaging studies from the past 24 hours.      Key findings and decisions made today included:     Severe COVID pneumonia, improving .   - Neuro . Sedated w/ prop and fent. Wean prop   - Pulm .  COVID/MSSA pneumonia/ARDs . Hx of COPD . Hx of CARL .  =>  Improving oxygenation . Weaning ventilator . Mixed respiratory acidosis and metabolic alkalosis due to chronic compensation + diuresis . CXR for tube/line eval. On ceftriaxone.   - CV. Hx of HFpEF c/f pHTN => not on pressors/inotropes . Trend nt pro BNP . Holding off on diuresis .    - GI . Constipation => KUB . Enema   - /renal .  Net negative volume status. No diuresis, adequate auto diuresis . CTM   - heme / ID . (see pulm)   - rheum/endo . Per protocol   - skin . Skin wounds . Wound care consult .     *wean propofol   *KUB   *CXR   *enema   *wean PEEP   *wound care consult   *continue ceftriaxone   *ntprobnp       I personally managed the ventilator, sedation, pain control and analgesia, metabolic abnormalities, antibiotic therapy, and nutritional status.     Consults ongoing and ordered are: wound care .     Procedures that will happen today are: mechanical ventilation.     All treatments were placed at my direction.  I formulated today s plan with the house staff team or resident(s) and agree with the findings and plan in the associated note.      I spent a total of 40 minutes (excluding procedure time) personally providing and directing critical care services at the bedside and on the critical care unit for Chika Ferguson     Kristine Cast MD   Pulmonary and Critical Care Staff

## 2021-10-12 ENCOUNTER — APPOINTMENT (OUTPATIENT)
Dept: GENERAL RADIOLOGY | Facility: CLINIC | Age: 61
DRG: 207 | End: 2021-10-12
Attending: ANESTHESIOLOGY
Payer: MEDICARE

## 2021-10-12 LAB
ANION GAP SERPL CALCULATED.3IONS-SCNC: <1 MMOL/L (ref 3–14)
BACTERIA BLD CULT: NO GROWTH
BASE EXCESS BLDA CALC-SCNC: 13.4 MMOL/L (ref -9–1.8)
BASE EXCESS BLDA CALC-SCNC: 14 MMOL/L (ref -9–1.8)
BUN SERPL-MCNC: 51 MG/DL (ref 7–30)
CALCIUM SERPL-MCNC: 9.7 MG/DL (ref 8.5–10.1)
CHLORIDE BLD-SCNC: 105 MMOL/L (ref 94–109)
CO2 SERPL-SCNC: 40 MMOL/L (ref 20–32)
CREAT SERPL-MCNC: 0.93 MG/DL (ref 0.52–1.04)
ERYTHROCYTE [DISTWIDTH] IN BLOOD BY AUTOMATED COUNT: 16.3 % (ref 10–15)
GFR SERPL CREATININE-BSD FRML MDRD: 66 ML/MIN/1.73M2
GLUCOSE BLD-MCNC: 127 MG/DL (ref 70–99)
GLUCOSE BLDC GLUCOMTR-MCNC: 119 MG/DL (ref 70–99)
GLUCOSE BLDC GLUCOMTR-MCNC: 120 MG/DL (ref 70–99)
GLUCOSE BLDC GLUCOMTR-MCNC: 120 MG/DL (ref 70–99)
GLUCOSE BLDC GLUCOMTR-MCNC: 127 MG/DL (ref 70–99)
GLUCOSE BLDC GLUCOMTR-MCNC: 151 MG/DL (ref 70–99)
GLUCOSE BLDC GLUCOMTR-MCNC: 197 MG/DL (ref 70–99)
HCO3 BLD-SCNC: 41 MMOL/L (ref 21–28)
HCO3 BLD-SCNC: 41 MMOL/L (ref 21–28)
HCT VFR BLD AUTO: 32.2 % (ref 35–47)
HGB BLD-MCNC: 9.5 G/DL (ref 11.7–15.7)
MAGNESIUM SERPL-MCNC: 2.8 MG/DL (ref 1.6–2.3)
MCH RBC QN AUTO: 26 PG (ref 26.5–33)
MCHC RBC AUTO-ENTMCNC: 29.5 G/DL (ref 31.5–36.5)
MCV RBC AUTO: 88 FL (ref 78–100)
O2/TOTAL GAS SETTING VFR VENT: 35 %
O2/TOTAL GAS SETTING VFR VENT: 55 %
PCO2 BLD: 66 MM HG (ref 35–45)
PCO2 BLD: 70 MM HG (ref 35–45)
PH BLD: 7.38 [PH] (ref 7.35–7.45)
PH BLD: 7.4 [PH] (ref 7.35–7.45)
PHOSPHATE SERPL-MCNC: 2.7 MG/DL (ref 2.5–4.5)
PLATELET # BLD AUTO: 286 10E3/UL (ref 150–450)
PO2 BLD: 192 MM HG (ref 80–105)
PO2 BLD: 73 MM HG (ref 80–105)
POTASSIUM BLD-SCNC: 3.7 MMOL/L (ref 3.4–5.3)
RBC # BLD AUTO: 3.65 10E6/UL (ref 3.8–5.2)
SODIUM SERPL-SCNC: 144 MMOL/L (ref 133–144)
WBC # BLD AUTO: 9.3 10E3/UL (ref 4–11)

## 2021-10-12 PROCEDURE — 250N000011 HC RX IP 250 OP 636: Performed by: STUDENT IN AN ORGANIZED HEALTH CARE EDUCATION/TRAINING PROGRAM

## 2021-10-12 PROCEDURE — 94003 VENT MGMT INPAT SUBQ DAY: CPT

## 2021-10-12 PROCEDURE — 83735 ASSAY OF MAGNESIUM: CPT | Performed by: INTERNAL MEDICINE

## 2021-10-12 PROCEDURE — 80048 BASIC METABOLIC PNL TOTAL CA: CPT | Performed by: STUDENT IN AN ORGANIZED HEALTH CARE EDUCATION/TRAINING PROGRAM

## 2021-10-12 PROCEDURE — 71045 X-RAY EXAM CHEST 1 VIEW: CPT

## 2021-10-12 PROCEDURE — 71045 X-RAY EXAM CHEST 1 VIEW: CPT | Mod: 26 | Performed by: RADIOLOGY

## 2021-10-12 PROCEDURE — 82803 BLOOD GASES ANY COMBINATION: CPT | Performed by: STUDENT IN AN ORGANIZED HEALTH CARE EDUCATION/TRAINING PROGRAM

## 2021-10-12 PROCEDURE — 250N000013 HC RX MED GY IP 250 OP 250 PS 637: Performed by: STUDENT IN AN ORGANIZED HEALTH CARE EDUCATION/TRAINING PROGRAM

## 2021-10-12 PROCEDURE — 85027 COMPLETE CBC AUTOMATED: CPT | Performed by: STUDENT IN AN ORGANIZED HEALTH CARE EDUCATION/TRAINING PROGRAM

## 2021-10-12 PROCEDURE — 250N000013 HC RX MED GY IP 250 OP 250 PS 637: Performed by: NURSE PRACTITIONER

## 2021-10-12 PROCEDURE — 200N000002 HC R&B ICU UMMC

## 2021-10-12 PROCEDURE — 99291 CRITICAL CARE FIRST HOUR: CPT | Performed by: STUDENT IN AN ORGANIZED HEALTH CARE EDUCATION/TRAINING PROGRAM

## 2021-10-12 PROCEDURE — 250N000013 HC RX MED GY IP 250 OP 250 PS 637: Performed by: INTERNAL MEDICINE

## 2021-10-12 PROCEDURE — 999N000157 HC STATISTIC RCP TIME EA 10 MIN

## 2021-10-12 PROCEDURE — 84100 ASSAY OF PHOSPHORUS: CPT | Performed by: INTERNAL MEDICINE

## 2021-10-12 PROCEDURE — 250N000013 HC RX MED GY IP 250 OP 250 PS 637

## 2021-10-12 PROCEDURE — 250N000011 HC RX IP 250 OP 636

## 2021-10-12 PROCEDURE — 250N000011 HC RX IP 250 OP 636: Performed by: NURSE PRACTITIONER

## 2021-10-12 RX ORDER — PROPOFOL 10 MG/ML
5-75 INJECTION, EMULSION INTRAVENOUS CONTINUOUS
Status: DISCONTINUED | OUTPATIENT
Start: 2021-10-12 | End: 2021-10-13

## 2021-10-12 RX ORDER — BUMETANIDE 0.25 MG/ML
1 INJECTION INTRAMUSCULAR; INTRAVENOUS
Status: DISCONTINUED | OUTPATIENT
Start: 2021-10-12 | End: 2021-10-16

## 2021-10-12 RX ORDER — PROPOFOL 10 MG/ML
10-20 INJECTION, EMULSION INTRAVENOUS EVERY 30 MIN PRN
Status: DISCONTINUED | OUTPATIENT
Start: 2021-10-12 | End: 2021-10-13

## 2021-10-12 RX ADMIN — Medication 40 MG: at 08:14

## 2021-10-12 RX ADMIN — ATORVASTATIN CALCIUM 40 MG: 40 TABLET, FILM COATED ORAL at 08:14

## 2021-10-12 RX ADMIN — ENOXAPARIN SODIUM 80 MG: 80 INJECTION SUBCUTANEOUS at 08:24

## 2021-10-12 RX ADMIN — DEXAMETHASONE SODIUM PHOSPHATE 6 MG: 4 INJECTION, SOLUTION INTRA-ARTICULAR; INTRALESIONAL; INTRAMUSCULAR; INTRAVENOUS; SOFT TISSUE at 08:21

## 2021-10-12 RX ADMIN — ASPIRIN 81 MG CHEWABLE TABLET 81 MG: 81 TABLET CHEWABLE at 08:14

## 2021-10-12 RX ADMIN — BUMETANIDE 1 MG: 0.25 INJECTION INTRAMUSCULAR; INTRAVENOUS at 11:09

## 2021-10-12 RX ADMIN — MICONAZOLE NITRATE: 20 CREAM TOPICAL at 20:38

## 2021-10-12 RX ADMIN — BUMETANIDE 1 MG: 0.25 INJECTION INTRAMUSCULAR; INTRAVENOUS at 16:35

## 2021-10-12 RX ADMIN — Medication 15 ML: at 08:14

## 2021-10-12 RX ADMIN — PAROXETINE HYDROCHLORIDE 40 MG: 10 SUSPENSION ORAL at 08:14

## 2021-10-12 RX ADMIN — INSULIN ASPART 1 UNITS: 100 INJECTION, SOLUTION INTRAVENOUS; SUBCUTANEOUS at 16:40

## 2021-10-12 RX ADMIN — ENOXAPARIN SODIUM 80 MG: 80 INJECTION SUBCUTANEOUS at 20:36

## 2021-10-12 RX ADMIN — DOCUSATE SODIUM 50 MG AND SENNOSIDES 8.6 MG 2 TABLET: 8.6; 5 TABLET, FILM COATED ORAL at 08:15

## 2021-10-12 RX ADMIN — CEFTRIAXONE SODIUM 2 G: 2 INJECTION, POWDER, FOR SOLUTION INTRAMUSCULAR; INTRAVENOUS at 01:19

## 2021-10-12 RX ADMIN — MICONAZOLE NITRATE: 20 CREAM TOPICAL at 08:24

## 2021-10-12 RX ADMIN — POLYETHYLENE GLYCOL 3350 17 G: 17 POWDER, FOR SOLUTION ORAL at 08:14

## 2021-10-12 RX ADMIN — INSULIN ASPART 2 UNITS: 100 INJECTION, SOLUTION INTRAVENOUS; SUBCUTANEOUS at 12:41

## 2021-10-12 ASSESSMENT — ACTIVITIES OF DAILY LIVING (ADL)
ADLS_ACUITY_SCORE: 16
ADLS_ACUITY_SCORE: 14
ADLS_ACUITY_SCORE: 16
ADLS_ACUITY_SCORE: 14

## 2021-10-12 ASSESSMENT — MIFFLIN-ST. JEOR: SCORE: 2083.5

## 2021-10-12 NOTE — PROGRESS NOTES
ICU Progress Note  10/12/2021      Date of Hospital Admission: 10/6/21  Date of ICU Admission: 10/6/21  Reason for Critical Care Admission: Acute hypoxic and hypercapnic respiratory failure  Date of Service (when I saw the patient): 10/12/2021    ASSESSMENT: Chika Ferguson is a 60 YO F with a h/o HLD, GERD, obesity, hiatal hernia, CARL, HFpEF, pHTN, CAD s/p PCI mlad (2016), and former tobacco abuse, admitted on 10/6/21 with acute hypoxic and hypercapnic respiratory failure. S/p intubation 10/6/21.    Major changes:  Restart diuresis    PLAN:    Neuro:  # Pain and sedation  Propofol gtt and prn  Fentanyl gtt and prn  RASS -1 to -2    # Depression/anxiety  - Continue PTA paroxetine    Pulmonary:  # Acute hypoxic and hypercarbic respiratory failure, multifactorial  # ARDS  # CARL  Treating for HF exacerbation, COVID pneumonia, CAP, and suspected OHS. CT PE study negative. Patient wears nocturnal CPAP at baseline. Former smoker.  - Strict I/O, aiming for net even. Dosing Bumetanide based on UOP response.    - Restarting bumetanide 1 mg BID for 1L net negative output  - Ctn to hold PTA carvedilol.   - S/p Flolan  - S/p proning, currently supine  - Ventilation Mode: CMV/AC  (Continuous Mandatory Ventilation/ Assist Control)  FiO2 (%): 50 %  Rate Set (breaths/minute): 14 breaths/min  Tidal Volume Set (mL): 360 mL  PEEP (cm H2O): 10 cmH2O  Oxygen Concentration (%): 60 %  Resp: 15  - Antibiotic regimen below  - COVID regimen below  - Decreased PEEP to 10    # COVID Pneumonia  - COVID-19 precautions  - Dexamethasome 6mg qday (10/7-10/17)  - S/p Remdesivir   - S/p Tocilizumab x1    Cardiovascular:  # Shock, distributive  - MAP goal >65  - Off NE since 10/9/21    # pHTN  # Right Ventricular Failure  10/6/21 TTE: mod-severe RV dilation and mod-severely reduced function, consistent with chronic Group III pulmonary hypetension.   - Consulted cardiology, appreciate recs   - BB: holding PTA carvedilol 3.125 mg BID  - Diuresis plan  above   - V/Q scan pending  - TTE 10/7/21: negative bubble study  - Labs: HCV, HBC, ASHANTI negative, R. Factor normal  - Continue monitoring CVP  - Continue trending NT-pro BNP  - RHC once stable and extubated    # CAD  - Ctn PTA aspirin 81 mg  - Ctn PTA atorvastatin    GI/Nutrition:  # NPO  Does not meet criteria for malnutrition.  - Intubated. S/p OG tube   - Consulted nutrition, ctn TF's    # Constipation, resolved  - Senna/miralax scheduled  - BID Miralax   - Bisacodyl daily  - BM today    # Suspected GERD/ mechanical ventilation  - Ctn PPI    # Elevated LFT's, resolved  Present on admission, now resolved.   - Restarted PTA statin    Renal/Fluids/Electrolytes:  # Non-oliguric TIMMY on CKD, resolved  Unclear baseline creatinine. Admission Cre 1.68, now 1.01. Producing appropriate urine in setting of ongoing diuresis. UA with hyaline casts suggestive of pre-renal etiology. Also considering cardiorenal etiology given improvement s/p diuresis. Renal U/S w/o evidence of hydronephrosis.   - Strict I's and O's, avoid nephrotoxic agents, arthur inserted  - Bumex 1mg BID    #Hypokalemia, resolved  Potassium replacement protocol    # Mild Hypernatremia (resolved)  - FWF, increased to 60 mL q4 on 10/10/21    # Metabolic alkalosis  - S/p Diamox x1    Endocrine:  # Steroid-induced hyperglycemia  HbA1c of 6.7% c/w prediabetes/T2DM. No PTA antidiabetic medications.  - Hypoglycemia protocol  - Medium ssi      ID:  # Covid 19 pneumonia  COVID positive after prior infection unclear whether this is a reinfection or residual viral shedding.   - Treatment above    # CAP  Sputum culture 10/6/21 with 4+ S Aureus.  - CTX 10/6/21-10/11/21   - S/p Azithromycin 10/6-10/7    Hematology:    # Leukocytosis, resolved  Stress-induced, infection, and steroids.    # Anemia of chronic disease, stable  - CTM    Musculoskeletal:  # PT/OT    Skin:  # Pressure ulcers  Multiple observed, likely with long amount of time in chair  - WOC consult    General  Cares/Prophylaxis:    DVT Prophylaxis: Enoxaparin SQ  GI Prophylaxis: PPI  Restraints: ordered  Family Communication: SonParviz  Code Status: Full    Lines/tubes/drains:  - PIVx2  - CVC RIJ  - ETT 7mm  - Art. Line  - Lopes catheter in place    Disposition:  - Medical ICU    Patient seen and findings/plan discussed with medical ICU staff, Dr. Cast.    Jl Hermosillo MD  Internal Medicine, PGY-1  [phone]  [facility] 1, [id number]    -----------------------------------------------------------------------  Interval History:   No acute events overnight, desaturated this morning with head turns, most likely secondary to fluid build-up in the lungs. Continued weaning off anesthesia, more responsive this morning.    PHYSICAL EXAMINATION:  Temp:  [98.3  F (36.8  C)-99.9  F (37.7  C)] 99  F (37.2  C)  Pulse:  [61-73] 61  Resp:  [14-16] 15  MAP:  [75 mmHg-92 mmHg] 75 mmHg  Arterial Line BP: (111-135)/(57-70) 111/58  FiO2 (%):  [35 %-60 %] 50 %  SpO2:  [94 %-98 %] 95 %   General: intubated and sedated  HEENT: ET tube in, NC, AT, MMM  Neuro: alert, responds to stimuli, patellar reflexes present   Pulm/Resp: reduced sounds in bases, crackles in upper lung fields, negative for wheezing  CV: RRR, bradycardic  Abdomen: Soft, mildly distended, bowel sounds decreased  Incisions/Skin: coccyx erythema and skin maceration, significant lower extremity edema bilaterally, improving    LABS: Reviewed.   Arterial Blood Gases   Recent Labs   Lab 10/12/21  1445 10/12/21  0449 10/11/21  0626 10/10/21  1158   PH 7.38 7.40 7.39 7.40   PCO2 70* 66* 70* 73*   PO2 192* 73* 94 77*   HCO3 41* 41* 42* 45*     Complete Blood Count   Recent Labs   Lab 10/12/21  0450 10/11/21  0449 10/10/21  0443 10/09/21  0336   WBC 9.3 9.3 9.0 11.0   HGB 9.5* 9.4* 8.9* 8.6*    278 276 255     Basic Metabolic Panel  Recent Labs   Lab 10/12/21  1239 10/12/21  0735 10/12/21  0457 10/12/21  0450 10/11/21  0930 10/11/21  0449 10/10/21  0445 10/10/21  0255  10/09/21  1227 10/09/21  1013   NA  --   --   --  144  --  144  --  145*  --  144   POTASSIUM  --   --   --  3.7  --  3.8  --  3.6  3.6  --  4.1   CHLORIDE  --   --   --  105  --  100  --  98  --  103   CO2  --   --   --  40*  --  42*  --  44*  --  39*   BUN  --   --   --  51*  --  53*  --  46*  --  41*   CR  --   --   --  0.93  --  0.99  --  1.01  --  1.10*   * 119* 120* 127*   < > 139*   < > 142*   < > 147*    < > = values in this interval not displayed.     Liver Function Tests  Recent Labs   Lab 10/08/21  0354 10/07/21  1332 10/06/21  0500   AST 31  --  90*   ALT 40  --  60*   ALKPHOS 94  --  103   BILITOTAL 0.3  --  0.4   ALBUMIN 2.4*  --  2.4*   INR  --  1.25* 1.19*     Coagulation Profile  Recent Labs   Lab 10/07/21  1332 10/06/21  0500   INR 1.25* 1.19*   PTT 34 27     IMAGING:  Recent Results (from the past 24 hour(s))   XR Chest Port 1 View    Narrative    EXAM: XR Chest 1 view 10/12/2021 10:03 AM      HISTORY: New desaturations.    COMPARISON: Previous day.     TECHNIQUE: Frontal view of the chest.    FINDINGS: ET tube is 3.8 cm from the jayden. An enteric tube is  subdiaphragmatic with tip within the gastric lumen. Right IJ CVC with  tip in the mid SVC.  Trachea is midline. Cardiac and mediastinal silhouette is stable.  Stable hazy pulmonary and bibasilar pulmonary opacities. Moderate  pleural effusion and small left pleural effusion. No pneumothoraces.  Visualized portion of the upper abdomen is within normal limits. No  acute osseous abnormalities.      Impression    IMPRESSION:   1. Stable bibasilar atelectasis with moderate right and small left  pleural effusions.  2. Stable cardiomegaly with hazy pulmonary opacities, which may  represent edema.  3. Stable support devices.    I have personally reviewed the examination and initial interpretation  and I agree with the findings.    NOEMI VILLALTA MD         SYSTEM ID:  [id number]

## 2021-10-12 NOTE — PROGRESS NOTES
[facility] MICU STAFF NOTE    Chika Ferguson remains critically ill with hypoxic respiratory failure 2/2 COVID pneumonia      I personally examined and evaluated the patient today. I have evaluated all laboratory values and imaging studies from the past 24 hours.    Key findings and decisions made today included:     Severe COVID pneumonia, improving . Diuresis held yesterday, today CXR more congested with transient desaturations and elevated CVP (20) . Had BM after bowel prep .     Will diurese today. Continue PT/OT   - Neuro . RASS 0 .Sedated w/ prop and fent.   - Pulm .  COVID/MSSA pneumonia/ARDs . Hx of COPD . Hx of CARL .  =>  Improving oxygenation . Weaning ventilator . Mixed respiratory acidosis and metabolic alkalosis due to chronic compensation + diuresis .  On ceftriaxone.   - CV. Hx of HFpEF c/f pHTN => not on pressors/inotropes . Trend nt pro BNP .diuresis .    - GI .  - /renal .  despite Net negative volume status. Increasing CVP . Diuresis with 1 mg Bumex BID .   - heme / ID . (see pulm)   - rheum/endo . Per protocol   - skin . Skin wounds . Wound care consult .        I personally managed the ventilator, sedation, pain control and analgesia, metabolic abnormalities, antibiotic therapy, and nutritional status.     Consults ongoing and ordered are: none     Procedures that will happen today are: mechanical ventilation    All treatments were placed at my direction.  I formulated today s plan with the house staff team or resident(s) and agree with the findings and plan in the associated note.      I spent a total of 40 minutes (excluding procedure time) personally providing and directing critical care services at the bedside and on the critical care unit for Chika Ferguson     Kristine Cast MD   Pulmonary and Critical Care Staff

## 2021-10-12 NOTE — PROGRESS NOTES
CLINICAL NUTRITION SERVICES - REASSESSMENT NOTE     Nutrition Prescription    RECOMMENDATIONS FOR MDs/PROVIDERS TO ORDER:  Fluids and bowel regimen per team     Malnutrition Status:    Unable to assess     Recommendations already ordered by Registered Dietitian (RD):  No changes at this time.     Future/Additional Recommendations:  - Skin status     EVALUATION OF THE PROGRESS TOWARD GOALS   Diet: NPO  Nutrition Support: Vital High Protein @ goal of 60ml/hr (1440ml/day) will provide: 1440 kcals (22.5 kcal/kg IBW), 125 g PRO (2 g/kg IBW), 1203 ml free H20, 159 g CHO, and 0 g fiber daily.     Intake: Average TF intakes over the past 7 days:  1062 ml, 1062 kcals, 92 g pro     Propofol over the past 6 days: 1023 kcals    Total intakes: 2085 kcals (33 kcals/kg), 92 g pro ( 1.4 g/kg pro)     NEW FINDINGS   Weight: weight  Loss over the past 1 week likely 2/2 to fluids. Currently using IBW for dosing weight. 9% wt loss, cannot rule out fluids, pt is on diuretic.   Meds: Bumex, propofol off, ducolax, miralax, senna   GI: 10/11: Obtained AXR, new obstruction, large bowel movement this AM s/p suppository.  Skin: Sacral dressing to be changed today. Excoriated skin under skin folds.   Resp: intubated    MALNUTRITION  % Intake: Decreased intake does not meet criteria, excessive kcals, 70% of lower end pro needs   % Weight Loss: Unable to assess  Subcutaneous Fat Loss: None observed on visual inspection (COVID precautions)  Muscle Loss: None observed on visual inspection (COVID precautions)  Fluid Accumulation/Edema: Mild  Malnutrition Diagnosis: Unable to assess 2/2 to weight trends    Previous Goals   Total avg nutritional intake to meet a minimum of 22 kcal/kg and 2 g PRO/kg daily (per dosing wt 64 kg IBW).  Evaluation: Met- kcals, not met -pro    Previous Nutrition Diagnosis  Inadequate oral intake related to mechanical ventilation inhibiting ability to take nutrition PO as evidenced by NPO status x at least 1 day requiring  the start of enteral nutrition to meet 100% of needs.   Evaluation: No change    CURRENT NUTRITION DIAGNOSIS  Excessive energy intake related to propofol use as evidenced by pt meeting 130% of higher end estimated needs.       INTERVENTIONS  Implementation  Collaboration with other nutrition professionals    Goals  Total avg nutritional intake to meet a minimum of 22 kcal/kg and 2 g PRO/kg daily (per dosing wt 64 kg).    Monitoring/Evaluation  Progress toward goals will be monitored and evaluated per protocol.      Albina Palomo, AUBREE, MS, LD  SICU: [id number]

## 2021-10-12 NOTE — PLAN OF CARE
Major Shift Events: No acute events overnight. Opens eye spontaneously and following commands. Propofol @ 10, fentanyl @ 50.   Plan: Sacral dressing to be changed today. Continue to wean sedation/O2 as tolerated.   For vital signs and complete assessments, please see documentation flowsheets.

## 2021-10-12 NOTE — PLAN OF CARE
D:  Patient admitted on 10/6/21 with acute hypoxic and hypercapnic respiratory failure     I/A: Hemodynamically stable. No acute events this shift. More awake and following commands.     VS: Tmax 99.6. Normotensive. HR 60-70s. On 40% FiO2.   Lines/drains/airway: ETT. Lopes. Right I.J. CVC.   Gtts: Fentanyl at 50/hr. Propofol at 10/hr.  Neuro: Sedated, but more alert this afternoon and following more commands. PERRL.  CV: NSR.  Pulm: Remains on CMV vent settings, FiO2 decreased to 40%, and PEEP weaned to 10.  GI:  2 Large BMs this shift. TF at goal of 60/hr.   : Lopes with good UOP.  Skin: Excoriated skin under skin folds, interdry in place. Mepliex on ulcerations clean dry intact.       P: continue to monitor and notify provider of changes.

## 2021-10-12 NOTE — PLAN OF CARE
D:  Patient admitted on 10/6/21 with acute hypoxic and hypercapnic respiratory failure     I/A: Hemodynamically stable. No acute events this shift. More awake and following commands.      VS: Tmax 99.4. Normotensive. HR 60-70s. On 55% FiO2.   Lines/drains/airway: ETT. Lopes. Right I.J. CVC.   Gtts: Fentanyl at 50/hr.   Neuro: RASS 0 to -1. PERRLA. Follows commands.  CV: NSR.  Pulm: Remains on CMV vent settings.  GI:  2 Large BMs this shift. TF at goal of 60/hr.   : Lopes with good UOP.  Skin: Excoriated skin under skin folds, interdry in place. Mepliex on ulcerations clean dry intact.         P: continue to monitor and notify provider of changes.

## 2021-10-13 ENCOUNTER — APPOINTMENT (OUTPATIENT)
Dept: GENERAL RADIOLOGY | Facility: CLINIC | Age: 61
DRG: 207 | End: 2021-10-13
Attending: ANESTHESIOLOGY
Payer: MEDICARE

## 2021-10-13 LAB
ANION GAP SERPL CALCULATED.3IONS-SCNC: 2 MMOL/L (ref 3–14)
BASE EXCESS BLDA CALC-SCNC: 11.9 MMOL/L (ref -9–1.8)
BASE EXCESS BLDA CALC-SCNC: 16.8 MMOL/L (ref -9–1.8)
BUN SERPL-MCNC: 50 MG/DL (ref 7–30)
CALCIUM SERPL-MCNC: 10 MG/DL (ref 8.5–10.1)
CHLORIDE BLD-SCNC: 105 MMOL/L (ref 94–109)
CO2 SERPL-SCNC: 41 MMOL/L (ref 20–32)
CREAT SERPL-MCNC: 0.9 MG/DL (ref 0.52–1.04)
ERYTHROCYTE [DISTWIDTH] IN BLOOD BY AUTOMATED COUNT: 16.3 % (ref 10–15)
GFR SERPL CREATININE-BSD FRML MDRD: 68 ML/MIN/1.73M2
GLUCOSE BLD-MCNC: 126 MG/DL (ref 70–99)
GLUCOSE BLDC GLUCOMTR-MCNC: 107 MG/DL (ref 70–99)
GLUCOSE BLDC GLUCOMTR-MCNC: 127 MG/DL (ref 70–99)
GLUCOSE BLDC GLUCOMTR-MCNC: 135 MG/DL (ref 70–99)
GLUCOSE BLDC GLUCOMTR-MCNC: 140 MG/DL (ref 70–99)
GLUCOSE BLDC GLUCOMTR-MCNC: 158 MG/DL (ref 70–99)
GLUCOSE BLDC GLUCOMTR-MCNC: 184 MG/DL (ref 70–99)
GLUCOSE BLDC GLUCOMTR-MCNC: 95 MG/DL (ref 70–99)
HCO3 BLD-SCNC: 39 MMOL/L (ref 21–28)
HCO3 BLD-SCNC: 44 MMOL/L (ref 21–28)
HCT VFR BLD AUTO: 33.1 % (ref 35–47)
HGB BLD-MCNC: 9.8 G/DL (ref 11.7–15.7)
MAGNESIUM SERPL-MCNC: 2.8 MG/DL (ref 1.6–2.3)
MCH RBC QN AUTO: 25.8 PG (ref 26.5–33)
MCHC RBC AUTO-ENTMCNC: 29.6 G/DL (ref 31.5–36.5)
MCV RBC AUTO: 87 FL (ref 78–100)
NT-PROBNP SERPL-MCNC: 411 PG/ML (ref 0–900)
O2/TOTAL GAS SETTING VFR VENT: 40 %
O2/TOTAL GAS SETTING VFR VENT: 60 %
PCO2 BLD: 63 MM HG (ref 35–45)
PCO2 BLD: 68 MM HG (ref 35–45)
PH BLD: 7.4 [PH] (ref 7.35–7.45)
PH BLD: 7.42 [PH] (ref 7.35–7.45)
PHOSPHATE SERPL-MCNC: 2.9 MG/DL (ref 2.5–4.5)
PLATELET # BLD AUTO: 285 10E3/UL (ref 150–450)
PO2 BLD: 79 MM HG (ref 80–105)
PO2 BLD: 84 MM HG (ref 80–105)
POTASSIUM BLD-SCNC: 3.5 MMOL/L (ref 3.4–5.3)
RBC # BLD AUTO: 3.8 10E6/UL (ref 3.8–5.2)
SODIUM SERPL-SCNC: 148 MMOL/L (ref 133–144)
WBC # BLD AUTO: 9.6 10E3/UL (ref 4–11)

## 2021-10-13 PROCEDURE — 250N000013 HC RX MED GY IP 250 OP 250 PS 637: Performed by: NURSE PRACTITIONER

## 2021-10-13 PROCEDURE — 71045 X-RAY EXAM CHEST 1 VIEW: CPT | Mod: 26 | Performed by: RADIOLOGY

## 2021-10-13 PROCEDURE — 85027 COMPLETE CBC AUTOMATED: CPT | Performed by: STUDENT IN AN ORGANIZED HEALTH CARE EDUCATION/TRAINING PROGRAM

## 2021-10-13 PROCEDURE — 82803 BLOOD GASES ANY COMBINATION: CPT | Performed by: STUDENT IN AN ORGANIZED HEALTH CARE EDUCATION/TRAINING PROGRAM

## 2021-10-13 PROCEDURE — 71045 X-RAY EXAM CHEST 1 VIEW: CPT

## 2021-10-13 PROCEDURE — 250N000011 HC RX IP 250 OP 636

## 2021-10-13 PROCEDURE — 200N000002 HC R&B ICU UMMC

## 2021-10-13 PROCEDURE — 250N000013 HC RX MED GY IP 250 OP 250 PS 637: Performed by: STUDENT IN AN ORGANIZED HEALTH CARE EDUCATION/TRAINING PROGRAM

## 2021-10-13 PROCEDURE — 999N000157 HC STATISTIC RCP TIME EA 10 MIN

## 2021-10-13 PROCEDURE — 83880 ASSAY OF NATRIURETIC PEPTIDE: CPT

## 2021-10-13 PROCEDURE — 250N000013 HC RX MED GY IP 250 OP 250 PS 637: Performed by: INTERNAL MEDICINE

## 2021-10-13 PROCEDURE — 80048 BASIC METABOLIC PNL TOTAL CA: CPT | Performed by: STUDENT IN AN ORGANIZED HEALTH CARE EDUCATION/TRAINING PROGRAM

## 2021-10-13 PROCEDURE — 83735 ASSAY OF MAGNESIUM: CPT | Performed by: INTERNAL MEDICINE

## 2021-10-13 PROCEDURE — 84100 ASSAY OF PHOSPHORUS: CPT | Performed by: INTERNAL MEDICINE

## 2021-10-13 PROCEDURE — 94003 VENT MGMT INPAT SUBQ DAY: CPT

## 2021-10-13 PROCEDURE — 99291 CRITICAL CARE FIRST HOUR: CPT | Performed by: STUDENT IN AN ORGANIZED HEALTH CARE EDUCATION/TRAINING PROGRAM

## 2021-10-13 PROCEDURE — 250N000011 HC RX IP 250 OP 636: Performed by: STUDENT IN AN ORGANIZED HEALTH CARE EDUCATION/TRAINING PROGRAM

## 2021-10-13 PROCEDURE — 250N000013 HC RX MED GY IP 250 OP 250 PS 637

## 2021-10-13 RX ORDER — BISACODYL 10 MG
10 SUPPOSITORY, RECTAL RECTAL DAILY PRN
Status: DISCONTINUED | OUTPATIENT
Start: 2021-10-13 | End: 2021-10-27 | Stop reason: HOSPADM

## 2021-10-13 RX ORDER — POTASSIUM CHLORIDE 29.8 MG/ML
20 INJECTION INTRAVENOUS ONCE
Status: COMPLETED | OUTPATIENT
Start: 2021-10-13 | End: 2021-10-13

## 2021-10-13 RX ORDER — POLYETHYLENE GLYCOL 3350 17 G/17G
17 POWDER, FOR SOLUTION ORAL DAILY
Status: DISCONTINUED | OUTPATIENT
Start: 2021-10-14 | End: 2021-10-27 | Stop reason: HOSPADM

## 2021-10-13 RX ORDER — ACETAZOLAMIDE 250 MG/1
250 TABLET ORAL ONCE
Status: COMPLETED | OUTPATIENT
Start: 2021-10-13 | End: 2021-10-13

## 2021-10-13 RX ADMIN — ASPIRIN 81 MG CHEWABLE TABLET 81 MG: 81 TABLET CHEWABLE at 08:51

## 2021-10-13 RX ADMIN — ENOXAPARIN SODIUM 80 MG: 80 INJECTION SUBCUTANEOUS at 19:46

## 2021-10-13 RX ADMIN — BUMETANIDE 1 MG: 0.25 INJECTION INTRAMUSCULAR; INTRAVENOUS at 08:51

## 2021-10-13 RX ADMIN — DOCUSATE SODIUM 50 MG AND SENNOSIDES 8.6 MG 2 TABLET: 8.6; 5 TABLET, FILM COATED ORAL at 08:51

## 2021-10-13 RX ADMIN — ATORVASTATIN CALCIUM 40 MG: 40 TABLET, FILM COATED ORAL at 08:50

## 2021-10-13 RX ADMIN — PAROXETINE HYDROCHLORIDE 40 MG: 10 SUSPENSION ORAL at 08:51

## 2021-10-13 RX ADMIN — INSULIN ASPART 1 UNITS: 100 INJECTION, SOLUTION INTRAVENOUS; SUBCUTANEOUS at 12:29

## 2021-10-13 RX ADMIN — Medication 40 MG: at 08:50

## 2021-10-13 RX ADMIN — Medication 50 MCG/HR: at 04:33

## 2021-10-13 RX ADMIN — POTASSIUM CHLORIDE 20 MEQ: 29.8 INJECTION, SOLUTION INTRAVENOUS at 06:48

## 2021-10-13 RX ADMIN — Medication 25 MCG: at 02:30

## 2021-10-13 RX ADMIN — ENOXAPARIN SODIUM 80 MG: 80 INJECTION SUBCUTANEOUS at 09:02

## 2021-10-13 RX ADMIN — POLYETHYLENE GLYCOL 3350 17 G: 17 POWDER, FOR SOLUTION ORAL at 08:52

## 2021-10-13 RX ADMIN — ACETAZOLAMIDE 250 MG: 250 TABLET ORAL at 11:11

## 2021-10-13 RX ADMIN — DEXAMETHASONE SODIUM PHOSPHATE 6 MG: 4 INJECTION, SOLUTION INTRA-ARTICULAR; INTRALESIONAL; INTRAMUSCULAR; INTRAVENOUS; SOFT TISSUE at 08:50

## 2021-10-13 RX ADMIN — INSULIN ASPART 1 UNITS: 100 INJECTION, SOLUTION INTRAVENOUS; SUBCUTANEOUS at 15:27

## 2021-10-13 RX ADMIN — MICONAZOLE NITRATE: 20 CREAM TOPICAL at 19:47

## 2021-10-13 RX ADMIN — BUMETANIDE 1 MG: 0.25 INJECTION INTRAMUSCULAR; INTRAVENOUS at 15:23

## 2021-10-13 RX ADMIN — Medication 15 ML: at 08:51

## 2021-10-13 RX ADMIN — MICONAZOLE NITRATE: 20 CREAM TOPICAL at 09:36

## 2021-10-13 ASSESSMENT — ACTIVITIES OF DAILY LIVING (ADL)
ADLS_ACUITY_SCORE: 14
ADLS_ACUITY_SCORE: 18
ADLS_ACUITY_SCORE: 14
ADLS_ACUITY_SCORE: 14

## 2021-10-13 NOTE — PROGRESS NOTES
[facility]    Medicine Progress Note - Hospitalist Service, Gold Night       Date of Admission:  10/6/2021    Assessment & Plan         Chika Ferguson is a 61 year old female with history of CAD s/p PCI, HFpEF, HLD, suspected COPD, CARL, morbid obesity, GERD, depression, and anxiety who was admitted to MICU from OSH on 10/6 with acute hypoxic/hypercapnic respiratory failure. Intubated prior to transfer. Subsequently found to have MSSA pneumonia, +COVID-19 PCR, acute heart failure with RV dsyfunction, pulmonary HTN, and TIMMY. Extubated 10/13. Transferred to medicine 10/13 for ongoing management.    # Acute hypoxic-hypercapnic respiratory failure, multifactorial.  Hypoxic on arrival w O2 sats in 50's. VBG with pH 7.05 and pCO2 81.  Intubated in ED. Bibasilar opacities noted on CXR. CT Chest with possible atelectasis, otherwise clear lungs. WBC 19.6. Lactate 8.4. COVID PCR negative. Pro-BNP elevated. Given relatively clear lung fields on imaging, initial impression CHF vs possible pneumonia. Treated with diuretics and antibiotics.  CTA Chest on at [facility] negative for PE, again showed evidence of atelectasis. Sputum cultures grew MSSA. Repeat COVID PCR + 10/6. Severe RV dysfunction and dilated IVC noted on TTE with preserved EF. Continued treatment for CAP, COVID-19, and CHF. CRP and BNP down-trending. Compensated respiratory acidosis on repeat blood gas. Extubated 10/13. Now stable on 4 L NC.   - Wean O2 as able  - CPAP at night  - See below    # Community acquired pneumonia d/t MSSA.  WBC 19.6 on arrival. CT Chest x 2 without infiltrates. Afebrile. Sputum culture 10/6 grew MSSA.. Completed a 5 day course of ceftriaxone + azithromycin.  CRP down-trending (170 to 38).   - Monitor clinically     # Positive COVID-19 PCR.  History of COVID infection in 12/2020, did not require hospitalization. Completed vaccination series 5/2021. +Exposure recently. COVID PCR negative at OSH but  repeat PCR positive on admission. DDx includes reinfection vs persistent positive PCR from prior infection. CT w/o diffuse GGO to suggest COVID pneumonia. S/p Remdesivir and Tocilizumab x 1 on 10/7 (unclear why remdesivir wasn't continued), as well as steroids.   - Continue dexamethasone through 10/17  - Suspect mild COVID given other possible etiologies of hypoxia and relatively normal imaging, would consider 10 days of special precautions    # Acute on chronic HFpEF and RV failure  # Pulmonary HTN, probable Group III  Prior history of HFpEF and pulmonary HTN. On Bumex at home. NTproBNP 2264 on admission. TTE (10/6) LVEF 55-60%, mild LVH, indeterminate diastolic function, moderate to severe RV dilation w moderate to severely reduced RV function, and dilated IVC. Pulmonary pressures could not be assessed on TTE. CTA Chest negative for PE but showed severely dilated pulmonary trunk c/w pulmonary HTN. Cardiology consulted. Suspect group III pulmonary HTN, although CTEPH cannot be ruled out. RV failure likely chronic given RV dilation on imaging. Clinically improved w IV Bumex. Weight down 31 lbs since admission. Last seen by cardiology 10/7, recommendations reviewed.  - V/Q scan ordered for AM  - Possible RHC pending results  - Continue lovenox 1mg/kg BID for now  - Will need to contact cardiology service 10/13 for ongoing recommendations  - Continue Bumex 1mg BID, goal net even  - Daily weights, I&O's  - Repeat BNP in AM    # CAD:  Hx PCI w stenting of mLAD in 2016. Mild troponin elevation on admission felt to be demand ischemia. TTE with normal wall motion. Cardiology consulted, ACS not suspected.   - Continue ASA + statin  - BB on hold d/t borderline low BP in ICU    # TIMMY.  Cr 1.95 at OSH, 1.68 on admission. Baseline unknown. UA with hyaline casts, otherwise bland. Renal US negative for hydro. Suspect prerenal azotemia in setting of acute CHF.   - Repeat BMP in AM     # Steroid-induced hyperglycemia.  No history  of DM. A1c 6.7 on admission. Currently well controlled on sliding scale.   - MSSI q4h   - Hypoglycemia protocol      # Hypernatremia.  FWF increased to 100mL q4h today.   - Repeat BMP in AM    # Hypokalemia.  Resolved. Likely 2/2 diuresis. Monitor    # Chronic anemia.  No evidence of active bleeding. Hgb stable.  - CBC in AM    # Malnutrition risk.  Does not meet criteria for malnutrition. Started on TF via OG tube following intubation.   - Nutrition following  - SLP consulted for swallow assessment  - Continue NPO status for now    # GERD.  Continue protonix.     # Pressure ulcers, POA.  Essentially chair-bound. Gets up weekly to ambulate. Increased weakness lately.  - WOCN consulted   - PT/OT consults when clinically stable           Diet: Adult Formula Drip Feeding: Continuous Vital High Protein; Orogastric tube; Goal Rate: 60; mL/hr; Medication - Feeding Tube Flush Frequency: At least 15-30 mL water before and after medication administration and with tube clogging; Do not start or...  NPO for Medical/Clinical Reasons Except for: Ice Chips, Meds    DVT Prophylaxis: Enoxaparin (Lovenox) SQ  Lopes Catheter: PRESENT, indication: Strict 1-2 Hour I&O  Central Lines: PRESENT  CVC TRIPLE LUMEN Right Internal jugular-Site Assessment: WDL  Code Status: Full Code      Disposition Plan   Expected discharge:  TBD recommended to TBD once work up and treatment completed.     The patient's care was discussed with the Attending Physician, Dr. Cooper.    Rylan Fernandez PA-C  Hospitalist Service, [facility]  Securely message with the Vocera Web Console (learn more here)  Text page via [employer] Paging/Directory  Please see sign in/sign out for up to date coverage information    Clinically Significant Risk Factors Present on Admission                ______________________________________________________________________    Interval History   CC:  Follow up hypoxia, pneumonia,  CHF, pulmonary HTN.     Feeling well. Denies any dyspnea, cough, chest pain, palpitations, worsening edema, nausea, vomiting, abdominal pain. Some loose stools, on laxatives. Reports using CPAP at home with 2 L O2 bled in overnight, although has been using throughout the day as well.     ROS:  Pulm, CV, GI and  negative unless otherwise noted above.     Data reviewed today: I reviewed all medications, new labs and imaging results over the last 24 hours.     Physical Exam   Vital Signs: Temp: 97.9  F (36.6  C) Temp src: Oral BP: 123/67 Pulse: 69   Resp: 18 SpO2: 94 % O2 Device: Nasal cannula Oxygen Delivery: 4 LPM  Weight: 326 lbs 4.49 oz  GENERAL:  Awake. Alert. NAD. O2 via NC at 4 lpm.  HEENT:  Moist mucous membranes. Otherwise unremarkable.  CV:  RRR. S1, S2. No murmur.   PULM:  Normal effort. Diminished throughout.  GI:  Abdomen soft, non-distended. Active bowel sounds. No tenderness, guarding, or rebound.     NEURO:  Grossly non-focal. Moves all extremities.    EXTREMITIES:  PCD in place. No pitting edema. No calf tenderness.   SKIN:  Dry. No visible rash.     Data   Recent Labs   Lab 10/13/21  0429 10/12/21  0450 10/11/21  0449 10/10/21  0255 10/08/21  1349 10/08/21  0354   * 144 144 145*   < > 143   POTASSIUM 3.5 3.7 3.8 3.6  3.6   < > 3.4   CHLORIDE 105 105 100 98   < > 103   CO2 41* 40* 42* 44*   < > 38*   ANIONGAP 2* <1* 2* 3   < > 2*   BUN 50* 51* 53* 46*   < > 26   CR 0.90 0.93 0.99 1.01   < > 1.10*   NAE 10.0 9.7 9.5 8.7   < > 8.9   MAG 2.8* 2.8* 2.6*  --   --  2.0   PHOS 2.9 2.7 3.9  --   --  2.9   PROTTOTAL  --   --   --   --   --  7.4   ALBUMIN  --   --   --   --   --  2.4*   BILITOTAL  --   --   --   --   --  0.3   ALKPHOS  --   --   --   --   --  94   AST  --   --   --   --   --  31   ALT  --   --   --   --   --  40    < > = values in this interval not displayed.     Recent Labs   Lab 10/13/21  0429 10/12/21  0450 10/11/21  0449 10/10/21  0443   WBC 9.6 9.3 9.3 9.0   RBC 3.80 3.65* 3.62*  3.44*   HGB 9.8* 9.5* 9.4* 8.9*   HCT 33.1* 32.2* 31.7* 30.2*   MCV 87 88 88 88   MCH 25.8* 26.0* 26.0* 25.9*   MCHC 29.6* 29.5* 29.7* 29.5*   RDW 16.3* 16.3* 16.4* 16.7*    286 278 276     Recent Labs   Lab 10/07/21  1332   INR 1.25*     Recent Labs   Lab 10/11/21  0449 10/10/21  0255 10/09/21  0336 10/08/21  0354   CRP 38.0* 57.0* 91.0* 170.0*         Glucose Values Latest Ref Rng & Units 10/7/2021 10/8/2021 10/9/2021 10/10/2021 10/11/2021 10/12/2021 10/13/2021   Bedside Glucose (mg/dl )  - -- -- -- -- -- -- --   GLUCOSE 70 - 99 mg/dL 121(H) 164(H) 147(H) 142(H) 139(H) 127(H) 126(H)        All labs personally reviewed in Epic.  See A&P for additional results.     Unresulted Labs Ordered in the Past 30 Days of this Admission     No orders found from 9/6/2021 to 10/7/2021.

## 2021-10-13 NOTE — PLAN OF CARE
Major Shift Events: RASS 0 to -1 overnight. Following commands, nods to yes/no questions. Able to make some needs known by writing. Afebrile. FiO2 increased to 60% this AM d/t satting in the low 90's. Fentanyl @ 50/hr, pt c/o general body aches.   Plan: K to be replaced this AM, recheck tomorrow AM.   For vital signs and complete assessments, please see documentation flowsheets.

## 2021-10-13 NOTE — PROGRESS NOTES
MD order to place pt on PS.   Docs ok with a peep of 10+ while doing the trial.     Will continue to monitor and provide support.     Nathaly Ferrer, RT

## 2021-10-13 NOTE — PLAN OF CARE
Major Shift Events:  Patient extubated to nasal cannula this afternoon.  Denies any breakthrough pain, continues on fentanyl drip.  Disoriented to time and situation but easily reoriented.  3 incontinent stools; hold bowel meds.  Lopes in place to be removed tomorrow.  Diuresed today and currently 1.2 L negative.  Up to the chair this morning for ~2 hours.  Vitals WDL.  Plan: Continue care per orders.  For vital signs and complete assessments, please see documentation flowsheets.

## 2021-10-13 NOTE — PROGRESS NOTES
ICU Progress Note  10/13/2021      Date of Hospital Admission: 10/6/21  Date of ICU Admission: 10/6/21  Reason for Critical Care Admission: Acute hypoxic and hypercapnic respiratory failure  Date of Service (when I saw the patient): 10/13/2021    ASSESSMENT: Chika Ferguson is a 60 YO F with a h/o HLD, GERD, obesity, hiatal hernia, CARL, HFpEF, pHTN, CAD s/p PCI mlad (2016), and former tobacco abuse, admitted on 10/6/21 with acute hypoxic and hypercapnic respiratory failure. S/p intubation 10/6/21.    Major changes:  Successfully extubated  CPAP at night  Bisacodyl PRN  Off sedation  Bumex 1mg IV, Diamox 250 mg IV    PLAN:    Neuro:  # Pain and sedation  Off sedation now, tolerating well    # Depression/anxiety  - Continue PTA paroxetine    Pulmonary:  # Acute hypoxic and hypercarbic respiratory failure, multifactorial  # ARDS  # CARL  Treating for HF exacerbation, COVID pneumonia, CAP, and suspected OHS. CT PE study negative. Patient wears nocturnal CPAP at baseline. Former smoker.  - Strict I/O, aiming for net even. Dosing Bumetanide based on UOP response.    - Restarting bumetanide 1 mg BID for 1L net negative output  - Ctn to hold PTA carvedilol.   - S/p Flolan  - S/p proning, currently supine  - Ventilation Mode: (S) CPAP/PS  (Continuous positive airway pressure with Pressure Support)  FiO2 (%): 40 %  Rate Set (breaths/minute): 14 breaths/min  Tidal Volume Set (mL): 360 mL  PEEP (cm H2O): 10 cmH2O  Pressure Support (cm H2O): 7 cmH2O  Oxygen Concentration (%): 40 %  Resp: 17  - Antibiotic regimen below  - COVID regimen below  - Increased oxygenation needs this morning. New pneumonia vs fluid overload. Net negative 2L, normal breathing sounds on exam. No leukocytosis no fever, not on antibiotics  - New CXR  - New sputum culture  - Oxygenation improved during the day, now PEEP dropping to 5, FiO2 down to 40%, started pressure support trials, will attempt extubation either later today or tomorrow    # COVID  Pneumonia  - COVID-19 precautions  - Dexamethasome 6mg qday (10/7-10/17)  - S/p Remdesivir   - S/p Tocilizumab x1      Cardiovascular:  # Shock, distributive  - MAP goal >65  - Off NE since 10/9/21    # pHTN  # Right Ventricular Failure  10/6/21 TTE: mod-severe RV dilation and mod-severely reduced function, consistent with chronic Group III pulmonary hypetension.   - Consulted cardiology, appreciate recs   - BB: holding PTA carvedilol 3.125 mg BID  - Diuresis plan above   - V/Q scan pending  - TTE 10/7/21: negative bubble study  - Labs: HCV, HBC, ASHANTI negative, R. Factor normal  - Continue monitoring CVP  - Continue trending NT-pro BNP  - RHC once stable and extubated    # CAD  - Ctn PTA aspirin 81 mg  - Ctn PTA atorvastatin    GI/Nutrition:  # NPO  Does not meet criteria for malnutrition.  - Intubated. S/p OG tube   - Consulted nutrition, ctn TF's  - Extubated today, made NPO tonight except for ice chips and meds  - Speech and swallow test tomorrow    # Constipation, resolved  - Senna/miralax scheduled  - BID Miralax   - Bisacodyl daily (changed to PRN)  - BM today    # Suspected GERD/ mechanical ventilation  - Ctn PPI    # Elevated LFT's, resolved  Present on admission, now resolved.   - Restarted PTA statin    Renal/Fluids/Electrolytes:  # Non-oliguric TIMMY on CKD, resolved  Unclear baseline creatinine. Admission Cre 1.68, now 1.01. Producing appropriate urine in setting of ongoing diuresis. UA with hyaline casts suggestive of pre-renal etiology. Also considering cardiorenal etiology given improvement s/p diuresis. Renal U/S w/o evidence of hydronephrosis.   - Strict I's and O's, avoid nephrotoxic agents, arthur inserted  - Bumex 1mg BID    #Hypokalemia, resolved  Potassium replacement protocol    # Mild Hypernatremia (recurring)  - FWF, increased to 100ml q4h  - Sodium q12h    # Metabolic alkalosis  - S/p Diamox x2    Endocrine:  # Steroid-induced hyperglycemia  HbA1c of 6.7% c/w prediabetes/T2DM. No PTA  antidiabetic medications.  - Hypoglycemia protocol  - Medium ssi      ID:  # Covid 19 pneumonia  COVID positive after prior infection unclear whether this is a reinfection or residual viral shedding.   - Treatment above    # CAP  Sputum culture 10/6/21 with 4+ S Aureus.  - CTX 10/6/21-10/11/21   - S/p Azithromycin 10/6-10/7    Hematology:    # Leukocytosis, resolved  Stress-induced, infection, and steroids.    # Anemia of chronic disease, stable  - CTM    Musculoskeletal:  # PT/OT    Skin:  # Pressure ulcers  Multiple observed, likely with long amount of time in chair  - WOC consult    General Cares/Prophylaxis:    DVT Prophylaxis: Enoxaparin SQ  GI Prophylaxis: PPI  Restraints: ordered  Family Communication: SonParviz  Code Status: Full    Lines/tubes/drains:  - PIVx2  - CVC RIJ  - ETT 7mm  - Art. Line  - Lopes catheter in place    Disposition:  - Medical ICU    Patient seen and findings/plan discussed with medical ICU staff, Dr. Cast.    Jl Hermosillo MD  Internal Medicine, PGY-1  [phone]  [facility] 1, [id number]    -----------------------------------------------------------------------  Interval History:   No acute events overnight, desaturated this morning with head turns, most likely secondary to fluid build-up in the lungs. Continued weaning off anesthesia, more responsive this morning.    PHYSICAL EXAMINATION:  Temp:  [98.2  F (36.8  C)-99  F (37.2  C)] 98.8  F (37.1  C)  Pulse:  [48-73] 61  Resp:  [14-17] 17  BP: (123)/(67) 123/67  MAP:  [69 mmHg-98 mmHg] 84 mmHg  Arterial Line BP: (103-140)/(52-80) 124/66  FiO2 (%):  [40 %-60 %] 40 %  SpO2:  [92 %-97 %] 95 %   General: intubated and sedated  HEENT: ET tube in, NC, AT, MMM  Neuro: alert, responds to stimuli, patellar reflexes present   Pulm/Resp: reduced sounds in bases, crackles in upper lung fields, negative for wheezing  CV: RRR, bradycardic  Abdomen: Soft, mildly distended, bowel sounds decreased  Incisions/Skin: coccyx erythema and skin maceration,  significant lower extremity edema bilaterally, improving    LABS: Reviewed.   Arterial Blood Gases   Recent Labs   Lab 10/13/21  0429 10/12/21  1445 10/12/21  0449 10/11/21  0626   PH 7.42 7.38 7.40 7.39   PCO2 68* 70* 66* 70*   PO2 84 192* 73* 94   HCO3 44* 41* 41* 42*     Complete Blood Count   Recent Labs   Lab 10/13/21  0429 10/12/21  0450 10/11/21  0449 10/10/21  0443   WBC 9.6 9.3 9.3 9.0   HGB 9.8* 9.5* 9.4* 8.9*    286 278 276     Basic Metabolic Panel  Recent Labs   Lab 10/13/21  1228 10/13/21  0904 10/13/21  0431 10/13/21  0429 10/12/21  0457 10/12/21  0450 10/11/21  0930 10/11/21  0449 10/10/21  0445 10/10/21  0255   NA  --   --   --  148*  --  144  --  144  --  145*   POTASSIUM  --   --   --  3.5  --  3.7  --  3.8  --  3.6  3.6   CHLORIDE  --   --   --  105  --  105  --  100  --  98   CO2  --   --   --  41*  --  40*  --  42*  --  44*   BUN  --   --   --  50*  --  51*  --  53*  --  46*   CR  --   --   --  0.90  --  0.93  --  0.99  --  1.01   * 140* 127* 126*   < > 127*   < > 139*   < > 142*    < > = values in this interval not displayed.     Liver Function Tests  Recent Labs   Lab 10/08/21  0354 10/07/21  1332   AST 31  --    ALT 40  --    ALKPHOS 94  --    BILITOTAL 0.3  --    ALBUMIN 2.4*  --    INR  --  1.25*     Coagulation Profile  Recent Labs   Lab 10/07/21  1332   INR 1.25*   PTT 34     IMAGING:  Recent Results (from the past 24 hour(s))   XR Chest Port 1 View    Narrative    Portable chest    INDICATION: Increasing oxygenation needs    COMPARISON: 10/12/2021    FINDINGS: Heart is mildly enlarged. Patchy opacities in the lungs  appears similar and there is haziness with loss of the distinct left  hemidiaphragm shadow. Low inspiratory volume. Right IJ catheter tip is  in the SVC. Endotracheal tube tip is approximately 3.4 cm above the  jayden. NG/OG tube tip and sidehole both are within the stomach.      Impression    IMPRESSION: Low inspiratory volume with patchy opacities in the  lungs  grossly similar, suggestive of atelectasis or edema, cannot exclude  infection.    CARMEN BRITT MD         SYSTEM ID:  [id number]

## 2021-10-13 NOTE — PROGRESS NOTES
[facility] MICU STAFF NOTE     Chika Ferguson remains critically ill with hypoxic respiratory failure 2/2 COVID pneumonia      I personally examined and evaluated the patient today. I have evaluated all laboratory values and imaging studies from the past 24 hours.     Key findings and decisions made today included:      Medical history of  HLD, GERD, obesity, hiatal hernia, CARL, HFpEF, pHTN, CAD s/p PCI mlad (2016), and former tobacco abuse, admitted on 10/6/21 with acute hypoxic and hypercapnic respiratory failure. S/p intubation 10/6/21    Severe COVID pneumonia, improving . Good response to bumex, continue . CVP improved. Good mental status, occasional desaturations . SBT/SAT .        Will diurese today. Continue PT/OT   - Neuro . RASS 0 . On fent.   - Pulm .  COVID/MSSA pneumonia/ARDs . Hx of COPD . Hx of CARL .  =>  Improving oxygenation . Weaning ventilator . Mixed respiratory acidosis and metabolic alkalosis due to chronic compensation + diuresis . Completed ceftriaxone.   - CV. Hx of HFpEF c/f pHTN => not on pressors/inotropes . Trend nt pro BNP .diuresis .    - GI .  - /renal  . Diuresis with 1 mg Bumex BID + acetazolamide  . Increase FWF .   - heme / ID . (see pulm)   - rheum/endo . Per protocol   - skin . Skin wounds . Wound care consult .         I personally managed the ventilator, sedation, pain control and analgesia, metabolic abnormalities, antibiotic therapy, and nutritional status.      Consults ongoing and ordered are: none      Procedures that will happen today are: mechanical ventilation     All treatments were placed at my direction.  I formulated today s plan with the house staff team or resident(s) and agree with the findings and plan in the associated note.      I spent a total of 40 minutes (excluding procedure time) personally providing and directing critical care services at the bedside and on the critical care unit for Chika Ferguson     Kristine Cast MD   Pulmonary and  Critical Care Staff

## 2021-10-14 ENCOUNTER — APPOINTMENT (OUTPATIENT)
Dept: PHYSICAL THERAPY | Facility: CLINIC | Age: 61
DRG: 207 | End: 2021-10-14
Attending: ANESTHESIOLOGY
Payer: MEDICARE

## 2021-10-14 ENCOUNTER — APPOINTMENT (OUTPATIENT)
Dept: SPEECH THERAPY | Facility: CLINIC | Age: 61
DRG: 207 | End: 2021-10-14
Attending: ANESTHESIOLOGY
Payer: MEDICARE

## 2021-10-14 LAB
ANION GAP SERPL CALCULATED.3IONS-SCNC: <1 MMOL/L (ref 3–14)
BUN SERPL-MCNC: 47 MG/DL (ref 7–30)
CALCIUM SERPL-MCNC: 10 MG/DL (ref 8.5–10.1)
CHLORIDE BLD-SCNC: 106 MMOL/L (ref 94–109)
CO2 SERPL-SCNC: 40 MMOL/L (ref 20–32)
CREAT SERPL-MCNC: 0.85 MG/DL (ref 0.52–1.04)
ERYTHROCYTE [DISTWIDTH] IN BLOOD BY AUTOMATED COUNT: 16.6 % (ref 10–15)
GFR SERPL CREATININE-BSD FRML MDRD: 73 ML/MIN/1.73M2
GLUCOSE BLD-MCNC: 95 MG/DL (ref 70–99)
GLUCOSE BLDC GLUCOMTR-MCNC: 120 MG/DL (ref 70–99)
GLUCOSE BLDC GLUCOMTR-MCNC: 211 MG/DL (ref 70–99)
GLUCOSE BLDC GLUCOMTR-MCNC: 84 MG/DL (ref 70–99)
GLUCOSE BLDC GLUCOMTR-MCNC: 94 MG/DL (ref 70–99)
GLUCOSE BLDC GLUCOMTR-MCNC: 96 MG/DL (ref 70–99)
GLUCOSE BLDC GLUCOMTR-MCNC: 98 MG/DL (ref 70–99)
HCT VFR BLD AUTO: 37.5 % (ref 35–47)
HGB BLD-MCNC: 11.2 G/DL (ref 11.7–15.7)
MAGNESIUM SERPL-MCNC: 3 MG/DL (ref 1.6–2.3)
MCH RBC QN AUTO: 26.2 PG (ref 26.5–33)
MCHC RBC AUTO-ENTMCNC: 29.9 G/DL (ref 31.5–36.5)
MCV RBC AUTO: 88 FL (ref 78–100)
NT-PROBNP SERPL-MCNC: 428 PG/ML (ref 0–900)
PHOSPHATE SERPL-MCNC: 4.1 MG/DL (ref 2.5–4.5)
PLATELET # BLD AUTO: 311 10E3/UL (ref 150–450)
POTASSIUM BLD-SCNC: 3.6 MMOL/L (ref 3.4–5.3)
RBC # BLD AUTO: 4.28 10E6/UL (ref 3.8–5.2)
SODIUM SERPL-SCNC: 146 MMOL/L (ref 133–144)
WBC # BLD AUTO: 12.8 10E3/UL (ref 4–11)

## 2021-10-14 PROCEDURE — 97162 PT EVAL MOD COMPLEX 30 MIN: CPT | Mod: GP

## 2021-10-14 PROCEDURE — 84100 ASSAY OF PHOSPHORUS: CPT | Performed by: INTERNAL MEDICINE

## 2021-10-14 PROCEDURE — 250N000013 HC RX MED GY IP 250 OP 250 PS 637: Performed by: INTERNAL MEDICINE

## 2021-10-14 PROCEDURE — G0463 HOSPITAL OUTPT CLINIC VISIT: HCPCS

## 2021-10-14 PROCEDURE — 97530 THERAPEUTIC ACTIVITIES: CPT | Mod: GP

## 2021-10-14 PROCEDURE — 99233 SBSQ HOSP IP/OBS HIGH 50: CPT | Performed by: INTERNAL MEDICINE

## 2021-10-14 PROCEDURE — 82374 ASSAY BLOOD CARBON DIOXIDE: CPT | Performed by: STUDENT IN AN ORGANIZED HEALTH CARE EDUCATION/TRAINING PROGRAM

## 2021-10-14 PROCEDURE — 83880 ASSAY OF NATRIURETIC PEPTIDE: CPT

## 2021-10-14 PROCEDURE — 250N000011 HC RX IP 250 OP 636: Performed by: STUDENT IN AN ORGANIZED HEALTH CARE EDUCATION/TRAINING PROGRAM

## 2021-10-14 PROCEDURE — 83735 ASSAY OF MAGNESIUM: CPT | Performed by: INTERNAL MEDICINE

## 2021-10-14 PROCEDURE — 92610 EVALUATE SWALLOWING FUNCTION: CPT | Mod: GN

## 2021-10-14 PROCEDURE — 85027 COMPLETE CBC AUTOMATED: CPT | Performed by: STUDENT IN AN ORGANIZED HEALTH CARE EDUCATION/TRAINING PROGRAM

## 2021-10-14 PROCEDURE — 250N000013 HC RX MED GY IP 250 OP 250 PS 637

## 2021-10-14 PROCEDURE — 250N000011 HC RX IP 250 OP 636

## 2021-10-14 PROCEDURE — 200N000002 HC R&B ICU UMMC

## 2021-10-14 PROCEDURE — 92526 ORAL FUNCTION THERAPY: CPT | Mod: GN

## 2021-10-14 PROCEDURE — 250N000013 HC RX MED GY IP 250 OP 250 PS 637: Performed by: STUDENT IN AN ORGANIZED HEALTH CARE EDUCATION/TRAINING PROGRAM

## 2021-10-14 RX ORDER — BUMETANIDE 1 MG/1
1 TABLET ORAL ONCE
Status: COMPLETED | OUTPATIENT
Start: 2021-10-14 | End: 2021-10-14

## 2021-10-14 RX ORDER — HYDROXYZINE HYDROCHLORIDE 25 MG/1
25 TABLET, FILM COATED ORAL EVERY 6 HOURS PRN
Status: DISCONTINUED | OUTPATIENT
Start: 2021-10-14 | End: 2021-10-27 | Stop reason: HOSPADM

## 2021-10-14 RX ORDER — GUAIFENESIN 600 MG/1
15 TABLET, EXTENDED RELEASE ORAL DAILY
Status: DISCONTINUED | OUTPATIENT
Start: 2021-10-15 | End: 2021-10-15

## 2021-10-14 RX ADMIN — BUMETANIDE 1 MG: 1 TABLET ORAL at 14:05

## 2021-10-14 RX ADMIN — ENOXAPARIN SODIUM 80 MG: 80 INJECTION SUBCUTANEOUS at 20:32

## 2021-10-14 RX ADMIN — ATORVASTATIN CALCIUM 40 MG: 40 TABLET, FILM COATED ORAL at 08:12

## 2021-10-14 RX ADMIN — INSULIN ASPART 2 UNITS: 100 INJECTION, SOLUTION INTRAVENOUS; SUBCUTANEOUS at 13:04

## 2021-10-14 RX ADMIN — DEXAMETHASONE SODIUM PHOSPHATE 6 MG: 4 INJECTION, SOLUTION INTRA-ARTICULAR; INTRALESIONAL; INTRAMUSCULAR; INTRAVENOUS; SOFT TISSUE at 08:12

## 2021-10-14 RX ADMIN — ENOXAPARIN SODIUM 80 MG: 80 INJECTION SUBCUTANEOUS at 08:12

## 2021-10-14 RX ADMIN — MICONAZOLE NITRATE: 20 CREAM TOPICAL at 08:15

## 2021-10-14 RX ADMIN — ASPIRIN 81 MG CHEWABLE TABLET 81 MG: 81 TABLET CHEWABLE at 08:12

## 2021-10-14 RX ADMIN — BUMETANIDE 1 MG: 0.25 INJECTION INTRAMUSCULAR; INTRAVENOUS at 15:15

## 2021-10-14 RX ADMIN — BUMETANIDE 1 MG: 0.25 INJECTION INTRAMUSCULAR; INTRAVENOUS at 08:12

## 2021-10-14 RX ADMIN — HYDROXYZINE HYDROCHLORIDE 25 MG: 25 TABLET, FILM COATED ORAL at 15:15

## 2021-10-14 RX ADMIN — MICONAZOLE NITRATE: 20 CREAM TOPICAL at 20:33

## 2021-10-14 ASSESSMENT — ACTIVITIES OF DAILY LIVING (ADL)
ADLS_ACUITY_SCORE: 13
ADLS_ACUITY_SCORE: 14
DEPENDENT_IADLS:: INDEPENDENT
ADLS_ACUITY_SCORE: 14
ADLS_ACUITY_SCORE: 14
ADLS_ACUITY_SCORE: 13
ADLS_ACUITY_SCORE: 13

## 2021-10-14 ASSESSMENT — MIFFLIN-ST. JEOR: SCORE: 2023.5

## 2021-10-14 NOTE — PROGRESS NOTES
10/14/21 0947   General Information   Onset of Illness/Injury or Date of Surgery 10/06/21   Referring Physician Jl Jean-Baptiste MD   Patient/Family Therapy Goal Statement (SLP) None stated   Pertinent History of Current Problem Chika Ferguson is a 61 year old female with history of CAD s/p PCI, HFpEF, HLD, suspected COPD, CARL, morbid obesity, GERD, depression, and anxiety who was admitted to MICU from OSH on 10/6 with acute hypoxic/hypercapnic respiratory failure. Intubated prior to transfer. Subsequently found to have MSSA pneumonia, +COVID-19 PCR, acute heart failure with RV dsyfunction, pulmonary HTN, and TIMMY. Extubated 10/13. Transferred to medicine 10/13 for ongoing management. Clinical swallow eval completed per MD orders to further assess oropharyngeal swallow function.   Past History of Dysphagia No hx of dysphagia per chart review or pt report. RN reports pt passed nursing swallow screen and reports pt has been tolerating water without difficulty.    Type of Evaluation   Type of Evaluation Swallow Evaluation   Oral Motor   Oral Musculature generally intact   Structural Abnormalities none present   Mucosal Quality dry   Dentition (Oral Motor)   Dentition (Oral Motor) adequate dentition   Facial Symmetry (Oral Motor)   Facial Symmetry (Oral Motor) WNL   Lip Function (Oral Motor)   Lip Range of Motion (Oral Motor) WNL   Tongue Function (Oral Motor)   Tongue ROM (Oral Motor) WNL   Jaw Function (Oral Motor)   Jaw Function (Oral Motor) WNL   Cough/Swallow/Gag Reflex (Oral Motor)   Soft Palate/Velum (Oral Motor) WNL   Volitional Throat Clear/Cough (Oral Motor) WNL   Vocal Quality/Secretion Management (Oral Motor)   Vocal Quality (Oral Motor) WFL   Secretion Management (Oral Motor) WNL   General Swallowing Observations   Current Diet/Method of Nutritional Intake (General Swallowing Observations, NIS) NPO   Respiratory Support (General Swallowing Observations) supplemental oxygen;nasal cannula   Swallowing  Evaluation Clinical swallow evaluation   Clinical Swallow Evaluation   Feeding Assistance frequent cues/help required   Clinical Swallow Evaluation Textures Trialed thin liquids;pureed;solid foods   Clinical Swallow Eval: Thin Liquid Texture Trial   Mode of Presentation, Thin Liquids straw;fed by clinician;self-fed   Volume of Liquid or Food Presented 6 oz   Oral Phase of Swallow Premature pharyngeal entry   Pharyngeal Phase of Swallow impaired;coughing/choking   Diagnostic Statement Thin liquids via straw resulted in overt s/sx of aspiration marked by coughing on initial trial. No additional overt s/sx of aspiration on additional thin liquid trials via straw.    Clinical Swallow Evaluation: Puree Solid Texture Trial   Mode of Presentation, Puree spoon;self-fed;fed by clinician   Volume of Puree Presented 3 tbsp   Oral Phase, Puree WFL   Pharyngeal Phase, Puree intact   Diagnostic Statement No overt s/sx of aspiration   Clinical Swallow Evaluation: Solid Food Texture Trial   Mode of Presentation self-fed;fed by clinician   Volume Presented 3 tbsp   Oral Phase other (see comments)  (prolonged but functional time for mastication)   Pharyngeal Phase intact   Diagnostic Statement No overt s/sx of aspiration. Pt required mildly prolonged but functional time for mastication. Mild increased WOB noted with mastication.    Esophageal Phase of Swallow   Patient reports or presents with symptoms of esophageal dysphagia Yes   Esophageal comments Hx of GERD per chart review.    Swallowing Recommendations   Diet Consistency Recommendations thin liquids (level 0);regular diet   Supervision Level for Intake 1:1 supervision needed   Mode of Delivery Recommendations bolus size, small;food moistened;slow rate of intake   Swallowing Maneuver Recommendations alternate food and liquid intake   Monitoring/Assistance Required (Eating/Swallowing) stop eating activities when fatigue is present;optimize oral intake to minimize need for tube  feeding;monitor for cough or change in vocal quality with intake   Recommended Feeding/Eating Techniques (Swallow Eval) maintain upright posture during/after eating for 30 minutes;maintain upright sitting position for eating;minimize distractions during oral intake;provide assist with feeding;provide 6 smaller meals throughout day;set-up and prepare tray   Medication Administration Recommendations, Swallowing (SLP) as tolerated   General Therapy Interventions   Planned Therapy Interventions Dysphagia Treatment   Dysphagia treatment Compensatory strategies for swallowing;Instruction of safe swallow strategies   SLP Therapy Assessment/Plan   Criteria for Skilled Therapeutic Interventions Met (SLP Eval) yes;treatment indicated   SLP Diagnosis mild oropharyngeal dysphagia    Rehab Potential (SLP Eval) fair, will monitor progress closely   Therapy Frequency (SLP Eval) 4 times/wk   Predicted Duration of Therapy Intervention (SLP Eval) 1 week   Comment, Therapy Assessment/Plan (SLP) Clinical swallow eval completed per MD orders. Pt presents with mild oropharyngeal dysphagia s/p extubation in the setting of generalized weakness. Pt with dry oral cavity and mild increased WOB at baseline, otherwise oral musculature WFL. Initial trial of thin liquids via straw resulted in overt s/sx of aspiration marked by coughing. No additional overt s/sx of aspiration on additional thin liquids via straw with cues to take small sips. Pt tolerated pureed textures and regular solid textures with no overt s/sx of aspiration. Mild increased WOB noted with mastication. Recommend pt initiate regular textures and thin liquids with 1:1 supervision. Pt should be fully upright and alert for all PO, take small sips/bites, slow pacing, and alternate between consistencies. ST to continue to follow to assess diet tolerance and train safe swallow strategies. Pt may meet swallowing goals prior to discharge.    Therapy Plan Review/Discharge Plan (SLP)    Therapy Plan Review (SLP) evaluation/treatment results reviewed;care plan/treatment goals reviewed;risks/benefits reviewed;participants voiced agreement with care plan;current/potential barriers reviewed;participants included;patient   Demonstrates Need for Referral to Another Service (SLP) occupational therapist;clinical nutrition services/dietitian;physical therapist   SLP Discharge Planning    SLP Discharge Recommendation (DC Rec) Transitional Care Facility   SLP Rationale for DC Rec pt may meet swallowing goals prior to discharge   SLP Brief overview of current status   Recommend pt initiate regular textures and thin liquids with 1:1 supervision. Pt should be fully upright and alert for all PO, take small sips/bites, slow pacing, and alternate between consistencies.     Total Evaluation Time   Total Evaluation Time (Minutes) 13

## 2021-10-14 NOTE — PROGRESS NOTES
10/14/21 1300   Quick Adds   Type of Visit Initial PT Evaluation   Living Environment   People in home child(delfin), adult   Current Living Arrangements house   Home Accessibility stairs to enter home;stairs within home   Number of Stairs, Main Entrance 1   Number of Stairs, Within Home, Primary   (flight up to 2nd level)   Transportation Anticipated family or friend will provide   Living Environment Comments Lives on main level and doesn't use stairs normally. 1 [address] main level   Self-Care   Usual Activity Tolerance moderate   Current Activity Tolerance fair   Regular Exercise No   Equipment Currently Used at Home none   Activity/Exercise/Self-Care Comment Watches TV and doesn't do much normally. More challenging to get around since  passed away.   Disability/Function   Fall history within last six months no   Change in Functional Status Since Onset of Current Illness/Injury yes   General Information   Onset of Illness/Injury or Date of Surgery 10/06/21   Patient/Family Therapy Goals Statement (PT) Wants to sit in chair   Pertinent History of Current Problem (include personal factors and/or comorbidities that impact the POC) Chika Ferguson is a 61 year old female with history of CAD s/p PCI, HFpEF, HLD, suspected COPD, CARL, morbid obesity, GERD, depression, and anxiety who was admitted to MICU from OSH on 10/6 with acute hypoxic/hypercapnic respiratory failure. Intubated prior to transfer. Subsequently found to have MSSA pneumonia, +COVID-19 PCR, acute heart failure with RV dsyfunction, pulmonary HTN, and TIMMY. Extubated 10/13. Transferred to medicine 10/13 for ongoing management.   Existing Precautions/Restrictions oxygen therapy device and L/min;fall   Cognition   Orientation Status (Cognition) oriented to;person   Affect/Mental Status (Cognition) confused;low arousal/lethargic;flat/blunted affect   Follows Commands (Cognition) follows one-step commands   Safety Deficit (Cognition) moderate deficit   Pain  Assessment   Patient Currently in Pain No   Integumentary/Edema   Integumentary/Edema Comments Mild non pitting edema BLE   Posture    Posture Forward head position;Protracted shoulders;Kyphosis   Range of Motion (ROM)   ROM Comment B LE AROM mildly limited due to weakness   Strength   Strength Comments B LE strength grossly 3+ to 4/5 strength   Bed Mobility   Comment (Bed Mobility) B rolling iwth cues and mod A   Transfers   Transfer Safety Comments Sit>stand from recliner with max A x 2 and FWW   Gait/Stairs (Locomotion)   Comment (Gait/Stairs) Not assessed; pt not appropriate   Balance   Balance Comments Max A for standing balance, sitting balance requires mod A   Sensory Examination   Sensory Perception patient reports no sensory changes   Clinical Impression   Criteria for Skilled Therapeutic Intervention yes, treatment indicated   PT Diagnosis (PT) Impaired functional mobility   Influenced by the following impairments Weakness, cognition, poor posture, body habitus, O2 dependent at baseline   Functional limitations due to impairments Bed mobility, transfers, gait, balance, endurance   Clinical Presentation Evolving/Changing   Clinical Presentation Rationale Clinical judgement   Clinical Decision Making (Complexity) moderate complexity   Therapy Frequency (PT) 5x/week   Predicted Duration of Therapy Intervention (days/wks) 3 weeks   Planned Therapy Interventions (PT) balance training;bed mobility training;gait training;home exercise program;neuromuscular re-education;patient/family education;postural re-education;ROM (range of motion);stair training;strengthening;stretching;transfer training;progressive activity/exercise;home program guidelines   Risk & Benefits of therapy have been explained evaluation/treatment results reviewed;care plan/treatment goals reviewed;risks/benefits reviewed;current/potential barriers reviewed;participants voiced agreement with care plan;participants included;patient   PT Discharge  Planning    PT Discharge Recommendation (DC Rec) Acute Rehab Center-Motivated patient will benefit from intensive, interdisciplinary therapy.  Anticipate will be able to tolerate 3 hours of therapy per day   PT Rationale for DC Rec Son present to assist upon d/c, motivated and moderately weak   PT Brief overview of current status  Ceiling lift bed<>chair   Total Evaluation Time   Total Evaluation Time (Minutes) 5

## 2021-10-14 NOTE — CONSULTS
Care Management Initial Consult    General Information  Assessment completed with: Family, Christiano, alexi  Type of CM/SW Visit: Initial Assessment    Primary Care Provider verified and updated as needed: Yes   Readmission within the last 30 days:        Reason for Consult: discharge planning  Advance Care Planning: Advance Care Planning Reviewed: concerns discussed          Communication Assessment  Patient's communication style: spoken language (English or Bilingual)    Hearing Difficulty or Deaf: no   Wear Glasses or Blind: no    Cognitive  Cognitive/Neuro/Behavioral: .WDL except  Level of Consciousness: alert  Arousal Level: opens eyes spontaneously  Orientation: oriented x 4  Mood/Behavior: calm, cooperative  Best Language: 0 - No aphasia  Speech: hoarse    Living Environment:   People in home: child(delfin), adult  Son christiano and his wife  Current living Arrangements: house      Able to return to prior arrangements: other (see comments) (MARQUES)       Family/Social Support:  Care provided by: child(delfin), self  Provides care for: no one  Marital Status: Single  Children          Description of Support System: Involved, Supportive    Support Assessment: Adequate social supports    Current Resources:   Patient receiving home care services: No     Community Resources: None  Equipment currently used at home: none (per son, no equipment used)  Supplies currently used at home: None    Employment/Financial:  Employment Status: retired        Financial Concerns: No concerns identified   Referral to Financial Counselor: No       Lifestyle & Psychosocial Needs:  Social Determinants of Health     Tobacco Use:      Smoking Tobacco Use:      Smokeless Tobacco Use:    Alcohol Use:      Frequency of Alcohol Consumption:      Average Number of Drinks:      Frequency of Binge Drinking:    Financial Resource Strain:      Difficulty of Paying Living Expenses:    Food Insecurity:      Worried About Running Out of Food in the Last Year:       Ran Out of Food in the Last Year:    Transportation Needs:      Lack of Transportation (Medical):      Lack of Transportation (Non-Medical):    Physical Activity:      Days of Exercise per Week:      Minutes of Exercise per Session:    Stress:      Feeling of Stress :    Social Connections:      Frequency of Communication with Friends and Family:      Frequency of Social Gatherings with Friends and Family:      Attends Ep[employer] Services:      Active Member of Clubs or Organizations:      Attends Club or Organization Meetings:      Marital Status:    Intimate Partner Violence:      Fear of Current or Ex-Partner:      Emotionally Abused:      Physically Abused:      Sexually Abused:    Depression:      PHQ-2 Score:    Housing Stability:      Unable to Pay for Housing in the Last Year:      Number of Places Lived in the Last Year:      Unstable Housing in the Last Year:        Functional Status:  Prior to admission patient needed assistance:   Dependent ADLs:: Independent  Dependent IADLs:: Independent       Mental Health Status:  Mental Health Status: Other (see comment) (MARQUES)       Chemical Dependency Status:  Chemical Dependency Status: No Current Concerns             Values/Beliefs:  Spiritual, Cultural Beliefs, Ep[employer]opalian Practices, Values that affect care: yes               Additional Information:  Called patients son to discuss discharge planning and initial assessment. Per son, patient live with him and his wife in a house in [place]. He stated that she is completely independent  at home but was weak leading up to hospitalization. She was able to run her own errands and driving when she wanted to per the son. She does not receive any services at this time at home. Writer instructed the patients son that physical therapy is recommending TCU after hospitalization. Writer explained the services they provide, and he agreed that that would be a good place for her to go if needed. RN[facility] will continue to check in  with family and needs for discharge.    Melisa Tay  RN Care Coordinator   MICU/SICU  [phone]         Melisa Tay, RN

## 2021-10-14 NOTE — PROGRESS NOTES
WO Nurse Inpatient Wound Assessment   Reason for consultation: Evaluate and treat  Buttock, pannus and heel wounds    Assessment  Buttock and pannus wounds due to Incontinence Associated Dermatitis (IAD), Intertrigo and Moisture Associated Skin Damage (MASD) with fungal involvement   Status: improving, noted small pimple appearing open area to R fleshy buttock on assessment today      R plantar heel wound due to pressure injury, present on admission  Pressure injury is stage 2   Status: stable    R posterior heel with pressure injury, hospital acquired, Stage Deep tissue pressure injury  Status: initial assessment     Treatment Plan  Buttock wounds: BID apply Osmar antifungal (antifungal with skin protectant) paste (MD order) over wound sites. With incontinence, cleanse with Osmar cleanse and protect and paper washclothes. Can apply critic-aid paste in between antifungal applications to maintain barrier. If full removal needed, please use baby oil to reduce friction forces to the skin.     Pannus rash: BID cleanse with microKlenz and dry thouroughly, then apply Osmar antifungal paste (MD order) over red areas in a thin layer    R plantar and posterior heel: Every third day cleanse with microKlenz and dry, apply Cavilon no sting barrier film to skin around wound and let dry, then place mepilex. Ensure heel elevated off mattress at all times using pillow under the calves.      Orders Written  WO Nurse follow-up plan:weekly  Nursing to notify the Provider(s) and re-consult the WO Nurse if wound(s) deteriorates or new skin concern.    Patient History  According to provider note(s):  Chika Ferguson is a 61 year old female who presents with acute hypoxic respiratory failure. Her PMH includes pulmonary hypertension, history of Covid at the end of 2020, COPD, CARL, CAD with history of stenting (unknown vessel-no records). Cards consulted for evaluation of severe RV dysfunction noted on echo.    Objective Data  Containment  of urine/stool: Incontinence Protocol    Active Diet Order  Orders Placed This Encounter      Regular Diet Adult      Output:   I/O last 3 completed shifts:  In: 1742 [P.O.:1280; I.V.:242; NG/GT:100]  Out: 2880 [Urine:2880]    Risk Assessment:   Sensory Perception: 2-->very limited  Moisture: 2-->very moist  Activity: 2-->chairfast  Mobility: 2-->very limited  Nutrition: 2-->probably inadequate  Friction and Shear: 2-->potential problem  Mychal Score: 12                          Labs:   Recent Labs   Lab 10/14/21  0322 10/12/21  0450 10/11/21  0449 10/09/21  0336 10/08/21  0354   ALBUMIN  --   --   --   --  2.4*   HGB 11.2*   < > 9.4*   < > 9.6*   WBC 12.8*   < > 9.3   < > 13.1*   CRP  --   --  38.0*   < > 170.0*    < > = values in this interval not displayed.       Physical Exam  Areas of skin assessed: focused buttock, pannus and heels      Wound Location:  R posterior heel   Date of last photo 10/14 (blurry due to being in plastic for covid precautions   Wound History: hospital acquired pressure injury       Wound Base: 100 % blister blood filled      Palpation of the wound bed: spongy      Drainage: none     Description of drainage: none     Measurements (length x width x depth, in cm)1.5cm x 2.5cm x 0cm      Tunneling N/A     Undermining N/A  Periwound skin: dry/scaly and hyperkeratosis      Color: normal and consistent with surrounding tissue      Temperature: normal   Odor: mild  Pain: pt denies      Wound Location:  R plantar heel   Date of last photo 10/6  Wound History: patient found stuck in chair for extended period of time likely causing plantar heel pressure injury         Wound Base: 100 % dermis     Palpation of the wound bed: normal      Drainage: moderate     Description of drainage: serous     Measurements (length x width x depth, in cm) 3  x 4  x  0.1 cm      Tunneling N/A     Undermining N/A  Periwound skin: dry/scaly and hyperkeratosis      Color: normal and consistent with surrounding tissue       Temperature: normal   Odor: mild  Pain: unable to assess due to  sedation ,     Pannus and buttock skin folds with bright red erythema, satellite lesions and yeast like odor consistent with fungal rash due to incontinence while stuck in chair for a number of days. --yeast has improved, noted a pea sized raised open area to R buttock, appears to be opened pimple with possible shear force damage     Interventions  Visual inspection and assessment completed   Wound Care Rationale Promote moist wound healing without tissue dehydration , Provide protection  and Decrease bacterial load  Wound Care: completed by RN  Supplies: floor stock and discussed with RN  Current off-loading measures: Pillows  Current support surface: Standard  Low air loss mattress  Education provided to: plan of care  Discussed plan of care with Nurse    Dafne Gonzáles RN, CWOCN

## 2021-10-14 NOTE — PLAN OF CARE
neurologicay intact ex slow with orientation questions/needs yes/no intermittently; afebrile, SB to SR with rare-occasional ectopy, BP WDL; Lung Sounds clear, denies SOB on 4-6L NC; arthur in place with WDL output; rectal pouch placed for loose BMs 6+ in 24hrs; Fentanyl discontinued, dressings changed    See Flowsheets/MAR for details    transfer to floor when possible

## 2021-10-14 NOTE — PROGRESS NOTES
Physician Attestation   I, Ed Wren, was present with the medical/KEY student who participated in the service and in the documentation of the note.  I have verified the history and personally performed the physical exam and medical decision making.  I agree with the assessment and plan of care as documented in the note.      I personally reviewed vital signs, medications, labs and imaging.    61 year old female with a PMH notable for CAD s/p PCI to mLAD (2016), HFpEF, HLD, suspected COPD, CARL on CPAP, morbid obesity, and anxiety who was admitted to ICU with acute hypoxic and hypercapnic respiratory failure requiring intubation 2/2 to COVID and MSSA pneumonia leading to ARDS. She completed a course of antibiotics for MSSA and received Dexamethasone, Tocilizumab, and Remdesivir x1 for COVID. She has also been aggressively diuresed given her history of CARL/HFpEF with concern for possible underlying pulmonary HTN for which Cardiology had been following early on in her hospitalization. She was extubated on 10/13 and transferred to hospital medicine for further cares.    On evaluation today, patient reports she is feeling much improved. Her breathing feels more comfortable though she is short of breath with activity. She notes that she is typically on 3-4L O2 at home and uses CPAP as well at night time for sleep. She otherwise denies any chest pain/pressure, fevers/chills, abdominal pain, nausea, or vomiting. She does report her breathing is worsened by anxiety, and she admits to significant anxiety while being hospitalized. She is hopeful to able to return home after discharge.    On exam, she is comfortable appearing though has some abdominal muscle use with respiration. She is speaking in full sentences and in no acute distress. Her mucus membranes are moist. Her heart is regular without m/r/g but systolic murmur is appreciated over the apex. Lung sounds are diminished at the bilateral bases and no wheezes  are appreciated. Her abdomen is softly distended but non-tender. Her extremities are warm and well perfused. Labs reviewed: Na 146, Cr 0.85, WBC 12.8    Overall, agree with plans below. For her acute on chronic respiratory failure, we will continue efforts at diuresis and obtain VQ scan to evaluate for pulmonary HTN. Will touch base with Cardiology pending results regarding timing/need for inpatient RHC to formally diagnose pulmonary HTN. Continue efforts at O2 wean and clarify home O2 use with son. Resume CPAP at bedtime.     Regarding COVID pneumonia, continue Dexamethasone through 10/17 and Lovenox 0.5 mg/kg for anticoagulation. As her respiratory status is improving, will hold on further doses of Remdesivir.    Hopeful that increased PO intake will help resolve hypernatremia now that she has been cleared for a regular diet. We will also remove her arterial and central lines. Monitor BGs as PO intake improves. Appreciate PT/OT recs, likely will need to go to TCU for rehabilitation.    Ed Wren MD  Date of Service (when I saw the patient): 10/14/21    [facility]    Progress Note - Gold 9 Service        Date of Admission:  10/6/2021    Assessment & Plan             Chika Ferguson is a 61 year old female with history of CAD s/p PCI, HFpEF, HLD, suspected COPD, CARL, morbid obesity, GERD, depression, and anxiety who was admitted to MICU from OSH on 10/6 with acute hypoxic/hypercapnic respiratory failure. Intubated prior to transfer. Subsequently found to have MSSA pneumonia, +COVID-19 PCR, acute heart failure with RV dsyfunction, pulmonary HTN, and TIMMY. Extubated 10/13. Transferred to medicine 10/13 for ongoing management.     # Acute hypoxic-hypercapnic respiratory failure, multifactorial.  Hypoxic on arrival w O2 sats in 50's. VBG with pH 7.05 and pCO2 81.  Intubated in ED. Bibasilar opacities noted on CXR. CT Chest with possible atelectasis, otherwise clear  lungs. WBC 19.6. Lactate 8.4. COVID PCR negative. Pro-BNP elevated. Given relatively clear lung fields on imaging, initial impression CHF vs possible pneumonia. Treated with diuretics and antibiotics.  CTA Chest on at [facility] negative for PE, again showed evidence of atelectasis. Sputum cultures grew MSSA. Repeat COVID PCR + 10/6. Severe RV dysfunction and dilated IVC noted on TTE with preserved EF. Continued treatment for CAP, COVID-19, and CHF. CRP and BNP down-trending. Compensated respiratory acidosis on repeat blood gas. Extubated 10/13. On 7 L NC, with reports of home oxygen use at 3-4 lpm.  - Wean O2 as able  - CPAP at night  - See below     # Community acquired pneumonia d/t MSSA.  WBC 19.6 on arrival. CT Chest x 2 without infiltrates. Afebrile. Sputum culture 10/6 grew MSSA.. Completed a 5 day course of ceftriaxone + azithromycin.  CRP down-trending (170 to 38).   - Monitor clinically      # Positive COVID-19 PCR.  History of COVID infection in 12/2020, did not require hospitalization. Completed vaccination series 5/2021. +Exposure recently. COVID PCR negative at OSH but repeat PCR positive on admission. DDx includes reinfection vs persistent positive PCR from prior infection. CT w/o diffuse GGO to suggest COVID pneumonia. S/p Remdesivir and Tocilizumab x 1 on 10/7 (unclear why remdesivir wasn't continued), as well as steroids.   - Continue dexamethasone through 10/17  - Suspect mild COVID given other possible etiologies of hypoxia and relatively normal imaging, would consider 10 days of special precautions     # Acute on chronic HFpEF and RV failure  # Pulmonary HTN, probable Group III  Prior history of HFpEF and pulmonary HTN. On Bumex at home. NTproBNP 2264 on admission. TTE (10/6) LVEF 55-60%, mild LVH, indeterminate diastolic function, moderate to severe RV dilation w moderate to severely reduced RV function, and dilated IVC. Pulmonary pressures could not be assessed on TTE. CTA Chest negative for PE but  showed severely dilated pulmonary trunk c/w pulmonary HTN. Cardiology consulted. Suspect group III pulmonary HTN, although CTEPH cannot be ruled out. RV failure likely chronic given RV dilation on imaging. Clinically improved w IV Bumex. Weight down 31 lbs since admission. Last seen by cardiology 10/7, recommendations reviewed.  - V/Q scan ordered for 10/14 PM  - Possible RHC pending results  - Continue lovenox 1mg/kg BID for now  - Will contact cardiology service for ongoing recommendations  - Continue Bumex 1mg BID, goal net even   - One extra dose of Bumex 1 mg 10/14  - Daily weights, I&O's  - Repeat BNP in AM     # CAD:  Hx PCI w stenting of mLAD in 2016. Mild troponin elevation on admission felt to be demand ischemia. TTE with normal wall motion. Cardiology consulted, ACS not suspected.   - Continue ASA + statin  - BB on hold d/t borderline low BP in ICU     # TIMMY - resolved:  Cr 1.95 at OSH, 1.68 on admission. Baseline unknown. UA with hyaline casts, otherwise bland. Renal US negative for hydro. Suspect prerenal azotemia in setting of acute CHF.   - Repeat BMP in AM      # Steroid-induced hyperglycemia.  No history of DM. A1c 6.7 on admission. Currently well controlled on sliding scale.   - MSSI q4h   - Hypoglycemia protocol       # Hypernatremia.  Restart regular diet 10/14. Suspect PO intake will improve Na.  - Repeat BMP in AM     # Hypokalemia.  Resolved. Likely 2/2 diuresis. Monitor     # Chronic anemia.  No evidence of active bleeding. Hgb stable.  - CBC in AM     # Malnutrition risk.  Does not meet criteria for malnutrition.   - SLP consulted (10/14)- regular diet     # GERD.  Continue protonix.      # Pressure ulcers, POA.  Essentially chair-bound. Gets up weekly to ambulate. Increased weakness lately.  - WOCN consulted   - PT/OT consults when clinically stable       Diet: Regular Diet Adult    DVT Prophylaxis: Enoxaparin (Lovenox) SQ  Lopes Catheter: PRESENT, indication: Strict 1-2 Hour I&O  Fluids:  PO intake  Central Lines: None  Code Status: Full Code      Disposition Plan   Expected discharge: Pending clinical improvement recommended to discharge based on PT recommendations once O2 use is less then or equal to 3-4.     The patient's care was discussed with the Attending Physician, Dr. Wren.    Griselda Martínez  Medical Student  [address]  Securely message with the Vocera Web Console (learn more here)  Text page via [employer] Paging/Directory  Please see sign in/sign out for up to date coverage information    ______________________________________________________________________    Interval History   NAOE. Is sitting up in the chair. Reports significant anxiety at baseline. Sometime this effects her breathing making it challenging to determine if she is short of breath because of anxiety or COVID/PNA. Denies fever, chills, chest pain, or abdominal pain.    4 point ROS is negative unless noted above.    Data reviewed today: I reviewed all medications, new labs and imaging results over the last 24 hours. I personally reviewed no images or EKG's today.    Physical Exam   Vital Signs: Temp: 98.1  F (36.7  C) Temp src: Axillary   Pulse: 71   Resp: 18 SpO2: 97 % O2 Device: Nasal cannula Oxygen Delivery: 6 LPM  Weight: 313 lbs .85 oz  Constitutional: awake, alert, cooperative, no apparent distress  Respiratory: Slightly increased work of breathing, diminished air movement throughout  Cardiovascular: Regular rate and rhythm and no murmur noted  GI: Soft, non-distended, non-tender to palpation throughout  Musculoskeletal: minimal edema in the lower extremities bilaterally  Neuropsychiatric: General: normal, calm and normal eye contact  Level of consciousness: alert / normal  Affect: normal    Data   Recent Labs   Lab 10/14/21  1304 10/14/21  0825 10/14/21  0322 10/13/21  0431 10/13/21  0429 10/12/21  0457 10/12/21  0450 10/09/21  2029 10/09/21  1546  10/09/21  1227 10/09/21  1013 10/09/21  0826 10/09/21  0336 10/08/21  0742 10/08/21  7714   WBC  --   --  12.8*  --  9.6  --  9.3   < >  --   --   --   --  11.0   < > 13.1*   HGB  --   --  11.2*  --  9.8*  --  9.5*   < >  --   --   --   --  8.6*   < > 9.6*   MCV  --   --  88  --  87  --  88   < >  --   --   --   --  86   < > 84   PLT  --   --  311  --  285  --  286   < >  --   --   --   --  255   < > 282   NA  --   --  146*  --  148*  --  144   < >  --   --  144  --   --    < > 143   POTASSIUM  --   --  3.6  --  3.5  --  3.7   < >  --   --  4.1   < > 3.5   < > 3.4   CHLORIDE  --   --  106  --  105  --  105   < >  --   --  103  --   --    < > 103   CO2  --   --  40*  --  41*  --  40*   < >  --   --  39*  --   --    < > 38*   BUN  --   --  47*  --  50*  --  51*   < >  --   --  41*  --   --    < > 26   CR  --   --  0.85  --  0.90  --  0.93   < >  --   --  1.10*  --   --    < > 1.10*   ANIONGAP  --   --  <1*  --  2*  --  <1*   < >  --   --  2*  --   --    < > 2*   NAE  --   --  10.0  --  10.0  --  9.7   < >  --   --  8.6  --   --    < > 8.9   * 84 95   < > 126*   < > 127*   < >  --    < > 147*   < >  --    < > 164*   ALBUMIN  --   --   --   --   --   --   --   --   --   --   --   --   --   --  2.4*   PROTTOTAL  --   --   --   --   --   --   --   --   --   --   --   --   --   --  7.4   BILITOTAL  --   --   --   --   --   --   --   --   --   --   --   --   --   --  0.3   ALKPHOS  --   --   --   --   --   --   --   --   --   --   --   --   --   --  94   ALT  --   --   --   --   --   --   --   --   --   --   --   --   --   --  40   AST  --   --   --   --   --   --   --   --   --   --   --   --   --   --  31   TROPONIN  --   --   --   --   --   --   --   --  0.028  --  0.018  --  0.027   < > 0.036    < > = values in this interval not displayed.     No results found for this or any previous visit (from the past 24 hour(s)).  Medications     dextrose         aspirin  81 mg Oral Daily     atorvastatin  40 mg Oral Daily      bumetanide  1 mg Intravenous BID     bumetanide  1 mg Oral Once     dexamethasone  6 mg Intravenous Daily     enoxaparin ANTICOAGULANT  0.5 mg/kg (Dosing Weight) Subcutaneous BID     influenza recomb quadrivalent PF  0.5 mL Intramuscular Prior to discharge     insulin aspart  1-6 Units Subcutaneous Q4H     miconazole with skin protectant   Topical BID     [START ON 10/15/2021] multivitamins w/minerals  15 mL Oral Daily     [START ON 10/15/2021] pantoprazole  40 mg Oral QAM AC     PARoxetine  40 mg Oral Daily     polyethylene glycol  17 g Oral or Feeding Tube Daily     senna-docusate  2 tablet Oral or Feeding Tube BID

## 2021-10-15 ENCOUNTER — APPOINTMENT (OUTPATIENT)
Dept: GENERAL RADIOLOGY | Facility: CLINIC | Age: 61
DRG: 207 | End: 2021-10-15
Attending: INTERNAL MEDICINE
Payer: MEDICARE

## 2021-10-15 ENCOUNTER — APPOINTMENT (OUTPATIENT)
Dept: PHYSICAL THERAPY | Facility: CLINIC | Age: 61
DRG: 207 | End: 2021-10-15
Attending: ANESTHESIOLOGY
Payer: MEDICARE

## 2021-10-15 ENCOUNTER — APPOINTMENT (OUTPATIENT)
Dept: SPEECH THERAPY | Facility: CLINIC | Age: 61
DRG: 207 | End: 2021-10-15
Attending: ANESTHESIOLOGY
Payer: MEDICARE

## 2021-10-15 LAB
ALBUMIN UR-MCNC: 30 MG/DL
ANION GAP SERPL CALCULATED.3IONS-SCNC: <1 MMOL/L (ref 3–14)
APPEARANCE UR: ABNORMAL
BILIRUB UR QL STRIP: NEGATIVE
BUN SERPL-MCNC: 47 MG/DL (ref 7–30)
CALCIUM SERPL-MCNC: 9.2 MG/DL (ref 8.5–10.1)
CHLORIDE BLD-SCNC: 105 MMOL/L (ref 94–109)
CO2 SERPL-SCNC: 42 MMOL/L (ref 20–32)
COLOR UR AUTO: ABNORMAL
CREAT SERPL-MCNC: 0.95 MG/DL (ref 0.52–1.04)
ERYTHROCYTE [DISTWIDTH] IN BLOOD BY AUTOMATED COUNT: 16.2 % (ref 10–15)
GFR SERPL CREATININE-BSD FRML MDRD: 64 ML/MIN/1.73M2
GLUCOSE BLD-MCNC: 92 MG/DL (ref 70–99)
GLUCOSE BLDC GLUCOMTR-MCNC: 112 MG/DL (ref 70–99)
GLUCOSE BLDC GLUCOMTR-MCNC: 116 MG/DL (ref 70–99)
GLUCOSE BLDC GLUCOMTR-MCNC: 123 MG/DL (ref 70–99)
GLUCOSE BLDC GLUCOMTR-MCNC: 128 MG/DL (ref 70–99)
GLUCOSE BLDC GLUCOMTR-MCNC: 94 MG/DL (ref 70–99)
GLUCOSE BLDC GLUCOMTR-MCNC: 99 MG/DL (ref 70–99)
GLUCOSE UR STRIP-MCNC: NEGATIVE MG/DL
HCT VFR BLD AUTO: 37.8 % (ref 35–47)
HGB BLD-MCNC: 11 G/DL (ref 11.7–15.7)
HGB UR QL STRIP: ABNORMAL
HYALINE CASTS: 5 /LPF
KETONES UR STRIP-MCNC: NEGATIVE MG/DL
LEUKOCYTE ESTERASE UR QL STRIP: NEGATIVE
MAGNESIUM SERPL-MCNC: 2.9 MG/DL (ref 1.6–2.3)
MCH RBC QN AUTO: 26.2 PG (ref 26.5–33)
MCHC RBC AUTO-ENTMCNC: 29.1 G/DL (ref 31.5–36.5)
MCV RBC AUTO: 90 FL (ref 78–100)
MUCOUS THREADS #/AREA URNS LPF: PRESENT /LPF
NITRATE UR QL: NEGATIVE
NT-PROBNP SERPL-MCNC: 616 PG/ML (ref 0–900)
PH UR STRIP: 5.5 [PH] (ref 5–7)
PHOSPHATE SERPL-MCNC: 4.1 MG/DL (ref 2.5–4.5)
PLATELET # BLD AUTO: 311 10E3/UL (ref 150–450)
POTASSIUM BLD-SCNC: 3.7 MMOL/L (ref 3.4–5.3)
RBC # BLD AUTO: 4.2 10E6/UL (ref 3.8–5.2)
RBC URINE: >182 /HPF
SODIUM SERPL-SCNC: 146 MMOL/L (ref 133–144)
SP GR UR STRIP: 1.02 (ref 1–1.03)
SQUAMOUS EPITHELIAL: 5 /HPF
TRANSITIONAL EPI: 1 /HPF
UROBILINOGEN UR STRIP-MCNC: NORMAL MG/DL
WBC # BLD AUTO: 13.8 10E3/UL (ref 4–11)
WBC URINE: 0 /HPF

## 2021-10-15 PROCEDURE — 71045 X-RAY EXAM CHEST 1 VIEW: CPT | Mod: 26 | Performed by: RADIOLOGY

## 2021-10-15 PROCEDURE — 82374 ASSAY BLOOD CARBON DIOXIDE: CPT | Performed by: STUDENT IN AN ORGANIZED HEALTH CARE EDUCATION/TRAINING PROGRAM

## 2021-10-15 PROCEDURE — 83880 ASSAY OF NATRIURETIC PEPTIDE: CPT

## 2021-10-15 PROCEDURE — 250N000011 HC RX IP 250 OP 636: Performed by: STUDENT IN AN ORGANIZED HEALTH CARE EDUCATION/TRAINING PROGRAM

## 2021-10-15 PROCEDURE — 83735 ASSAY OF MAGNESIUM: CPT | Performed by: INTERNAL MEDICINE

## 2021-10-15 PROCEDURE — 250N000011 HC RX IP 250 OP 636

## 2021-10-15 PROCEDURE — 250N000011 HC RX IP 250 OP 636: Performed by: INTERNAL MEDICINE

## 2021-10-15 PROCEDURE — 99233 SBSQ HOSP IP/OBS HIGH 50: CPT | Performed by: INTERNAL MEDICINE

## 2021-10-15 PROCEDURE — 92526 ORAL FUNCTION THERAPY: CPT | Mod: GN

## 2021-10-15 PROCEDURE — 250N000013 HC RX MED GY IP 250 OP 250 PS 637: Performed by: INTERNAL MEDICINE

## 2021-10-15 PROCEDURE — 250N000013 HC RX MED GY IP 250 OP 250 PS 637

## 2021-10-15 PROCEDURE — 200N000002 HC R&B ICU UMMC

## 2021-10-15 PROCEDURE — 85027 COMPLETE CBC AUTOMATED: CPT | Performed by: STUDENT IN AN ORGANIZED HEALTH CARE EDUCATION/TRAINING PROGRAM

## 2021-10-15 PROCEDURE — 81001 URINALYSIS AUTO W/SCOPE: CPT | Performed by: INTERNAL MEDICINE

## 2021-10-15 PROCEDURE — 250N000013 HC RX MED GY IP 250 OP 250 PS 637: Performed by: STUDENT IN AN ORGANIZED HEALTH CARE EDUCATION/TRAINING PROGRAM

## 2021-10-15 PROCEDURE — 97530 THERAPEUTIC ACTIVITIES: CPT | Mod: GP | Performed by: PHYSICAL THERAPIST

## 2021-10-15 PROCEDURE — 71045 X-RAY EXAM CHEST 1 VIEW: CPT

## 2021-10-15 PROCEDURE — 84100 ASSAY OF PHOSPHORUS: CPT | Performed by: INTERNAL MEDICINE

## 2021-10-15 PROCEDURE — 999N000157 HC STATISTIC RCP TIME EA 10 MIN

## 2021-10-15 PROCEDURE — 94660 CPAP INITIATION&MGMT: CPT

## 2021-10-15 RX ORDER — POTASSIUM CHLORIDE 29.8 MG/ML
20 INJECTION INTRAVENOUS ONCE
Status: COMPLETED | OUTPATIENT
Start: 2021-10-15 | End: 2021-10-15

## 2021-10-15 RX ORDER — AMOXICILLIN 250 MG
2 CAPSULE ORAL 2 TIMES DAILY PRN
Status: CANCELLED | OUTPATIENT
Start: 2021-10-15

## 2021-10-15 RX ORDER — AMOXICILLIN 250 MG
2 CAPSULE ORAL 2 TIMES DAILY
Status: CANCELLED | OUTPATIENT
Start: 2021-10-15

## 2021-10-15 RX ORDER — MULTIPLE VITAMINS W/ MINERALS TAB 9MG-400MCG
1 TAB ORAL DAILY
Status: DISCONTINUED | OUTPATIENT
Start: 2021-10-15 | End: 2021-10-27 | Stop reason: HOSPADM

## 2021-10-15 RX ORDER — PANTOPRAZOLE SODIUM 40 MG/1
40 TABLET, DELAYED RELEASE ORAL
Status: DISCONTINUED | OUTPATIENT
Start: 2021-10-16 | End: 2021-10-27 | Stop reason: HOSPADM

## 2021-10-15 RX ORDER — BISACODYL 10 MG
10 SUPPOSITORY, RECTAL RECTAL DAILY PRN
Status: CANCELLED | OUTPATIENT
Start: 2021-10-15

## 2021-10-15 RX ORDER — PAROXETINE 20 MG/1
40 TABLET, FILM COATED ORAL DAILY
Status: DISCONTINUED | OUTPATIENT
Start: 2021-10-15 | End: 2021-10-27 | Stop reason: HOSPADM

## 2021-10-15 RX ORDER — BUMETANIDE 0.25 MG/ML
1 INJECTION INTRAMUSCULAR; INTRAVENOUS ONCE
Status: COMPLETED | OUTPATIENT
Start: 2021-10-15 | End: 2021-10-15

## 2021-10-15 RX ORDER — POLYETHYLENE GLYCOL 3350 17 G/17G
17 POWDER, FOR SOLUTION ORAL DAILY
Status: CANCELLED | OUTPATIENT
Start: 2021-10-16

## 2021-10-15 RX ORDER — AMOXICILLIN 250 MG
1 CAPSULE ORAL 2 TIMES DAILY PRN
Status: CANCELLED | OUTPATIENT
Start: 2021-10-15

## 2021-10-15 RX ADMIN — BUMETANIDE 1 MG: 0.25 INJECTION, SOLUTION INTRAMUSCULAR; INTRAVENOUS at 14:30

## 2021-10-15 RX ADMIN — MICONAZOLE NITRATE: 20 CREAM TOPICAL at 08:41

## 2021-10-15 RX ADMIN — ATORVASTATIN CALCIUM 40 MG: 40 TABLET, FILM COATED ORAL at 08:38

## 2021-10-15 RX ADMIN — ENOXAPARIN SODIUM 80 MG: 80 INJECTION SUBCUTANEOUS at 20:37

## 2021-10-15 RX ADMIN — ASPIRIN 81 MG CHEWABLE TABLET 81 MG: 81 TABLET CHEWABLE at 08:38

## 2021-10-15 RX ADMIN — POTASSIUM CHLORIDE 20 MEQ: 29.8 INJECTION, SOLUTION INTRAVENOUS at 11:31

## 2021-10-15 RX ADMIN — ENOXAPARIN SODIUM 80 MG: 80 INJECTION SUBCUTANEOUS at 08:40

## 2021-10-15 RX ADMIN — MICONAZOLE NITRATE: 20 CREAM TOPICAL at 20:33

## 2021-10-15 RX ADMIN — DEXAMETHASONE SODIUM PHOSPHATE 6 MG: 4 INJECTION, SOLUTION INTRA-ARTICULAR; INTRALESIONAL; INTRAMUSCULAR; INTRAVENOUS; SOFT TISSUE at 08:38

## 2021-10-15 RX ADMIN — Medication 1 TABLET: at 11:30

## 2021-10-15 RX ADMIN — BUMETANIDE 1 MG: 0.25 INJECTION INTRAMUSCULAR; INTRAVENOUS at 08:39

## 2021-10-15 RX ADMIN — BUMETANIDE 1 MG: 0.25 INJECTION INTRAMUSCULAR; INTRAVENOUS at 16:57

## 2021-10-15 RX ADMIN — PAROXETINE 40 MG: 20 TABLET, FILM COATED ORAL at 11:30

## 2021-10-15 ASSESSMENT — ACTIVITIES OF DAILY LIVING (ADL)
ADLS_ACUITY_SCORE: 13
ADLS_ACUITY_SCORE: 17
ADLS_ACUITY_SCORE: 13
ADLS_ACUITY_SCORE: 17
ADLS_ACUITY_SCORE: 13
ADLS_ACUITY_SCORE: 17

## 2021-10-15 ASSESSMENT — MIFFLIN-ST. JEOR: SCORE: 2033.5

## 2021-10-15 NOTE — PROGRESS NOTES
CLINICAL NUTRITION SERVICES - BRIEF NOTE    Skin: Per WOC- R plantar heel wound due to pressure injury, present on admission. Pressure injury is stage 2.  PO: 33% intake for dinner, encourage eating, total feed. Consuming 50% of meals per RN flowsheets.    Resp: pt extubated 10/13- pts TF discontinued.     INTERVENTIONS  Recommendations / Nutrition Prescription  Continue to monitor PO intakes     Implementation  RD to order calorie counts 10/16-10/18  Supplements: Ensure Enlive BID  Multivitamin to ensure pt is meeting micronutrients needs with wound healing       Albina Palomo RD, MS, LD  SICU: [id number]

## 2021-10-15 NOTE — PROGRESS NOTES
"  Physician Attestation   I, Ed Wren, was present with the medical/KEY student who participated in the service and in the documentation of the note.  I have verified the history and personally performed the physical exam and medical decision making.  I agree with the assessment and plan of care as documented in the note.      I personally reviewed vital signs, medications, labs and imaging.    CAD s/p PCI to mLAD (2016), HFpEF, HLD, suspected COPD, CARL on CPAP, morbid obesity, and anxiety who was admitted to ICU with acute hypoxic and hypercapnic respiratory failure requiring intubation 2/2 to COVID and MSSA pneumonia leading to ARDS. She completed a course of antibiotics for MSSA and received Dexamethasone, Tocilizumab, and Remdesivir x1 for COVID. She has also been aggressively diuresed given her history of CARL/HFpEF with concern for possible underlying pulmonary HTN for which Cardiology had been following early on in her hospitalization. She was extubated on 10/13 and transferred to hospital medicine for further cares.    Today, Chika continues to say her breathing feels comfortable and has returned to \"normal.\" She denies any cough or sputum production. She reports vague abdominal discomfort and decreased appetite but no nausea or vomiting. She also denies any fevers or chills. On exam, she is using abdominal muscles for respiration but is otherwise speaking in full sentences and is A&Ox4. Her lung sounds are coarse with diminished sounds at the bilateral lung bases. Her heart is regular without m/r/g. Her abdomen is soft and non-tender. Her lower extremities remain edematous but are improving in size. She continues to diurese adequately.     Overall, agree with plans below. Leukocytosis on 10/15 in context of stable/increased O2 needs raises suspicion for HAP, though CXR shows infiltrates that most likely represent atalectesis vs. Edema. She has also had some diarrhea, though without abdominal " tenderness, will hold on C. Diff testing. Low threshold for systemic antibiotics pending her clinical course, however.     We will continue diuresis for her likely underlying pulmonary HTN since her renal function continues to tolerate this. We will also continue Dexamethasone through 10/17 for COVID+ status, and patient has already received a dose of Tocilizumab and Remdesivir earlier on in her hospitalization.    Remainder of plans as below.    Ed Wren MD  Date of Service (when I saw the patient): 10/15/21    [facility]    Progress Note - Gold 9 Service        Date of Admission:  10/6/2021    Assessment & Plan        Chika Ferguson is a 61 year old female with history of CAD s/p PCI, HFpEF, HLD, suspected COPD, CARL, morbid obesity, GERD, depression, and anxiety who was admitted to MICU from OSH on 10/6 with acute hypoxic/hypercapnic respiratory failure. Intubated prior to transfer. Subsequently found to have MSSA pneumonia, +COVID-19 PCR, acute heart failure with RV dsyfunction, pulmonary HTN, and TIMMY. Extubated 10/13. Transferred to medicine 10/13 for ongoing management.     # Acute hypoxic-hypercapnic respiratory failure, multifactorial.  Hypoxic on arrival w O2 sats in 50's. VBG with pH 7.05 and pCO2 81.  Intubated in ED. Bibasilar opacities noted on CXR. CT Chest with possible atelectasis, otherwise clear lungs. WBC 19.6. Lactate 8.4. COVID PCR negative. Pro-BNP elevated. Given relatively clear lung fields on imaging, initial impression CHF vs possible pneumonia. Treated with diuretics and antibiotics.  CTA Chest on at [facility] negative for PE, again showed evidence of atelectasis. Sputum cultures grew MSSA. Repeat COVID PCR + 10/6. Severe RV dysfunction and dilated IVC noted on TTE with preserved EF. Continued treatment for CAP, COVID-19, and CHF. CRP and BNP down-trending. Compensated respiratory acidosis on repeat blood gas. Extubated 10/13. Continues  to need NC up to 6 lpm.   - Wean O2 as able  - CPAP at night  - See below     # Community acquired pneumonia d/t MSSA.  WBC 19.6 on arrival. CT Chest x 2 without infiltrates. Afebrile. Sputum culture 10/6 grew MSSA.. Completed a 5 day course of ceftriaxone + azithromycin.  CRP down-trending (170 to 38). WBC increasing on 10/15. Chest x-ray completed showing increased patchy interstitial airspace opacities possible representing atelectasis, edema, vs infection. We will continue to monitor for fevers or increased oxygen requirements and add antibiotics when indicated.  - Monitor clinically   - Add broad spectrum antibiotics in the setting of fever or increasing oxygen needs     # Positive COVID-19 PCR.  History of COVID infection in 12/2020, did not require hospitalization. Completed vaccination series 5/2021. +Exposure recently. COVID PCR negative at OSH but repeat PCR positive on admission. DDx includes reinfection vs persistent positive PCR from prior infection. CT w/o diffuse GGO to suggest COVID pneumonia. S/p Remdesivir and Tocilizumab x 1 on 10/7 (unclear why remdesivir wasn't continued), as well as steroids.   - Continue dexamethasone through 10/17  - Suspect mild COVID given other possible etiologies of hypoxia and relatively normal imaging, would consider 10 days of special precautions     # Acute on chronic HFpEF and RV failure  # Pulmonary HTN, probable Group III  Prior history of HFpEF and pulmonary HTN. On Bumex at home. NTproBNP 2264 on admission. TTE (10/6) LVEF 55-60%, mild LVH, indeterminate diastolic function, moderate to severe RV dilation w moderate to severely reduced RV function, and dilated IVC. Pulmonary pressures could not be assessed on TTE. CTA Chest negative for PE but showed severely dilated pulmonary trunk c/w pulmonary HTN. Cardiology consulted. Suspect group III pulmonary HTN, although CTEPH cannot be ruled out. RV failure likely chronic given RV dilation on imaging. Clinically  improved w IV Bumex. Weight down 31 lbs since admission.  - Cardiology consulted, appreciate recommendations   - V/Q scan attempted 10/14, but pt unable to tolerate lying flat    -Not likely a candidate for pulmonary HTN therapies   - RHC to complete in outpatient setting   - Schedule follow up with pulmonary HTN team (Dr. Hughes) in 2-4 weeks  - Continue lovenox 1mg/kg BID for now  - Continue Bumex 1mg BID, goal net even              - One extra dose of Bumex 1 mg 10/15  - Daily weights, I&O's     # CAD:  Hx PCI w stenting of mLAD in 2016. Mild troponin elevation on admission felt to be demand ischemia. TTE with normal wall motion. Cardiology consulted, ACS not suspected.   - Continue ASA + statin  - BB on hold d/t borderline low BP in ICU     # TIMMY - resolved:  Cr 1.95 at OSH, 1.68 on admission. Baseline unknown. UA with hyaline casts, otherwise bland. Renal US negative for hydro. Suspect prerenal azotemia in setting of acute CHF. Repeat UA on 10/15 negative for LE and nitrites (completed in the setting of increasing WBC.)   - Repeat BMP in AM      # Steroid-induced hyperglycemia.  No history of DM. A1c 6.7 on admission. Currently well controlled on sliding scale.   - MSSI q4h   - Hypoglycemia protocol       # Hypernatremia.  Restart regular diet 10/14. Suspect PO intake will improve Na.  - Repeat BMP in AM      # Hypokalemia.  Resolved.  - BMP in AM     # Chronic anemia.  Hgb as low as 8.6 during hospitalization likely in the setting of infection. Improving to ~11.   - CBC in AM     # Malnutrition risk.    - SLP consulted (10/14)- regular diet      # GERD.  Continue protonix.      # Pressure ulcers, POA.  Essentially chair-bound. Gets up weekly to ambulate. Increased weakness lately.  - WOCN consulted   - PT/OT consulted     Diet: Regular Diet Adult    DVT Prophylaxis: Enoxaparin (Lovenox) SQ  Lopes Catheter: PRESENT, indication: Strict 1-2 Hour I&O  Fluids: PO intake  Central Lines: None  Code Status: Full  Code      Disposition Plan   Expected discharge: pending medical stability recommended to home/facility pending PT evaluation once oxygen requirements are reduced.     The patient's care was discussed with the Attending Physician, Dr. Wren.    Griselda Martínez  Medical Student  [address]  Securely message with the Vocera Web Console (learn more here)  Text page via "[employer] Paging/Directory  Please see sign in/sign out for up to date coverage information      ______________________________________________________________________    Interval History   NAOE. Appears tired this morning. Expresses headache, but this is not new. Reports current breathing is her baseline. Is trying to eat and drink as much as possible. Denies abdominal pain or shortness of breath.     4 point ROS is negative unless noted above.    Data reviewed today: I reviewed all medications, new labs and imaging results over the last 24 hours. I personally reviewed the chest x-ray image(s) showing patchy infiltrates, poor inspiratory effort.    Physical Exam   Vital Signs: Temp: 97.7  F (36.5  C) Temp src: Axillary BP: (!) 141/86 Pulse: 71   Resp: 22 SpO2: 98 % O2 Device: Nasal cannula Oxygen Delivery: 6 LPM  Weight: 313 lbs .85 oz  Constitutional: awake, cooperative  Respiratory: Increased work of breathing using abdominal muscles, diminished air exchange  Cardiovascular: Regular rate and rhythm  GI: Normal bowel sounds, soft, non-distended, non-tender to palpation  Musculoskeletal: minimal lower extremity edema present  Neuropsychiatric: General: normal, calm and normal eye contact  Level of consciousness: drowsy  Affect: normal    Data   Recent Labs   Lab 10/15/21  1136 10/15/21  0842 10/15/21  0302 10/15/21  0300 10/14/21  2307 10/14/21  0825 10/14/21  0322 10/13/21  0431 10/13/21  0429 10/09/21  2029 10/09/21  1547 10/09/21  1227 10/09/21  1013 10/09/21  0826 10/09/21  0336   WBC  --    --   --  13.8*  --   --  12.8*  --  9.6   < >  --   --   --   --  11.0   HGB  --   --   --  11.0*  --   --  11.2*  --  9.8*   < >  --   --   --   --  8.6*   MCV  --   --   --  90  --   --  88  --  87   < >  --   --   --   --  86   PLT  --   --   --  311  --   --  311  --  285   < >  --   --   --   --  255   NA  --   --   --  146*  --   --  146*  --  148*   < >  --   --  144  --   --    POTASSIUM  --   --   --  3.7  --   --  3.6  --  3.5   < >  --   --  4.1   < > 3.5   CHLORIDE  --   --   --  105  --   --  106  --  105   < >  --   --  103  --   --    CO2  --   --   --  42*  --   --  40*  --  41*   < >  --   --  39*  --   --    BUN  --   --   --  47*  --   --  47*  --  50*   < >  --   --  41*  --   --    CR  --   --   --  0.95  --   --  0.85  --  0.90   < >  --   --  1.10*  --   --    ANIONGAP  --   --   --  <1*  --   --  <1*  --  2*   < >  --   --  2*  --   --    NAE  --   --   --  9.2  --   --  10.0  --  10.0   < >  --   --  8.6  --   --    * 99 94 92   < >   < > 95   < > 126*   < >  --    < > 147*   < >  --    TROPONIN  --   --   --   --   --   --   --   --   --   --  0.028  --  0.018  --  0.027    < > = values in this interval not displayed.     No results found for this or any previous visit (from the past 24 hour(s)).  Medications     dextrose         aspirin  81 mg Oral Daily     atorvastatin  40 mg Oral Daily     bumetanide  1 mg Intravenous BID     dexamethasone  6 mg Intravenous Daily     enoxaparin ANTICOAGULANT  0.5 mg/kg (Dosing Weight) Subcutaneous BID     influenza recomb quadrivalent PF  0.5 mL Intramuscular Prior to discharge     insulin aspart  1-6 Units Subcutaneous Q4H     miconazole with skin protectant   Topical BID     multivitamin w/minerals  1 tablet Oral Daily     [START ON 10/16/2021] pantoprazole  40 mg Oral QAM AC     PARoxetine  40 mg Oral Daily     polyethylene glycol  17 g Oral or Feeding Tube Daily     senna-docusate  2 tablet Oral or Feeding Tube BID

## 2021-10-15 NOTE — PLAN OF CARE
"Major Shift Events:  Patient stable throughout shift today. Got up to the chair with PT using the ceiling lift sling. Sat in chair for approx. 5 hours with frequent repositioning. Was not actively impulsive this shift, though did ask a few times \"can I just have the legs of the chair down\" and would try to stand up when RN writer had their back turned. She is less delirious today than previous days per report, but she did say random, out of context, non-applicable things today such as \"when is your mom closing down this store?\" and \"Hi Grandma.\" to speaking to her son when on the phone.   Appeared short of breath upon start of shift on 6L NC, switched to 8L oxymask with a big reposition in bed and arthur exchange, stayed on oxymask for approx. 6 hours. She appears to be breathing less labored now after being on oxymask. She is now back on 6L NC. Should be wearing CPAP at night. She does wear this every night at home. MD notified and updated orders regarding this. She does wear 5L NC at baseline at home.   Arthur exchanged today and UA gathered on new arthur due to climbing WBC count. Chest xray also obtained. Per MD, watching for fevers or increased O2 requirements to suggest infection before beginning antibiotics.   RIJ CVC removed today per MD order.   Poor appetite today. Only ate 50% of dinner. Encourage Ensure shakes for nutrition.     Plan: Transfer to  when bed becomes available.     For vital signs and complete assessments, please see documentation flowsheets.   "

## 2021-10-15 NOTE — PLAN OF CARE
Major Shift Events:  Patient remains confused at times, disoriented to time, occasional slurring of speech. HR sinus rhythm. BP elevated however stable. Lungs w/ diffuse crackles, shallow breathing; supplemental oxygen at 4-7LPM per NC. Passing liquid stools, ongoing fecal incontinence. Urethral cath in place for ongoing diuresis and wound healing, output adequate. Swallowing intact; taking adequate PO w/ assistance and encouragement. Up to chair x2 w/ ceiling lift assist.  Plan: Continue to monitor closely, encourage pulm toilet.  For vital signs and complete assessments, please see documentation flowsheets.

## 2021-10-15 NOTE — PLAN OF CARE
Speech Language Therapy Discharge Summary    Reason for therapy discharge:    All goals and outcomes met, no further needs identified.    Progress towards therapy goal(s). See goals on Care Plan in [facility] electronic health record for goal details.  Goals met    Therapy recommendation(s):    No further therapy is recommended.        Recommend pt continue regular textures and thin liquids with 1:1 supervision. Pt should be fully upright and alert for all PO, take small sips/bites, slow pacing, and alternate between consistencies. Swallowing goals met; will complete orders.

## 2021-10-15 NOTE — PLAN OF CARE
neurologically intact ex slow with orientation questions/needs and more confused this shift, restless, tried to climb out of bed x1, redirectable, slept well tonight afterwards (6hrs); afebrile, SB to SR with rare ectopy, BP WDL; Lung Sounds clear, denies SOB on 6L NC (5L is home baseline per patient); arthur in place with WDL output; 33% intake for dinner, encourage eating, total feed;     See Flowsheets/MAR for details    transfer to floor when possible

## 2021-10-16 LAB
ANION GAP SERPL CALCULATED.3IONS-SCNC: 1 MMOL/L (ref 3–14)
BUN SERPL-MCNC: 44 MG/DL (ref 7–30)
CALCIUM SERPL-MCNC: 9.7 MG/DL (ref 8.5–10.1)
CHLORIDE BLD-SCNC: 105 MMOL/L (ref 94–109)
CO2 SERPL-SCNC: 39 MMOL/L (ref 20–32)
CREAT SERPL-MCNC: 0.92 MG/DL (ref 0.52–1.04)
ERYTHROCYTE [DISTWIDTH] IN BLOOD BY AUTOMATED COUNT: 16.4 % (ref 10–15)
GFR SERPL CREATININE-BSD FRML MDRD: 66 ML/MIN/1.73M2
GLUCOSE BLD-MCNC: 103 MG/DL (ref 70–99)
GLUCOSE BLDC GLUCOMTR-MCNC: 102 MG/DL (ref 70–99)
GLUCOSE BLDC GLUCOMTR-MCNC: 135 MG/DL (ref 70–99)
GLUCOSE BLDC GLUCOMTR-MCNC: 171 MG/DL (ref 70–99)
GLUCOSE BLDC GLUCOMTR-MCNC: 181 MG/DL (ref 70–99)
GLUCOSE BLDC GLUCOMTR-MCNC: 97 MG/DL (ref 70–99)
GLUCOSE BLDC GLUCOMTR-MCNC: 98 MG/DL (ref 70–99)
HCT VFR BLD AUTO: 39.8 % (ref 35–47)
HGB BLD-MCNC: 11.4 G/DL (ref 11.7–15.7)
MAGNESIUM SERPL-MCNC: 3.2 MG/DL (ref 1.6–2.3)
MCH RBC QN AUTO: 25.7 PG (ref 26.5–33)
MCHC RBC AUTO-ENTMCNC: 28.6 G/DL (ref 31.5–36.5)
MCV RBC AUTO: 90 FL (ref 78–100)
PHOSPHATE SERPL-MCNC: 3.2 MG/DL (ref 2.5–4.5)
PLATELET # BLD AUTO: 312 10E3/UL (ref 150–450)
POTASSIUM BLD-SCNC: 4.2 MMOL/L (ref 3.4–5.3)
RBC # BLD AUTO: 4.43 10E6/UL (ref 3.8–5.2)
SODIUM SERPL-SCNC: 145 MMOL/L (ref 133–144)
WBC # BLD AUTO: 12.1 10E3/UL (ref 4–11)

## 2021-10-16 PROCEDURE — 85027 COMPLETE CBC AUTOMATED: CPT | Performed by: STUDENT IN AN ORGANIZED HEALTH CARE EDUCATION/TRAINING PROGRAM

## 2021-10-16 PROCEDURE — 250N000011 HC RX IP 250 OP 636: Performed by: STUDENT IN AN ORGANIZED HEALTH CARE EDUCATION/TRAINING PROGRAM

## 2021-10-16 PROCEDURE — 94660 CPAP INITIATION&MGMT: CPT

## 2021-10-16 PROCEDURE — 120N000003 HC R&B IMCU UMMC

## 2021-10-16 PROCEDURE — 250N000013 HC RX MED GY IP 250 OP 250 PS 637: Performed by: STUDENT IN AN ORGANIZED HEALTH CARE EDUCATION/TRAINING PROGRAM

## 2021-10-16 PROCEDURE — 84100 ASSAY OF PHOSPHORUS: CPT | Performed by: INTERNAL MEDICINE

## 2021-10-16 PROCEDURE — 250N000013 HC RX MED GY IP 250 OP 250 PS 637: Performed by: INTERNAL MEDICINE

## 2021-10-16 PROCEDURE — 250N000013 HC RX MED GY IP 250 OP 250 PS 637

## 2021-10-16 PROCEDURE — 999N000157 HC STATISTIC RCP TIME EA 10 MIN

## 2021-10-16 PROCEDURE — 250N000011 HC RX IP 250 OP 636

## 2021-10-16 PROCEDURE — 250N000011 HC RX IP 250 OP 636: Performed by: INTERNAL MEDICINE

## 2021-10-16 PROCEDURE — 36415 COLL VENOUS BLD VENIPUNCTURE: CPT | Performed by: STUDENT IN AN ORGANIZED HEALTH CARE EDUCATION/TRAINING PROGRAM

## 2021-10-16 PROCEDURE — 83735 ASSAY OF MAGNESIUM: CPT | Performed by: INTERNAL MEDICINE

## 2021-10-16 PROCEDURE — 80048 BASIC METABOLIC PNL TOTAL CA: CPT | Performed by: STUDENT IN AN ORGANIZED HEALTH CARE EDUCATION/TRAINING PROGRAM

## 2021-10-16 PROCEDURE — 99233 SBSQ HOSP IP/OBS HIGH 50: CPT | Performed by: INTERNAL MEDICINE

## 2021-10-16 RX ORDER — AMOXICILLIN 250 MG
1 CAPSULE ORAL EVERY EVENING
Status: DISCONTINUED | OUTPATIENT
Start: 2021-10-16 | End: 2021-10-27 | Stop reason: HOSPADM

## 2021-10-16 RX ORDER — LANOLIN ALCOHOL/MO/W.PET/CERES
3 CREAM (GRAM) TOPICAL EVERY EVENING
Status: DISCONTINUED | OUTPATIENT
Start: 2021-10-16 | End: 2021-10-27 | Stop reason: HOSPADM

## 2021-10-16 RX ORDER — BUMETANIDE 0.25 MG/ML
2 INJECTION INTRAMUSCULAR; INTRAVENOUS EVERY 24 HOURS
Status: DISCONTINUED | OUTPATIENT
Start: 2021-10-17 | End: 2021-10-17

## 2021-10-16 RX ORDER — BUMETANIDE 0.25 MG/ML
1 INJECTION INTRAMUSCULAR; INTRAVENOUS ONCE
Status: COMPLETED | OUTPATIENT
Start: 2021-10-16 | End: 2021-10-16

## 2021-10-16 RX ORDER — BUMETANIDE 0.25 MG/ML
1 INJECTION INTRAMUSCULAR; INTRAVENOUS EVERY 24 HOURS
Status: DISCONTINUED | OUTPATIENT
Start: 2021-10-16 | End: 2021-10-17

## 2021-10-16 RX ADMIN — ATORVASTATIN CALCIUM 40 MG: 40 TABLET, FILM COATED ORAL at 07:42

## 2021-10-16 RX ADMIN — ENOXAPARIN SODIUM 80 MG: 80 INJECTION SUBCUTANEOUS at 07:43

## 2021-10-16 RX ADMIN — Medication 3 MG: at 20:30

## 2021-10-16 RX ADMIN — PANTOPRAZOLE SODIUM 40 MG: 40 TABLET, DELAYED RELEASE ORAL at 07:42

## 2021-10-16 RX ADMIN — INSULIN ASPART 1 UNITS: 100 INJECTION, SOLUTION INTRAVENOUS; SUBCUTANEOUS at 12:07

## 2021-10-16 RX ADMIN — BUMETANIDE 1 MG: 0.25 INJECTION, SOLUTION INTRAMUSCULAR; INTRAVENOUS at 15:57

## 2021-10-16 RX ADMIN — MICONAZOLE NITRATE: 20 CREAM TOPICAL at 20:32

## 2021-10-16 RX ADMIN — BUMETANIDE 1 MG: 0.25 INJECTION INTRAMUSCULAR; INTRAVENOUS at 07:43

## 2021-10-16 RX ADMIN — BUMETANIDE 1 MG: 0.25 INJECTION, SOLUTION INTRAMUSCULAR; INTRAVENOUS at 12:04

## 2021-10-16 RX ADMIN — ENOXAPARIN SODIUM 80 MG: 80 INJECTION SUBCUTANEOUS at 23:44

## 2021-10-16 RX ADMIN — PAROXETINE 40 MG: 20 TABLET, FILM COATED ORAL at 07:42

## 2021-10-16 RX ADMIN — ASPIRIN 81 MG CHEWABLE TABLET 81 MG: 81 TABLET CHEWABLE at 07:42

## 2021-10-16 RX ADMIN — MICONAZOLE NITRATE: 20 CREAM TOPICAL at 07:43

## 2021-10-16 RX ADMIN — Medication 1 TABLET: at 07:42

## 2021-10-16 RX ADMIN — INSULIN ASPART 1 UNITS: 100 INJECTION, SOLUTION INTRAVENOUS; SUBCUTANEOUS at 20:29

## 2021-10-16 RX ADMIN — DEXAMETHASONE SODIUM PHOSPHATE 6 MG: 4 INJECTION, SOLUTION INTRA-ARTICULAR; INTRALESIONAL; INTRAMUSCULAR; INTRAVENOUS; SOFT TISSUE at 07:42

## 2021-10-16 RX ADMIN — DOCUSATE SODIUM 50 MG AND SENNOSIDES 8.6 MG 1 TABLET: 8.6; 5 TABLET, FILM COATED ORAL at 20:29

## 2021-10-16 ASSESSMENT — ACTIVITIES OF DAILY LIVING (ADL)
ADLS_ACUITY_SCORE: 17

## 2021-10-16 NOTE — PLAN OF CARE
Transferred to: Unit 6B  at 1300  Belongings: sent with the patient  Lopes removed? No: MD order to keep it in   Central line removed? Yes  Chart and medications sent with patient Yes  Family notified: No: Lewis (son) notified this AM that patient will be transferring hopefully. Patient has cell phone and is able to update her family that she has moved rooms.     Patient stable this shift. Patient saturating fine on 6L NC or 8L oxymask. She will choose which she wants per her own preference. Per MD, patient is to ONLY wear CPAP at night. Patient does wear CPAP consisently at home and has requested to wear it a few times already during the day. Reinforce education on pulmonary toilet.     MD did change bowel regimen today. Patient was having too loose of stools, so bowel meds were held, but is now not having consistent stools. Resume bowel regimen per MD.     Mental status (confusion) continues to wax and wane. Patient continues to remain A/O x4.     Recliner chair sent up with the patient for her to continue to get up and out of bed.     Patient settled in room 30 and bedside handoff given to ERIK Smith on 6B.

## 2021-10-16 NOTE — PLAN OF CARE
Neuro: A&Ox3, disoriented to situation.  Cardiac: SR-ST. VSS. Afebrile.    Respiratory: Sating >92% on 6L NC. CPAP 50% @ night, only while sleeping.   GI/: Adequate urine output via atrhur. No BM this shift.  Diet/appetite: Tolerating reg diet. Eating well.Q4h BG.  Activity:  Assist of 2 w/ lift, up to chair  Pain: At acceptable level on current regimen.   Skin: No new deficits noted.  LDA's: PIV x2 SL, arthur     Plan: Continue with POC. Notify primary team with changes.

## 2021-10-16 NOTE — PLAN OF CARE
Major Shift Events:   Neuro: AxOx4 tonight but forgetful. Able to make needs known. Weak in all extremities.   Pulm: CPAP all night 50% FiO2 and tolerating well.   Cards: NSR. BP WDL. Afebrile  GI/: Moses patent. No BM. BG q4hrs not requiring any insulin.   Plan: Transfer to floor.   For vital signs and complete assessments, please see documentation flowsheets.

## 2021-10-16 NOTE — PROGRESS NOTES
[facility]    Medicine Progress Note - Hospitalist Service, Gold 9       Date of Admission:  10/6/2021    Assessment & Plan           CAD s/p PCI to mLAD (2016), HFpEF, HLD, suspected COPD, CARL on CPAP, morbid obesity, and anxiety who was admitted to ICU with acute hypoxic and hypercapnic respiratory failure requiring intubation 2/2 to COVID and MSSA pneumonia leading to ARDS. She completed a course of antibiotics for MSSA and received Dexamethasone, Tocilizumab, and Remdesivir x1 for COVID. She has also been aggressively diuresed given her history of CARL/HFpEF with concern for possible underlying pulmonary HTN for which Cardiology had been following early on in her hospitalization. She was extubated on 10/13 and transferred to hospital medicine for further cares.     # Acute hypoxic-hypercapnic respiratory failure, multifactorial  # CAP 2/2 MMSA pneumonia  # COVID-19 pneumonia  # Hx of Pulm HTN (see below)  # CARL on CPAP  Hypoxic on admission to ICU on 10/6 w O2 sats in 50's. VBG with pH 7.05 and pCO2 81.  Intubated in ED. Bibasilar opacities noted on CXR. WBC 19.6. Lactate 8.4. COVID PCR negative. Pro-BNP elevated. Given relatively clear lung fields on imaging, initial impression CHF vs possible pneumonia. Treated with diuretics and antibiotics.  [facility]A Chest on at [facility] negative for PE, again showed evidence of atelectasis. Sputum cultures grew MSSA. Repeat COVID PCR + 10/6. Severe RV dysfunction and dilated IVC noted on TTE with preserved EF. Continued treatment for CAP, COVID-19, and CHF. CRP and BNP down-trending. Compensated respiratory acidosis on repeat blood gas. Extubated 10/13. Continues to need NC up to 6 lpm.   - She has finished treatment for MSSA PNA with CTX x5 days and Azithromycin x3 days  - She continues to require oxygen up to 6L/nc at times, this may be her new baseline  - Continue pulmonary toilet efforts, OOB as tolerates  - Continue diuresis,  treatment of COVID-19 as below  - Monitor for signs of respiratory infection  - Continue CPAP at bedtime, per patient's son, she was essentially using CPAP ATC at home prior to admission     # Leukocytosis  WBC increased on 10/14, now elevated but stable. Chest x-ray completed showing increased patchy interstitial airspace opacities possible representing atelectasis, edema, vs infection. UA obtained from arthur, not indicative of infection. She was having loose stools but this has since improved. She remains afebrile and her WBC is downtrending on 10/16  - monitor for signs of infection  - given prolonged hospitalization, if signs of sepsis, would cover broadly with vanc/zosyn  - follow CBC daily     # Positive COVID-19 PCR.    History of COVID infection in 12/2020, did not require hospitalization. Completed vaccination series 5/2021. +Exposure recently. COVID PCR negative at OSH but repeat PCR positive on admission. DDx includes reinfection vs persistent positive PCR from prior infection. CT w/o diffuse GGO to suggest COVID pneumonia. S/p Remdesivir and Tocilizumab x 1 on 10/7, as well as steroids.   - Continue dexamethasone through 10/17  - On DVT prophylaxis with Heparin at 0.5 mg/kg BID  - Continue contact/respiratory precautions     # Acute on chronic HFpEF and RV failure  # Pulmonary HTN, probable Group III  Prior history of HFpEF and pulmonary HTN. On Bumex at home. NTproBNP 2264 on admission. TTE (10/6) LVEF 55-60%, mild LVH, indeterminate diastolic function, moderate to severe RV dilation w moderate to severely reduced RV function, and dilated IVC. Pulmonary pressures could not be assessed on TTE. CTA Chest negative for PE but showed severely dilated pulmonary trunk c/w pulmonary HTN. Cardiology consulted. Suspect group III pulmonary HTN, although CTEPH cannot be ruled out. RV failure likely chronic given RV dilation on imaging. Clinically improved w IV Bumex. Weight down ~31 lbs since admission. Continues  to tolerate aggressive IV diuresis, so will continue.  - Cardiology consulted, appreciate recommendations   - V/Q scan attempted 10/14, but pt unable to tolerate lying flat    -Not likely a candidate for pulmonary HTN therapies   - RHC to complete in outpatient setting   - Schedule follow up with pulmonary HTN team (Dr. Hughes) in 2-4 weeks  - Continue Bumex 2 mg IV qAM, 1 mg qPM  - Daily weights, I&O's  - BMP daily     # CAD:  Hx PCI w stenting of mLAD in 2016. Mild troponin elevation on admission felt to be demand ischemia. TTE with normal wall motion. Cardiology consulted, ACS not suspected.   - Continue ASA + statin  - BB on hold d/t borderline low BP in ICU, will resume as BP tolerates     # TIMMY - resolved:  Cr 1.95 at OSH, 1.68 on admission. Baseline unknown. UA with hyaline casts, otherwise bland. Renal US negative for hydro. Suspect prerenal azotemia in setting of acute CHF  - Follow BMP daily  - Continue diuresis until Cr doesn't tolerate    # Steroid-induced hyperglycemia.  No history of DM. A1c 6.7 on admission. Currently well controlled on sliding scale.   - MSSI q4h   - Hypoglycemia protocol       # Hypernatremia   # Hypokalemia   Restart regular diet 10/14. Suspect PO intake will improve  - BMP daily     # Chronic anemia  Hgb as low as 8.6 during hospitalization likely in the setting of infection. Improving to ~11.   - CBC in AM     # Malnutrition risk.    - SLP consulted (10/14)- regular diet      # GERD  Continue protonix.      # Pressure ulcers, POA.    Essentially chair-bound. Gets up weekly to ambulate. Increased weakness lately.  - WOCN consulted   - PT/OT consulted       Diet: Regular Diet Adult  Calorie Counts  Snacks/Supplements Adult: Ensure Enlive; Between Meals    DVT Prophylaxis: Enoxaparin (Lovenox) SQ  Lopes Catheter: PRESENT, indication: Strict 1-2 Hour I&O, Strict 1-2 Hour I&O  Central Lines: None  Code Status: Full Code      Disposition Plan   Expected discharge: 10/22/2021    recommended to transitional care unit once oxygenation stable, concerns for infection have stabilized.     The patient's care was discussed with the Bedside Nurse and Patient.    Ed Wren MD  Hospitalist Service, [address]  Securely message with the Vocera Web Console (learn more here)  Text page via [facility] Paging/Directory  Please see sign in/sign out for up to date coverage information    Clinically Significant Risk Factors Present on Admission                ______________________________________________________________________    Interval History   More awake and alert today. Slept well on the CPAP last night, now wants to be put back on CPAP this afternoon so that she can sleep. Otherwise feels like her breathing is comfortable. Denies any cough or sputum production. No abdominal pain, nausea, or vomiting. Didn't have any fevers/chills overnight. Ate a little for breakfast today, waiting to go up to the chair this afternoon for lunch. No further concerns at this time.    Data reviewed today: I reviewed all medications, new labs and imaging results over the last 24 hours. I personally reviewed no images or EKG's today.    Physical Exam   Vital Signs: Temp: 97.8  F (36.6  C) Temp src: Axillary BP: 111/72 Pulse: 67   Resp: 18 SpO2: 98 % O2 Device: BiPAP/CPAP Oxygen Delivery: 5 LPM  Weight: 315 lbs 4.12 oz  General Appearance: Chronically ill appearing female, sitting up in bed, no acute distress, pleasant and conversant. Continues to have abdominal breathing but is in no respiratory distress. Oriented x3  Respiratory: Breathing with abdominal muscle use (consistent over past 72 hours). Diminished breath sounds at the bilateral lung bases. No wheezing or rales appreciated.  Cardiovascular: RRR, no m/r/g. Radial pulses 2+ and symmetric.  GI: soft, non-distended, non-tender to palptaion  Skin: legs with improved peripheral edema/wrinkling compared to  previous days. No skin rash.  Other: Lopes draining clear yellow urine.     Data   Recent Labs   Lab 10/16/21  0740 10/16/21  0446 10/16/21  0415 10/16/21  0320 10/15/21  0302 10/15/21  0300 10/14/21  0825 10/14/21  0322 10/09/21  2029 10/09/21  1547   WBC  --  12.1*  --   --   --  13.8*  --  12.8*   < >  --    HGB  --  11.4*  --   --   --  11.0*  --  11.2*   < >  --    MCV  --  90  --   --   --  90  --  88   < >  --    PLT  --  312  --   --   --  311  --  311   < >  --    NA  --   --  145*  --   --  146*  --  146*   < >  --    POTASSIUM  --   --  4.2  --   --  3.7  --  3.6   < >  --    CHLORIDE  --   --  105  --   --  105  --  106   < >  --    CO2  --   --  39*  --   --  42*  --  40*   < >  --    BUN  --   --  44*  --   --  47*  --  47*   < >  --    CR  --   --  0.92  --   --  0.95  --  0.85   < >  --    ANIONGAP  --   --  1*  --   --  <1*  --  <1*   < >  --    NAE  --   --  9.7  --   --  9.2  --  10.0   < >  --    GLC 97  --  103* 98   < > 92   < > 95   < >  --    TROPONIN  --   --   --   --   --   --   --   --   --  0.028    < > = values in this interval not displayed.     No results found for this or any previous visit (from the past 24 hour(s)).

## 2021-10-17 LAB
ANION GAP SERPL CALCULATED.3IONS-SCNC: 2 MMOL/L (ref 3–14)
BUN SERPL-MCNC: 53 MG/DL (ref 7–30)
CALCIUM SERPL-MCNC: 9.3 MG/DL (ref 8.5–10.1)
CHLORIDE BLD-SCNC: 100 MMOL/L (ref 94–109)
CO2 SERPL-SCNC: 39 MMOL/L (ref 20–32)
CREAT SERPL-MCNC: 1.07 MG/DL (ref 0.52–1.04)
ERYTHROCYTE [DISTWIDTH] IN BLOOD BY AUTOMATED COUNT: 16.8 % (ref 10–15)
GFR SERPL CREATININE-BSD FRML MDRD: 55 ML/MIN/1.73M2
GLUCOSE BLD-MCNC: 128 MG/DL (ref 70–99)
GLUCOSE BLDC GLUCOMTR-MCNC: 116 MG/DL (ref 70–99)
GLUCOSE BLDC GLUCOMTR-MCNC: 117 MG/DL (ref 70–99)
GLUCOSE BLDC GLUCOMTR-MCNC: 127 MG/DL (ref 70–99)
GLUCOSE BLDC GLUCOMTR-MCNC: 128 MG/DL (ref 70–99)
GLUCOSE BLDC GLUCOMTR-MCNC: 130 MG/DL (ref 70–99)
GLUCOSE BLDC GLUCOMTR-MCNC: 94 MG/DL (ref 70–99)
HCT VFR BLD AUTO: 36.3 % (ref 35–47)
HGB BLD-MCNC: 10.7 G/DL (ref 11.7–15.7)
MAGNESIUM SERPL-MCNC: 2.8 MG/DL (ref 1.6–2.3)
MCH RBC QN AUTO: 26 PG (ref 26.5–33)
MCHC RBC AUTO-ENTMCNC: 29.5 G/DL (ref 31.5–36.5)
MCV RBC AUTO: 88 FL (ref 78–100)
PHOSPHATE SERPL-MCNC: 4.4 MG/DL (ref 2.5–4.5)
PLATELET # BLD AUTO: 292 10E3/UL (ref 150–450)
POTASSIUM BLD-SCNC: 3.2 MMOL/L (ref 3.4–5.3)
POTASSIUM BLD-SCNC: 3.8 MMOL/L (ref 3.4–5.3)
RBC # BLD AUTO: 4.12 10E6/UL (ref 3.8–5.2)
SODIUM SERPL-SCNC: 141 MMOL/L (ref 133–144)
WBC # BLD AUTO: 13.5 10E3/UL (ref 4–11)

## 2021-10-17 PROCEDURE — 94660 CPAP INITIATION&MGMT: CPT

## 2021-10-17 PROCEDURE — 36415 COLL VENOUS BLD VENIPUNCTURE: CPT | Performed by: INTERNAL MEDICINE

## 2021-10-17 PROCEDURE — 84100 ASSAY OF PHOSPHORUS: CPT | Performed by: INTERNAL MEDICINE

## 2021-10-17 PROCEDURE — 250N000013 HC RX MED GY IP 250 OP 250 PS 637

## 2021-10-17 PROCEDURE — 85027 COMPLETE CBC AUTOMATED: CPT | Performed by: STUDENT IN AN ORGANIZED HEALTH CARE EDUCATION/TRAINING PROGRAM

## 2021-10-17 PROCEDURE — 250N000013 HC RX MED GY IP 250 OP 250 PS 637: Performed by: STUDENT IN AN ORGANIZED HEALTH CARE EDUCATION/TRAINING PROGRAM

## 2021-10-17 PROCEDURE — 999N000157 HC STATISTIC RCP TIME EA 10 MIN

## 2021-10-17 PROCEDURE — 99232 SBSQ HOSP IP/OBS MODERATE 35: CPT | Performed by: INTERNAL MEDICINE

## 2021-10-17 PROCEDURE — 36415 COLL VENOUS BLD VENIPUNCTURE: CPT | Performed by: STUDENT IN AN ORGANIZED HEALTH CARE EDUCATION/TRAINING PROGRAM

## 2021-10-17 PROCEDURE — 80048 BASIC METABOLIC PNL TOTAL CA: CPT | Performed by: STUDENT IN AN ORGANIZED HEALTH CARE EDUCATION/TRAINING PROGRAM

## 2021-10-17 PROCEDURE — 250N000013 HC RX MED GY IP 250 OP 250 PS 637: Performed by: PHYSICIAN ASSISTANT

## 2021-10-17 PROCEDURE — 250N000013 HC RX MED GY IP 250 OP 250 PS 637: Performed by: INTERNAL MEDICINE

## 2021-10-17 PROCEDURE — 120N000003 HC R&B IMCU UMMC

## 2021-10-17 PROCEDURE — 250N000011 HC RX IP 250 OP 636: Performed by: INTERNAL MEDICINE

## 2021-10-17 PROCEDURE — 250N000011 HC RX IP 250 OP 636

## 2021-10-17 PROCEDURE — 83735 ASSAY OF MAGNESIUM: CPT | Performed by: INTERNAL MEDICINE

## 2021-10-17 PROCEDURE — 84132 ASSAY OF SERUM POTASSIUM: CPT | Performed by: INTERNAL MEDICINE

## 2021-10-17 RX ORDER — POTASSIUM CHLORIDE 1.5 G/1.58G
40 POWDER, FOR SOLUTION ORAL ONCE
Status: DISCONTINUED | OUTPATIENT
Start: 2021-10-17 | End: 2021-10-17

## 2021-10-17 RX ORDER — POTASSIUM CHLORIDE 750 MG/1
20 TABLET, EXTENDED RELEASE ORAL ONCE
Status: COMPLETED | OUTPATIENT
Start: 2021-10-17 | End: 2021-10-17

## 2021-10-17 RX ORDER — OXYCODONE HYDROCHLORIDE 5 MG/1
5 TABLET ORAL EVERY 4 HOURS PRN
Status: DISCONTINUED | OUTPATIENT
Start: 2021-10-17 | End: 2021-10-26

## 2021-10-17 RX ORDER — BUMETANIDE 0.25 MG/ML
1 INJECTION INTRAMUSCULAR; INTRAVENOUS 2 TIMES DAILY
Status: DISCONTINUED | OUTPATIENT
Start: 2021-10-18 | End: 2021-10-18

## 2021-10-17 RX ORDER — POTASSIUM CHLORIDE 750 MG/1
40 TABLET, EXTENDED RELEASE ORAL ONCE
Status: COMPLETED | OUTPATIENT
Start: 2021-10-17 | End: 2021-10-17

## 2021-10-17 RX ADMIN — PAROXETINE 40 MG: 20 TABLET, FILM COATED ORAL at 08:43

## 2021-10-17 RX ADMIN — Medication 3 MG: at 19:57

## 2021-10-17 RX ADMIN — BUMETANIDE 2 MG: 0.25 INJECTION, SOLUTION INTRAMUSCULAR; INTRAVENOUS at 08:43

## 2021-10-17 RX ADMIN — ENOXAPARIN SODIUM 40 MG: 40 INJECTION SUBCUTANEOUS at 19:56

## 2021-10-17 RX ADMIN — ENOXAPARIN SODIUM 80 MG: 80 INJECTION SUBCUTANEOUS at 08:43

## 2021-10-17 RX ADMIN — MICONAZOLE NITRATE: 20 CREAM TOPICAL at 19:57

## 2021-10-17 RX ADMIN — ATORVASTATIN CALCIUM 40 MG: 40 TABLET, FILM COATED ORAL at 08:43

## 2021-10-17 RX ADMIN — Medication 1 TABLET: at 08:43

## 2021-10-17 RX ADMIN — ASPIRIN 81 MG CHEWABLE TABLET 81 MG: 81 TABLET CHEWABLE at 08:43

## 2021-10-17 RX ADMIN — POTASSIUM CHLORIDE 20 MEQ: 750 TABLET, EXTENDED RELEASE ORAL at 17:04

## 2021-10-17 RX ADMIN — POLYETHYLENE GLYCOL 3350 17 G: 17 POWDER, FOR SOLUTION ORAL at 08:43

## 2021-10-17 RX ADMIN — POTASSIUM CHLORIDE 40 MEQ: 750 TABLET, EXTENDED RELEASE ORAL at 10:18

## 2021-10-17 RX ADMIN — DOCUSATE SODIUM 50 MG AND SENNOSIDES 8.6 MG 1 TABLET: 8.6; 5 TABLET, FILM COATED ORAL at 19:57

## 2021-10-17 RX ADMIN — MICONAZOLE NITRATE: 20 CREAM TOPICAL at 08:56

## 2021-10-17 RX ADMIN — PANTOPRAZOLE SODIUM 40 MG: 40 TABLET, DELAYED RELEASE ORAL at 08:43

## 2021-10-17 ASSESSMENT — ACTIVITIES OF DAILY LIVING (ADL)
ADLS_ACUITY_SCORE: 14
ADLS_ACUITY_SCORE: 14
ADLS_ACUITY_SCORE: 18
ADLS_ACUITY_SCORE: 18
ADLS_ACUITY_SCORE: 14
ADLS_ACUITY_SCORE: 14

## 2021-10-17 ASSESSMENT — MIFFLIN-ST. JEOR: SCORE: 2032.5

## 2021-10-17 NOTE — PROGRESS NOTES
[facility]    Medicine Progress Note - Hospitalist Service, Gold 9       Date of Admission:  10/6/2021    Assessment & Plan           CAD s/p PCI to mLAD (2016), HFpEF, HLD, suspected COPD, CARL on CPAP, morbid obesity, and anxiety who was admitted to ICU with acute hypoxic and hypercapnic respiratory failure requiring intubation 2/2 to COVID and MSSA pneumonia leading to ARDS. She completed a course of antibiotics for MSSA and received Dexamethasone, Tocilizumab, and Remdesivir x1 for COVID. She has also been aggressively diuresed given her history of CARL/HFpEF with concern for possible underlying pulmonary HTN for which Cardiology had been following early on in her hospitalization. She was extubated on 10/13 and transferred to hospital medicine for further cares.     Changes today:  - Reduce Bumex dose  - Decrease dose of Lovenox for DVT ppx  - Monitor for change in abdominal hematoma size, pain control    # Acute hypoxic-hypercapnic respiratory failure, multifactorial  # CAP 2/2 MMSA pneumonia  # COVID-19 pneumonia  # Hx of Pulm HTN (see below)  # CARL on CPAP  Hypoxic on admission to ICU on 10/6 w O2 sats in 50's. VBG with pH 7.05 and pCO2 81.  Intubated in ED. Bibasilar opacities noted on CXR. WBC 19.6. Lactate 8.4. COVID PCR negative. Pro-BNP elevated. Given relatively clear lung fields on imaging, initial impression CHF vs possible pneumonia. Treated with diuretics and antibiotics.  CTA Chest on at [facility] negative for PE, again showed evidence of atelectasis. Sputum cultures grew MSSA. Repeat COVID PCR + 10/6. Severe RV dysfunction and dilated IVC noted on TTE with preserved EF. Continued treatment for CAP, COVID-19, and CHF. CRP and BNP down-trending. Compensated respiratory acidosis on repeat blood gas. Extubated 10/13. Continues to need NC up to 6 lpm.   - She has finished treatment for MSSA PNA with CTX x5 days and Azithromycin x3 days  - She continues to require  oxygen up to 6L/nc at times, this may be her new baseline  - Continue pulmonary toilet efforts, OOB as tolerates  - Continue diuresis, treatment of COVID-19 as below  - Monitor for signs of respiratory infection  - Continue CPAP at bedtime, per patient's son, she was essentially using CPAP ATC at home prior to admission     # Leukocytosis  # Intramuscular hematoma  WBC increased on 10/14, now elevated but stable. Chest x-ray completed showing increased patchy interstitial airspace opacities possible representing atelectasis, edema, vs infection. UA obtained from arthur, not indicative of infection. She was having loose stools but this has since improved. She remains afebrile though she is also intermittently encephalopathic. On 10/17, firm tenderness noted over her right lower abdomen with tenderness to palpation noted - suspect this represents an intramuscular hematoma likely from Lovenox administration  - monitor for signs of infection  - given prolonged hospitalization, if signs of sepsis, would cover broadly with vanc/zosyn  - follow CBC daily  - Pain control for hematoma; Hgb otherwise remains stable, will also decrease Lovenox dose now that pt out of ICU as below     # Positive COVID-19 PCR.    History of COVID infection in 12/2020, did not require hospitalization. Completed vaccination series 5/2021. +Exposure recently. COVID PCR negative at OSH but repeat PCR positive on admission. DDx includes reinfection vs persistent positive PCR from prior infection. CT w/o diffuse GGO to suggest COVID pneumonia. S/p Remdesivir and Tocilizumab x 1 on 10/7, as well as steroids.   - Continue dexamethasone through 10/17  - On DVT prophylaxis with subcutaneous Lovenox BID at routine prophy doses  - Continue contact/respiratory precautions     # Acute on chronic HFpEF and RV failure  # Pulmonary HTN, probable Group III  Prior history of HFpEF and pulmonary HTN. On Bumex at home. NTproBNP 2264 on admission. TTE (10/6) LVEF  55-60%, mild LVH, indeterminate diastolic function, moderate to severe RV dilation w moderate to severely reduced RV function, and dilated IVC. Pulmonary pressures could not be assessed on TTE. CTA Chest negative for PE but showed severely dilated pulmonary trunk c/w pulmonary HTN. Cardiology consulted. Suspect group III pulmonary HTN, although CTEPH cannot be ruled out. RV failure likely chronic given RV dilation on imaging. Clinically improved w IV Bumex. Weight down ~31 lbs since admission but remains essentially unchanged since 10/14, though she continues to diurese without signs of contraction alkalosis.  - Cardiology consulted, appreciate recommendations   - V/Q scan attempted 10/14, but pt unable to tolerate lying flat    -Not likely a candidate for pulmonary HTN therapies   - RHC to complete in outpatient setting   - Schedule follow up with pulmonary HTN team (Dr. Hughes) in 2-4 weeks  - Decrease Bumex to 1 mg BID given increase in Cr on 10/17  - Daily weights, I&O's  - BMP daily     # CAD:  Hx PCI w stenting of mLAD in 2016. Mild troponin elevation on admission felt to be demand ischemia. TTE with normal wall motion. Cardiology consulted, ACS not suspected.   - Continue ASA + statin  - BB on hold d/t borderline low BP in ICU, will resume as BP tolerates     # TIMMY - resolved:  Cr 1.95 at OSH, 1.68 on admission. Baseline unknown. UA with hyaline casts, otherwise bland. Renal US negative for hydro. Suspect prerenal azotemia in setting of acute CHF  - Follow BMP daily    # Steroid-induced hyperglycemia.  No history of DM. A1c 6.7 on admission. Currently well controlled on sliding scale.   - MSSI qAC and HS   - Hypoglycemia protocol       # Hypernatremia   # Hypokalemia   Restart regular diet 10/14. Suspect PO intake will improve  - BMP daily     # Chronic anemia  Hgb as low as 8.6 during hospitalization likely in the setting of infection. Improving to ~11.   - CBC in AM     # Malnutrition risk.    - SLP  consulted (10/14)- regular diet   - Calorie counts started on 10/16     # GERD  Continue protonix.      # Pressure ulcers, POA.    Essentially chair-bound. Gets up weekly to ambulate. Increased weakness lately.  - WOCN consulted   - PT/OT consulted, likely will need TCU discharge     Diet: Regular Diet Adult  Calorie Counts  Snacks/Supplements Adult: Ensure Enlive; Between Meals    DVT Prophylaxis: Enoxaparin (Lovenox) SQ  Lopes Catheter: PRESENT, indication: Strict 1-2 Hour I&O, Strict 1-2 Hour I&O  Central Lines: None  Code Status: Full Code      Disposition Plan   Expected discharge: 10/22/2021   recommended to transitional care unit once adequate pain management/ tolerating PO medications, antibiotic plan established and O2 use less than 6 liters/minute.     The patient's care was discussed with the Bedside Nurse and Patient.    Ed Wren MD  Hospitalist Service, [address]  Securely message with the Vocera Web Console (learn more here)  Text page via [employer] Paging/Directory  Please see sign in/sign out for up to date coverage information    Clinically Significant Risk Factors Present on Admission                ______________________________________________________________________    Interval History   No acute events overnight. Today Chika states her breathing feels improved. She denies any cough or sputum production. She slept well on the CPAP overnight. She denies any fevers/chills, or URI symptoms. She has no abdominal pain, nausea, or vomiting but notes that her appetite remains poor as she just doesn't feel hungry. She wonders when she might be able to go home. She has no further concerns at this time.    Data reviewed today: I reviewed all medications, new labs and imaging results over the last 24 hours. I personally reviewed no images or EKG's today.    Physical Exam   Vital Signs: Temp: 97.9  F (36.6  C) Temp src: Axillary BP: 122/77 Pulse:  70   Resp: 18 SpO2: 94 % O2 Device: BiPAP/CPAP Oxygen Delivery: 6 LPM  Weight: 315 lbs .6 oz  General Appearance: Pleasant elderly female, lying in bed on nasal cannula, breathing more comfortably compared with prior days  Respiratory: Diminished breath sounds at the bilateral lung bases, lungs o/w CTAB  Cardiovascular: RRR, no m/r/g. Radial pulses 2+ and symmetric.  GI: soft, non-distended, over the left lower abdominal wall there is a large area of firmness with associated tenderness to palpation which is not present on the right, c/w hematoma.  Skin: no skin rash or pallor  Other: Lower extremity edema improved significantly c/w previous days     Data   Recent Labs   Lab 10/17/21  1223 10/17/21  0758 10/17/21  0654 10/16/21  0740 10/16/21  0446 10/16/21  0415 10/15/21  0302 10/15/21  0300   WBC  --   --  13.5*  --  12.1*  --   --  13.8*   HGB  --   --  10.7*  --  11.4*  --   --  11.0*   MCV  --   --  88  --  90  --   --  90   PLT  --   --  292  --  312  --   --  311   NA  --   --  141  --   --  145*  --  146*   POTASSIUM  --   --  3.2*  --   --  4.2  --  3.7   CHLORIDE  --   --  100  --   --  105  --  105   CO2  --   --  39*  --   --  39*  --  42*   BUN  --   --  53*  --   --  44*  --  47*   CR  --   --  1.07*  --   --  0.92  --  0.95   ANIONGAP  --   --  2*  --   --  1*  --  <1*   NAE  --   --  9.3  --   --  9.7  --  9.2   * 130* 128*   < >  --  103*   < > 92    < > = values in this interval not displayed.     No results found for this or any previous visit (from the past 24 hour(s)).

## 2021-10-17 NOTE — PLAN OF CARE
Major Shift Events:  Confused ,oriented to self and situation. ,forgetful at times . Easily to be re-oriented and redirected . Hemodynamically stable. Afebrile. On 6-8lpm oxi-plus while awake . CPAP FIO2 30% while sleeping . Tele SR 70-90. Passing. Stool softener given. No BM during the shift. Lopes cath patent with adequate UOP .   Plan: continue to monitor patient conditon. Encourage increase po intakes . Pt on calorie count that started on 10/16 .   For vital signs and complete assessments, please see documentation flowsheets.      Problem: ARDS (Acute Respiratory Distress Syndrome)  Goal: Effective Oxygenation  Outcome: No Change     Problem: Fluid Imbalance (Pneumonia)  Goal: Fluid Balance  Outcome: No Change     Problem: Respiratory Compromise (Pneumonia)  Goal: Effective Oxygenation and Ventilation  Outcome: No Change  Intervention: Promote Airway Secretion Clearance  Recent Flowsheet Documentation  Taken 10/17/2021 0400 by Pushpa Mccallum, RN  Cough And Deep Breathing: done with encouragement  Intervention: Optimize Oxygenation and Ventilation  Recent Flowsheet Documentation  Taken 10/17/2021 0400 by Pushpa Mccallum, RN  Head of Bed (HOB) Positioning: HOB at 30 degrees

## 2021-10-17 NOTE — PROGRESS NOTES
Calorie Count  Intake recorded for: 10/16  Total Kcals: 0 Total Protein: 0g  Kcals from Hospital Food: 0   Protein: 0g  Kcals from Outside Food (average):0 Protein: 0g  # Meals Ordered from Kitchen: 1 meal  # Meals Recorded: No food intake recorded  # Supplements Recorded: No intake recorded

## 2021-10-17 NOTE — PLAN OF CARE
Neuro: A&O to self and situation, able to make needs known. Lethargic, arouses easily.   Cardiac: SR. VSS. Afebrile.   Respiratory: Sating >92% on 6L NC.  GI/: Adequate urine output via arthur, bumex given in AM, afternoon dose held d/t increased Cr. No BM this shift.   Diet/appetite: Tolerating reg diet. Poor appetite, encouraged intake throughout day.  Activity:  Assist of 2 w/ lift up to chair   Pain: At acceptable level on current regimen.   Skin: LLQ hematoma noted.   LDA's: PIV x2 SL, arthur    Plan: Continue with POC. Notify primary team with changes.

## 2021-10-18 ENCOUNTER — APPOINTMENT (OUTPATIENT)
Dept: OCCUPATIONAL THERAPY | Facility: CLINIC | Age: 61
DRG: 207 | End: 2021-10-18
Attending: ANESTHESIOLOGY
Payer: MEDICARE

## 2021-10-18 ENCOUNTER — APPOINTMENT (OUTPATIENT)
Dept: PHYSICAL THERAPY | Facility: CLINIC | Age: 61
DRG: 207 | End: 2021-10-18
Attending: ANESTHESIOLOGY
Payer: MEDICARE

## 2021-10-18 LAB
ANION GAP SERPL CALCULATED.3IONS-SCNC: 2 MMOL/L (ref 3–14)
BASOPHILS # BLD AUTO: 0.2 10E3/UL (ref 0–0.2)
BASOPHILS NFR BLD AUTO: 1 %
BUN SERPL-MCNC: 49 MG/DL (ref 7–30)
CALCIUM SERPL-MCNC: 9.1 MG/DL (ref 8.5–10.1)
CHLORIDE BLD-SCNC: 100 MMOL/L (ref 94–109)
CO2 SERPL-SCNC: 38 MMOL/L (ref 20–32)
CREAT SERPL-MCNC: 0.96 MG/DL (ref 0.52–1.04)
EOSINOPHIL # BLD AUTO: 0.5 10E3/UL (ref 0–0.7)
EOSINOPHIL NFR BLD AUTO: 3 %
ERYTHROCYTE [DISTWIDTH] IN BLOOD BY AUTOMATED COUNT: 17 % (ref 10–15)
GFR SERPL CREATININE-BSD FRML MDRD: 63 ML/MIN/1.73M2
GLUCOSE BLD-MCNC: 110 MG/DL (ref 70–99)
GLUCOSE BLDC GLUCOMTR-MCNC: 103 MG/DL (ref 70–99)
GLUCOSE BLDC GLUCOMTR-MCNC: 107 MG/DL (ref 70–99)
HCT VFR BLD AUTO: 36 % (ref 35–47)
HGB BLD-MCNC: 10.4 G/DL (ref 11.7–15.7)
IMM GRANULOCYTES # BLD: 0.1 10E3/UL
IMM GRANULOCYTES NFR BLD: 1 %
LYMPHOCYTES # BLD AUTO: 2.9 10E3/UL (ref 0.8–5.3)
LYMPHOCYTES NFR BLD AUTO: 20 %
MAGNESIUM SERPL-MCNC: 2.8 MG/DL (ref 1.6–2.3)
MCH RBC QN AUTO: 26 PG (ref 26.5–33)
MCHC RBC AUTO-ENTMCNC: 28.9 G/DL (ref 31.5–36.5)
MCV RBC AUTO: 90 FL (ref 78–100)
MONOCYTES # BLD AUTO: 1 10E3/UL (ref 0–1.3)
MONOCYTES NFR BLD AUTO: 7 %
NEUTROPHILS # BLD AUTO: 10 10E3/UL (ref 1.6–8.3)
NEUTROPHILS NFR BLD AUTO: 68 %
PHOSPHATE SERPL-MCNC: 4.1 MG/DL (ref 2.5–4.5)
PLATELET # BLD AUTO: 294 10E3/UL (ref 150–450)
POTASSIUM BLD-SCNC: 3.9 MMOL/L (ref 3.4–5.3)
POTASSIUM BLD-SCNC: 3.9 MMOL/L (ref 3.4–5.3)
RBC # BLD AUTO: 4 10E6/UL (ref 3.8–5.2)
SODIUM SERPL-SCNC: 140 MMOL/L (ref 133–144)
WBC # BLD AUTO: 15 10E3/UL (ref 4–11)
WBC # BLD AUTO: ABNORMAL 10*3/UL

## 2021-10-18 PROCEDURE — 99232 SBSQ HOSP IP/OBS MODERATE 35: CPT | Performed by: INTERNAL MEDICINE

## 2021-10-18 PROCEDURE — 94660 CPAP INITIATION&MGMT: CPT

## 2021-10-18 PROCEDURE — 250N000013 HC RX MED GY IP 250 OP 250 PS 637: Performed by: INTERNAL MEDICINE

## 2021-10-18 PROCEDURE — 84132 ASSAY OF SERUM POTASSIUM: CPT | Performed by: INTERNAL MEDICINE

## 2021-10-18 PROCEDURE — 250N000013 HC RX MED GY IP 250 OP 250 PS 637

## 2021-10-18 PROCEDURE — 250N000013 HC RX MED GY IP 250 OP 250 PS 637: Performed by: PHYSICIAN ASSISTANT

## 2021-10-18 PROCEDURE — 250N000011 HC RX IP 250 OP 636: Performed by: INTERNAL MEDICINE

## 2021-10-18 PROCEDURE — 250N000013 HC RX MED GY IP 250 OP 250 PS 637: Performed by: STUDENT IN AN ORGANIZED HEALTH CARE EDUCATION/TRAINING PROGRAM

## 2021-10-18 PROCEDURE — 85025 COMPLETE CBC W/AUTO DIFF WBC: CPT | Performed by: STUDENT IN AN ORGANIZED HEALTH CARE EDUCATION/TRAINING PROGRAM

## 2021-10-18 PROCEDURE — 999N000157 HC STATISTIC RCP TIME EA 10 MIN

## 2021-10-18 PROCEDURE — 97530 THERAPEUTIC ACTIVITIES: CPT | Mod: GP

## 2021-10-18 PROCEDURE — 84100 ASSAY OF PHOSPHORUS: CPT | Performed by: INTERNAL MEDICINE

## 2021-10-18 PROCEDURE — 80048 BASIC METABOLIC PNL TOTAL CA: CPT | Performed by: STUDENT IN AN ORGANIZED HEALTH CARE EDUCATION/TRAINING PROGRAM

## 2021-10-18 PROCEDURE — 97530 THERAPEUTIC ACTIVITIES: CPT | Mod: GO

## 2021-10-18 PROCEDURE — 83735 ASSAY OF MAGNESIUM: CPT | Performed by: INTERNAL MEDICINE

## 2021-10-18 PROCEDURE — 120N000002 HC R&B MED SURG/OB UMMC

## 2021-10-18 PROCEDURE — 99223 1ST HOSP IP/OBS HIGH 75: CPT | Performed by: PHYSICAL MEDICINE & REHABILITATION

## 2021-10-18 PROCEDURE — 36415 COLL VENOUS BLD VENIPUNCTURE: CPT | Performed by: STUDENT IN AN ORGANIZED HEALTH CARE EDUCATION/TRAINING PROGRAM

## 2021-10-18 RX ORDER — BUMETANIDE 1 MG/1
1 TABLET ORAL
Status: DISCONTINUED | OUTPATIENT
Start: 2021-10-18 | End: 2021-10-27 | Stop reason: HOSPADM

## 2021-10-18 RX ADMIN — ASPIRIN 81 MG CHEWABLE TABLET 81 MG: 81 TABLET CHEWABLE at 08:40

## 2021-10-18 RX ADMIN — BUMETANIDE 1 MG: 0.25 INJECTION, SOLUTION INTRAMUSCULAR; INTRAVENOUS at 08:48

## 2021-10-18 RX ADMIN — Medication 1 TABLET: at 08:40

## 2021-10-18 RX ADMIN — POLYETHYLENE GLYCOL 3350 17 G: 17 POWDER, FOR SOLUTION ORAL at 08:41

## 2021-10-18 RX ADMIN — ATORVASTATIN CALCIUM 40 MG: 40 TABLET, FILM COATED ORAL at 08:41

## 2021-10-18 RX ADMIN — Medication 3 MG: at 20:15

## 2021-10-18 RX ADMIN — ENOXAPARIN SODIUM 40 MG: 40 INJECTION SUBCUTANEOUS at 20:15

## 2021-10-18 RX ADMIN — MICONAZOLE NITRATE: 20 CREAM TOPICAL at 20:16

## 2021-10-18 RX ADMIN — BUMETANIDE 1 MG: 1 TABLET ORAL at 16:48

## 2021-10-18 RX ADMIN — PAROXETINE 40 MG: 20 TABLET, FILM COATED ORAL at 08:40

## 2021-10-18 RX ADMIN — ENOXAPARIN SODIUM 40 MG: 40 INJECTION SUBCUTANEOUS at 08:41

## 2021-10-18 RX ADMIN — PANTOPRAZOLE SODIUM 40 MG: 40 TABLET, DELAYED RELEASE ORAL at 08:41

## 2021-10-18 RX ADMIN — MICONAZOLE NITRATE: 20 CREAM TOPICAL at 11:58

## 2021-10-18 ASSESSMENT — ACTIVITIES OF DAILY LIVING (ADL)
ADLS_ACUITY_SCORE: 16
ADLS_ACUITY_SCORE: 12
ADLS_ACUITY_SCORE: 12
ADLS_ACUITY_SCORE: 16
ADLS_ACUITY_SCORE: 12
ADLS_ACUITY_SCORE: 16
ADLS_ACUITY_SCORE: 16

## 2021-10-18 ASSESSMENT — MIFFLIN-ST. JEOR: SCORE: 2019.5

## 2021-10-18 NOTE — PROGRESS NOTES
Physician Attestation   I, Ed Wren, was present with the medical/KEY student who participated in the service and in the documentation of the note.  I have verified the history and personally performed the physical exam and medical decision making.  I agree with the assessment and plan of care as documented in the note.      I personally reviewed vital signs, medications, labs and imaging.    Seen resting in chair this afternoon. Continued to feel well overall, and denied any shortness of breath, difficulty breathing, or cough. No abdominal pain, nausea, or vomiting. Discussed the need to have her move around and work with PT to work on her strength. Also discussed importance of eating consistent meals. Broached subject of discharge to TCU - she was open tot his but disappointed. On exam, she is comfortable appearing with persistent abdominal breathing which appears to be her baseline. Lung sounds are diminished at the bilateral lung bases but she is otherwise moving good air. Abdominal exam notable for a large intramuscular hematoma on the LLQ which is tender to palpation. Her lower extremity swelling seems improved.    Changes today:  - Suspect leukocytosis is related to abdominal hematoma; she remains without any localizing symptoms of infection  - Transition Bumex to PO   - Finish Dex for COVID; given severe illness she needs to remain on precautions for 20 days  - Would remove arthur tomorrow if otherwise remains clinically stable    Remainder of plans as below.    Ed Wren MD  Date of Service (when I saw the patient): 10/18/21    [facility] [place]    Progress Note - Gold 9 Service        Date of Admission:  10/6/2021    Assessment & Plan           Chika Ferguson is a 61 year old female with history of CAD s/p PCI to mLAD (2016), HFpEF, HLD, suspected COPD, CARL on CPAP, morbid obesity, and anxiety who was admitted to ICU with acute hypoxic and  hypercapnic respiratory failure requiring intubation 2/2 to COVID and MSSA pneumonia leading to ARDS. She completed a course of antibiotics for MSSA and received Dexamethasone, Tocilizumab, and Remdesivir x1 for COVID. She has also been aggressively diuresed given her history of CARL/HFpEF with concern for possible underlying pulmonary HTN for which Cardiology had been following early on in her hospitalization. She was extubated on 10/13 and transferred to hospital medicine for further cares.    Changes today:  - Decadron 10 day course completed  - PM&R consult for rehab  - Continued COVID-19 isolation (20 days - classified as critical illness, immunocompetent)      # Acute hypoxic-hypercapnic respiratory failure, multifactorial  # CAP 2/2 MMSA pneumonia  # COVID-19 pneumonia  # Hx of Pulm HTN (see below)  # CARL on CPAP  Hypoxic on admission to ICU on 10/6 w O2 sats in 50's. VBG with pH 7.05 and pCO2 81.  Intubated in ED. Bibasilar opacities noted on CXR. WBC 19.6. Lactate 8.4. COVID PCR negative. Pro-BNP elevated. Given relatively clear lung fields on imaging, initial impression CHF vs possible pneumonia. Treated with diuretics and antibiotics.  CTA Chest on at [facility] negative for PE, again showed evidence of atelectasis. Sputum cultures grew MSSA. Repeat COVID PCR + 10/6. Severe RV dysfunction and dilated IVC noted on TTE with preserved EF. Continued treatment for CAP, COVID-19, and CHF. CRP and BNP down-trending. Compensated respiratory acidosis on repeat blood gas. Extubated 10/13. Continues to need NC up to 6 lpm with CPAP use of 30-35% fiO2 at night.  - She has finished treatment for MSSA PNA with CTX x5 days and Azithromycin x3 days  - Continues to require oxygen up to 6L/nc at times, this may be her new baseline  - Continue pulmonary toilet efforts, OOB as tolerates  - Continue diuresis, treatment of COVID-19 as below  - Monitor for signs of respiratory infection  - Continue CPAP at bedtime, per patient's son,  she was essentially using CPAP ATC at home prior to admission     # Leukocytosis  # Intramuscular hematoma  WBC increased on 10/14, now elevated but stable. Chest x-ray completed showing increased patchy interstitial airspace opacities possible representing atelectasis, edema, vs infection. UA obtained from arthur, not indicative of infection. She was having loose stools but this has since improved. Remains afebrile though she is also intermittently encephalopathic. On 10/17, firm tenderness noted over her right lower abdomen with tenderness to palpation noted - suspect this represents an intramuscular hematoma likely from Lovenox administration. Additional hematoma noted on left lower shin. Hgb continues to be stable. We will continue to monitor.  - Monitor for signs of infection  - Given prolonged hospitalization, if signs of sepsis, would cover broadly with vanc/zosyn  -CBC daily  - Pain control for hematoma  - Continue decrease Lovenox dose now that pt out of ICU as below     # Positive COVID-19 PCR.    History of COVID infection in 12/2020, did not require hospitalization. Completed vaccination series 5/2021. +Exposure recently. COVID PCR negative at OSH but repeat PCR positive on admission. DDx includes reinfection vs persistent positive PCR from prior infection. CT w/o diffuse GGO to suggest COVID pneumonia. S/p Remdesivir and Tocilizumab x 1 on 10/7, as well as steroids.   - Completed dexamethasone 10 day course 10/17  - DVT prophylaxis with subcutaneous Lovenox BID  - Continue contact/respiratory precautions    - Considered critically ill, immunocompetent requiring 20 days of isolation     # Acute on chronic HFpEF and RV failure  # Pulmonary HTN, probable Group III  Prior history of HFpEF and pulmonary HTN. On Bumex at home. NTproBNP 2264 on admission. TTE (10/6) LVEF 55-60%, mild LVH, indeterminate diastolic function, moderate to severe RV dilation w moderate to severely reduced RV function, and dilated  IVC. Pulmonary pressures could not be assessed on TTE. CTA Chest negative for PE but showed severely dilated pulmonary trunk c/w pulmonary HTN. Cardiology consulted. Suspect group III pulmonary HTN, although CTEPH cannot be ruled out. RV failure likely chronic given RV dilation on imaging. Clinically improved w IV Bumex. Weight down ~45 lbs since admission but remains essentially unchanged since 10/14, though she continues to diurese without signs of contraction alkalosis.  - Cardiology consulted, appreciate recommendations              - V/Q scan attempted 10/14, but pt unable to tolerate lying flat                          -Not likely a candidate for pulmonary HTN therapies              - RHC to complete in outpatient setting              - Schedule follow up with pulmonary HTN team (Dr. Hughes) in 2-4 weeks  - Switch to PO Bumex to 1 mg BID (tired to increase Bumex w/resultant Cr elevation on 10/17)  - Daily weights, I&O's  - BMP daily     # CAD:  Hx PCI w stenting of mLAD in 2016. Mild troponin elevation on admission felt to be demand ischemia. TTE with normal wall motion. Cardiology consulted, ACS not suspected.   - Continue ASA + statin  - BB on hold d/t borderline low BP in ICU, will resume as BP tolerates     # TIMMY - resolved:  Cr 1.95 at OSH, 1.68 on admission. Baseline unknown. UA with hyaline casts, otherwise bland. Renal US negative for hydro. Suspect prerenal azotemia in setting of acute CHF  - Follow BMP daily     # Steroid-induced hyperglycemia.  No history of DM. A1c 6.7 on admission. Blood glucose <120 not requiring sliding scale insulin for 2 days. With completion of steroids and well controlled blood sugars will discontinue insulin.     # Hypernatremia - resolved  # Hypokalemia - resolved   Restart regular diet 10/14. Continued PO intake will likely correct electrolyte disturbances.  - BMP daily     # Chronic anemia  Hgb as low as 8.6 during hospitalization likely in the setting of infection.  Improving to ~11. With resent hematoma, will continue to monitor hgb.  - CBC in AM     # Malnutrition risk.    - SLP consulted (10/14)- regular diet   - Calorie counts started on 10/16     # GERD  Continue protonix.      # Pressure ulcers, POA.    Essentially chair-bound. Gets up weekly to ambulate. Increased weakness lately.  - WOCN consulted   - PT/OT consulted, likely will need TCU discharge    Diet: Regular Diet Adult  Calorie Counts  Snacks/Supplements Adult: Ensure Enlive; Between Meals    DVT Prophylaxis: Enoxaparin (Lovenox) SQ  Lopes Catheter: PRESENT, indication: Strict 1-2 Hour I&O, Strict 1-2 Hour I&O  Central Lines: None  Code Status: Full Code        Disposition Plan   Expected discharge: 10/22/2021   recommended to transitional care unit once O2 use is less and safe disposition plan/ TCU bed available.     The patient's care was discussed with the Attending Physician, Dr. Wren.    Grisedla Martínez  Medical Student  [address]  Securely message with the Vocera Web Console (learn more here)  Text page via [employer] Paging/Directory  Please see sign in/sign out for up to date coverage information    ______________________________________________________________________    Interval History   NAOE. Reports feeling like her breathing is a baseline this morning. Is up in chair. Reports some superficial pain over left lower quadrant where hematoma is present. Also endorses pain on the left lower shin. Denies shortness of breath, nausea, vomiting, diffuse abdominal pain.     4 point ROS is negative unless noted above.    Data reviewed today: I reviewed all medications, new labs and imaging results over the last 24 hours. I personally reviewed no images or EKG's today.    Physical Exam   Vital Signs: Temp: 98.3  F (36.8  C) Temp src: Axillary BP: 100/56 Pulse: 75   Resp: 16 SpO2: 95 % O2 Device: BiPAP/CPAP Oxygen Delivery: 4 LPM  Weight: 312 lbs 2.74  oz  Constitutional: awake, alert, cooperative, no apparent distress  Respiratory: diminished breath sounds lower lobe, clear to auscultation bilaterally in upper lung fields  Cardiovascular: regular rate and rhythm and no murmur noted  GI: normal bowel sounds, non-distended and tenderness noted in the left lower quadrant  Skin: warmth, swelling, and tenderness noted on the lower left shin in about a 5cm x 5 cm area and ecchymosis on right arm and on abdomen  Musculoskeletal: trace lower extremity edema present  Neuropsychiatric: General: normal, calm and normal eye contact  Level of consciousness: alert / normal  Affect: normal    Data   Recent Labs   Lab 10/18/21  0842 10/18/21  0521 10/18/21  0343 10/17/21  1614 10/17/21  1420 10/17/21  0758 10/17/21  0654 10/16/21  0740 10/16/21  0446 10/16/21  0415 10/15/21  0300   WBC  --  15.0*  --   --   --   --  13.5*  --  12.1*  --    < >   HGB  --  10.4*  --   --   --   --  10.7*  --  11.4*  --    < >   MCV  --  90  --   --   --   --  88  --  90  --    < >   PLT  --  294  --   --   --   --  292  --  312  --    < >   NA  --  140  --   --   --   --  141  --   --  145*  --    POTASSIUM  --  3.9  3.9  --   --  3.8  --  3.2*   < >  --  4.2  --    CHLORIDE  --  100  --   --   --   --  100  --   --  105  --    CO2  --  38*  --   --   --   --  39*  --   --  39*  --    BUN  --  49*  --   --   --   --  53*  --   --  44*  --    CR  --  0.96  --   --   --   --  1.07*  --   --  0.92  --    ANIONGAP  --  2*  --   --   --   --  2*  --   --  1*  --    NAE  --  9.1  --   --   --   --  9.3  --   --  9.7  --    * 110* 103*   < >  --    < > 128*   < >  --  103*  --     < > = values in this interval not displayed.     No results found for this or any previous visit (from the past 24 hour(s)).  Medications     dextrose         aspirin  81 mg Oral Daily     atorvastatin  40 mg Oral Daily     bumetanide  1 mg Oral BID     enoxaparin ANTICOAGULANT  40 mg Subcutaneous BID     influenza  recomb quadrivalent PF  0.5 mL Intramuscular Prior to discharge     melatonin  3 mg Oral or Feeding Tube QPM     miconazole with skin protectant   Topical BID     multivitamin w/minerals  1 tablet Oral Daily     pantoprazole  40 mg Oral QAM AC     PARoxetine  40 mg Oral Daily     polyethylene glycol  17 g Oral or Feeding Tube Daily     senna-docusate  1 tablet Oral or Feeding Tube QPM

## 2021-10-18 NOTE — PLAN OF CARE
"NEURO: A&O x4, very forgetful  VITALS: Blood pressure 116/68, pulse 90, temperature 98.2  F (36.8  C), temperature source Oral, resp. rate 18, height 1.727 m (5' 8\"), weight 141.6 kg (312 lb 2.7 oz), SpO2 93 %.  PAIN: Denies  CARDIAC: SR  RESPIRATORY: 4-6L NC when awake, cpap at night  GI: Bowel sounds active, BM x2, adequate PO intake, regular diet, paola counts  : Adequate UOP via arthur  LDA: PIV x2, arthur  IV: SL  ACTIVITY: Up with lift, in chair most of day  GOALS: Tx to 5B    "

## 2021-10-18 NOTE — PROGRESS NOTES
CLINICAL NUTRITION SERVICES - REASSESSMENT NOTE     Nutrition Prescription    RECOMMENDATIONS FOR MDs/PROVIDERS TO ORDER:  Encourage oral intake     Malnutrition Status:    Moderate malnutrition in the context of acute illness     Recommendations already ordered by Registered Dietitian (RD):  Changed ensure order to chocolate  Discontinued Fee water flush    Future/Additional Recommendations:  Continue to encourage oral intake. If intake not able to increase consider restarting nutrition support   Vital High Protein @ goal of  60ml/hr  (1440ml/day)  will provide: 1440 kcals, 125 g PRO, 1203 ml free H20, 159 g CHO, and 0 g fiber daily.  -Start at 10 mL/hr and advance by 10 mL q 6-8 hours   -Water flush 60 mL q 4 hours      EVALUATION OF THE PROGRESS TOWARD GOALS   Diet: Regular + ensure enlive BID   Intake:      10/17     Total Kcals: 0           Total Protein: 0g  10/16     Total Kcals: 0           Total Protein: 0g, 1 meal ordered     Nutrition Support (10/6-10/13)  Vital High Protein @ goal of 60ml/hr (1440ml/day) will provide: 1440 kcals (22.5 kcal/kg IBW), 125 g PRO (2 g/kg IBW), 1203 ml free H20, 159 g CHO, and 0 g fiber daily.   Intake: 10/11-10/13 provided an average of 1280 kcal and 111 g protein     NEW FINDINGS  Chika reported she thinks her appetite is ok, she is used to eating smaller meals. She does not mind the ensure but would prefer chocolate flavor BID. Had a BM today. Denied chewing/swallowing issues      Weight Trends:   10/18/21 0400 141.6 kg (312 lb 2.7 oz)   10/17/21 0348 142.9 kg (315 lb 0.6 oz)   10/15/21 2200 143 kg (315 lb 4.1 oz)   10/14/21 0000 142 kg (313 lb 0.9 oz)   10/12/21 0400 148 kg (326 lb 4.5 oz)   10/10/21 0400 149.2 kg (329 lb)   10/09/21 0400 151.4 kg (333 lb 12.4 oz)   10/08/21 0400 152.4 kg (336 lb)   10/07/21 0000 159.3 kg (351 lb 3.1 oz)   10/06/21 0400 162.2 kg (357 lb 9.4 oz)     Will maintain dosing weight of 64 kg (IBW)    Respiratory: 35% FiO2  GI: Last bowel movement,  today. Patient denied concerns.   Skin: WOCN last assessed 10/14   --Buttock and pannus wounds due to Incontinence Associated Dermatitis (IAD), Intertrigo and Moisture Associated Skin Damage (MASD) with fungal involvement, Improving   --R plantar heel wound due to pressure injury, present on admission, Stage 2, stable  --R posterior heel with pressure injury, hospital acquired, Stage Deep tissue pressure injury, initial assessment     Labs: Mg 2.8 (H)  Medications:Bumex, Thera-Vit-M, Protonix, Miralax, Senokot,     MALNUTRITION  % Intake: </= 50% for >/= 5 days (severe)  % Weight Loss: Unable to assess with diuresis but currently >5% in one month  Subcutaneous Fat Loss: Unable to assess  Muscle Loss: Unable to assess  Fluid Accumulation/Edema: Mild  Malnutrition Diagnosis: Moderate malnutrition in the context of acute illness    Previous Goals   Total avg nutritional intake to meet a minimum of 22 kcal/kg and 2 g PRO/kg daily (per dosing wt 64 kg).  Evaluation: Not met    Previous Nutrition Diagnosis  Excessive energy intake related to propofol use as evidenced by pt meeting 130% of higher end estimated needs.     Evaluation: No longer applicable, nutrition diagnosis changed below    CURRENT NUTRITION DIAGNOSIS  Inadequate oral intake related to decreased appetite and respiratory status as evidenced by minimal PO documented      INTERVENTIONS  Implementation  Collaboration with other providers  Medical food supplement therapy    Goals  Patient to consume % of nutritionally adequate meal trays TID, or the equivalent with supplements/snacks.    Monitoring/Evaluation  Progress toward goals will be monitored and evaluated per protocol.    Megan Astudillo RD, LYLY  6B pager: [phone]

## 2021-10-18 NOTE — PLAN OF CARE
Major Shift Events:  pleasant. A&OX2-3 . Forgetful . Clustered care done during the night to promote sleeping .Denied any pain . Hemodynamically stable. Afebrile. On 4-5 lpmNC while awake and CPAP FIO2 30% while sleeping . Able to maintain her saturation > 92%. Passing gas , stool softners given . No BM since 10/16 . Lopes cath in place with adequate UOP . Poor po intakes .continue document calorie intakes .    Plan: continue to monitor . Needs help with ordering diet from the ,encourage increase po intakes .   For vital signs and complete assessments, please see documentation flowsheets.      Problem: Fluid Imbalance (Pneumonia)  Goal: Fluid Balance  Outcome: No Change     Problem: Infection (Pneumonia)  Goal: Resolution of Infection Signs and Symptoms  Outcome: No Change  Intervention: Prevent Infection Progression  Recent Flowsheet Documentation  Taken 10/18/2021 0400 by Pushpa Mccallum RN  Isolation Precautions: (COVID . SPECIAL Precation ) other (see comments)  T  Problem: Respiratory Compromise (Pneumonia)  Goal: Effective Oxygenation and Ventilation  Outcome: No Change  Intervention: Promote Airway Secretion Clearance  Recent Flowsheet Documentation  Taken 10/18/2021 0400 by Pushpa Mcacllum RN  Cough And Deep Breathing: done with encouragement  Taken 10/18/2021 0000 by Pushpa Mccallum RN  Cough And Deep Breathing: done with encouragement  Taken 10/17/2021 2000 by Pushpa Mccallum RN  Cough And Deep Breathing: done with encouragement  Intervention: Optimize Oxygenation and Ventilation  Recent Flowsheet Documentation  Taken 10/18/2021 0400 by Pushpa Mccallum, RN  Head of Bed (HOB) Positioning: HOB at 30 degrees  Taken 10/18/2021 0200 by Pushpa Mccallum RN  Problem: Impaired Wound Healing  Goal: Optimal Wound Healing  Intervention: Promote Wound Healing  Recent Flowsheet Documentation  Taken 10/18/2021 0400 by Pushpa Mccallum, RN  Activity Management: activity adjusted per tolerance

## 2021-10-18 NOTE — CONSULTS
[facility]   PM&R CONSULT    Consulting Provider: Lucila Ward  Reason for Consult: Assessment of rehabilitation   Location of Patient: 6230  Date of Encounter: 10/18/2021   Date of Admission: 10/6/2021      ASSESSMENT/PLAN:    Ms. Chika Ferguson is a Right handed 61 year old yo female who presents with severe deconditioning/debility in the setting of COVID19 infection leading to ARDS requiring intubation from 10/6-10/13    Previously independent but very sedentary, she is now requiring assist of 2 progressing from Max to mod for sit to stand transfers.     Given my exam, current fucntion, and prvious level of fucntioning, she is appropriate for a TCU setting on discharge.       Thank you for consulting the PM&R Department.   For any questions, please feel free to page me at [phone]       Cinthya Escoto MD   Department of PM&R      HPI:    Chika Ferguson is a 61 year old female who presented to the 10/6/2021 for symptoms of SOB, and was diagnosed with acute hypoxic and hypercapnic respiratory failure requiring intubation 2/2 to COVID and MSSA pneumonia leading to ARDS.   PMH is significant for CAD s/p PCI to mLAD (2016), HFpEF, HLD, suspected COPD, Pulmonary hypertension, CARL on CPAP, morbid obesity, and anxiety   She completed vaccination series 5/2021.   She was intubated in the ED on 10/6/21, initial PCR was negative but turned positive on 10/6/21.   She was extubated on 10/13.    CTA Chest on at [facility] negative for PE, again showed evidence of atelectasis. Sputum cultures grew MSSA.   Severe RV dysfunction and dilated IVC noted on TTE with preserved EF.    She is on continued treatment for CAP, COVID-19, and CHF.   CRP and BNP down-trending.   She continues to need NC up to 6 lpm.     She is also intermittently encephalopathic per chart review.   On 10/17, firm tenderness noted over her right lower abdomen with tenderness to palpation noted - suspect this  represents an intramuscular hematoma likely from Lovenox administration. Lovenox dose was reduced today.     She has prior history of HFpEF and pulmonary HTN. She was on Bumex at home. NTproBNP 2264 on admission.   TTE (10/6) LVEF 55-60%, mild LVH, indeterminate diastolic function, moderate to severe RV dilation w moderate to severely reduced RV function, and dilated IVC.   CTA Chest negative for PE but showed severely dilated pulmonary trunk c/w pulmonary HTN.   Cardiology was consulted and she is on Bumex for now.     Resolved issues in clude TIMMY, steroid induced hyperglycemia, and Hypernatremia/Hypokalemia    She has a arthur catheter     PREVIOUS LEVEL OF FUNCTION   Patient is somewhat vague about her overall function. She appears very anxious. She states that she does not use any assistive device, but is active. After much discussion, she endorses that she is not active much.       CURRENT FUNCTION   Currently she is modA x2 for sit<>stands and lift. She is requiring min assist of 2 for rolling side to side for the placement of the sling.   SLP has signed off.       LIVING SITUATION/SUPPORT  Lives with adult son and his fianceMonica Arboleda.   Chika states that Rob[place] cooks for family. Patient does her own laundry.   They live in [place],  in a house with [address].  She lives on main level and doesn't use stairs normally.      Past Medical History:  PMH is significant for CAD s/p PCI to mLAD (2016), HFpEF, HLD, suspected COPD, Pulmonary hypertension, CARL on CPAP, morbid obesity, and anxiety       Current Medications:  Current Facility-Administered Medications   Medication     aspirin (ASA) chewable tablet 81 mg     atorvastatin (LIPITOR) tablet 40 mg     bisacodyl (DULCOLAX) Suppository 10 mg     bumetanide (BUMEX) tablet 1 mg     dextrose 10% infusion     glucose gel 15-30 g    Or     dextrose 50 % injection 25-50 mL    Or     glucagon injection 1 mg     enoxaparin ANTICOAGULANT (LOVENOX) injection 40 mg     hydrOXYzine (ATARAX)  tablet 25 mg     influenza recomb quadrivalent PF (FLUBLOK) injection 0.5 mL     melatonin tablet 3 mg     miconazole with skin protectant (ANNABELLA ANTIFUNGAL) 2 % cream     multivitamin w/minerals (THERA-VIT-M) tablet 1 tablet     naloxone (NARCAN) injection 0.2 mg    Or     naloxone (NARCAN) injection 0.4 mg    Or     naloxone (NARCAN) injection 0.2 mg    Or     naloxone (NARCAN) injection 0.4 mg     oxyCODONE (ROXICODONE) tablet 5 mg     pantoprazole (PROTONIX) EC tablet 40 mg     PARoxetine (PAXIL) tablet 40 mg     polyethylene glycol (MIRALAX) Packet 17 g     senna-docusate (SENOKOT-S/PERICOLACE) 8.6-50 MG per tablet 1 tablet     senna-docusate (SENOKOT-S/PERICOLACE) 8.6-50 MG per tablet 1 tablet    Or     senna-docusate (SENOKOT-S/PERICOLACE) 8.6-50 MG per tablet 2 tablet         Labs   Lab Results   Component Value Date    WBC 15.0 (H) 10/18/2021    HGB 10.4 (L) 10/18/2021    HCT 36.0 10/18/2021    MCV 90 10/18/2021     10/18/2021     Lab Results   Component Value Date     10/18/2021    POTASSIUM 3.9 10/18/2021    POTASSIUM 3.9 10/18/2021    CHLORIDE 100 10/18/2021    CO2 38 (H) 10/18/2021     (H) 10/18/2021     Lab Results   Component Value Date    GFRESTIMATED 63 10/18/2021     Lab Results   Component Value Date    AST 31 10/08/2021    ALT 40 10/08/2021    ALKPHOS 94 10/08/2021    BILITOTAL 0.3 10/08/2021     Lab Results   Component Value Date    INR 1.25 (H) 10/07/2021     Lab Results   Component Value Date    BUN 49 (H) 10/18/2021    CR 0.96 10/18/2021         ON EXAMINATION:  Vitals:    10/18/21 0400 10/18/21 0838 10/18/21 1157 10/18/21 1245   BP:  114/71 116/68    BP Location:  Right arm Right arm    Pulse:  90 90    Resp:  16 18    Temp:  98  F (36.7  C) 98.2  F (36.8  C)    TempSrc:  Oral Oral    SpO2:  97% 96% 93%   Weight: 141.6 kg (312 lb 2.7 oz)      Height:           Physical Exam:  Blood pressure 116/68, pulse 90, temperature 98.2  F (36.8  C), temperature source Oral, resp.  "rate 18, height 1.727 m (5' 8\"), weight 141.6 kg (312 lb 2.7 oz), SpO2 93 %.    GEN:   Chika is a morbidly obese female who appears anxious   She is lying in bed, she is no acute pain  She is on oxygen via NC   She is breathing laboriously and anxious.   She is alert, appropriate, cooperative  Speech is mildly hypophonic   Comprehension is intact to simple commands   She is slow to process   HEENT: NCAT  MSK:   Generalized  muscle atrophy noted, while being morbidly obese   ABD: obese, soft, non tender, pos BS  NEURO:   CRANIAL NERVES: Discs flat. Pupils equal, round and reactive to light. Extraocular movements full. Visual fields full. Face moves symmetrically. Tongue midline. Hearing intact to finger rubbing.    Strength: her UE have antigravity strength , though she is weak proximally. ROM in the shoulders is limited to about 110 degrees in abduction.   In the BLE, she has 1/5 strength proximally and 4/5 distally.     SKIN: no rashes or lesions noted.   EXT: + edema bilaterally, evidence of chronic stasis changes present, no ulcers.   PSYCH: anxious       Thank you for the consult. Please call for any questions. Pager number [phone]   Total of 70 min spent in this encounter with more than 50% in counseling and coordination.   Cinthya Escoto   Department of Rehabilitation Medicine       "

## 2021-10-18 NOTE — PROGRESS NOTES
Care Management Follow Up    Length of Stay (days): 12    Expected Discharge Date: 10/22/2021     Concerns to be Addressed: discharge planning     Patient plan of care discussed at interdisciplinary rounds: Yes    Anticipated Discharge Disposition: TCU vs ARU   Anticipated Discharge Services: TBD  Anticipated Discharge DME: TBD    Patient/family educated on Medicare website which has current facility and service quality ratings: NA- waiting for PM&R recs   Education Provided on the Discharge Plan: yes   Patient/Family in Agreement with the Plan: yes    Referrals Placed by CM/SW: None at this time.   Private pay costs discussed: Not applicable    Additional Information:  SW following for dispo needs- per pt's med team, pt may be med ready for discharge in the next few days. Currently, therapy is recommending TCU vs ARU. SW requested provider place a PM&R consult to assist with discharge planning. Pt is still currently under COVID precautions and there are only two COVID facilities at this time.    SW spoke with team and pt will need to be under COVID precautions for 20 days. Pt likely to be COVID recovered on 10/26. Team is aware there are only two facilities taking COVID+ pts.     SW to continue following for dispo needs. SW to initiate referrals once PM&R has their recommendations and pending COVID status.    TALYA Major, LGSW  6B   [facility]  Phone: [phone]  Pager: [phone]

## 2021-10-18 NOTE — SUMMARY OF CARE
Reason for transfer:  Not needing higher level of care  Transferred from: 6B  Report received from: Sancho Gant RN skin assessment completed by: Roxi TREADWELL  Bed algorithm reevaluated: yes  Was Pulsate ordered?: no pt on pulsate  Detailed Belongings: pants, shirt, bra, sweat shirt, and shoes

## 2021-10-18 NOTE — PROGRESS NOTES
Calorie Count  Intake recorded for: 10/17  Total Kcals: 0 Total Protein: 0g  Kcals from Hospital Food: 0   Protein: 0g  Kcals from Outside Food (average):0 Protein: 0g  # Meals Recorded: no meals ordered from kitchen, no intake recorded.   # Supplements Recorded: no intake recorded.

## 2021-10-19 ENCOUNTER — APPOINTMENT (OUTPATIENT)
Dept: ULTRASOUND IMAGING | Facility: CLINIC | Age: 61
DRG: 207 | End: 2021-10-19
Attending: HOSPITALIST
Payer: MEDICARE

## 2021-10-19 ENCOUNTER — APPOINTMENT (OUTPATIENT)
Dept: OCCUPATIONAL THERAPY | Facility: CLINIC | Age: 61
DRG: 207 | End: 2021-10-19
Attending: ANESTHESIOLOGY
Payer: MEDICARE

## 2021-10-19 ENCOUNTER — APPOINTMENT (OUTPATIENT)
Dept: PHYSICAL THERAPY | Facility: CLINIC | Age: 61
DRG: 207 | End: 2021-10-19
Attending: ANESTHESIOLOGY
Payer: MEDICARE

## 2021-10-19 LAB
ANION GAP SERPL CALCULATED.3IONS-SCNC: 3 MMOL/L (ref 3–14)
BUN SERPL-MCNC: 41 MG/DL (ref 7–30)
CALCIUM SERPL-MCNC: 9.3 MG/DL (ref 8.5–10.1)
CHLORIDE BLD-SCNC: 99 MMOL/L (ref 94–109)
CO2 SERPL-SCNC: 39 MMOL/L (ref 20–32)
CREAT SERPL-MCNC: 0.98 MG/DL (ref 0.52–1.04)
ERYTHROCYTE [DISTWIDTH] IN BLOOD BY AUTOMATED COUNT: 17.1 % (ref 10–15)
GFR SERPL CREATININE-BSD FRML MDRD: 62 ML/MIN/1.73M2
GLUCOSE BLD-MCNC: 115 MG/DL (ref 70–99)
HCT VFR BLD AUTO: 35.6 % (ref 35–47)
HGB BLD-MCNC: 10.7 G/DL (ref 11.7–15.7)
MAGNESIUM SERPL-MCNC: 2.7 MG/DL (ref 1.6–2.3)
MCH RBC QN AUTO: 26.5 PG (ref 26.5–33)
MCHC RBC AUTO-ENTMCNC: 30.1 G/DL (ref 31.5–36.5)
MCV RBC AUTO: 88 FL (ref 78–100)
PHOSPHATE SERPL-MCNC: 4.8 MG/DL (ref 2.5–4.5)
PLATELET # BLD AUTO: 280 10E3/UL (ref 150–450)
POTASSIUM BLD-SCNC: 3.6 MMOL/L (ref 3.4–5.3)
RBC # BLD AUTO: 4.04 10E6/UL (ref 3.8–5.2)
SODIUM SERPL-SCNC: 141 MMOL/L (ref 133–144)
WBC # BLD AUTO: 17 10E3/UL (ref 4–11)

## 2021-10-19 PROCEDURE — 36415 COLL VENOUS BLD VENIPUNCTURE: CPT | Performed by: STUDENT IN AN ORGANIZED HEALTH CARE EDUCATION/TRAINING PROGRAM

## 2021-10-19 PROCEDURE — 82374 ASSAY BLOOD CARBON DIOXIDE: CPT | Performed by: STUDENT IN AN ORGANIZED HEALTH CARE EDUCATION/TRAINING PROGRAM

## 2021-10-19 PROCEDURE — 999N000157 HC STATISTIC RCP TIME EA 10 MIN

## 2021-10-19 PROCEDURE — 85027 COMPLETE CBC AUTOMATED: CPT | Performed by: STUDENT IN AN ORGANIZED HEALTH CARE EDUCATION/TRAINING PROGRAM

## 2021-10-19 PROCEDURE — 83735 ASSAY OF MAGNESIUM: CPT | Performed by: INTERNAL MEDICINE

## 2021-10-19 PROCEDURE — 250N000013 HC RX MED GY IP 250 OP 250 PS 637: Performed by: INTERNAL MEDICINE

## 2021-10-19 PROCEDURE — 76882 US LMTD JT/FCL EVL NVASC XTR: CPT | Mod: 26 | Performed by: RADIOLOGY

## 2021-10-19 PROCEDURE — 250N000011 HC RX IP 250 OP 636: Performed by: INTERNAL MEDICINE

## 2021-10-19 PROCEDURE — 94660 CPAP INITIATION&MGMT: CPT

## 2021-10-19 PROCEDURE — 84100 ASSAY OF PHOSPHORUS: CPT | Performed by: INTERNAL MEDICINE

## 2021-10-19 PROCEDURE — 120N000002 HC R&B MED SURG/OB UMMC

## 2021-10-19 PROCEDURE — 97530 THERAPEUTIC ACTIVITIES: CPT | Mod: GO

## 2021-10-19 PROCEDURE — G0463 HOSPITAL OUTPT CLINIC VISIT: HCPCS

## 2021-10-19 PROCEDURE — 250N000013 HC RX MED GY IP 250 OP 250 PS 637: Performed by: HOSPITALIST

## 2021-10-19 PROCEDURE — 99233 SBSQ HOSP IP/OBS HIGH 50: CPT | Performed by: HOSPITALIST

## 2021-10-19 PROCEDURE — 250N000013 HC RX MED GY IP 250 OP 250 PS 637: Performed by: STUDENT IN AN ORGANIZED HEALTH CARE EDUCATION/TRAINING PROGRAM

## 2021-10-19 PROCEDURE — 76882 US LMTD JT/FCL EVL NVASC XTR: CPT | Mod: LT

## 2021-10-19 PROCEDURE — 97530 THERAPEUTIC ACTIVITIES: CPT | Mod: GP

## 2021-10-19 PROCEDURE — 250N000013 HC RX MED GY IP 250 OP 250 PS 637

## 2021-10-19 RX ORDER — POTASSIUM CHLORIDE 750 MG/1
20 TABLET, EXTENDED RELEASE ORAL ONCE
Status: COMPLETED | OUTPATIENT
Start: 2021-10-19 | End: 2021-10-19

## 2021-10-19 RX ADMIN — PANTOPRAZOLE SODIUM 40 MG: 40 TABLET, DELAYED RELEASE ORAL at 09:40

## 2021-10-19 RX ADMIN — Medication 3 MG: at 20:01

## 2021-10-19 RX ADMIN — POTASSIUM CHLORIDE 20 MEQ: 750 TABLET, EXTENDED RELEASE ORAL at 09:40

## 2021-10-19 RX ADMIN — ENOXAPARIN SODIUM 40 MG: 40 INJECTION SUBCUTANEOUS at 09:40

## 2021-10-19 RX ADMIN — Medication 1 TABLET: at 09:40

## 2021-10-19 RX ADMIN — BUMETANIDE 1 MG: 1 TABLET ORAL at 16:27

## 2021-10-19 RX ADMIN — BUMETANIDE 1 MG: 1 TABLET ORAL at 09:40

## 2021-10-19 RX ADMIN — PAROXETINE 40 MG: 20 TABLET, FILM COATED ORAL at 09:40

## 2021-10-19 RX ADMIN — ENOXAPARIN SODIUM 40 MG: 40 INJECTION SUBCUTANEOUS at 20:01

## 2021-10-19 RX ADMIN — ASPIRIN 81 MG CHEWABLE TABLET 81 MG: 81 TABLET CHEWABLE at 09:40

## 2021-10-19 RX ADMIN — ATORVASTATIN CALCIUM 40 MG: 40 TABLET, FILM COATED ORAL at 09:40

## 2021-10-19 RX ADMIN — MICONAZOLE NITRATE: 20 CREAM TOPICAL at 09:42

## 2021-10-19 RX ADMIN — MICONAZOLE NITRATE: 20 CREAM TOPICAL at 20:01

## 2021-10-19 ASSESSMENT — ACTIVITIES OF DAILY LIVING (ADL)
ADLS_ACUITY_SCORE: 14
ADLS_ACUITY_SCORE: 16
ADLS_ACUITY_SCORE: 14
ADLS_ACUITY_SCORE: 16
ADLS_ACUITY_SCORE: 14
ADLS_ACUITY_SCORE: 16
ADLS_ACUITY_SCORE: 14
ADLS_ACUITY_SCORE: 14
ADLS_ACUITY_SCORE: 16

## 2021-10-19 ASSESSMENT — MIFFLIN-ST. JEOR: SCORE: 2003.5

## 2021-10-19 NOTE — PLAN OF CARE
"RN assumed cares: [id number]    Vitals: VSS on 4L NC w/ humidification   Neuro: AOx4; forgetful. Anxious at times; has PRN atarax available   Pain: Denies   Activity: Ax2 w/ ceiling lift   Cardiac: Denies CP.   Respiratory: Weaned from  6L to 4L NC; SaO2 >90%; desatted to 88% on 3L. Reports mild DE JESUS, occasional SOB, denies cough. LS dim. Encourage frequent use of IS. CPAP at night   GI/: Tolerating regular diet, encourage increased oral intake. Drank two ensures this shift & 25% of \"brunch\". No BM this shift. Adequate urine output; arthur discontinued per order, waiting for first void- purewik in place.    Skin: Hematoma in LLQ, bruising over RLQ. L ankle swollen w/ erythema; area marked. Mepilex over bilateral elbows, sacrum & R heel pressure injury; all CDI.   LDAs: R PIV SL.   Labs: AM K 3.6, 20 mEq replaced orally- recheck in AM    Events & Plan: Pt up in chair x2 this shift. Worked w/ PT & OT. Arthur removed; monitor for adequate output. L ankle US done in afternoon. Call light within reach, able to make needs known. Will continue to monitor & follow POC   "

## 2021-10-19 NOTE — PROVIDER NOTIFICATION
Provider notified (name): Dr. HAYES  Reason for notification: FYI, pt found on floor at approximately 0630. Denies head strike. Denies pain. Vitals WDL. Pupils PERRLA. States she felt like she had to urinate but had forgotten she had a arthur in. States she stood up and her legs gave out. Pt was found sitting upright next to bed.   Response from provider: Primary team to evaluate.

## 2021-10-19 NOTE — PROGRESS NOTES
Physician Attestation   I, Navi Wu, was present with the medical/KEY student who participated in the service and in the documentation of the note.  I have verified the history and personally performed the physical exam and medical decision making.  I agree with the assessment and plan of care as documented in the note.      I personally reviewed vital signs, medications, labs and imaging.    LLE distal lateral fluctuant collection shown to be fluid (abscess vs hematoma) w/ debris on US. Lower suspicion that this causing system infection and more likely hematoma given pannus hematomi in setting of AC, though would keep in mind if pt develops fever or marked area progression of erythema.     Navi Wu MD  Date of Service (when I saw the patient): 10/19/21    [facility]    Progress Note - Gold 9 Service        Date of Admission:  10/6/2021    Assessment & Plan   Chika Ferguson is a 61 year old female with history of CAD s/p PCI to mLAD (2016), HFpEF, HLD, suspected COPD, CARL on CPAP, morbid obesity, and anxiety who was admitted to ICU with acute hypoxic and hypercapnic respiratory failure requiring intubation 2/2 to COVID and MSSA pneumonia leading to ARDS. She completed a course of antibiotics for MSSA and received Dexamethasone, Tocilizumab, and Remdesivir x1 for COVID. She has also been aggressively diuresed given her history of CARL/HFpEF with concern for possible underlying pulmonary HTN for which Cardiology had been following early on in her hospitalization. She was extubated on 10/13 and transferred to hospital medicine for further cares.     Changes today:  - US left lower leg d/t concerns for abscess  - Discontinue urinary catheter     # Acute hypoxic-hypercapnic respiratory failure, multifactorial  # CAP 2/2 MSSA pneumonia  # COVID-19 pneumonia  # Hx of Pulm HTN (see below)  # CARL on CPAP  Hypoxic on admission to ICU on 10/6 w O2 sats in 50's. VBG  with pH 7.05 and pCO2 81.  Intubated in ED. Bibasilar opacities noted on CXR. WBC 19.6. Lactate 8.4. COVID PCR negative. Pro-BNP elevated. Given relatively clear lung fields on imaging, initial impression CHF vs possible pneumonia. Treated with diuretics and antibiotics.  CTA Chest on at [facility] negative for PE, again showed evidence of atelectasis. Sputum cultures grew MSSA. Repeat COVID PCR + 10/6. Severe RV dysfunction and dilated IVC noted on TTE with preserved EF. Continued treatment for CAP, COVID-19, and CHF. CRP and BNP down-trending. Compensated respiratory acidosis on repeat blood gas. Extubated 10/13. Continues to need NC up to 6 lpm with CPAP use of 30-35% fiO2 at night.  - Completed treatment for MSSA PNA with CTX x5 days and Azithromycin x3 days  - Continues to require oxygen up to 6L/nc at times, this may be her new baseline  - Continue pulmonary toilet efforts, OOB as tolerates  - Continue diuresis (Bumex 1 mg PO BID)  - Treatment of COVID-19 as below  - Monitor for signs of respiratory infection  - Continue CPAP at bedtime, per patient's son, she was essentially using CPAP ATC at home prior to admission     # Leukocytosis  # Intramuscular hematoma  WBC increased on 10/14, now elevated but stable. Chest x-ray completed showing increased patchy interstitial airspace opacities possible representing atelectasis, edema, vs infection. UA obtained from arthur, not indicative of infection. She was having loose stools but this has since improved. Remains afebrile though she is also intermittently encephalopathic. On 10/17, firm tenderness noted over her right lower abdomen with tenderness to palpation noted - suspect this represents an intramuscular hematoma likely from Lovenox administration. Additional indurated area noted on left lower shin which could be concerning for an abscess. Hgb continues to be stable. We will continue to monitor.  - Ordered US left lower leg with c/f abscess  - Monitor for signs of  infection   - Given prolonged hospitalization, if signs of sepsis, would cover broadly with vanc/zosyn  - CBC daily  - Pain control for hematoma  - Continue decrease Lovenox dose now that pt out of ICU as below     # Positive COVID-19 PCR.    History of COVID infection in 12/2020, did not require hospitalization. Completed vaccination series 5/2021. +Exposure recently. COVID PCR negative at OSH but repeat PCR positive on admission. DDx includes reinfection vs persistent positive PCR from prior infection. CT w/o diffuse GGO to suggest COVID pneumonia. S/p Remdesivir and Tocilizumab x 1 on 10/7, as well as steroids.   - Completed dexamethasone 10 day course 10/17  - DVT prophylaxis with subcutaneous Lovenox BID 40 mg (reduced due to hematoma)  - Continue contact/respiratory precautions               - Considered critically ill, immunocompetent requiring 20 days of isolation     # Acute on chronic HFpEF and RV failure  # Pulmonary HTN, probable Group III  Prior history of HFpEF and pulmonary HTN. On Bumex at home. NTproBNP 2264 on admission. TTE (10/6) LVEF 55-60%, mild LVH, indeterminate diastolic function, moderate to severe RV dilation w moderate to severely reduced RV function, and dilated IVC. Pulmonary pressures could not be assessed on TTE. CTA Chest negative for PE but showed severely dilated pulmonary trunk c/w pulmonary HTN. Cardiology consulted. Suspect group III pulmonary HTN, although CTEPH cannot be ruled out. RV failure likely chronic given RV dilation on imaging. Clinically improved w IV Bumex. Weight down ~45 lbs since admission but remains essentially unchanged since 10/14, though she continues to diurese without signs of contraction alkalosis.  - Cardiology consulted, appreciate recommendations              - V/Q scan attempted 10/14, but pt unable to tolerate lying flat                          -Not likely a candidate for pulmonary HTN therapies              - RHC to complete in outpatient  setting              - Schedule follow up with pulmonary HTN team (Dr. Hughes) in 2-4 weeks  - Switch to PO Bumex to 1 mg BID (tired to increase Bumex w/resultant Cr elevation on 10/17)  - Daily weights, I&O's  - BMP daily    # Fall  Fell 10/19 AM after attempting to get up to the bathroom. Denies hitting her head or pain throughout. Neurologically intact in the AM. We will continue to monitor.   - Encourage call light use  - Fall precautions      # CAD:  Hx PCI w stenting of mLAD in 2016. Mild troponin elevation on admission felt to be demand ischemia. TTE with normal wall motion. Cardiology consulted, ACS not suspected.   - Continue ASA + statin  - BB (carvedilol 3.125 mg BID) on hold d/t borderline low BP in ICU, will resume as BP tolerates     # Chronic anemia  Hgb as low as 8.6 during hospitalization likely in the setting of infection. Improving to ~11. With resent hematoma, will continue to monitor hgb.  - CBC in AM     # Malnutrition risk.    - SLP consulted (10/14)- regular diet   - Calorie counts started on 10/16     # GERD  Continue protonix.      # Pressure ulcers, POA.    Essentially chair-bound. Gets up weekly to ambulate. Increased weakness lately.  - WOCN consulted   - PT/OT consulted, likely will need TCU discharge    # TIMMY - resolved:  Cr 1.95 at OSH, 1.68 on admission. Baseline unknown. UA with hyaline casts, otherwise bland. Renal US negative for hydro. Suspect prerenal azotemia in setting of acute CHF  - Follow BMP daily     # Steroid-induced hyperglycemia -resolved  No history of DM. A1c 6.7 on admission. Blood glucose <120 not requiring sliding scale insulin for 2 days. With completion of steroids and well controlled blood sugars will discontinue insulin.     # Hypernatremia - resolved  # Hypokalemia - resolved   Restart regular diet 10/14. Continued PO intake will likely correct electrolyte disturbances.  - BMP daily     Diet: Regular Diet Adult  Calorie Counts  Snacks/Supplements Adult:  Ensure Enlive; Between Meals    DVT Prophylaxis: Enoxaparin (Lovenox) SQ  Lopes Catheter: PRESENT, indication: Strict 1-2 Hour I&O, Strict 1-2 Hour I&O  Central Lines: None  Code Status: Full Code       Disposition Plan   Expected discharge: 10/22/2021   recommended to transitional care unit once safe disposition plan/ TCU bed available.     The patient's care was discussed with the Attending Physician, Dr. Wu.    Griselda Martínez  Medical Student  [address]  Securely message with the Vocera Web Console (learn more here)  Text page via [facility] Paging/Directory  Please see sign in/sign out for up to date coverage information    ______________________________________________________________________    Interval History   Reported to be found on the floor overnight after attempting to use the bathroom. Chika denies hitting her head or having arm/leg pain or weakness since the fall. Otherwise, reports breathing to be at baseline. Continues to have some superficial abdominal pain in the lower left quadrant over the hematoma. Denies fever, chills, chest pain, or diffuse abdominal pain.     4 point ROS is negative unless noted above.    Data reviewed today: I reviewed all medications, new labs and imaging results over the last 24 hours. I personally reviewed no images or EKG's today.    Physical Exam   Vital Signs: Temp: 97.3  F (36.3  C) Temp src: Axillary BP: (!) 149/65 Pulse: 82   Resp: 24 SpO2: 93 % O2 Device: BiPAP/CPAP Oxygen Delivery: 8 LPM  Weight: 312 lbs 2.74 oz  Constitutional: awake, alert, cooperative, no apparent distress, and appears stated age  Respiratory: No increased work of breathing, good air exchange, clear to auscultation bilaterally, no crackles or wheezing  Cardiovascular: regular rate and rhythm and no murmur noted  GI: normal bowel sounds, non-distended and tenderness noted in the left lower quadrant in the area of the hematoma  Skin:  bruising noted over the left lower quadrant 2 cm by 3 cm area above hematoma roughly softball size, additional bruise noted over right lower quadrant quarter sized, area of induration, warmth, with a small area of erythema about 5 cm x 5 cm over the left lower distal leg superior to the ankle or bleeding  Musculoskeletal: trace lower extremity edema present  Neurologic: Cranial Nerves:  cranial nerves II-XII are grossly intact  Motor Exam:  Motor exam is symmetrical 5 out of 5 all extremities bilaterally  Neuropsychiatric: General: normal, calm and normal eye contact  Level of consciousness: alert / normal  Affect: normal    Data   Recent Labs   Lab 10/19/21  0354 10/18/21  0842 10/18/21  0521 10/17/21  1614 10/17/21  1420 10/17/21  0758 10/17/21  0654   WBC 17.0*  --  15.0*  --   --   --  13.5*   HGB 10.7*  --  10.4*  --   --   --  10.7*   MCV 88  --  90  --   --   --  88     --  294  --   --   --  292     --  140  --   --   --  141   POTASSIUM 3.6  --  3.9  3.9  --  3.8   < > 3.2*   CHLORIDE 99  --  100  --   --   --  100   CO2 39*  --  38*  --   --   --  39*   BUN 41*  --  49*  --   --   --  53*   CR 0.98  --  0.96  --   --   --  1.07*   ANIONGAP 3  --  2*  --   --   --  2*   NAE 9.3  --  9.1  --   --   --  9.3   * 107* 110*   < >  --    < > 128*    < > = values in this interval not displayed.     No results found for this or any previous visit (from the past 24 hour(s)).  Medications     dextrose         aspirin  81 mg Oral Daily     atorvastatin  40 mg Oral Daily     bumetanide  1 mg Oral BID     enoxaparin ANTICOAGULANT  40 mg Subcutaneous BID     influenza recomb quadrivalent PF  0.5 mL Intramuscular Prior to discharge     melatonin  3 mg Oral or Feeding Tube QPM     miconazole with skin protectant   Topical BID     multivitamin w/minerals  1 tablet Oral Daily     pantoprazole  40 mg Oral QAM AC     PARoxetine  40 mg Oral Daily     polyethylene glycol  17 g Oral or Feeding Tube Daily      senna-docusate  1 tablet Oral or Feeding Tube QPM

## 2021-10-19 NOTE — PLAN OF CARE
"/66 (BP Location: Left arm)   Pulse 86   Temp 98.2  F (36.8  C) (Oral)   Resp 16   Ht 1.727 m (5' 8\")   Wt 141.6 kg (312 lb 2.7 oz)   SpO2 94%   BMI 47.47 kg/m      Assumed care admission to 1930  Pt rounded on hourly  Armani: alert oriented to place and person sometimes to situation and time  Pain: denies  GI/: Denies nausea. Bowel sounds present. Good urine output clear yellow urine via arthur. Pt had large loose BM  Cardiac: WDL  Respiratory: Pt has SOB lung sounds diminished  bilaterally Pt on 4-5 L NC  Skin: pressure injury to right heel, skin tear to right elbow, abrasion to left elbow, Moisture to panus and groin. Scattered bruising. Full skin assessment completed  Lines: PIV saline locked  Assist level: lift  Will continue to monitor and follow plan of care.       Call light in reach, Pt able to make needs known, Will continue to monitor and follow plan of care.       "

## 2021-10-19 NOTE — PROVIDER NOTIFICATION
Provider paged asking if they want pt on tele. And to put in Med surg tele order if they want pt on tele

## 2021-10-19 NOTE — PLAN OF CARE
Assumed Cares: [id number]      Significant Events: No acute events overnight. Pt appeared to be sleeping between cares.     Vitals: VSS on 4lpm via NC. On CPAP overnight   LDAs: Bilateral PIV, saline locked  Neuro: WDL with intermittent forgetfullness.  Respiratory: Complains of intermittent shortness of breath with movement and when laying flat. States has increased anxiety when laying flat which causes her shortness of breath.   Cardiac: WDL   GI/: Lopes in place with adequate output, Soft BM x1 this shift. Denies abdominal pain. Bowel sounds audible and normoactive.   Skin: Interdry placed in pannus fold, mepilex in place on heel, sacrum and bilateral elbows.   Mobility: Assist of 1-2 with turns. Assist of 2 if OOB.   Nutrition: Regular   Pain: Denies     Plan of care:  Continue with POC, update treatment team as needed with changes.

## 2021-10-19 NOTE — PROGRESS NOTES
WO Nurse Inpatient Wound Assessment   Reason for consultation: Evaluate and treat  Buttock, pannus and heel wounds    Assessment  Buttock and pannus wounds due to Incontinence Associated Dermatitis (IAD), Intertrigo and Moisture Associated Skin Damage (MASD) with fungal involvement   Status: improving, noted small pimple appearing open area to R fleshy buttock on assessment today      R plantar heel wound due to pressure injury, present on admission  Pressure injury is stage 2   Status: stable    R posterior heel with pressure injury, hospital acquired, Stage Deep tissue pressure injury  Status: stable    Treatment Plan  Buttock wounds: BID apply Osmar antifungal (antifungal with skin protectant) paste (MD order) over wound sites. With incontinence, cleanse with Osmar cleanse and protect and paper washclothes. Can apply critic-aid paste in between antifungal applications to maintain barrier. If full removal needed, please use baby oil to reduce friction forces to the skin.     Pannus rash: BID cleanse with microKlenz and dry thouroughly, then apply Osmar antifungal paste (MD order) over red areas in a thin layer    R plantar and posterior heel: Every third day cleanse with microKlenz and dry, apply Cavilon no sting barrier film to skin around wound and let dry, then place mepilex. Ensure heel elevated off mattress at all times using pillow under the calves.      Orders Written  WOC Nurse follow-up plan:weekly  Nursing to notify the Provider(s) and re-consult the WOC Nurse if wound(s) deteriorates or new skin concern.    Patient History  According to provider note(s):  Chika Ferguson is a 61 year old female who presents with acute hypoxic respiratory failure. Her PMH includes pulmonary hypertension, history of Covid at the end of 2020, COPD, CARL, CAD with history of stenting (unknown vessel-no records). Cards consulted for evaluation of severe RV dysfunction noted on echo.    Objective Data  Containment of  urine/stool: Incontinence Protocol    Active Diet Order  Orders Placed This Encounter      Regular Diet Adult      Output:   I/O last 3 completed shifts:  In: 360 [P.O.:360]  Out: 1325 [Urine:1325]    Risk Assessment:   Sensory Perception: 2-->very limited  Moisture: 3-->occasionally moist  Activity: 2-->chairfast  Mobility: 2-->very limited  Nutrition: 2-->probably inadequate  Friction and Shear: 2-->potential problem  Mychal Score: 13                          Labs:   Recent Labs   Lab 10/19/21  0354   HGB 10.7*   WBC 17.0*       Physical Exam  Areas of skin assessed: focused buttock, pannus and heels      Wound Location:  R posterior heel   Date of last photo 10/19  Wound History: hospital acquired pressure injury     10/14                                                                10/19    Wound Base: 100 % blister blood filled      Palpation of the wound bed: spongy      Drainage: none     Description of drainage: none     Measurements (length x width x depth, in cm)1.5cm x 2.5cm x 0cm      Tunneling N/A     Undermining N/A  Periwound skin: dry/scaly and hyperkeratosis      Color: normal and consistent with surrounding tissue      Temperature: normal   Odor: mild  Pain: pt denies      Wound Location:  R plantar heel   Date of last photo 10/6  Wound History: patient found stuck in chair for extended period of time likely causing plantar heel pressure injury         Wound Base: 100 % dermis     Palpation of the wound bed: normal      Drainage: moderate     Description of drainage: serous     Measurements (length x width x depth, in cm) 3  x 4  x  0.1 cm      Tunneling N/A     Undermining N/A  Periwound skin: dry/scaly and hyperkeratosis      Color: normal and consistent with surrounding tissue      Temperature: normal   Odor: mild  Pain: unable to assess due to  sedation ,     Pannus and buttock skin folds with bright red erythema, satellite lesions and yeast like odor consistent with fungal rash due to  incontinence while stuck in chair for a number of days. --yeast has improved, noted a pea sized raised open area to R buttock, appears to be opened pimple with possible shear force damage     Interventions  Visual inspection and assessment completed   Wound Care Rationale Promote moist wound healing without tissue dehydration , Provide protection  and Decrease bacterial load  Wound Care: completed by RN  Supplies: floor stock and discussed with RN  Current off-loading measures: Pillows  Current support surface: Standard  Low air loss mattress  Education provided to: plan of care  Discussed plan of care with Nurse    Dafne Gonzáles RN, CWOCN

## 2021-10-20 ENCOUNTER — APPOINTMENT (OUTPATIENT)
Dept: OCCUPATIONAL THERAPY | Facility: CLINIC | Age: 61
DRG: 207 | End: 2021-10-20
Attending: ANESTHESIOLOGY
Payer: MEDICARE

## 2021-10-20 ENCOUNTER — APPOINTMENT (OUTPATIENT)
Dept: PHYSICAL THERAPY | Facility: CLINIC | Age: 61
DRG: 207 | End: 2021-10-20
Attending: ANESTHESIOLOGY
Payer: MEDICARE

## 2021-10-20 LAB
ANION GAP SERPL CALCULATED.3IONS-SCNC: <1 MMOL/L (ref 3–14)
BUN SERPL-MCNC: 35 MG/DL (ref 7–30)
CALCIUM SERPL-MCNC: 9.2 MG/DL (ref 8.5–10.1)
CHLORIDE BLD-SCNC: 97 MMOL/L (ref 94–109)
CO2 SERPL-SCNC: 42 MMOL/L (ref 20–32)
CREAT SERPL-MCNC: 0.88 MG/DL (ref 0.52–1.04)
ERYTHROCYTE [DISTWIDTH] IN BLOOD BY AUTOMATED COUNT: 17.6 % (ref 10–15)
GFR SERPL CREATININE-BSD FRML MDRD: 70 ML/MIN/1.73M2
GLUCOSE BLD-MCNC: 115 MG/DL (ref 70–99)
GLUCOSE BLDC GLUCOMTR-MCNC: 114 MG/DL (ref 70–99)
HCT VFR BLD AUTO: 34.3 % (ref 35–47)
HGB BLD-MCNC: 10.2 G/DL (ref 11.7–15.7)
LACTATE SERPL-SCNC: 1.2 MMOL/L (ref 0.7–2)
MAGNESIUM SERPL-MCNC: 2.5 MG/DL (ref 1.6–2.3)
MCH RBC QN AUTO: 26.5 PG (ref 26.5–33)
MCHC RBC AUTO-ENTMCNC: 29.7 G/DL (ref 31.5–36.5)
MCV RBC AUTO: 89 FL (ref 78–100)
PHOSPHATE SERPL-MCNC: 3.7 MG/DL (ref 2.5–4.5)
PLATELET # BLD AUTO: 279 10E3/UL (ref 150–450)
POTASSIUM BLD-SCNC: 3.6 MMOL/L (ref 3.4–5.3)
RBC # BLD AUTO: 3.85 10E6/UL (ref 3.8–5.2)
SODIUM SERPL-SCNC: 139 MMOL/L (ref 133–144)
WBC # BLD AUTO: 12.7 10E3/UL (ref 4–11)

## 2021-10-20 PROCEDURE — 250N000013 HC RX MED GY IP 250 OP 250 PS 637

## 2021-10-20 PROCEDURE — 250N000013 HC RX MED GY IP 250 OP 250 PS 637: Performed by: PHYSICIAN ASSISTANT

## 2021-10-20 PROCEDURE — 84100 ASSAY OF PHOSPHORUS: CPT | Performed by: INTERNAL MEDICINE

## 2021-10-20 PROCEDURE — 80048 BASIC METABOLIC PNL TOTAL CA: CPT | Performed by: STUDENT IN AN ORGANIZED HEALTH CARE EDUCATION/TRAINING PROGRAM

## 2021-10-20 PROCEDURE — 36415 COLL VENOUS BLD VENIPUNCTURE: CPT | Performed by: HOSPITALIST

## 2021-10-20 PROCEDURE — 999N000157 HC STATISTIC RCP TIME EA 10 MIN

## 2021-10-20 PROCEDURE — 97530 THERAPEUTIC ACTIVITIES: CPT | Mod: GO

## 2021-10-20 PROCEDURE — 250N000011 HC RX IP 250 OP 636: Performed by: INTERNAL MEDICINE

## 2021-10-20 PROCEDURE — 97530 THERAPEUTIC ACTIVITIES: CPT | Mod: GP | Performed by: PHYSICAL THERAPIST

## 2021-10-20 PROCEDURE — 250N000013 HC RX MED GY IP 250 OP 250 PS 637: Performed by: INTERNAL MEDICINE

## 2021-10-20 PROCEDURE — 120N000002 HC R&B MED SURG/OB UMMC

## 2021-10-20 PROCEDURE — 250N000011 HC RX IP 250 OP 636: Performed by: HOSPITALIST

## 2021-10-20 PROCEDURE — G0008 ADMIN INFLUENZA VIRUS VAC: HCPCS | Performed by: INTERNAL MEDICINE

## 2021-10-20 PROCEDURE — 99233 SBSQ HOSP IP/OBS HIGH 50: CPT | Performed by: HOSPITALIST

## 2021-10-20 PROCEDURE — 250N000013 HC RX MED GY IP 250 OP 250 PS 637: Performed by: STUDENT IN AN ORGANIZED HEALTH CARE EDUCATION/TRAINING PROGRAM

## 2021-10-20 PROCEDURE — 250N000013 HC RX MED GY IP 250 OP 250 PS 637: Performed by: HOSPITALIST

## 2021-10-20 PROCEDURE — 85027 COMPLETE CBC AUTOMATED: CPT | Performed by: STUDENT IN AN ORGANIZED HEALTH CARE EDUCATION/TRAINING PROGRAM

## 2021-10-20 PROCEDURE — 83735 ASSAY OF MAGNESIUM: CPT | Performed by: INTERNAL MEDICINE

## 2021-10-20 PROCEDURE — 83605 ASSAY OF LACTIC ACID: CPT | Performed by: HOSPITALIST

## 2021-10-20 PROCEDURE — 90682 RIV4 VACC RECOMBINANT DNA IM: CPT | Performed by: INTERNAL MEDICINE

## 2021-10-20 RX ORDER — POTASSIUM CHLORIDE 750 MG/1
20 TABLET, EXTENDED RELEASE ORAL ONCE
Status: COMPLETED | OUTPATIENT
Start: 2021-10-20 | End: 2021-10-20

## 2021-10-20 RX ADMIN — MICONAZOLE NITRATE: 20 CREAM TOPICAL at 08:45

## 2021-10-20 RX ADMIN — Medication 3 MG: at 20:44

## 2021-10-20 RX ADMIN — BUMETANIDE 1 MG: 1 TABLET ORAL at 08:45

## 2021-10-20 RX ADMIN — ATORVASTATIN CALCIUM 40 MG: 40 TABLET, FILM COATED ORAL at 08:45

## 2021-10-20 RX ADMIN — PANTOPRAZOLE SODIUM 40 MG: 40 TABLET, DELAYED RELEASE ORAL at 08:45

## 2021-10-20 RX ADMIN — BUMETANIDE 1 MG: 1 TABLET ORAL at 17:45

## 2021-10-20 RX ADMIN — ENOXAPARIN SODIUM 40 MG: 40 INJECTION SUBCUTANEOUS at 20:44

## 2021-10-20 RX ADMIN — POLYETHYLENE GLYCOL 3350 17 G: 17 POWDER, FOR SOLUTION ORAL at 08:46

## 2021-10-20 RX ADMIN — PAROXETINE 40 MG: 20 TABLET, FILM COATED ORAL at 08:46

## 2021-10-20 RX ADMIN — ENOXAPARIN SODIUM 40 MG: 40 INJECTION SUBCUTANEOUS at 08:45

## 2021-10-20 RX ADMIN — INFLUENZA A VIRUS A/WISCONSIN/588/2019 (H1N1) RECOMBINANT HEMAGGLUTININ ANTIGEN, INFLUENZA A VIRUS A/TASMANIA/503/2020 (H3N2) RECOMBINANT HEMAGGLUTININ ANTIGEN, INFLUENZA B VIRUS B/WASHINGTON/02/2019 RECOMBINANT HEMAGGLUTININ ANTIGEN, AND INFLUENZA B VIRUS B/PHUKET/3073/2013 RECOMBINANT HEMAGGLUTININ ANTIGEN 0.5 ML: 45; 45; 45; 45 INJECTION INTRAMUSCULAR at 08:44

## 2021-10-20 RX ADMIN — MICONAZOLE NITRATE: 20 CREAM TOPICAL at 20:48

## 2021-10-20 RX ADMIN — POTASSIUM CHLORIDE 20 MEQ: 750 TABLET, EXTENDED RELEASE ORAL at 11:26

## 2021-10-20 RX ADMIN — ASPIRIN 81 MG CHEWABLE TABLET 81 MG: 81 TABLET CHEWABLE at 08:45

## 2021-10-20 RX ADMIN — Medication 1 TABLET: at 08:45

## 2021-10-20 ASSESSMENT — ACTIVITIES OF DAILY LIVING (ADL)
ADLS_ACUITY_SCORE: 20
ADLS_ACUITY_SCORE: 20
ADLS_ACUITY_SCORE: 18
ADLS_ACUITY_SCORE: 20
ADLS_ACUITY_SCORE: 18
ADLS_ACUITY_SCORE: 20

## 2021-10-20 ASSESSMENT — MIFFLIN-ST. JEOR: SCORE: 2013.5

## 2021-10-20 NOTE — PLAN OF CARE
".Assumed cares [id number]. Pt rounded on hourly.     ./63 (BP Location: Left arm)   Pulse 69   Temp (!) 95.6  F (35.3  C) (Axillary)   Resp 23   Ht 1.727 m (5' 8\")   Wt 140 kg (308 lb 10.3 oz)   SpO2 98%   BMI 46.93 kg/m      Pt alert and oriented x4, occasionally forgetful but easily redirected. Tachypnea and on 4L NC while awake, otherwise vitally stable. SOB at rest, but pt states not more than usual. CPAP at night. Assist x2/lift assist with repositioning and cares. Turned/repositioned q 2 hrs. PIV saline locked. Hematoma in LLQ of abdomen. R heel pressure injury and sacrum mepilexes changed and are C/D/I. Elbow mepilex C/D/I. Purewick in place with adequate output. No BM this shift. Triggered sepsis BPA this AM due to elevated WBC and tachypnea (not new to pt)- after 2 attempts, lab unable to draw, MD notified.    Plan: continue to follow plan of care and notify MD with changes in condition.     "

## 2021-10-20 NOTE — PROGRESS NOTES
Physician Attestation   I, Navi Wu, was present with the medical/KEY student who participated in the service and in the documentation of the note.  I have verified the history and personally performed the physical exam and medical decision making.  I agree with the assessment and plan of care as documented in the note.      I personally reviewed vital signs, medications and labs.    90/40's bp this afternoon, pt w/o light headed or dizziness or other acute complaint. BP had been taken w/ small cuff on wrist, after appropriate size cuff obtained /71. Continue to wean oxygen as able. Monitoring pannus hematomi on prophylaxis anticoagulation, suspect left shin fluctuance hematoma as well as no surface trauma to predispose to abscess, within boundaries marked yesterday.    Navi Wu MD  Date of Service (when I saw the patient): 10/20/21    [facility]    Progress Note - Gold 9 Service        Date of Admission:  10/6/2021    Assessment & Plan     Chika Ferguson is a 61 year old female with history of CAD s/p PCI to mLAD (2016), HFpEF, HLD, suspected COPD, CARL on CPAP, morbid obesity, and anxiety who was admitted to ICU with acute hypoxic and hypercapnic respiratory failure requiring intubation 2/2 to COVID and MSSA pneumonia leading to ARDS. She completed a course of antibiotics for MSSA and received Dexamethasone, Tocilizumab, and Remdesivir x1 for COVID. She has also been aggressively diuresed given her history of CARL/HFpEF with concern for possible underlying pulmonary HTN for which Cardiology had been following early on in her hospitalization. She was extubated on 10/13 and transferred to hospital medicine for further cares.     Changes today:  - Continue to monitor for signs of infection  - Continue to wean oxygen (down to 4 lpm)     # Acute hypoxic-hypercapnic respiratory failure, multifactorial  # CAP 2/2 MSSA pneumonia  # COVID-19 pneumonia  # Hx  of Pulm HTN (see below)  # CARL on CPAP  Hypoxic on admission to ICU on 10/6 w O2 sats in 50's. VBG with pH 7.05 and pCO2 81.  Intubated in ED. Bibasilar opacities noted on CXR. WBC 19.6. Lactate 8.4. COVID PCR negative. Pro-BNP elevated. Given relatively clear lung fields on imaging, initial impression CHF vs possible pneumonia. Treated with diuretics and antibiotics.  CTA Chest on at [facility] negative for PE, again showed evidence of atelectasis. Sputum cultures grew MSSA. Repeat COVID PCR + 10/6. Severe RV dysfunction and dilated IVC noted on TTE with preserved EF. Continued treatment for CAP, COVID-19, and CHF. CRP and BNP down-trending. Compensated respiratory acidosis on repeat blood gas. Extubated 10/13. Reduced oxygen needs on 10/20 to NC 4 lpm with CPAP use of 4 lpm at night. Baseline use of 2 lpm with CPAP at home, but no NC use during day.  - Completed treatment for MSSA PNA with CTX x5 days and Azithromycin x3 days  - Continues to require oxygen up to 6L/nc at times, this may be her new baseline  - Continue pulmonary toilet efforts, OOB as tolerates  - Continue diuresis (Bumex 1 mg PO BID)  - Treatment of COVID-19 as below  - Monitor for signs of respiratory infection  - Continue CPAP at bedtime, per patient's son, she was essentially using CPAP ATC at home prior to admission     # Leukocytosis  # Intramuscular hematoma  WBC increased on 10/14, now elevated but stable. Chest x-ray completed showing increased patchy interstitial airspace opacities possible representing atelectasis, edema, vs infection. UA obtained from arthur, not indicative of infection. She was having loose stools but this has since improved. Remains afebrile though she is also intermittently encephalopathic. On 10/17, firm tenderness noted over her right lower abdomen with tenderness to palpation noted - suspect this represents an intramuscular hematoma likely from Lovenox administration. Additional indurated area noted on left lower shin  which could be concerning for an abscess with US confirming abscess vs hematoma. Hgb continues to be stable. We will continue to monitor.  - Ordered US left lower leg 10/19 showing abscess vs hematoma  - Continue to monitor for signs of infection   - Given prolonged hospitalization, if signs of sepsis, would cover broadly with vanc/zosyn  - CBC daily  - Pain control for hematoma  - Continue decrease Lovenox 40 mg BID dose now that pt out of ICU as below     # Positive COVID-19 PCR.    History of COVID infection in 12/2020, did not require hospitalization. Completed vaccination series 5/2021. +Exposure recently. COVID PCR negative at OSH but repeat PCR positive on admission. DDx includes reinfection vs persistent positive PCR from prior infection. CT w/o diffuse GGO to suggest COVID pneumonia. S/p Remdesivir and Tocilizumab x 1 on 10/7, as well as steroids.   - Completed dexamethasone 10 day course 10/17  - DVT prophylaxis with subcutaneous Lovenox BID 40 mg (reduced due to hematoma)  - Continue contact/respiratory precautions               - Considered critically ill, immunocompetent requiring 20 days of isolation     # Acute on chronic HFpEF and RV failure  # Pulmonary HTN, probable Group III  Prior history of HFpEF and pulmonary HTN. On Bumex at home. NTproBNP 2264 on admission. TTE (10/6) LVEF 55-60%, mild LVH, indeterminate diastolic function, moderate to severe RV dilation w moderate to severely reduced RV function, and dilated IVC. Pulmonary pressures could not be assessed on TTE. CTA Chest negative for PE but showed severely dilated pulmonary trunk c/w pulmonary HTN. Cardiology consulted. Suspect group III pulmonary HTN, although CTEPH cannot be ruled out. RV failure likely chronic given RV dilation on imaging. Clinically improved w IV Bumex. Weight down ~45 lbs since admission but remains essentially unchanged since 10/14, though she continues to diurese without signs of contraction alkalosis.  - Cardiology  consulted, appreciate recommendations              - V/Q scan attempted 10/14, but pt unable to tolerate lying flat                          -Not likely a candidate for pulmonary HTN therapies              - RHC to complete in outpatient setting              - Schedule follow up with pulmonary HTN team (Dr. Hughes) in 2-4 weeks  - Switch to PO Bumex to 1 mg BID (tired to increase Bumex w/resultant Cr elevation on 10/17)  - Daily weights, I&O's  - BMP daily     # Fall  Fell 10/19 AM after attempting to get up to the bathroom. Denies hitting her head or pain throughout. Neurologically intact in the AM. We will continue to monitor.   - Encourage call light use  - Fall precautions      # CAD:  Hx PCI w stenting of mLAD in 2016. Mild troponin elevation on admission felt to be demand ischemia. TTE with normal wall motion. Cardiology consulted, ACS not suspected.   - Continue ASA + statin  - BB (carvedilol 3.125 mg BID) on hold d/t borderline low BP in ICU, will resume as BP tolerates     # Chronic anemia  Hgb as low as 8.6 during hospitalization likely in the setting of infection. Improving to ~11. With resent hematoma, will continue to monitor hgb.  - CBC in AM     # Malnutrition risk.    - SLP consulted (10/14)- regular diet   - Calorie counts started on 10/16     # GERD  Continue protonix.      # Pressure ulcers, POA.    Essentially chair-bound. Gets up weekly to ambulate. Increased weakness lately.  - WOCN consulted   - PT/OT consulted, likely will need TCU discharge     # TIMMY - resolved:  Cr 1.95 at OSH, 1.68 on admission. Baseline unknown. UA with hyaline casts, otherwise bland. Renal US negative for hydro. Suspect prerenal azotemia in setting of acute CHF  - Follow BMP daily     # Steroid-induced hyperglycemia -resolved  No history of DM. A1c 6.7 on admission. Blood glucose <120 not requiring sliding scale insulin for 2 days. With completion of steroids and well controlled blood sugars will discontinue  insulin.     # Hypernatremia - resolved  # Hypokalemia - resolved   Restart regular diet 10/14. Continued PO intake will likely correct electrolyte disturbances.  - BMP daily     Diet: Regular Diet Adult  Calorie Counts  Snacks/Supplements Adult: Ensure Enlive; Between Meals    DVT Prophylaxis: Enoxaparin (Lovenox) SQ  Lopes Catheter: PRESENT, indication: Strict 1-2 Hour I&O, Strict 1-2 Hour I&O  Central Lines: None  Code Status: Full Code        Disposition Plan   Expected discharge: 10/26/2021   recommended to transitional care unit once O2 use less and safe disposition plan/ TCU bed available.     The patient's care was discussed with the Attending Physician, Dr. Wu.    Griselda Martínez  Medical Student  [address]  Securely message with the Vocera Web Console (learn more here)  Text page via [email or web address] Paging/Directory  Please see sign in/sign out for up to date coverage information      ______________________________________________________________________    Interval History   Overnight nursing notes tachypnea in combination with elevated WBC meeting SIRS criteria. This morning the patient reports feeling well with her breathing at baseline. Continues to wean oxygen from 6 - 4 lpm today. Is encouarged to increase nutrition as well as work with therapies to regain strength. Denies headaches, fevers, chills, shortness of breath, chest pain, or abdominal pain.     4 point ROS is negative unless noted above.    Data reviewed today: I reviewed all medications, new labs and imaging results over the last 24 hours. I personally reviewed no images or EKG's today.    Physical Exam   Vital Signs: Temp: (!) 95.6  F (35.3  C) Temp src: Axillary BP: 125/63 Pulse: 69   Resp: 23 SpO2: 98 % O2 Device: BiPAP/CPAP Oxygen Delivery: 4 LPM  Weight: 308 lbs 10.3 oz  Constitutional: awake, alert, cooperative and morbidly obese  Respiratory: tachypneic using accessory  abdominal muscles at baseline, diminished air exchange and clear to auscultation  Cardiovascular: regular rate and rhythm and no murmur noted  GI: normal bowel sounds, non-distended and non-tender  Skin: bruising noted over the left lower quadrant 3 cm by 3 cm area above hematoma roughly softball size, hematoma with warmth, additional bruise noted over right lower quadrant quarter sized, area of induration, warmth, with a small area of erythema about 5 cm x 5 cm over the left lower distal leg superior to the ankle or bleeding  Musculoskeletal: no lower extremity pitting edema present  Neuropsychiatric: General: normal, calm and normal eye contact  Level of consciousness: alert / normal  Affect: normal    Data   Recent Labs   Lab 10/20/21  0812 10/20/21  0208 10/19/21  0354 10/18/21  0842 10/18/21  0521   WBC 12.7*  --  17.0*  --  15.0*   HGB 10.2*  --  10.7*  --  10.4*   MCV 89  --  88  --  90     --  280  --  294     --  141  --  140   POTASSIUM 3.6  --  3.6  --  3.9  3.9   CHLORIDE 97  --  99  --  100   CO2 42*  --  39*  --  38*   BUN 35*  --  41*  --  49*   CR 0.88  --  0.98  --  0.96   ANIONGAP <1*  --  3  --  2*   NAE 9.2  --  9.3  --  9.1   * 114* 115*   < > 110*    < > = values in this interval not displayed.     Recent Results (from the past 24 hour(s))   US Extremity Non Vascular Left    Narrative    EXAMINATION:  US EXTREMITY NON VASCULAR LEFT 10/19/2021 3:19 PM.    COMPARISON: None.    HISTORY:  LLE mass fluctuant proximal to ankle, concern for abscess    FINDINGS: Grayscale and color Doppler sonographic images of the area  of interest subcutaneous tissues overlying the anterior left lower  shin. Fluid collection containing echogenic debris measuring 1.9 x 1.2  x 5.1 cm. No significant peripheral hyperemia on Doppler images, which  is nonspecific.      Impression    IMPRESSION:   Fluid collection containing debris in the subcutaneous tissues  measuring 1.9 x 1.2 x 5.1 cm. Differential  includes abscess or  hematoma.     I have personally reviewed the examination and initial interpretation  and I agree with the findings.    CARMEN BRITT MD         SYSTEM ID:  [id number]     Medications     dextrose         aspirin  81 mg Oral Daily     atorvastatin  40 mg Oral Daily     bumetanide  1 mg Oral BID     enoxaparin ANTICOAGULANT  40 mg Subcutaneous BID     melatonin  3 mg Oral or Feeding Tube QPM     miconazole with skin protectant   Topical BID     multivitamin w/minerals  1 tablet Oral Daily     pantoprazole  40 mg Oral QAM AC     PARoxetine  40 mg Oral Daily     polyethylene glycol  17 g Oral or Feeding Tube Daily     potassium chloride  20 mEq Oral Once     senna-docusate  1 tablet Oral or Feeding Tube QPM

## 2021-10-20 NOTE — PLAN OF CARE
"BP (!) 140/58 (BP Location: Left arm)   Pulse 93   Temp 97.4  F (36.3  C) (Axillary)   Resp 24   Ht 1.727 m (5' 8\")   Wt 140 kg (308 lb 10.3 oz)   SpO2 94%   BMI 46.93 kg/m      Care from: [id number]      VS & Pain: VSS ex tachypneic, weaned down to 2 LPM nc, denied pain    Neuro: A&O x4     Respiratory: dyspnea on exertion, abdominal muscle use, infrequent good cough, diminished lung sounds in BLL     Cardiac: apical pulse irregular    Peripheral neurovascular: 2+ dependent, generalized, and BLE edema; 1+ bilateral hands edema    GI/: adequate urine output- changed Purewick, Purewick in place when pt is in the chair, BM x3 this shift    Nutrition: good appetite and oral intake    Skin: mepilex for R heel and coccyx are CDI; changed mepilex for bilateral elbows; applied interdry to abdominal panus, under breasts, and groins     Musculoskeletal: significantly impaired    Lines: L PIV is saline locked    Activity: assist of 2, lift    Events this shift: Administered Potassium replacement, AM recheck for tomorrow was ordered. Lactic check was 1.2. Call light within reach and bed alarm on.    Plan: Continue to follow poc. Continue to wean down O2 as tolerated.  "

## 2021-10-20 NOTE — PLAN OF CARE
Pt A&O x4, VSS on cpap overnight. On 4L NC during day. PIV SL. Pt does not tolerate laying flat very well with increased SOB. Up in chair for most of evening.Good UOP after arthur removal. Incontinent Stool x1. Interdry in pannus folds and олег cream used. 1-2 assist with turns. Tolerating regular diet. Denies pain. Mepilex on bilateral elbows and R heel pressure injury. K+ replaced with recheck in morning. Hematoma in LLQ bruising on RLQ.  Swollen area on L ankle. Ultrasound on ankle taken. Will continue to monitor.

## 2021-10-21 ENCOUNTER — APPOINTMENT (OUTPATIENT)
Dept: PHYSICAL THERAPY | Facility: CLINIC | Age: 61
DRG: 207 | End: 2021-10-21
Attending: ANESTHESIOLOGY
Payer: MEDICARE

## 2021-10-21 LAB
ANION GAP SERPL CALCULATED.3IONS-SCNC: 1 MMOL/L (ref 3–14)
BUN SERPL-MCNC: 36 MG/DL (ref 7–30)
CALCIUM SERPL-MCNC: 9.5 MG/DL (ref 8.5–10.1)
CHLORIDE BLD-SCNC: 97 MMOL/L (ref 94–109)
CO2 SERPL-SCNC: 42 MMOL/L (ref 20–32)
CREAT SERPL-MCNC: 0.97 MG/DL (ref 0.52–1.04)
ERYTHROCYTE [DISTWIDTH] IN BLOOD BY AUTOMATED COUNT: 18.4 % (ref 10–15)
GFR SERPL CREATININE-BSD FRML MDRD: 62 ML/MIN/1.73M2
GLUCOSE BLD-MCNC: 117 MG/DL (ref 70–99)
HCT VFR BLD AUTO: 35.3 % (ref 35–47)
HGB BLD-MCNC: 10.5 G/DL (ref 11.7–15.7)
MAGNESIUM SERPL-MCNC: 2.5 MG/DL (ref 1.6–2.3)
MCH RBC QN AUTO: 27.1 PG (ref 26.5–33)
MCHC RBC AUTO-ENTMCNC: 29.7 G/DL (ref 31.5–36.5)
MCV RBC AUTO: 91 FL (ref 78–100)
PHOSPHATE SERPL-MCNC: 4 MG/DL (ref 2.5–4.5)
PLATELET # BLD AUTO: 304 10E3/UL (ref 150–450)
POTASSIUM BLD-SCNC: 3.6 MMOL/L (ref 3.4–5.3)
RBC # BLD AUTO: 3.88 10E6/UL (ref 3.8–5.2)
SODIUM SERPL-SCNC: 140 MMOL/L (ref 133–144)
WBC # BLD AUTO: 10.9 10E3/UL (ref 4–11)

## 2021-10-21 PROCEDURE — 250N000013 HC RX MED GY IP 250 OP 250 PS 637: Performed by: HOSPITALIST

## 2021-10-21 PROCEDURE — 250N000013 HC RX MED GY IP 250 OP 250 PS 637: Performed by: STUDENT IN AN ORGANIZED HEALTH CARE EDUCATION/TRAINING PROGRAM

## 2021-10-21 PROCEDURE — 84100 ASSAY OF PHOSPHORUS: CPT | Performed by: INTERNAL MEDICINE

## 2021-10-21 PROCEDURE — 85027 COMPLETE CBC AUTOMATED: CPT | Performed by: STUDENT IN AN ORGANIZED HEALTH CARE EDUCATION/TRAINING PROGRAM

## 2021-10-21 PROCEDURE — 83735 ASSAY OF MAGNESIUM: CPT | Performed by: INTERNAL MEDICINE

## 2021-10-21 PROCEDURE — 250N000013 HC RX MED GY IP 250 OP 250 PS 637: Performed by: INTERNAL MEDICINE

## 2021-10-21 PROCEDURE — 97530 THERAPEUTIC ACTIVITIES: CPT | Mod: GP | Performed by: PHYSICAL THERAPIST

## 2021-10-21 PROCEDURE — 36415 COLL VENOUS BLD VENIPUNCTURE: CPT | Performed by: STUDENT IN AN ORGANIZED HEALTH CARE EDUCATION/TRAINING PROGRAM

## 2021-10-21 PROCEDURE — 120N000002 HC R&B MED SURG/OB UMMC

## 2021-10-21 PROCEDURE — 80048 BASIC METABOLIC PNL TOTAL CA: CPT | Performed by: STUDENT IN AN ORGANIZED HEALTH CARE EDUCATION/TRAINING PROGRAM

## 2021-10-21 PROCEDURE — 250N000013 HC RX MED GY IP 250 OP 250 PS 637

## 2021-10-21 PROCEDURE — 99233 SBSQ HOSP IP/OBS HIGH 50: CPT | Performed by: HOSPITALIST

## 2021-10-21 PROCEDURE — 250N000011 HC RX IP 250 OP 636: Performed by: HOSPITALIST

## 2021-10-21 RX ORDER — POTASSIUM CHLORIDE 20MEQ/15ML
20 LIQUID (ML) ORAL ONCE
Status: COMPLETED | OUTPATIENT
Start: 2021-10-21 | End: 2021-10-21

## 2021-10-21 RX ADMIN — Medication 1 TABLET: at 08:41

## 2021-10-21 RX ADMIN — PAROXETINE 40 MG: 20 TABLET, FILM COATED ORAL at 08:41

## 2021-10-21 RX ADMIN — BUMETANIDE 1 MG: 1 TABLET ORAL at 08:41

## 2021-10-21 RX ADMIN — ASPIRIN 81 MG CHEWABLE TABLET 81 MG: 81 TABLET CHEWABLE at 08:41

## 2021-10-21 RX ADMIN — BUMETANIDE 1 MG: 1 TABLET ORAL at 17:08

## 2021-10-21 RX ADMIN — POTASSIUM CHLORIDE 20 MEQ: 20 SOLUTION ORAL at 13:18

## 2021-10-21 RX ADMIN — ENOXAPARIN SODIUM 40 MG: 40 INJECTION SUBCUTANEOUS at 08:40

## 2021-10-21 RX ADMIN — MICONAZOLE NITRATE: 20 CREAM TOPICAL at 08:41

## 2021-10-21 RX ADMIN — MICONAZOLE NITRATE: 20 CREAM TOPICAL at 20:59

## 2021-10-21 RX ADMIN — ATORVASTATIN CALCIUM 40 MG: 40 TABLET, FILM COATED ORAL at 08:40

## 2021-10-21 RX ADMIN — ENOXAPARIN SODIUM 40 MG: 40 INJECTION SUBCUTANEOUS at 20:58

## 2021-10-21 RX ADMIN — PANTOPRAZOLE SODIUM 40 MG: 40 TABLET, DELAYED RELEASE ORAL at 08:41

## 2021-10-21 RX ADMIN — Medication 3 MG: at 20:58

## 2021-10-21 ASSESSMENT — ACTIVITIES OF DAILY LIVING (ADL)
ADLS_ACUITY_SCORE: 20
ADLS_ACUITY_SCORE: 22
ADLS_ACUITY_SCORE: 18
ADLS_ACUITY_SCORE: 22
ADLS_ACUITY_SCORE: 22
ADLS_ACUITY_SCORE: 18
ADLS_ACUITY_SCORE: 18
ADLS_ACUITY_SCORE: 22
ADLS_ACUITY_SCORE: 18
ADLS_ACUITY_SCORE: 22
ADLS_ACUITY_SCORE: 22
ADLS_ACUITY_SCORE: 18
ADLS_ACUITY_SCORE: 18
ADLS_ACUITY_SCORE: 22
ADLS_ACUITY_SCORE: 22
ADLS_ACUITY_SCORE: 18
ADLS_ACUITY_SCORE: 22
ADLS_ACUITY_SCORE: 18
ADLS_ACUITY_SCORE: 22
ADLS_ACUITY_SCORE: 18
ADLS_ACUITY_SCORE: 18

## 2021-10-21 ASSESSMENT — MIFFLIN-ST. JEOR: SCORE: 2013.5

## 2021-10-21 NOTE — PROGRESS NOTES
Physician Attestation   I, Navi Wu, was present with the medical/KEY student who participated in the service and in the documentation of the note.  I have verified the history and personally performed the physical exam and medical decision making.  I agree with the assessment and plan of care as documented in the note.      I personally reviewed vital signs, medications and labs.    Evolving pannus and left shin hematomas without evidence of active extravasation. Wean oxygen.    Navi Wu MD  Date of Service (when I saw the patient): 10/21/21    [facility] [place]    Progress Note - Gold 9 Service        Date of Admission:  10/6/2021    Assessment & Plan             Chika Ferguson is a 61 year old female with history of CAD s/p PCI to mLAD (2016), HFpEF, HLD, suspected COPD, CARL on CPAP, morbid obesity, and anxiety who was admitted to ICU with acute hypoxic and hypercapnic respiratory failure requiring intubation 2/2 to COVID and MSSA pneumonia leading to ARDS. She completed a course of antibiotics for MSSA and received Dexamethasone, Tocilizumab, and Remdesivir x1 for COVID. She has also been aggressively diuresed given her history of CARL/HFpEF with concern for possible underlying pulmonary HTN for which Cardiology had been following early on in her hospitalization. She was extubated on 10/13 and transferred to hospital medicine for further cares.     Changes today:  - Continue to wean oxygen (2 lpm as of 10/21)  - Awaiting TCU      # Acute hypoxic-hypercapnic respiratory failure, multifactorial  # CAP 2/2 MSSA pneumonia  # COVID-19 pneumonia  # Hx of Pulm HTN (see below)  # CARL on CPAP  Hypoxic on admission to ICU on 10/6 w O2 sats in 50's. VBG with pH 7.05 and pCO2 81.  Intubated in ED. Bibasilar opacities noted on CXR. WBC 19.6. Lactate 8.4. COVID PCR negative. Pro-BNP elevated. Given relatively clear lung fields on imaging, initial impression CHF vs possible  pneumonia. Treated with diuretics and antibiotics.  CTA Chest on at [facility] negative for PE, again showed evidence of atelectasis. Sputum cultures grew MSSA. Repeat COVID PCR + 10/6. Severe RV dysfunction and dilated IVC noted on TTE with preserved EF. Continued treatment for CAP, COVID-19, and CHF. CRP and BNP down-trending. Compensated respiratory acidosis on repeat blood gas. Extubated 10/13. Reduced oxygen needs on 10/21 to NC 2 lpm with CPAP use of 40% FiO2 at night. Baseline use of 2 lpm with CPAP at home, but no NC use during day.  - Completed treatment for MSSA PNA with CTX x5 days and Azithromycin x3 days  - Continues to require oxygen (down to 4 lpm)  - Continue pulmonary toilet efforts, OOB as tolerates  - Continue diuresis (Bumex 1 mg PO BID)  - Treatment of COVID-19 as below  - Continue CPAP at bedtime, was using CPAP ATC PTA     # Leukocytosis- improving  # Intramuscular hematoma  WBC increased on 10/14, now elevated but stable. Chest x-ray completed showing increased patchy interstitial airspace opacities possible representing atelectasis, edema, vs infection. UA obtained from [facility], not indicative of infection. She was having loose stools but this has since improved. Remains afebrile though she is also intermittently encephalopathic. On 10/17, firm tenderness noted over her right lower abdomen with tenderness to palpation noted - suspect this represents an intramuscular hematoma likely from Lovenox administration. Additional indurated area noted on left lower shin. US confirming abscess vs hematoma suspect most likely hematoma as there is no surface trauma to indicate abscess. Hgb continues to be stable. We will continue to monitor.  - CBC daily  - Pain control for hematoma  -  Lovenox 40 mg BID dose now that pt out of ICU as below     # Positive COVID-19 PCR.    History of COVID infection in 12/2020, did not require hospitalization. Completed vaccination series 5/2021. +Exposure recently. COVID PCR  negative at OSH but repeat PCR positive on admission. DDx includes reinfection vs persistent positive PCR from prior infection. CT w/o diffuse GGO to suggest COVID pneumonia. S/p Remdesivir and Tocilizumab x 1 on 10/7, as well as steroids (10 days on 10/17). Continues to remain in respiratory isolation for a total of 20 days from positive test due to classification as critically ill, but immunocompetent.  - DVT prophylaxis with subcutaneous Lovenox BID 40 mg (weight based)   - Continue contact/respiratory precautions      # Acute on chronic HFpEF and RV failure  # Pulmonary HTN, probable Group III  Prior history of HFpEF and pulmonary HTN. On Bumex at home. NTproBNP 2264 on admission. TTE (10/6) LVEF 55-60%, mild LVH, indeterminate diastolic function, moderate to severe RV dilation w moderate to severely reduced RV function, and dilated IVC. Pulmonary pressures could not be assessed on TTE. CTA Chest negative for PE but showed severely dilated pulmonary trunk c/w pulmonary HTN. Cardiology consulted. Suspect group III pulmonary HTN, although CTEPH cannot be ruled out. RV failure likely chronic given RV dilation on imaging. Clinically improved w IV Bumex. Weight down ~45 lbs since admission but remains essentially unchanged since 10/14, though she continues to diurese without signs of contraction alkalosis.  - Cardiology consulted, appreciate recommendations              - V/Q scan attempted 10/14, but pt unable to tolerate lying flat                          -Not likely a candidate for pulmonary HTN therapies              - RHC to complete in outpatient setting              - Schedule follow up with pulmonary HTN team (Dr. Hughes) in 2-4 weeks (referral placed in discharge orders)  - Switch to PO Bumex to 1 mg BID (tired to increase Bumex w/resultant Cr elevation on 10/17)  - Daily weights, I&O's  - BMP daily     # Fall  Fell 10/19 AM after attempting to get up to the bathroom. Denies hitting her head or pain  throughout. Neurologically intact in the AM. We will continue to monitor.   - Encourage call light use  - Fall precautions      # CAD:  Hx PCI w stenting of mLAD in 2016. Mild troponin elevation on admission felt to be demand ischemia. TTE with normal wall motion. Cardiology consulted, ACS not suspected.   - Continue ASA + statin  - BB (carvedilol 3.125 mg BID) on hold d/t borderline low BP in ICU, will resume as BP tolerates     # Chronic anemia  Hgb as low as 8.6 during hospitalization likely in the setting of infection. Improving to ~11. With resent hematoma, will continue to monitor hgb.  - CBC in AM     # Malnutrition risk.    - SLP consulted (10/14)- regular diet   - Calorie counts started on 10/16     # GERD  Continue protonix.      # Pressure ulcers, POA.    Essentially chair-bound. Gets up weekly to ambulate. Increased weakness lately.  - WOCN consulted   - PT/OT consulted, likely will need TCU discharge     # TIMMY - resolved:  Cr 1.95 at OSH, 1.68 on admission. Baseline unknown. UA with hyaline casts, otherwise bland. Renal US negative for hydro. Suspect prerenal azotemia in setting of acute CHF.  - Follow BMP daily     # Steroid-induced hyperglycemia -resolved  No history of DM. A1c 6.7 on admission. Blood glucose <120 not requiring sliding scale insulin for 2 days. With completion of steroids and well controlled blood sugars will discontinue insulin.     # Hypernatremia - resolved  # Hypokalemia - resolved   Restart regular diet 10/14. Continued PO intake will likely correct electrolyte disturbances.  - BMP daily     Diet: Regular Diet Adult  Calorie Counts  Snacks/Supplements Adult: Ensure Enlive; Between Meals    DVT Prophylaxis: Enoxaparin (Lovenox) SQ  Lopes Catheter: PRESENT, indication: Strict 1-2 Hour I&O, Strict 1-2 Hour I&O  Central Lines: None  Code Status: Full Code      Disposition Plan   Expected discharge: 10/26/2021   recommended to transitional care unit once safe disposition plan/ TCU bed  available.     The patient's care was discussed with the Attending Physician, Dr. Wu.    Griselda Martínez  Medical Student  [address]  Securely message with the [employer] Web Console (learn more here)  Text page via [email or web address] Paging/Directory  Please see sign in/sign out for up to date coverage information    ______________________________________________________________________    Interval History   NAOE. Reports feeling well this morning. Is encouraged to regain her strength. Occasional chills, but suspects due to being cold at times. Denies shortness of breath (current breathing is baseline), chest pain, abdominal pain, or urinary concerns.    4 point ROS is negative unless noted above.    Data reviewed today: I reviewed all medications, new labs and imaging results over the last 24 hours. I personally reviewed no images or EKG's today.    Physical Exam   Vital Signs: Temp: (!) 96.3  F (35.7  C) Temp src: Axillary BP: 137/57 Pulse: 67   Resp: 20 SpO2: 94 % O2 Device: Nasal cannula with humidification Oxygen Delivery: 4 LPM  Weight: 310 lbs 13.58 oz   Constitutional: awake, alert, cooperative and morbidly obese  Respiratory: tachypneic using accessory abdominal muscles at baseline, diminished air exchange and clear to auscultation  Cardiovascular: regular rate and rhythm and no murmur noted  GI: normal bowel sounds, non-distended and non-tender  Skin: bruising noted over the left lower quadrant 4 cm by 5 cm area above hematoma roughly softball size, hematoma with warmth, additional bruise noted over right lower quadrant quarter sized, also area of induration, warmth, with area of ecchymosis about 5 cm x 5 cm over the left lower distal leg superior to the ankle  Musculoskeletal: trace lower extremity edema present  Neuropsychiatric: General: normal, calm and normal eye contact  Level of consciousness: alert / normal  Affect: normal      Data   Recent Labs    Lab 10/21/21  0742 10/20/21  0812 10/20/21  0208 10/19/21  0354 10/18/21  0842   WBC 10.9 12.7*  --  17.0*  --    HGB 10.5* 10.2*  --  10.7*  --    MCV 91 89  --  88  --     279  --  280  --     139  --  141  --    POTASSIUM 3.6 3.6  --  3.6  --    CHLORIDE 97 97  --  99  --    CO2 42* 42*  --  39*  --    BUN 36* 35*  --  41*  --    CR 0.97 0.88  --  0.98  --    ANIONGAP 1* <1*  --  3  --    NAE 9.5 9.2  --  9.3  --    * 115* 114* 115*   < >    < > = values in this interval not displayed.     No results found for this or any previous visit (from the past 24 hour(s)).  Medications     dextrose         aspirin  81 mg Oral Daily     atorvastatin  40 mg Oral Daily     bumetanide  1 mg Oral BID     enoxaparin ANTICOAGULANT  40 mg Subcutaneous Q12H     melatonin  3 mg Oral or Feeding Tube QPM     miconazole with skin protectant   Topical BID     multivitamin w/minerals  1 tablet Oral Daily     pantoprazole  40 mg Oral QAM AC     PARoxetine  40 mg Oral Daily     polyethylene glycol  17 g Oral or Feeding Tube Daily     potassium chloride  20 mEq Oral or Feeding Tube Once     senna-docusate  1 tablet Oral or Feeding Tube QPM

## 2021-10-21 NOTE — PLAN OF CARE
"Assumed cares 0700 to 1900.     /75 (BP Location: Right arm)   Pulse 85   Temp 97.4  F (36.3  C) (Oral)   Resp 20   Ht 1.727 m (5' 8\")   Wt 141 kg (310 lb 13.6 oz)   SpO2 98%   BMI 47.26 kg/m       Pain: Denies.  Neuro: Oriented.  Respiratory: MARQUES lung sounds d/t PAPR. Breathing labored. Some pursed lipped breathing present. SOB reported. On 2-4 L O2.   Cardiac/Neurovascular: MARQUES heart sounds d/t PAPR, but HR WDL. Pulses WDL. Trace edema in upper and LEs.  GI/: MARQUES bowel sounds d/t PAPR. 2 BMs, one incontinent and one continent. Adequate UOP, incontinent @ times, purewick in place.   Nutrition: Fair intake.  Activity: Turned q2h while in bed. Up to chair today with lift. Pt spent significant amt of time in chair this shift, refused to be moved back to bed despite pressure injury education.  Skin: Bottom has small open area, barrier cream applied. Redness in groin, barrier cream also applied here. Wound on heel and skin tear on elbow, mepilex @ each site.  Lines: PIV in LUE.   Events this shift: K replaced, recheck tomorrow.     Plan: Cont to monitor.    Mirna Hernandez RN on 10/21/2021 at 5:46 PM      "

## 2021-10-21 NOTE — PLAN OF CARE
Assumed cares [id number]    Pt sleeping between cares. VSS except on BIPAP, 40% FiO2. When off BIPAP on 2L O2.  A&Ox4, assist of 2 with lift. Turns easily with assist of 1 for cares. Purewick in place. Bed alarm on an activated. No complaints of pain this shift. L PIV SL.     Continue to wean down O2 as tolerated.

## 2021-10-22 ENCOUNTER — APPOINTMENT (OUTPATIENT)
Dept: OCCUPATIONAL THERAPY | Facility: CLINIC | Age: 61
DRG: 207 | End: 2021-10-22
Attending: ANESTHESIOLOGY
Payer: MEDICARE

## 2021-10-22 ENCOUNTER — APPOINTMENT (OUTPATIENT)
Dept: PHYSICAL THERAPY | Facility: CLINIC | Age: 61
DRG: 207 | End: 2021-10-22
Attending: ANESTHESIOLOGY
Payer: MEDICARE

## 2021-10-22 LAB
ANION GAP SERPL CALCULATED.3IONS-SCNC: 3 MMOL/L (ref 3–14)
BUN SERPL-MCNC: 36 MG/DL (ref 7–30)
CALCIUM SERPL-MCNC: 9.2 MG/DL (ref 8.5–10.1)
CHLORIDE BLD-SCNC: 93 MMOL/L (ref 94–109)
CO2 SERPL-SCNC: 40 MMOL/L (ref 20–32)
CREAT SERPL-MCNC: 1.01 MG/DL (ref 0.52–1.04)
ERYTHROCYTE [DISTWIDTH] IN BLOOD BY AUTOMATED COUNT: 18.5 % (ref 10–15)
GFR SERPL CREATININE-BSD FRML MDRD: 59 ML/MIN/1.73M2
GLUCOSE BLD-MCNC: 109 MG/DL (ref 70–99)
HCT VFR BLD AUTO: 31.9 % (ref 35–47)
HGB BLD-MCNC: 9.5 G/DL (ref 11.7–15.7)
MAGNESIUM SERPL-MCNC: 2.4 MG/DL (ref 1.6–2.3)
MCH RBC QN AUTO: 26.5 PG (ref 26.5–33)
MCHC RBC AUTO-ENTMCNC: 29.8 G/DL (ref 31.5–36.5)
MCV RBC AUTO: 89 FL (ref 78–100)
PHOSPHATE SERPL-MCNC: 4 MG/DL (ref 2.5–4.5)
PLATELET # BLD AUTO: 304 10E3/UL (ref 150–450)
POTASSIUM BLD-SCNC: 3.6 MMOL/L (ref 3.4–5.3)
RBC # BLD AUTO: 3.58 10E6/UL (ref 3.8–5.2)
SODIUM SERPL-SCNC: 136 MMOL/L (ref 133–144)
WBC # BLD AUTO: 11.2 10E3/UL (ref 4–11)

## 2021-10-22 PROCEDURE — 84100 ASSAY OF PHOSPHORUS: CPT | Performed by: INTERNAL MEDICINE

## 2021-10-22 PROCEDURE — 97535 SELF CARE MNGMENT TRAINING: CPT | Mod: GO

## 2021-10-22 PROCEDURE — 250N000013 HC RX MED GY IP 250 OP 250 PS 637: Performed by: INTERNAL MEDICINE

## 2021-10-22 PROCEDURE — 36415 COLL VENOUS BLD VENIPUNCTURE: CPT | Performed by: STUDENT IN AN ORGANIZED HEALTH CARE EDUCATION/TRAINING PROGRAM

## 2021-10-22 PROCEDURE — 97530 THERAPEUTIC ACTIVITIES: CPT | Mod: GP

## 2021-10-22 PROCEDURE — 82374 ASSAY BLOOD CARBON DIOXIDE: CPT | Performed by: STUDENT IN AN ORGANIZED HEALTH CARE EDUCATION/TRAINING PROGRAM

## 2021-10-22 PROCEDURE — 83735 ASSAY OF MAGNESIUM: CPT | Performed by: INTERNAL MEDICINE

## 2021-10-22 PROCEDURE — 97530 THERAPEUTIC ACTIVITIES: CPT | Mod: GO

## 2021-10-22 PROCEDURE — 250N000013 HC RX MED GY IP 250 OP 250 PS 637: Performed by: HOSPITALIST

## 2021-10-22 PROCEDURE — 99233 SBSQ HOSP IP/OBS HIGH 50: CPT | Performed by: HOSPITALIST

## 2021-10-22 PROCEDURE — 85027 COMPLETE CBC AUTOMATED: CPT | Performed by: STUDENT IN AN ORGANIZED HEALTH CARE EDUCATION/TRAINING PROGRAM

## 2021-10-22 PROCEDURE — 999N000157 HC STATISTIC RCP TIME EA 10 MIN

## 2021-10-22 PROCEDURE — 250N000011 HC RX IP 250 OP 636: Performed by: HOSPITALIST

## 2021-10-22 PROCEDURE — 250N000013 HC RX MED GY IP 250 OP 250 PS 637

## 2021-10-22 PROCEDURE — 94660 CPAP INITIATION&MGMT: CPT

## 2021-10-22 PROCEDURE — 120N000002 HC R&B MED SURG/OB UMMC

## 2021-10-22 PROCEDURE — 97116 GAIT TRAINING THERAPY: CPT | Mod: GP

## 2021-10-22 PROCEDURE — 250N000013 HC RX MED GY IP 250 OP 250 PS 637: Performed by: STUDENT IN AN ORGANIZED HEALTH CARE EDUCATION/TRAINING PROGRAM

## 2021-10-22 RX ORDER — POTASSIUM CHLORIDE 750 MG/1
20 TABLET, EXTENDED RELEASE ORAL ONCE
Status: COMPLETED | OUTPATIENT
Start: 2021-10-22 | End: 2021-10-22

## 2021-10-22 RX ADMIN — ASPIRIN 81 MG CHEWABLE TABLET 81 MG: 81 TABLET CHEWABLE at 09:17

## 2021-10-22 RX ADMIN — PANTOPRAZOLE SODIUM 40 MG: 40 TABLET, DELAYED RELEASE ORAL at 09:18

## 2021-10-22 RX ADMIN — POTASSIUM CHLORIDE 20 MEQ: 750 TABLET, EXTENDED RELEASE ORAL at 09:18

## 2021-10-22 RX ADMIN — ENOXAPARIN SODIUM 40 MG: 40 INJECTION SUBCUTANEOUS at 20:12

## 2021-10-22 RX ADMIN — ENOXAPARIN SODIUM 40 MG: 40 INJECTION SUBCUTANEOUS at 09:18

## 2021-10-22 RX ADMIN — ATORVASTATIN CALCIUM 40 MG: 40 TABLET, FILM COATED ORAL at 09:18

## 2021-10-22 RX ADMIN — PAROXETINE 40 MG: 20 TABLET, FILM COATED ORAL at 09:18

## 2021-10-22 RX ADMIN — Medication 3 MG: at 20:11

## 2021-10-22 RX ADMIN — BUMETANIDE 1 MG: 1 TABLET ORAL at 09:18

## 2021-10-22 RX ADMIN — HYDROXYZINE HYDROCHLORIDE 25 MG: 25 TABLET, FILM COATED ORAL at 16:15

## 2021-10-22 RX ADMIN — Medication 1 TABLET: at 09:18

## 2021-10-22 RX ADMIN — MICONAZOLE NITRATE: 20 CREAM TOPICAL at 09:22

## 2021-10-22 RX ADMIN — BUMETANIDE 1 MG: 1 TABLET ORAL at 16:15

## 2021-10-22 ASSESSMENT — ACTIVITIES OF DAILY LIVING (ADL)
ADLS_ACUITY_SCORE: 16
ADLS_ACUITY_SCORE: 20
ADLS_ACUITY_SCORE: 16
ADLS_ACUITY_SCORE: 20
ADLS_ACUITY_SCORE: 16
ADLS_ACUITY_SCORE: 16
ADLS_ACUITY_SCORE: 20
ADLS_ACUITY_SCORE: 16
ADLS_ACUITY_SCORE: 16
ADLS_ACUITY_SCORE: 20
ADLS_ACUITY_SCORE: 16

## 2021-10-22 NOTE — PROGRESS NOTES
Physician Attestation   I, Navi Wu, was present with the medical/KEY student who participated in the service and in the documentation of the note.  I have verified the history and personally performed the physical exam and medical decision making.  I agree with the assessment and plan of care as documented in the note.      I personally reviewed vital signs, medications and labs.    Continues to feel improved. Wean oxygen as able, see if PM&R will re-evaluate patient given perceived interval improvement w/ PT.    Navi Wu MD  Date of Service (when I saw the patient): 10/22/21    [facility]    Progress Note - Gold 9 Service        Date of Admission:  10/6/2021    Assessment & Plan           Chika Ferguson is a 61 year old female with history of CAD s/p PCI to mLAD (2016), HFpEF, HLD, suspected COPD, CARL on CPAP, morbid obesity, and anxiety who was admitted to ICU with acute hypoxic and hypercapnic respiratory failure requiring intubation 2/2 to COVID and MSSA pneumonia leading to ARDS. She completed a course of antibiotics for MSSA and received Dexamethasone, Tocilizumab, and Remdesivir x1 for COVID. She has also been aggressively diuresed given her history of CARL/HFpEF with concern for possible underlying pulmonary HTN for which Cardiology had been following early on in her hospitalization. She was extubated on 10/13 and transferred to hospital medicine for further cares.     Changes today:  - Continue to wean oxygen   - Re consult PM&R to assess for ARU     # Acute hypoxic-hypercapnic respiratory failure, multifactorial  # CAP 2/2 MSSA pneumonia  # COVID-19 pneumonia  # Hx of Pulm HTN (see below)  # CARL on CPAP  Hypoxic on admission to ICU on 10/6 w O2 sats in 50's. VBG with pH 7.05 and pCO2 81.  Intubated in ED. Bibasilar opacities noted on CXR. WBC 19.6. Lactate 8.4. COVID PCR negative. Pro-BNP elevated. Given relatively clear lung fields on imaging,  initial impression CHF vs possible pneumonia. Treated with diuretics and antibiotics.  CTA Chest on at [facility] negative for PE, again showed evidence of atelectasis. Sputum cultures grew MSSA. Repeat COVID PCR + 10/6. Severe RV dysfunction and dilated IVC noted on TTE with preserved EF. Continued treatment for CAP, COVID-19, and CHF. CRP and BNP down-trending. Compensated respiratory acidosis on repeat blood gas. Extubated 10/13. Reduced oxygen needs on 10/21 to NC 2 lpm with CPAP use of 40% FiO2 at night. Baseline use of 2 lpm with CPAP at home, but no NC use during day.  - Completed treatment for MSSA PNA with CTX x5 days w/ Azithromycin x3 days   - Continues to require oxygen  - Continue pulmonary toilet efforts, OOB as tolerates  - Continue diuresis (Bumex 1 mg PO BID)  - Completed treatment of COVID-19 as below  - Continue CPAP at bedtime, was using CPAP ATC PTA     # Leukocytosis- improving  # Intramuscular hematoma  WBC increased on 10/14, now elevated but stable. Infectious etiologies including HAP, UTI considered but negative. Considered C. Diff, but loose stools improved. On 10/17, firm tenderness noted over her right lower abdomen with tenderness to palpation noted - suspect this represents an intramuscular hematoma likely from Lovenox administration. Additional indurated area noted on left lower shin. US confirming abscess vs hematoma suspect most likely hematoma as there is no surface trauma to indicate abscess. We will continue to monitor.  - CBC daily  - Pain control for hematoma  - Lovenox 40 mg BID dose now that pt out of ICU as below     # Positive COVID-19 PCR.    History of COVID infection in 12/2020, did not require hospitalization. Completed vaccination series 5/2021. +Exposure recently. COVID PCR negative at OSH but repeat PCR positive on admission. DDx includes reinfection vs persistent positive PCR from prior infection. CT w/o diffuse GGO to suggest COVID pneumonia. S/p Remdesivir and  Tocilizumab x 1 on 10/7, as well as steroids (10 days on 10/17). Continues to remain in respiratory isolation for a total of 20 days from positive test due to classification as critically ill, but immunocompetent.  - DVT prophylaxis with subcutaneous Lovenox BID 40 mg (weight based)   - Continue contact/respiratory precautions      # Acute on chronic HFpEF and RV failure  # Pulmonary HTN, probable Group III  Prior history of HFpEF and pulmonary HTN. On Bumex at home. NTproBNP 2264 on admission. TTE (10/6) LVEF 55-60%, mild LVH, indeterminate diastolic function, moderate to severe RV dilation w moderate to severely reduced RV function, and dilated IVC. Pulmonary pressures could not be assessed on TTE. CTA Chest negative for PE but showed severely dilated pulmonary trunk c/w pulmonary HTN. Cardiology consulted. Suspect group III pulmonary HTN, although CTEPH cannot be ruled out. RV failure likely chronic given RV dilation on imaging. Clinically improved w IV Bumex. Weight down ~45 lbs since admission but remains essentially unchanged since 10/14, though she continues to diurese without signs of contraction alkalosis.  - Cardiology consulted, appreciate recommendations              - V/Q scan attempted 10/14, but pt unable to tolerate lying flat                          -Not likely a candidate for pulmonary HTN therapies              - RHC to complete in outpatient setting              - Schedule follow up with pulmonary HTN team (Dr. Hughes) in 2-4 weeks (referral placed in discharge orders)  - Switch to PO Bumex to 1 mg BID (tired to increase Bumex w/resultant Cr elevation on 10/17)  - Daily weights, I&O's  - BMP daily     # Fall  Fell 10/19 AM after attempting to get up to the bathroom. Denies hitting her head or pain throughout. Neurologically intact in the AM. We will continue to monitor.   - Encourage call light use  - Fall precautions      # CAD:  Hx PCI w stenting of mLAD in 2016. Mild troponin elevation on  admission felt to be demand ischemia. TTE with normal wall motion. Cardiology consulted, ACS not suspected.   - Continue ASA + statin  - BB (carvedilol 3.125 mg BID) on hold d/t borderline low/nl BPs, will resume as BP tolerates     # Chronic anemia  Hgb as low as 8.6 during hospitalization likely in the setting of infection. Improving to ~11. With resent hematoma, will continue to monitor hgb.  - CBC in AM     # Malnutrition risk.    - SLP consulted (10/14)- regular diet   - Calorie counts started on 10/16     # GERD  Continue protonix.      # Pressure ulcers, POA.    Essentially chair-bound. Gets up weekly to ambulate. Increased weakness lately.  - WOCN consulted   - PT/OT consulted, likely will need TCU/ARU at discharge     # TIMMY - resolved:  Cr 1.68 on admission. Baseline unknown. UA bland. Renal US negative for hydro. Likely prerenal azotemia in setting of acute CHF.     # Steroid-induced hyperglycemia -resolved with discontinuation of steriods. No history of DM. A1c 6.7 on admission.      # Hypernatremia - resolved with PO intake  # Hypokalemia - resolved with PO intake       Diet: Regular Diet Adult  Calorie Counts  Snacks/Supplements Adult: Ensure Enlive; Between Meals    DVT Prophylaxis: Enoxaparin (Lovenox) SQ  Central Lines: None  Code Status: Full Code         Disposition Plan   Expected discharge: 10/26/2021   recommended to transitional care unit once safe disposition plan/ TCU bed available.     The patient's care was discussed with the Attending Physician, Dr. Wu.    Griselda Martínez  Medical Student  [address]  Securely message with the [employer] Web Console (learn more here)  Text page via [employer] Paging/Directory  Please see sign in/sign out for up to date coverage information    ______________________________________________________________________    Interval History   NAOE. Feels well this morning. Reports being able to work with PT.  Further encouraged to get back to baseline. Denies shortness of breath (at baseline), chest pain, nausea, vomiting or abdominal pain.    4 point ROS is negative unless noted above.    Data reviewed today: I reviewed all medications, new labs and imaging results over the last 24 hours. I personally reviewed no images or EKG's today.    Physical Exam   Vital Signs: Temp: 97.1  F (36.2  C) Temp src: Axillary BP: 134/63 Pulse: 68   Resp: 18 SpO2: 98 % O2 Device: BiPAP/CPAP Oxygen Delivery: 3 LPM  Weight: 310 lbs 13.58 oz   Constitutional: awake, alert, cooperative and morbidly obese  Respiratory: tachypneic using accessory abdominal muscles at baseline, diminished air exchange and clear to auscultation  Cardiovascular: regular rate and rhythm and no murmur noted  GI: normal bowel sounds, non-distended and non-tender  Skin: bruising noted over the left lower quadrant 4 cm by 5 cm area above hematoma roughly softball size, hematoma with warmth, additional bruise noted over right lower quadrant quarter sized, also area of induration, warmth, with area of ecchymosis about 5 cm x 5 cm over the left lower distal leg superior to the ankle, no extension past marked area  Musculoskeletal: trace lower extremity edema present  Neuropsychiatric: General: normal, calm and normal eye contact  Level of consciousness: alert / normal  Affect: normal    Data   Recent Labs   Lab 10/22/21  0628 10/21/21  0742 10/20/21  0812   WBC 11.2* 10.9 12.7*   HGB 9.5* 10.5* 10.2*   MCV 89 91 89    304 279    140 139   POTASSIUM 3.6 3.6 3.6   CHLORIDE 93* 97 97   CO2 40* 42* 42*   BUN 36* 36* 35*   CR 1.01 0.97 0.88   ANIONGAP 3 1* <1*   NAE 9.2 9.5 9.2   * 117* 115*     No results found for this or any previous visit (from the past 24 hour(s)).  Medications     dextrose         aspirin  81 mg Oral Daily     atorvastatin  40 mg Oral Daily     bumetanide  1 mg Oral BID     enoxaparin ANTICOAGULANT  40 mg Subcutaneous Q12H      melatonin  3 mg Oral or Feeding Tube QPM     miconazole with skin protectant   Topical BID     multivitamin w/minerals  1 tablet Oral Daily     pantoprazole  40 mg Oral QAM AC     PARoxetine  40 mg Oral Daily     polyethylene glycol  17 g Oral or Feeding Tube Daily     potassium chloride  20 mEq Oral Once     senna-docusate  1 tablet Oral or Feeding Tube QPM

## 2021-10-22 NOTE — PROVIDER NOTIFICATION
Per the Pressure Injury Prevention Patient Refusal Algorithm, Providers are to be notified when patients are refusing intervention to prevent PIs.    Pt has continuously refused:   - Q2h repositioning     Please help to encourage pressure injury prevention measures.

## 2021-10-22 NOTE — PLAN OF CARE
Assumed care [id number]    Pt sleeping between cares. A&Ox4. On 3L NC, and 40% FiO2 on CPAP while asleep. Assist of 2 with lift when getting OOB, turns okay with assist of one. No complaints of pain this shift. Incontinent BM x1 this shift. Purewick in place while in bed. Bed alarm on, able to call to make needs known.     Continue to provide care per poc.

## 2021-10-22 NOTE — PLAN OF CARE
RN assumed cares: [id number]     Vitals: VSS on 2L NC w/ humidification   Neuro: AOx4; forgetful. Anxious at times, especially when HOB is lowered; given PRN atarax x1   Pain: Denies   Activity: Ax2 w/ ceiling lift. Able to stand from chair or bed for short intervals w/ GB & walker  Cardiac: Denies CP. Trace edema in extremities   Respiratory: Weaned down to 1.5L NC; SaO2 >90%. Reports mild DE JESUS, occasional SOB, denies cough. Tachypneic w/ anxiety. LS dim. Wears CPAP at night   GI/: Tolerating regular diet, fair oral intake. Adequate urine output; purewik in place, incontinent at times. BM x1 this shift.  Skin: Hematoma in LLQ, bruising over RLQ. Erythema w/ mild swelling on L ankle. Mepilexs over bilateral elbows, sacrum, R buttock & R heel pressure injury; CDI. Redness on bilateral posterior thighs from sling for ceiling lift & sitting in chair; mepilex placed for protection  LDAs: R PIV SL.   Labs: K replaced orally, recheck in AM     Events & Plan: Pt up in chair multiple times this shift, prefers chair vs bed as she is able to sit up more. Educated on importance of frequent position changing & agreeable to allow for micro shifts up in chair but refused to get out of chair majority of shift. Worked w/ PT & OT today. Call light within reach, able to make needs known. Will continue to monitor & follow POC

## 2021-10-23 LAB
ANION GAP SERPL CALCULATED.3IONS-SCNC: 3 MMOL/L (ref 3–14)
BUN SERPL-MCNC: 31 MG/DL (ref 7–30)
CALCIUM SERPL-MCNC: 9.2 MG/DL (ref 8.5–10.1)
CHLORIDE BLD-SCNC: 94 MMOL/L (ref 94–109)
CO2 SERPL-SCNC: 39 MMOL/L (ref 20–32)
CREAT SERPL-MCNC: 0.9 MG/DL (ref 0.52–1.04)
ERYTHROCYTE [DISTWIDTH] IN BLOOD BY AUTOMATED COUNT: 19 % (ref 10–15)
GFR SERPL CREATININE-BSD FRML MDRD: 68 ML/MIN/1.73M2
GLUCOSE BLD-MCNC: 109 MG/DL (ref 70–99)
HCT VFR BLD AUTO: 32.8 % (ref 35–47)
HGB BLD-MCNC: 9.5 G/DL (ref 11.7–15.7)
MAGNESIUM SERPL-MCNC: 2.3 MG/DL (ref 1.6–2.3)
MCH RBC QN AUTO: 26.2 PG (ref 26.5–33)
MCHC RBC AUTO-ENTMCNC: 29 G/DL (ref 31.5–36.5)
MCV RBC AUTO: 90 FL (ref 78–100)
PHOSPHATE SERPL-MCNC: 4.1 MG/DL (ref 2.5–4.5)
PLATELET # BLD AUTO: 295 10E3/UL (ref 150–450)
POTASSIUM BLD-SCNC: 3.4 MMOL/L (ref 3.4–5.3)
POTASSIUM BLD-SCNC: 3.7 MMOL/L (ref 3.4–5.3)
RBC # BLD AUTO: 3.63 10E6/UL (ref 3.8–5.2)
SODIUM SERPL-SCNC: 136 MMOL/L (ref 133–144)
WBC # BLD AUTO: 7.4 10E3/UL (ref 4–11)

## 2021-10-23 PROCEDURE — 250N000013 HC RX MED GY IP 250 OP 250 PS 637

## 2021-10-23 PROCEDURE — 999N000157 HC STATISTIC RCP TIME EA 10 MIN

## 2021-10-23 PROCEDURE — 80048 BASIC METABOLIC PNL TOTAL CA: CPT | Performed by: STUDENT IN AN ORGANIZED HEALTH CARE EDUCATION/TRAINING PROGRAM

## 2021-10-23 PROCEDURE — 85014 HEMATOCRIT: CPT | Performed by: STUDENT IN AN ORGANIZED HEALTH CARE EDUCATION/TRAINING PROGRAM

## 2021-10-23 PROCEDURE — 83735 ASSAY OF MAGNESIUM: CPT | Performed by: INTERNAL MEDICINE

## 2021-10-23 PROCEDURE — 999N000128 HC STATISTIC PERIPHERAL IV START W/O US GUIDANCE

## 2021-10-23 PROCEDURE — 36415 COLL VENOUS BLD VENIPUNCTURE: CPT | Performed by: HOSPITALIST

## 2021-10-23 PROCEDURE — 84100 ASSAY OF PHOSPHORUS: CPT | Performed by: INTERNAL MEDICINE

## 2021-10-23 PROCEDURE — 94660 CPAP INITIATION&MGMT: CPT

## 2021-10-23 PROCEDURE — 250N000013 HC RX MED GY IP 250 OP 250 PS 637: Performed by: STUDENT IN AN ORGANIZED HEALTH CARE EDUCATION/TRAINING PROGRAM

## 2021-10-23 PROCEDURE — 36415 COLL VENOUS BLD VENIPUNCTURE: CPT | Performed by: STUDENT IN AN ORGANIZED HEALTH CARE EDUCATION/TRAINING PROGRAM

## 2021-10-23 PROCEDURE — 120N000002 HC R&B MED SURG/OB UMMC

## 2021-10-23 PROCEDURE — 250N000013 HC RX MED GY IP 250 OP 250 PS 637: Performed by: INTERNAL MEDICINE

## 2021-10-23 PROCEDURE — 99232 SBSQ HOSP IP/OBS MODERATE 35: CPT | Performed by: HOSPITALIST

## 2021-10-23 PROCEDURE — 250N000011 HC RX IP 250 OP 636: Performed by: HOSPITALIST

## 2021-10-23 PROCEDURE — 84132 ASSAY OF SERUM POTASSIUM: CPT | Performed by: HOSPITALIST

## 2021-10-23 PROCEDURE — 250N000013 HC RX MED GY IP 250 OP 250 PS 637: Performed by: HOSPITALIST

## 2021-10-23 RX ORDER — POTASSIUM CHLORIDE 750 MG/1
20 TABLET, EXTENDED RELEASE ORAL ONCE
Status: COMPLETED | OUTPATIENT
Start: 2021-10-23 | End: 2021-10-23

## 2021-10-23 RX ORDER — POTASSIUM CHLORIDE 750 MG/1
40 TABLET, EXTENDED RELEASE ORAL ONCE
Status: COMPLETED | OUTPATIENT
Start: 2021-10-23 | End: 2021-10-23

## 2021-10-23 RX ADMIN — ASPIRIN 81 MG CHEWABLE TABLET 81 MG: 81 TABLET CHEWABLE at 08:04

## 2021-10-23 RX ADMIN — BUMETANIDE 1 MG: 1 TABLET ORAL at 16:11

## 2021-10-23 RX ADMIN — PANTOPRAZOLE SODIUM 40 MG: 40 TABLET, DELAYED RELEASE ORAL at 08:04

## 2021-10-23 RX ADMIN — MICONAZOLE NITRATE: 20 CREAM TOPICAL at 20:14

## 2021-10-23 RX ADMIN — ATORVASTATIN CALCIUM 40 MG: 40 TABLET, FILM COATED ORAL at 08:04

## 2021-10-23 RX ADMIN — MICONAZOLE NITRATE: 20 CREAM TOPICAL at 08:06

## 2021-10-23 RX ADMIN — ENOXAPARIN SODIUM 40 MG: 40 INJECTION SUBCUTANEOUS at 20:14

## 2021-10-23 RX ADMIN — BUMETANIDE 1 MG: 1 TABLET ORAL at 08:04

## 2021-10-23 RX ADMIN — POTASSIUM CHLORIDE 20 MEQ: 750 TABLET, EXTENDED RELEASE ORAL at 18:22

## 2021-10-23 RX ADMIN — POTASSIUM CHLORIDE 40 MEQ: 750 TABLET, EXTENDED RELEASE ORAL at 10:59

## 2021-10-23 RX ADMIN — ENOXAPARIN SODIUM 40 MG: 40 INJECTION SUBCUTANEOUS at 10:28

## 2021-10-23 RX ADMIN — Medication 3 MG: at 20:14

## 2021-10-23 RX ADMIN — Medication 1 TABLET: at 08:04

## 2021-10-23 RX ADMIN — PAROXETINE 40 MG: 20 TABLET, FILM COATED ORAL at 08:04

## 2021-10-23 ASSESSMENT — ACTIVITIES OF DAILY LIVING (ADL)
ADLS_ACUITY_SCORE: 20
ADLS_ACUITY_SCORE: 18
ADLS_ACUITY_SCORE: 20
ADLS_ACUITY_SCORE: 22
ADLS_ACUITY_SCORE: 20
ADLS_ACUITY_SCORE: 20
ADLS_ACUITY_SCORE: 18
ADLS_ACUITY_SCORE: 22
ADLS_ACUITY_SCORE: 20
ADLS_ACUITY_SCORE: 22
ADLS_ACUITY_SCORE: 18
ADLS_ACUITY_SCORE: 20
ADLS_ACUITY_SCORE: 18
ADLS_ACUITY_SCORE: 20
ADLS_ACUITY_SCORE: 20
ADLS_ACUITY_SCORE: 22
ADLS_ACUITY_SCORE: 20
ADLS_ACUITY_SCORE: 20
ADLS_ACUITY_SCORE: 22
ADLS_ACUITY_SCORE: 18
ADLS_ACUITY_SCORE: 22
ADLS_ACUITY_SCORE: 20

## 2021-10-23 ASSESSMENT — MIFFLIN-ST. JEOR: SCORE: 2043.5

## 2021-10-23 NOTE — PROGRESS NOTES
Care Management Follow Up    Length of Stay (days): 17    Expected Discharge Date: 10/26/2021     Concerns to be Addressed: discharge planning     Patient plan of care discussed at interdisciplinary rounds: Yes    Anticipated Discharge Disposition:   TCU vs ATU  Anticipated Discharge Services:  TCU vs ARU  Anticipated Discharge DME: None    Patient/family educated on Medicare website which has current facility and service quality ratings:  NA- waiting for PM&R recs   Education Provided on the Discharge Plan:  yes  Patient/Family in Agreement with the Plan: yes    Referrals Placed by CM/SW:  None at this time  Private pay costs discussed: Not applicable    Additional Information:  SW continuing to wait for PM&R recommendations. SW contacted rehab liaison to inquire on whether or not she is watching for these recommendations as well.      Thierry Arroyo, LICSW

## 2021-10-23 NOTE — PLAN OF CARE
ASSUMED CARES: [id number].   STATUS:  Pt admitted 10/6 for Acute Resp failure with hypoxia. COVID+. Extubated 10/13. Fall 10/19. MSSA Pneumonia   NEURO: A/o x 4. +Anxious.   VS: VSS on 2L NC. CPAP at HS 40% FiO2.   ACTIVITY: A2 with lift. Pt frequently refusing repositioning.   PAIN: Denies pain.   CARDIAC: No C/o CP  RESP: +DE JESUS. Denies SOB.   GI/: +PurWik- Ofelia colored urine. No BM this shift.   DIET: Regular  SKIN: Multiple wounds- Coccyx mepilex. Bilat elbow mepilex. Bilat thigh mepilex. R shin reddened area. Multiple bruises- Bilat arms and abdomen.   LDA'S: L PIV SL.   LABS: K+ replaced on previous shift per report- recheck  In AM.   CHANGES THIS SHIFT:   POC: Cont with POC. Plan for discharge unit once safe disposition plan/ TCU bed available. Bed alarm on for pt safety. Call light within reach.

## 2021-10-23 NOTE — PROGRESS NOTES
[facility]    Progress Note - Gold 9 Service        Date of Admission:  10/6/2021    Assessment & Plan           Chika Ferguson is a 61 year old female with history of CAD s/p PCI to mLAD (2016), HFpEF, HLD, suspected COPD, CARL on CPAP, morbid obesity, and anxiety who was admitted to ICU with acute hypoxic and hypercapnic respiratory failure requiring intubation 2/2 to COVID and MSSA pneumonia leading to ARDS. She completed a course of antibiotics for MSSA and received Dexamethasone, Tocilizumab, and Remdesivir x1 for COVID. She has also been aggressively diuresed given her history of CARL/HFpEF with concern for possible underlying pulmonary HTN for which Cardiology had been following early on in her hospitalization. She was extubated on 10/13 and transferred to hospital medicine for further cares.      # Acute hypoxic-hypercapnic respiratory failure, multifactorial  # CAP 2/2 MSSA pneumonia  # COVID-19 pneumonia  # Hx of Pulm HTN (see below)  # CARL on CPAP  Hypoxic on admission to ICU on 10/6 w O2 sats in 50's. VBG with pH 7.05 and pCO2 81.  Intubated in ED. Bibasilar opacities noted on CXR. WBC 19.6. Lactate 8.4. COVID PCR negative. Pro-BNP elevated. Given relatively clear lung fields on imaging, initial impression CHF vs possible pneumonia. Treated with diuretics and antibiotics.  CTA Chest on at [facility] negative for PE, again showed evidence of atelectasis. Sputum cultures grew MSSA. Repeat COVID PCR + 10/6. Severe RV dysfunction and dilated IVC noted on TTE with preserved EF. Continued treatment for CAP, COVID-19, and CHF. CRP and BNP down-trending. Compensated respiratory acidosis on repeat blood gas. Extubated 10/13. Reduced oxygen needs on 10/21 to NC 2 lpm with CPAP use of 40% FiO2 at night. Baseline use of 2 lpm with CPAP at home, but no NC use during day.  - Completed treatment for MSSA PNA with CTX x5 days w/ Azithromycin x3 days   - Continues to require  oxygen  - Continue pulmonary toilet efforts, OOB as tolerates  - Continue diuresis (Bumex 1 mg PO BID)  - Completed treatment of COVID-19 as below  - Continue CPAP at bedtime, was using CPAP ATC PTA     # Leukocytosis- improving  # Intramuscular hematoma  WBC increased on 10/14, now elevated but stable. Infectious etiologies including HAP, UTI considered but negative. Considered C. Diff, but loose stools improved. On 10/17, firm tenderness noted over her right lower abdomen with tenderness to palpation noted - suspect this represents an intramuscular hematoma likely from Lovenox administration. Additional indurated area noted on left lower shin. US confirming abscess vs hematoma suspect most likely hematoma as there is no surface trauma to indicate abscess. We will continue to monitor.  - CBC daily  - Pain control for hematoma  - Lovenox 40 mg BID dose now that pt out of ICU as below     # Positive COVID-19 PCR.    History of COVID infection in 12/2020, did not require hospitalization. Completed vaccination series 5/2021. +Exposure recently. COVID PCR negative at OSH but repeat PCR positive on admission. DDx includes reinfection vs persistent positive PCR from prior infection. CT w/o diffuse GGO to suggest COVID pneumonia. S/p Remdesivir and Tocilizumab x 1 on 10/7, as well as steroids (10 days on 10/17). Continues to remain in respiratory isolation for a total of 20 days from positive test due to classification as critically ill, but immunocompetent.  - DVT prophylaxis with subcutaneous Lovenox BID 40 mg (weight based)   - Continue contact/respiratory precautions      # Acute on chronic HFpEF and RV failure  # Pulmonary HTN, probable Group III  Prior history of HFpEF and pulmonary HTN. On Bumex at home. NTproBNP 2264 on admission. TTE (10/6) LVEF 55-60%, mild LVH, indeterminate diastolic function, moderate to severe RV dilation w moderate to severely reduced RV function, and dilated IVC. Pulmonary pressures could  not be assessed on TTE. CTA Chest negative for PE but showed severely dilated pulmonary trunk c/w pulmonary HTN. Cardiology consulted. Suspect group III pulmonary HTN, although CTEPH cannot be ruled out. RV failure likely chronic given RV dilation on imaging. Clinically improved w IV Bumex. Weight down ~45 lbs since admission but remains essentially unchanged since 10/14, though she continues to diurese without signs of contraction alkalosis.  - Cardiology consulted, appreciate recommendations              - V/Q scan attempted 10/14, but pt unable to tolerate lying flat                          -Not likely a candidate for pulmonary HTN therapies              - RHC to complete in outpatient setting              - Schedule follow up with pulmonary HTN team (Dr. Hughes) in 2-4 weeks (referral placed in discharge orders)  - Switch to PO Bumex to 1 mg BID (tired to increase Bumex w/resultant Cr elevation on 10/17)  - Daily weights, I&O's  - BMP daily     # Fall  Fell 10/19 AM after attempting to get up to the bathroom. Denies hitting her head or pain throughout. Neurologically intact in the AM. We will continue to monitor.   - Encourage call light use  - Fall precautions      # CAD:  Hx PCI w stenting of mLAD in 2016. Mild troponin elevation on admission felt to be demand ischemia. TTE with normal wall motion. Cardiology consulted, ACS not suspected.   - Continue ASA + statin  - BB (carvedilol 3.125 mg BID) on hold d/t borderline low/nl BPs, will resume as BP tolerates     # Chronic anemia  Hgb as low as 8.6 during hospitalization likely in the setting of infection. Improving to ~11. With resent hematoma, will continue to monitor hgb.  - CBC in AM     # Malnutrition risk.    - SLP consulted (10/14)- regular diet   - Calorie counts started on 10/16     # GERD  Continue protonix.      # Pressure ulcers, POA.    Essentially chair-bound. Gets up weekly to ambulate. Increased weakness lately.  - WOCN consulted   - PT/OT  consulted, likely will need TCU/ARU at discharge     # TIMMY - resolved:  Cr 1.68 on admission. Baseline unknown. UA bland. Renal US negative for hydro. Likely prerenal azotemia in setting of acute CHF.     # Steroid-induced hyperglycemia -resolved with discontinuation of steriods. No history of DM. A1c 6.7 on admission.      # Hypernatremia - resolved with PO intake  # Hypokalemia - resolved with PO intake       Diet: Regular Diet Adult  Calorie Counts  Snacks/Supplements Adult: Ensure Enlive; Between Meals    DVT Prophylaxis: Enoxaparin (Lovenox) SQ  Central Lines: None  Code Status: Full Code         Disposition Plan   Expected discharge: 10/26/2021   recommended to transitional care unit once safe disposition plan/ TCU bed available.     Jean-Paul Wu MD  [facility]      ______________________________________________________________________    Interval History   IN chair, no chest pain or shortness of breath , wonders about duration of oxygen which l/min is getting lower.    4 point ROS is negative unless noted above.    Data reviewed today: I reviewed all medications, new labs and imaging results over the last 24 hours. I personally reviewed no images or EKG's today.    Physical Exam   Vital Signs: Temp: 97.8  F (36.6  C) Temp src: Axillary BP: 137/65 Pulse: 83   Resp: 16 SpO2: 95 % O2 Device: Nasal cannula Oxygen Delivery: 2 LPM  Weight: 310 lbs 13.58 oz     Physical Exam   Constitutional:  NAD  HEENT: EOMI, NC in place  Neck: Symmetric  Cardiovascular: regular without murmurs or gallops  Pulmonary/Chest:  right rales. No respiratory distress.  GI: Soft, non-tender , non-distended    Musculoskeletal:  Trace bilateral lower extremity edema, evolving L shin hematoma marked within boundaries  Skin: Pannus hematoma w/ superficial discoloration and evolving changes  Neurological: Alert and oriented   Psychiatric:  euthymic      Data   Recent Labs   Lab 10/23/21  0716 10/22/21  0628 10/21/21  0742   WBC 7.4 11.2*  10.9   HGB 9.5* 9.5* 10.5*   MCV 90 89 91    304 304    136 140   POTASSIUM 3.4 3.6 3.6   CHLORIDE 94 93* 97   CO2 39* 40* 42*   BUN 31* 36* 36*   CR 0.90 1.01 0.97   ANIONGAP 3 3 1*   NAE 9.2 9.2 9.5   * 109* 117*     No results found for this or any previous visit (from the past 24 hour(s)).  Medications     dextrose         aspirin  81 mg Oral Daily     atorvastatin  40 mg Oral Daily     bumetanide  1 mg Oral BID     enoxaparin ANTICOAGULANT  40 mg Subcutaneous Q12H     melatonin  3 mg Oral or Feeding Tube QPM     miconazole with skin protectant   Topical BID     multivitamin w/minerals  1 tablet Oral Daily     pantoprazole  40 mg Oral QAM AC     PARoxetine  40 mg Oral Daily     polyethylene glycol  17 g Oral or Feeding Tube Daily     senna-docusate  1 tablet Oral or Feeding Tube QPM

## 2021-10-24 LAB
HOLD SPECIMEN: NORMAL
MAGNESIUM SERPL-MCNC: 2.5 MG/DL (ref 1.6–2.3)
PHOSPHATE SERPL-MCNC: 4.3 MG/DL (ref 2.5–4.5)
POTASSIUM BLD-SCNC: 3.7 MMOL/L (ref 3.4–5.3)

## 2021-10-24 PROCEDURE — 36415 COLL VENOUS BLD VENIPUNCTURE: CPT | Performed by: INTERNAL MEDICINE

## 2021-10-24 PROCEDURE — 999N000157 HC STATISTIC RCP TIME EA 10 MIN

## 2021-10-24 PROCEDURE — 250N000013 HC RX MED GY IP 250 OP 250 PS 637: Performed by: STUDENT IN AN ORGANIZED HEALTH CARE EDUCATION/TRAINING PROGRAM

## 2021-10-24 PROCEDURE — 250N000013 HC RX MED GY IP 250 OP 250 PS 637

## 2021-10-24 PROCEDURE — 250N000013 HC RX MED GY IP 250 OP 250 PS 637: Performed by: INTERNAL MEDICINE

## 2021-10-24 PROCEDURE — 84100 ASSAY OF PHOSPHORUS: CPT | Performed by: INTERNAL MEDICINE

## 2021-10-24 PROCEDURE — 250N000011 HC RX IP 250 OP 636: Performed by: HOSPITALIST

## 2021-10-24 PROCEDURE — 120N000002 HC R&B MED SURG/OB UMMC

## 2021-10-24 PROCEDURE — 99232 SBSQ HOSP IP/OBS MODERATE 35: CPT | Performed by: HOSPITALIST

## 2021-10-24 PROCEDURE — 83735 ASSAY OF MAGNESIUM: CPT | Performed by: INTERNAL MEDICINE

## 2021-10-24 PROCEDURE — 84132 ASSAY OF SERUM POTASSIUM: CPT | Performed by: HOSPITALIST

## 2021-10-24 PROCEDURE — 94660 CPAP INITIATION&MGMT: CPT

## 2021-10-24 RX ADMIN — ATORVASTATIN CALCIUM 40 MG: 40 TABLET, FILM COATED ORAL at 09:55

## 2021-10-24 RX ADMIN — Medication 3 MG: at 20:17

## 2021-10-24 RX ADMIN — BUMETANIDE 1 MG: 1 TABLET ORAL at 17:07

## 2021-10-24 RX ADMIN — ENOXAPARIN SODIUM 40 MG: 40 INJECTION SUBCUTANEOUS at 09:55

## 2021-10-24 RX ADMIN — MICONAZOLE NITRATE: 20 CREAM TOPICAL at 20:17

## 2021-10-24 RX ADMIN — Medication 1 TABLET: at 09:55

## 2021-10-24 RX ADMIN — PANTOPRAZOLE SODIUM 40 MG: 40 TABLET, DELAYED RELEASE ORAL at 09:54

## 2021-10-24 RX ADMIN — BUMETANIDE 1 MG: 1 TABLET ORAL at 09:54

## 2021-10-24 RX ADMIN — PAROXETINE 40 MG: 20 TABLET, FILM COATED ORAL at 09:54

## 2021-10-24 RX ADMIN — ASPIRIN 81 MG CHEWABLE TABLET 81 MG: 81 TABLET CHEWABLE at 09:55

## 2021-10-24 RX ADMIN — MICONAZOLE NITRATE: 20 CREAM TOPICAL at 09:56

## 2021-10-24 RX ADMIN — ENOXAPARIN SODIUM 40 MG: 40 INJECTION SUBCUTANEOUS at 20:17

## 2021-10-24 ASSESSMENT — ACTIVITIES OF DAILY LIVING (ADL)
ADLS_ACUITY_SCORE: 14
ADLS_ACUITY_SCORE: 18
ADLS_ACUITY_SCORE: 16
ADLS_ACUITY_SCORE: 16
ADLS_ACUITY_SCORE: 18
ADLS_ACUITY_SCORE: 16
ADLS_ACUITY_SCORE: 16
ADLS_ACUITY_SCORE: 18
ADLS_ACUITY_SCORE: 16
ADLS_ACUITY_SCORE: 14
ADLS_ACUITY_SCORE: 18
ADLS_ACUITY_SCORE: 18
ADLS_ACUITY_SCORE: 16

## 2021-10-24 ASSESSMENT — MIFFLIN-ST. JEOR: SCORE: 2013.5

## 2021-10-24 NOTE — PLAN OF CARE
RN assumed cares: [phone]     Vitals: VSS on 2L NC w/ humidification   Neuro: AOx4; forgetful. Anxious, especially when HOB is lowered; PRN atarax available  Pain: Denies   Activity: Ax2 w/ ceiling lift. Able to stand from chair or bed for short intervals w/ GB & walker  Cardiac: Denies CP. Trace edema in extremities   Respiratory: SaO2 >90% on 2L NC. Has mild DE JESUS, occasional SOB, denies cough. Tachypneic w/ anxiety. LS dim. Wears CPAP at night Encourage frequent IS use.  GI/: Tolerating regular diet. Adequate urine output; frequently incontinent despite purewik in place.  BM x1 this shift.  Skin: Hematoma in LLQ, bruising over RLQ. Erythema w/ mild swelling on L ankle. Mepilexs changed over bilateral elbows, sacrum, R buttock & R heel. Redness on bilateral posterior thighs  LDAs: R PIV SL.   Labs: K replaced x2, recheck in AM     Events & Plan: Pt up in chair majority of shift, allows for micro shifting but refuses to recline fully in chair, allow HOB <30 degrees or for significant turns d/t anxiety & SOB while lying back. Call light within reach, able to make needs known. Will continue to monitor & follow POC

## 2021-10-24 NOTE — PROVIDER NOTIFICATION
[address]  Wanda RN [id number]  Pt PIV Bad and pt requesting to not have another placed- No IV meds active in MAR- Is it ok for pt to not have a PIV? Thanks    Abhinav TORRES text paged at [phone] via [employer].    Addendum: BRIAN PASTRANA called writer back and stated that pt should have PIV in place- Will place vascular order.

## 2021-10-24 NOTE — PLAN OF CARE
ASSUMED CARES: [id number].   STATUS:  Pt admitted 10/6 for Acute Resp failure with hypoxia. COVID+. Extubated 10/13. Fall 10/19. MSSA Pneumonia   NEURO: A/o x 4. +Anxious.   VS: VSS on 2L NC. CPAP at HS 40% FiO2.   ACTIVITY: A2 with lift. Pt frequently refusing repositioning- Allowed x 2 this shift- Put prefers to stay on back. Increased anxiety with lying flat.    PAIN: Denies pain.   CARDIAC: No C/o CP  RESP: +DE JESUS. Denies SOB.   GI/: +PurWik- Ofelia colored urine. Incont urine despite PurWik in place. Incont BM x 1.   DIET: Regular  SKIN: Multiple wounds- Coccyx mepilex. Bilat elbow mepilex. Bilat thigh mepilex. R shin reddened area. Multiple bruises- Bilat arms and abdomen.   LDA'S: L PIV SL- Replaced overnight.   LABS: K+ replaced on previous shift per report- recheck in AM.   CHANGES THIS SHIFT: New PIV place. Incont stool x 1.   POC: Cont with POC. Plan for discharge unit once safe disposition plan/ TCU bed available. Bed alarm on for pt safety. Call light within reach.

## 2021-10-24 NOTE — PROGRESS NOTES
Care Management Follow Up    Length of Stay (days): 18    Expected Discharge Date: 10/26/2021     Concerns to be Addressed: discharge planning     Patient plan of care discussed at interdisciplinary rounds: Yes    Anticipated Discharge Disposition:   TCU vs ATU  Anticipated Discharge Services:  TCU vs ARU  Anticipated Discharge DME: None    Patient/family educated on Medicare website which has current facility and service quality ratings:  NA- waiting for PM&R recs   Education Provided on the Discharge Plan:  yes  Patient/Family in Agreement with the Plan: yes    Referrals Placed by CM/SW:    [facility]  10/24: pt on the wait list, needs to be off precations from covid and no beds there right now    [facility]  P: [phone]    [facility]  P: [phone]    [facility]  P: [phone]    [facility]  P: [phone]    [facility]  P: [phone]    Private pay costs discussed: Not applicable    Additional Information:  PM&R recommending TCU. Needs more referrals.     SW spoke with pt and discussed the recommendation for short term rehab at TCU. Pt was apprehensive but agreed to referrals being made in the [place] area. Pt asked SW to make the referrals for her and opted not to look at a medicare list.    SW updated pt's Son. Sarath per her request. Sarath had no questions.       SRUTHI Brennan

## 2021-10-25 ENCOUNTER — APPOINTMENT (OUTPATIENT)
Dept: OCCUPATIONAL THERAPY | Facility: CLINIC | Age: 61
DRG: 207 | End: 2021-10-25
Attending: ANESTHESIOLOGY
Payer: MEDICARE

## 2021-10-25 ENCOUNTER — APPOINTMENT (OUTPATIENT)
Dept: PHYSICAL THERAPY | Facility: CLINIC | Age: 61
DRG: 207 | End: 2021-10-25
Attending: ANESTHESIOLOGY
Payer: MEDICARE

## 2021-10-25 PROCEDURE — 120N000002 HC R&B MED SURG/OB UMMC

## 2021-10-25 PROCEDURE — 250N000013 HC RX MED GY IP 250 OP 250 PS 637

## 2021-10-25 PROCEDURE — 94660 CPAP INITIATION&MGMT: CPT

## 2021-10-25 PROCEDURE — 250N000011 HC RX IP 250 OP 636: Performed by: HOSPITALIST

## 2021-10-25 PROCEDURE — 99232 SBSQ HOSP IP/OBS MODERATE 35: CPT | Performed by: HOSPITALIST

## 2021-10-25 PROCEDURE — 999N000157 HC STATISTIC RCP TIME EA 10 MIN

## 2021-10-25 PROCEDURE — 250N000013 HC RX MED GY IP 250 OP 250 PS 637: Performed by: STUDENT IN AN ORGANIZED HEALTH CARE EDUCATION/TRAINING PROGRAM

## 2021-10-25 PROCEDURE — 99233 SBSQ HOSP IP/OBS HIGH 50: CPT | Mod: GC | Performed by: PHYSICAL MEDICINE & REHABILITATION

## 2021-10-25 PROCEDURE — 99356 PR PROLONGED SERV,INPATIENT,1ST HR: CPT | Mod: GC | Performed by: PHYSICAL MEDICINE & REHABILITATION

## 2021-10-25 PROCEDURE — 97535 SELF CARE MNGMENT TRAINING: CPT | Mod: GO

## 2021-10-25 PROCEDURE — 97530 THERAPEUTIC ACTIVITIES: CPT | Mod: GP

## 2021-10-25 PROCEDURE — 250N000013 HC RX MED GY IP 250 OP 250 PS 637: Performed by: INTERNAL MEDICINE

## 2021-10-25 PROCEDURE — 97110 THERAPEUTIC EXERCISES: CPT | Mod: GP

## 2021-10-25 RX ADMIN — PANTOPRAZOLE SODIUM 40 MG: 40 TABLET, DELAYED RELEASE ORAL at 09:34

## 2021-10-25 RX ADMIN — ASPIRIN 81 MG CHEWABLE TABLET 81 MG: 81 TABLET CHEWABLE at 09:34

## 2021-10-25 RX ADMIN — ATORVASTATIN CALCIUM 40 MG: 40 TABLET, FILM COATED ORAL at 09:34

## 2021-10-25 RX ADMIN — Medication 1 TABLET: at 09:35

## 2021-10-25 RX ADMIN — BUMETANIDE 1 MG: 1 TABLET ORAL at 18:00

## 2021-10-25 RX ADMIN — ENOXAPARIN SODIUM 40 MG: 40 INJECTION SUBCUTANEOUS at 21:47

## 2021-10-25 RX ADMIN — PAROXETINE 40 MG: 20 TABLET, FILM COATED ORAL at 09:34

## 2021-10-25 RX ADMIN — DOCUSATE SODIUM 50 MG AND SENNOSIDES 8.6 MG 1 TABLET: 8.6; 5 TABLET, FILM COATED ORAL at 21:47

## 2021-10-25 RX ADMIN — Medication 3 MG: at 21:46

## 2021-10-25 RX ADMIN — BUMETANIDE 1 MG: 1 TABLET ORAL at 09:34

## 2021-10-25 RX ADMIN — MICONAZOLE NITRATE: 20 CREAM TOPICAL at 09:37

## 2021-10-25 RX ADMIN — ENOXAPARIN SODIUM 40 MG: 40 INJECTION SUBCUTANEOUS at 09:34

## 2021-10-25 ASSESSMENT — ACTIVITIES OF DAILY LIVING (ADL)
ADLS_ACUITY_SCORE: 14
ADLS_ACUITY_SCORE: 16
ADLS_ACUITY_SCORE: 16
ADLS_ACUITY_SCORE: 14
ADLS_ACUITY_SCORE: 16
ADLS_ACUITY_SCORE: 16
ADLS_ACUITY_SCORE: 14
ADLS_ACUITY_SCORE: 16
ADLS_ACUITY_SCORE: 14
ADLS_ACUITY_SCORE: 16
ADLS_ACUITY_SCORE: 14
ADLS_ACUITY_SCORE: 14
ADLS_ACUITY_SCORE: 16
ADLS_ACUITY_SCORE: 16
ADLS_ACUITY_SCORE: 14
ADLS_ACUITY_SCORE: 16
ADLS_ACUITY_SCORE: 14
ADLS_ACUITY_SCORE: 16

## 2021-10-25 ASSESSMENT — MIFFLIN-ST. JEOR: SCORE: 1883.5

## 2021-10-25 NOTE — PLAN OF CARE
RN assumed cares: [id number]     Vitals: VSS on 1.5L NC w/ humidification   Neuro: AOx4; forgetful. Anxious at times  Pain: Denies   Activity: Ax2 w/GB & walker to stand from chair & pivot to commode. Ceiling lift for transfers from bed to chair  Cardiac: Denies CP. Trace edema in extremities   Respiratory: Weaned to 1.5L NC; SaO2 >90%. Mild DE JESUS, denies cough or SOB. LS dim. Wears CPAP at night. Endorses frequent IS use.  GI/: Tolerating regular diet. Adequate urine output; purewik in place, incontinent at times. Continent of BM x1 this shift in commode   Skin: Hematoma in LLQ, bruising over RLQ. Erythema w/ mild swelling on L ankle. Mepilexs over bilateral elbows, sacrum, R buttock & R heel; CDI. Redness on bilateral posterior thighs  LDAs: L PIV SL.      Events & Plan: Pt up in chair majority of shift, allows for micro shifting but refuses to get in bed during the day; stands well from chair to offload bottom pressure w/ assistance. Able to transfer to commode from chair multiple times & is improving w/ continence. Still requires ceiling lift to transfer from bed to chair as pt's bariatric bed w/ specialty mattress are too high for her to pivot to. Call light within reach, able to make needs known. Will continue to monitor & follow POC

## 2021-10-25 NOTE — PLAN OF CARE
RN assumed cares: [phone]     Vitals: VSS on 1 L NC w/ humidification   Neuro: AOx4; forgetful. Anxious at times  Pain: Denies   Activity: Ax2 w/ GB & walker to stand from chair & pivot to commode. Ceiling lift for transfers from chair to bed  Cardiac: Denies CP. Trace edema in extremities   Respiratory: SaO2 >90% on 1L NC, sometimes requires 2-3L NC w/ activity. Mild DE JESUS, denies cough or SOB. LS dim. Wears CPAP at night. Uses IS frequently   GI/: Tolerating regular diet. Adequate urine output, BM x3. Using commode today  Skin: Hematoma in LLQ, bruising over RLQ. Erythema w/ mild swelling on L ankle. Mepilexs over bilateral elbows, sacrum, R buttock & R heel; CDI. Redness on bilateral posterior thighs from sitting on chair, mepilex's placed for protection.  LDAs: L PIV SL.      Events & Plan:  Worked w/ OT in AM, PT in afternoon. Transferred from bed to chair but still requires ceiling lift for chair to bed as bed is too high for pt to pivot to. Improved continence with out purewik in place. Posterior thighs reddened d/t remaining in chair for majority of shift as she does not tolerate the bed well & refuses to be in it during the day. Call light within reach, able to make needs known. Will continue to monitor & follow POC

## 2021-10-25 NOTE — PROGRESS NOTES
[facility]    Progress Note - Gold 9 Service        Date of Admission:  10/6/2021    Assessment & Plan           Chika Ferguson is a 61 year old female with history of CAD s/p PCI to mLAD (2016), HFpEF, HLD, suspected COPD, CARL on CPAP, morbid obesity, and anxiety who was admitted to ICU with acute hypoxic and hypercapnic respiratory failure requiring intubation 2/2 to COVID and MSSA pneumonia leading to ARDS. She completed a course of antibiotics for MSSA and received Dexamethasone, Tocilizumab, and Remdesivir x1 for COVID. She has also been aggressively diuresed given her history of CARL/HFpEF with concern for possible underlying pulmonary HTN for which Cardiology had been following early on in her hospitalization. She was extubated on 10/13 and transferred to hospital medicine for further cares.      # Acute hypoxic-hypercapnic respiratory failure, multifactorial  # CAP 2/2 MSSA pneumonia  # COVID-19 pneumonia  # Hx of Pulm HTN (see below)  # CARL on CPAP  Hypoxic on admission to ICU on 10/6 w O2 sats in 50's. VBG with pH 7.05 and pCO2 81.  Intubated in ED. Bibasilar opacities noted on CXR. WBC 19.6. Lactate 8.4. COVID PCR negative. Pro-BNP elevated. Given relatively clear lung fields on imaging, initial impression CHF vs possible pneumonia. Treated with diuretics and antibiotics.  CTA Chest on at [facility] negative for PE, again showed evidence of atelectasis. Sputum cultures grew MSSA. Repeat COVID PCR + 10/6. Severe RV dysfunction and dilated IVC noted on TTE with preserved EF. Continued treatment for CAP, COVID-19, and CHF. CRP and BNP down-trending. Compensated respiratory acidosis on repeat blood gas. Extubated 10/13. Reduced oxygen needs on 10/21 to NC 2 lpm with CPAP use of 40% FiO2 at night. Baseline use of 2 lpm with CPAP at home, but no NC use during day.  - Completed treatment for MSSA PNA with CTX x5 days w/ Azithromycin x3 days   - Continues to require  oxygen  - Continue pulmonary toilet efforts, OOB as tolerates  - Continue diuresis (Bumex 1 mg PO BID)  - Completed treatment of COVID-19 as below  - Continue CPAP at bedtime, was using CPAP ATC PTA     # Leukocytosis- improving  # Intramuscular hematoma  WBC increased on 10/14, now elevated but stable. Infectious etiologies including HAP, UTI considered but negative. Considered C. Diff, but loose stools improved. On 10/17, firm tenderness noted over her right lower abdomen with tenderness to palpation noted - suspect this represents an intramuscular hematoma likely from Lovenox administration. Additional indurated area noted on left lower shin. US confirming abscess vs hematoma suspect most likely hematoma as there is no surface trauma to indicate abscess. We will continue to monitor.  - CBC daily  - Pain control for hematoma  - Lovenox 40 mg BID dose now that pt out of ICU as below     # Positive COVID-19 PCR.    History of COVID infection in 12/2020, did not require hospitalization. Completed vaccination series 5/2021. +Exposure recently. COVID PCR negative at OSH but repeat PCR positive on admission. DDx includes reinfection vs persistent positive PCR from prior infection. CT w/o diffuse GGO to suggest COVID pneumonia. S/p Remdesivir and Tocilizumab x 1 on 10/7, as well as steroids (10 days on 10/17). Continues to remain in respiratory isolation for a total of 20 days from positive test due to classification as critically ill, but immunocompetent.  - DVT prophylaxis with subcutaneous Lovenox BID 40 mg (weight based)   - Continue contact/respiratory precautions      # Acute on chronic HFpEF and RV failure  # Pulmonary HTN, probable Group III  Prior history of HFpEF and pulmonary HTN. On Bumex at home. NTproBNP 2264 on admission. TTE (10/6) LVEF 55-60%, mild LVH, indeterminate diastolic function, moderate to severe RV dilation w moderate to severely reduced RV function, and dilated IVC. Pulmonary pressures could  not be assessed on TTE. CTA Chest negative for PE but showed severely dilated pulmonary trunk c/w pulmonary HTN. Cardiology consulted. Suspect group III pulmonary HTN, although CTEPH cannot be ruled out. RV failure likely chronic given RV dilation on imaging. Clinically improved w IV Bumex. Weight down ~45 lbs since admission but remains essentially unchanged since 10/14, though she continues to diurese without signs of contraction alkalosis.  - Cardiology consulted, appreciate recommendations              - V/Q scan attempted 10/14, but pt unable to tolerate lying flat                          -Not likely a candidate for pulmonary HTN therapies              - RHC to complete in outpatient setting              - Schedule follow up with pulmonary HTN team (Dr. Hughes) in 2-4 weeks (referral placed in discharge orders)  - Switch to PO Bumex to 1 mg BID (tired to increase Bumex w/resultant Cr elevation on 10/17)  - Daily weights, I&O's  - BMP daily     # Fall  Fell 10/19 AM after attempting to get up to the bathroom. Denies hitting her head or pain throughout. Neurologically intact in the AM. We will continue to monitor.   - Encourage call light use  - Fall precautions      # CAD:  Hx PCI w stenting of mLAD in 2016. Mild troponin elevation on admission felt to be demand ischemia. TTE with normal wall motion. Cardiology consulted, ACS not suspected.   - Continue ASA + statin  - BB (carvedilol 3.125 mg BID) on hold d/t borderline low/nl BPs, will resume as BP tolerates     # Chronic anemia  Hgb as low as 8.6 during hospitalization likely in the setting of infection. Improving to ~11. With resent hematoma, will continue to monitor hgb.  - CBC in AM     # Malnutrition risk.    - SLP consulted (10/14)- regular diet   - Calorie counts started on 10/16     # GERD  Continue protonix.      # Pressure ulcers, POA.    Essentially chair-bound. Gets up weekly to ambulate. Increased weakness lately.  - WOCN consulted   - PT/OT  consulted, likely will need TCU/ARU at discharge     # TIMMY - resolved:  Cr 1.68 on admission. Baseline unknown. UA bland. Renal US negative for hydro. Likely prerenal azotemia in setting of acute CHF.     # Steroid-induced hyperglycemia -resolved with discontinuation of steriods. No history of DM. A1c 6.7 on admission.      # Hypernatremia - resolved with PO intake  # Hypokalemia - resolved with PO intake       Diet: Regular Diet Adult  Calorie Counts  Snacks/Supplements Adult: Ensure Enlive; Between Meals    DVT Prophylaxis: Enoxaparin (Lovenox) SQ  Central Lines: None  Code Status: Full Code         Disposition Plan   Expected discharge: 10/28/2021   recommended to transitional care unit once safe disposition plan/ TCU bed available.     Jean-Paul Wu MD  [facility]      ______________________________________________________________________    Interval History   No complaints.    4 point ROS is negative unless noted above.    Data reviewed today: I reviewed all medications, new labs and imaging results over the last 24 hours. I personally reviewed no images or EKG's today.    Physical Exam   Vital Signs: Temp: 98.1  F (36.7  C) Temp src: Oral BP: 139/55 Pulse: 88   Resp: 20 SpO2: 94 % O2 Device: Nasal cannula Oxygen Delivery: 1.5 LPM  Weight: 310 lbs 13.58 oz     Physical Exam   Constitutional:  NAD  HEENT: EOMI  Neck: Symmetric  Cardiovascular: regular without murmurs or gallops  Pulmonary/Chest:  Scant bibasilar crackles. No respiratory distress.  GI: Soft, non-tender , non-distended    Musculoskeletal:  Trace bilateral lower extremity edema, flattening L shin hematoma marked within boundaries  Skin: Pannus hematoma w/ superficial discoloration and evolving changes improving  Neurological: Alert and oriented   Psychiatric:  euthymic      Data   Recent Labs   Lab 10/24/21  0734 10/23/21  1610 10/23/21  0716 10/22/21  0628 10/22/21  0628 10/21/21  0742 10/21/21  0742   WBC  --   --  7.4  --  11.2*  --  10.9    HGB  --   --  9.5*  --  9.5*  --  10.5*   MCV  --   --  90  --  89  --  91   PLT  --   --  295  --  304  --  304   NA  --   --  136  --  136  --  140   POTASSIUM 3.7 3.7 3.4   < > 3.6   < > 3.6   CHLORIDE  --   --  94  --  93*  --  97   CO2  --   --  39*  --  40*  --  42*   BUN  --   --  31*  --  36*  --  36*   CR  --   --  0.90  --  1.01  --  0.97   ANIONGAP  --   --  3  --  3  --  1*   NAE  --   --  9.2  --  9.2  --  9.5   GLC  --   --  109*  --  109*  --  117*    < > = values in this interval not displayed.     No results found for this or any previous visit (from the past 24 hour(s)).  Medications     dextrose         aspirin  81 mg Oral Daily     atorvastatin  40 mg Oral Daily     bumetanide  1 mg Oral BID     enoxaparin ANTICOAGULANT  40 mg Subcutaneous Q12H     melatonin  3 mg Oral or Feeding Tube QPM     miconazole with skin protectant   Topical BID     multivitamin w/minerals  1 tablet Oral Daily     pantoprazole  40 mg Oral QAM AC     PARoxetine  40 mg Oral Daily     polyethylene glycol  17 g Oral or Feeding Tube Daily     senna-docusate  1 tablet Oral or Feeding Tube QPM

## 2021-10-25 NOTE — PROVIDER NOTIFICATION
Per the Pressure Injury Prevention Patient Refusal Algorithm, Providers are to be notified when patients are refusing intervention to prevent PIs.     Pt has continuously refused:   - Q2h repositioning      Please help to encourage pressure injury prevention measures

## 2021-10-25 NOTE — PROGRESS NOTES
[facility]    Progress Note - Gold 9 Service        Date of Admission:  10/6/2021    Assessment & Plan           Chika Ferguson is a 61 year old female with history of CAD s/p PCI to mLAD (2016), HFpEF, HLD, suspected COPD, CARL on CPAP, morbid obesity, and anxiety who was admitted to ICU with acute hypoxic and hypercapnic respiratory failure requiring intubation 2/2 to COVID and MSSA pneumonia leading to ARDS. She completed a course of antibiotics for MSSA and received Dexamethasone, Tocilizumab, and Remdesivir x1 for COVID. She has also been aggressively diuresed given her history of CARL/HFpEF with concern for possible underlying pulmonary HTN for which Cardiology had been following early on in her hospitalization. She was extubated on 10/13 and transferred to hospital medicine for further cares.      # Acute hypoxic-hypercapnic respiratory failure, multifactorial  # CAP 2/2 MSSA pneumonia  # COVID-19 pneumonia  # Hx of Pulm HTN (see below)  # CARL on CPAP  Hypoxic on admission to ICU on 10/6 w O2 sats in 50's. VBG with pH 7.05 and pCO2 81.  Intubated in ED. Bibasilar opacities noted on CXR. WBC 19.6. Lactate 8.4. COVID PCR negative. Pro-BNP elevated. Given relatively clear lung fields on imaging, initial impression CHF vs possible pneumonia. Treated with diuretics and antibiotics.  CTA Chest on at [facility] negative for PE, again showed evidence of atelectasis. Sputum cultures grew MSSA. Repeat COVID PCR + 10/6. Severe RV dysfunction and dilated IVC noted on TTE with preserved EF. Continued treatment for CAP, COVID-19, and CHF. CRP and BNP down-trending. Compensated respiratory acidosis on repeat blood gas. Extubated 10/13. Reduced oxygen needs on 10/21 to NC 2 lpm with CPAP use of 40% FiO2 at night. Baseline use of 2 lpm with CPAP at home, but no NC use during day.  - Completed treatment for MSSA PNA with CTX x5 days w/ Azithromycin x3 days   - Continues to require  oxygen  - Continue pulmonary toilet efforts, OOB as tolerates  - Continue diuresis (Bumex 1 mg PO BID)  - Completed treatment of COVID-19 as below  - Continue CPAP at bedtime, was using CPAP ATC PTA     # Leukocytosis- improving  # Intramuscular hematoma  WBC increased on 10/14, now elevated but stable. Infectious etiologies including HAP, UTI considered but negative. Considered C. Diff, but loose stools improved. On 10/17, firm tenderness noted over her right lower abdomen with tenderness to palpation noted - suspect this represents an intramuscular hematoma likely from Lovenox administration. Additional indurated area noted on left lower shin. US confirming abscess vs hematoma suspect most likely hematoma as there is no surface trauma to indicate abscess. We will continue to monitor.  - CBC daily  - Pain control for hematoma  - Lovenox 40 mg BID dose now that pt out of ICU as below     # Positive COVID-19 PCR.    History of COVID infection in 12/2020, did not require hospitalization. Completed vaccination series 5/2021. +Exposure recently. COVID PCR negative at OSH but repeat PCR positive on admission. DDx includes reinfection vs persistent positive PCR from prior infection. CT w/o diffuse GGO to suggest COVID pneumonia. S/p Remdesivir and Tocilizumab x 1 on 10/7, as well as steroids (10 days on 10/17). Continues to remain in respiratory isolation for a total of 20 days from positive test due to classification as critically ill, but immunocompetent.  - DVT prophylaxis with subcutaneous Lovenox BID 40 mg (weight based)   - Continue contact/respiratory precautions      # Acute on chronic HFpEF and RV failure  # Pulmonary HTN, probable Group III  Prior history of HFpEF and pulmonary HTN. On Bumex at home. NTproBNP 2264 on admission. TTE (10/6) LVEF 55-60%, mild LVH, indeterminate diastolic function, moderate to severe RV dilation w moderate to severely reduced RV function, and dilated IVC. Pulmonary pressures could  not be assessed on TTE. CTA Chest negative for PE but showed severely dilated pulmonary trunk c/w pulmonary HTN. Cardiology consulted. Suspect group III pulmonary HTN, although CTEPH cannot be ruled out. RV failure likely chronic given RV dilation on imaging. Clinically improved w IV Bumex. Weight down ~45 lbs since admission but remains essentially unchanged since 10/14, though she continues to diurese without signs of contraction alkalosis.  - Cardiology consulted, appreciate recommendations              - V/Q scan attempted 10/14, but pt unable to tolerate lying flat                          -Not likely a candidate for pulmonary HTN therapies              - RHC to complete in outpatient setting              - Schedule follow up with pulmonary HTN team (Dr. Hughes) in 2-4 weeks (referral placed in discharge orders)  - Switch to PO Bumex to 1 mg BID (tired to increase Bumex w/resultant Cr elevation on 10/17)  - Daily weights, I&O's  - BMP daily     # Fall  Fell 10/19 AM after attempting to get up to the bathroom. Denies hitting her head or pain throughout. Neurologically intact in the AM. We will continue to monitor.   - Encourage call light use  - Fall precautions      # CAD:  Hx PCI w stenting of mLAD in 2016. Mild troponin elevation on admission felt to be demand ischemia. TTE with normal wall motion. Cardiology consulted, ACS not suspected.   - Continue ASA + statin  - BB (carvedilol 3.125 mg BID) on hold d/t borderline low/nl BPs, will resume as BP tolerates     # Chronic anemia  Hgb as low as 8.6 during hospitalization likely in the setting of infection. Improving to ~11. With resent hematoma, will continue to monitor hgb.  - CBC in AM     # Malnutrition risk.    - SLP consulted (10/14)- regular diet   - Calorie counts started on 10/16     # GERD  Continue protonix.      # Pressure ulcers, POA.    Essentially chair-bound. Gets up weekly to ambulate. Increased weakness lately.  - WOCN consulted   - PT/OT  consulted, likely will need TCU/ARU at discharge     # TIMMY - resolved:  Cr 1.68 on admission. Baseline unknown. UA bland. Renal US negative for hydro. Likely prerenal azotemia in setting of acute CHF.     # Steroid-induced hyperglycemia -resolved with discontinuation of steriods. No history of DM. A1c 6.7 on admission.      # Hypernatremia - resolved with PO intake  # Hypokalemia - resolved with PO intake       Diet: Regular Diet Adult  Calorie Counts  Snacks/Supplements Adult: Ensure Enlive; Between Meals    DVT Prophylaxis: Enoxaparin (Lovenox) SQ  Central Lines: None  Code Status: Full Code         Disposition Plan   Expected discharge: 10/28/2021   recommended to transitional care unit once safe disposition plan/ TCU bed available.     Jean-Paul Wu MD  [facility]      ______________________________________________________________________    Interval History   No complaints.    4 point ROS is negative unless noted above.    Data reviewed today: I reviewed all medications, new labs and imaging results over the last 24 hours. I personally reviewed no images or EKG's today.    Physical Exam   Vital Signs: Temp: 98.1  F (36.7  C) Temp src: Oral BP: 139/55 Pulse: 88   Resp: 20 SpO2: 94 % O2 Device: Nasal cannula Oxygen Delivery: 1.5 LPM  Weight: 310 lbs 13.58 oz     Physical Exam   Constitutional:  NAD  HEENT: EOMI, NC in place 1.5L  Neck: Symmetric  Cardiovascular: regular without murmurs or gallops  Pulmonary/Chest:  right rales. No respiratory distress.  GI: Soft, non-tender , non-distended    Musculoskeletal:  Trace bilateral lower extremity edema, evolving L shin hematoma marked within boundaries  Skin: Pannus hematoma w/ superficial discoloration and evolving changes  Neurological: Alert and oriented   Psychiatric:  euthymic      Data   Recent Labs   Lab 10/24/21  0734 10/23/21  1610 10/23/21  0716 10/22/21  0628 10/22/21  0628 10/21/21  0742 10/21/21  0742   WBC  --   --  7.4  --  11.2*  --  10.9   HGB  --    --  9.5*  --  9.5*  --  10.5*   MCV  --   --  90  --  89  --  91   PLT  --   --  295  --  304  --  304   NA  --   --  136  --  136  --  140   POTASSIUM 3.7 3.7 3.4   < > 3.6   < > 3.6   CHLORIDE  --   --  94  --  93*  --  97   CO2  --   --  39*  --  40*  --  42*   BUN  --   --  31*  --  36*  --  36*   CR  --   --  0.90  --  1.01  --  0.97   ANIONGAP  --   --  3  --  3  --  1*   NAE  --   --  9.2  --  9.2  --  9.5   GLC  --   --  109*  --  109*  --  117*    < > = values in this interval not displayed.     No results found for this or any previous visit (from the past 24 hour(s)).  Medications     dextrose         aspirin  81 mg Oral Daily     atorvastatin  40 mg Oral Daily     bumetanide  1 mg Oral BID     enoxaparin ANTICOAGULANT  40 mg Subcutaneous Q12H     melatonin  3 mg Oral or Feeding Tube QPM     miconazole with skin protectant   Topical BID     multivitamin w/minerals  1 tablet Oral Daily     pantoprazole  40 mg Oral QAM AC     PARoxetine  40 mg Oral Daily     polyethylene glycol  17 g Oral or Feeding Tube Daily     senna-docusate  1 tablet Oral or Feeding Tube QPM

## 2021-10-25 NOTE — PROGRESS NOTES
Care Management Follow Up    Length of Stay (days): 19    Expected Discharge Date: 10/28/2021     Concerns to be Addressed: discharge planning     Patient plan of care discussed at interdisciplinary rounds: Yes    Anticipated Discharge Disposition: Transitional Care Care     Anticipated Discharge Services:  TCU  Anticipated Discharge DME: None    Patient/family educated on Medicare website which has current facility and service quality ratings:  Per 10/24 note - pt declined list.  Education Provided on the Discharge Plan:  yes  Patient/Family in Agreement with the Plan: yes    Referrals Placed by CM/SW:    - [facility] *[id number]  Discussed with admissions, will review.    - [facility]  Ph: [phone]  VM left with admissions    - [facility]   [phone]   VM left with admissions    - [facility]  Ph: [phone]   VM left with admissions    - [facility]   Ph: [phone]   VM left with admissions     - [facility]  [phone]  VM left with admissions     Private pay costs discussed: Not applicable    Additional Information:  SW to continue to follow for discharge planning needs.    Addend 1420: Per [facility]/ARU admissions, PM&R consult was placed on 10/22. Per admissions, they have paged PM&R physician to follow up on consult, as pt may be appropriate for ARU.     Deya Toribio, TALYA, LGSW  6D Adult Acute Care SW - assisting 5B ANGY  Ph:[phone]  Pager [phone]

## 2021-10-25 NOTE — PROGRESS NOTES
CLINICAL NUTRITION SERVICES - REASSESSMENT NOTE     Nutrition Prescription    RECOMMENDATIONS FOR MDs/PROVIDERS TO ORDER:  None at this time     Malnutrition Status:    Moderate malnutrition in the context of acute illness    Recommendations already ordered by Registered Dietitian (RD):  None at this time     Future/Additional Recommendations:  -- monitor oral intake of meals and supplements   -- If TF warranted recommend:   Vital High Protein @ goal of  60ml/hr  (1440ml/day)  will provide: 1440 kcals, 125 g PRO, 1203 ml free H20, 159 g CHO, and 0 g fiber daily.  -Start at 10 mL/hr and advance by 10 mL q 6-8 hours   -Water flush 60 mL q 4 hours      EVALUATION OF THE PROGRESS TOWARD GOALS   Diet: Regular with ensure enlive twice daily  Intake: per nursing documentation po intake ~ 100% of meals recently. Chika was not available when this writer tried to call her for an update on her appetite/intake.        NEW FINDINGS   Labs: reviewed     Meds:   [id number]-vit-m    GI: LBM (10/24) x 4     Skin: WOC nurse note (10/19): Buttock and pannus wounds due to Incontinence Associated Dermatitis (IAD), Intertrigo and Moisture Associated Skin Damage (MASD) with fungal involvement   Status: improving, noted small pimple appearing open area to R fleshy buttock on assessment today    - R plantar heel wound due to pressure injury, present on admission  Pressure injury is stage 2   Status: stable  - R posterior heel with pressure injury, hospital acquired, Stage Deep tissue pressure injury  Status: stable    Weight: weight decreased from admission weight. Admission weight 162.2 kg (10/6). Now 141 kg (10/24). Continue current dosing weight of 64 kg (ideal body weight) currently weight loss > 5% in one month.     MALNUTRITION  % Intake: Unable to assess  % Weight Loss: > 5% in 1 month (severe) - ongoing   Subcutaneous Fat Loss: None observed  Muscle Loss: Unable to assess  Fluid Accumulation/Edema: Mild  Malnutrition Diagnosis: Moderate  malnutrition in the context of acute illness    Previous Goals   Patient to consume % of nutritionally adequate meal trays TID, or the equivalent with supplements/snacks.  Evaluation: Met - per nursing documentation     Previous Nutrition Diagnosis  Inadequate oral intake  Evaluation: Improving    CURRENT NUTRITION DIAGNOSIS  Predicted inadequate intake (calories/protein) related to previous decreased appetite/intake, prolonged hospital stay      INTERVENTIONS  Implementation  Continue current diet and supplements     Goals  Patient to consume % of nutritionally adequate meal trays TID, or the equivalent with supplements/snacks.    Monitoring/Evaluation  Progress toward goals will be monitored and evaluated per protocol.    Lisbeth Daniel, MS/RD/LD/CNSC  5A/5B RD Pager: [phone]

## 2021-10-25 NOTE — PLAN OF CARE
ASSUMED CARES: [id number].   STATUS:  Pt admitted 10/6 for Acute Resp failure with hypoxia. COVID+. Extubated 10/13. Fall 10/19. MSSA Pneumonia   NEURO: A/o x 4. +Anxious.   VS: VSS on 1.5L NC. CPAP at HS 40% FiO2.   ACTIVITY: A2 with lift. Pt frequently refusing repositioning + Weight shifting. Pt able to turn in bed with A1.  Increased anxiety with lying flat.    PAIN: Denies pain.   CARDIAC: No C/o CP  RESP: +DE JESUS. Denies SOB.   GI/: +PurWik- Ofelia colored urine. No Bm this shift.   DIET: Regular  SKIN: Multiple wounds- Coccyx mepilex. Bilat elbow mepilex. Bilat thigh mepilex. R shin reddened area. Multiple bruises- Bilat arms and abdomen.   LDA'S: L PIV SL.   LABS: Am labs due to be drawn.   CHANGES THIS SHIFT: No changes noted overnight.   POC: Cont with POC. Plan for discharge unit once safe disposition plan/ TCU bed available. Bed alarm on for pt safety. Call light within reach.

## 2021-10-25 NOTE — PROGRESS NOTES
[facility]   PM&R Progress Note         Assessment/Recommendations     Ms. Chika Ferguson is a Right handed 61 year old yo female who presents with severe deconditioning/debility in the setting of COVID19 infection leading to ARDS requiring intubation from 10/6-10/13    Previously independent but very sedentary, she is now requiring assist of 2 progressing from Max to mod for sit to stand transfers.     Chika reports that she was sedentary prior to this admit.  She has excellent support in her son and his significant other as well as a grandchild.  Prior to this admit she was ambulating without an assistive device, was independent with her basic ADLs.    At present she reports that she is below her baseline significantly.    Her skilled nursing needs include right posterior heel pressure ulcer which is hospital-acquired and stable.  She is requiring the needs of the wound nurse for its management.    Assessment and plan of care  Chika is a 61-year-old female with history of morbid obesity with a BMI of 42.9, history of coronary artery disease, heart failure with preserved ejection fraction, hyperlipidemia COPD obstructive sleep apnea on CPAP at baseline and generalized anxiety disorder who presents with critical illness myopathy secondary to an admission for hypercapnic respiratory failure requiring intubation secondary to Covid 19 ARDS complicated by secondary infection with MSSA.  Given the history of heart failure and underlying pulmonary hypertension she is being closely monitored and diuresed aggressively with close cardiology follow-up.    I had a long discussion with the patient today as she has shown significant improvement compared to her presentation in the last encounter.  In therapies she is requiring assist of 1 for transfers from bed to chair and needing assistance with her basic ADLs and grooming and hygiene.    Given her medical complexity, I would highly recommend  "an acute rehab unit placement for medical follow-up, and intense rehabitation in the setting of morbid obesity.  Patient was counseled on her rehabitation needs and close supervision for medical complexity.  She is agreeable to the plan of care.    Thank you for consulting the PM&R Department.   For any questions, please feel free to page me at [phone]   Thank you for allowing us to participate in the care of this patient  Total of 65 minutes spent in this encounter of which more than 50% was in counseling and coordination    Cinthya Escoto MD   Department of PM&R          Interval history:   Chika Ferguson was seen and examined at bedside.     Functionally,   Ax2 w/ GB & walker to stand from chair & pivot to commode. Ceiling lift for transfers from chair to bed.    Worked w/ OT in AM, PT in afternoon. Transferred from bed to chair but still requires ceiling lift for chair to bed as bed is too high for pt to pivot to.     Improved continence with out purewik in place    Medically,   She is feeling better. She is being weaned off oxygen and is using NC. She uses CPAP at night.    She did have a fall since we last examined her, but she did not hit her head and remained neurologically in tact.     BB is on hold due to low/normal blood pressure.No acute events overnight.       Medications:  No current outpatient medications on file.              Physical Exam:   /55 (BP Location: Right arm)   Pulse 88   Temp 98.1  F (36.7  C) (Oral)   Resp 20   Ht 1.727 m (5' 8\")   Wt 141 kg (310 lb 13.6 oz)   SpO2 94%   BMI 47.26 kg/m    Gen: NAD, laying in recliner  HEENT: EOMI  Cardio: Acyanotic  Pulm: breathing comfortably  Neuro:   Alert and oriented x 3  Follows commands  CN: WNL  Strength: At least antigravity in BL upper and lower extremities.    Labs:  Lab Results   Component Value Date    WBC 7.4 10/23/2021    HGB 9.5 (L) 10/23/2021    HCT 32.8 (L) 10/23/2021    MCV 90 10/23/2021     10/23/2021 "     Lab Results   Component Value Date     10/23/2021    POTASSIUM 3.7 10/24/2021    CHLORIDE 94 10/23/2021    CO2 39 (H) 10/23/2021     (H) 10/23/2021     Lab Results   Component Value Date    GFRESTIMATED 68 10/23/2021     Lab Results   Component Value Date    AST 31 10/08/2021    ALT 40 10/08/2021    ALKPHOS 94 10/08/2021    BILITOTAL 0.3 10/08/2021     Lab Results   Component Value Date    INR 1.25 (H) 10/07/2021     Lab Results   Component Value Date    BUN 31 (H) 10/23/2021    CR 0.90 10/23/2021       The patient's assessment and plan was discussed with my attending physician Dr. Tigist Calderon, DO  PGY-4 PM&R Resident    I Dr Escoto saw and examined the patient with the resident, agree with the note above, the plan of care was written by me after coordination.

## 2021-10-26 ENCOUNTER — APPOINTMENT (OUTPATIENT)
Dept: PHYSICAL THERAPY | Facility: CLINIC | Age: 61
DRG: 207 | End: 2021-10-26
Attending: ANESTHESIOLOGY
Payer: MEDICARE

## 2021-10-26 ENCOUNTER — APPOINTMENT (OUTPATIENT)
Dept: OCCUPATIONAL THERAPY | Facility: CLINIC | Age: 61
DRG: 207 | End: 2021-10-26
Attending: ANESTHESIOLOGY
Payer: MEDICARE

## 2021-10-26 LAB
ANION GAP SERPL CALCULATED.3IONS-SCNC: 4 MMOL/L (ref 3–14)
BUN SERPL-MCNC: 24 MG/DL (ref 7–30)
CALCIUM SERPL-MCNC: 9 MG/DL (ref 8.5–10.1)
CHLORIDE BLD-SCNC: 94 MMOL/L (ref 94–109)
CO2 SERPL-SCNC: 39 MMOL/L (ref 20–32)
CREAT SERPL-MCNC: 0.88 MG/DL (ref 0.52–1.04)
ERYTHROCYTE [DISTWIDTH] IN BLOOD BY AUTOMATED COUNT: 19.3 % (ref 10–15)
GFR SERPL CREATININE-BSD FRML MDRD: 70 ML/MIN/1.73M2
GLUCOSE BLD-MCNC: 126 MG/DL (ref 70–99)
HCT VFR BLD AUTO: 29.8 % (ref 35–47)
HGB BLD-MCNC: 8.8 G/DL (ref 11.7–15.7)
MCH RBC QN AUTO: 26.4 PG (ref 26.5–33)
MCHC RBC AUTO-ENTMCNC: 29.5 G/DL (ref 31.5–36.5)
MCV RBC AUTO: 90 FL (ref 78–100)
PLATELET # BLD AUTO: 288 10E3/UL (ref 150–450)
POTASSIUM BLD-SCNC: 3.5 MMOL/L (ref 3.4–5.3)
RBC # BLD AUTO: 3.33 10E6/UL (ref 3.8–5.2)
SODIUM SERPL-SCNC: 137 MMOL/L (ref 133–144)
WBC # BLD AUTO: 6.9 10E3/UL (ref 4–11)

## 2021-10-26 PROCEDURE — 250N000011 HC RX IP 250 OP 636: Performed by: HOSPITALIST

## 2021-10-26 PROCEDURE — 97116 GAIT TRAINING THERAPY: CPT | Mod: GP

## 2021-10-26 PROCEDURE — 97530 THERAPEUTIC ACTIVITIES: CPT | Mod: GP

## 2021-10-26 PROCEDURE — 250N000013 HC RX MED GY IP 250 OP 250 PS 637: Performed by: STUDENT IN AN ORGANIZED HEALTH CARE EDUCATION/TRAINING PROGRAM

## 2021-10-26 PROCEDURE — 120N000002 HC R&B MED SURG/OB UMMC

## 2021-10-26 PROCEDURE — 80048 BASIC METABOLIC PNL TOTAL CA: CPT | Performed by: HOSPITALIST

## 2021-10-26 PROCEDURE — 97535 SELF CARE MNGMENT TRAINING: CPT | Mod: GO

## 2021-10-26 PROCEDURE — 36415 COLL VENOUS BLD VENIPUNCTURE: CPT | Performed by: HOSPITALIST

## 2021-10-26 PROCEDURE — 250N000013 HC RX MED GY IP 250 OP 250 PS 637: Performed by: INTERNAL MEDICINE

## 2021-10-26 PROCEDURE — 85027 COMPLETE CBC AUTOMATED: CPT | Performed by: HOSPITALIST

## 2021-10-26 PROCEDURE — G0463 HOSPITAL OUTPT CLINIC VISIT: HCPCS

## 2021-10-26 PROCEDURE — 97530 THERAPEUTIC ACTIVITIES: CPT | Mod: GO

## 2021-10-26 PROCEDURE — 250N000013 HC RX MED GY IP 250 OP 250 PS 637

## 2021-10-26 PROCEDURE — 99233 SBSQ HOSP IP/OBS HIGH 50: CPT | Performed by: STUDENT IN AN ORGANIZED HEALTH CARE EDUCATION/TRAINING PROGRAM

## 2021-10-26 RX ORDER — VITAMIN B COMPLEX
50 TABLET ORAL DAILY
Status: DISCONTINUED | OUTPATIENT
Start: 2021-10-26 | End: 2021-10-27 | Stop reason: HOSPADM

## 2021-10-26 RX ORDER — SIMVASTATIN 10 MG
20 TABLET ORAL AT BEDTIME
Status: DISCONTINUED | OUTPATIENT
Start: 2021-10-26 | End: 2021-10-27 | Stop reason: HOSPADM

## 2021-10-26 RX ORDER — SIMVASTATIN 10 MG
20 TABLET ORAL DAILY
Status: DISCONTINUED | OUTPATIENT
Start: 2021-10-26 | End: 2021-10-26

## 2021-10-26 RX ORDER — PAROXETINE 20 MG/1
40 TABLET, FILM COATED ORAL EVERY MORNING
Status: DISCONTINUED | OUTPATIENT
Start: 2021-10-27 | End: 2021-10-26

## 2021-10-26 RX ORDER — CARVEDILOL 3.12 MG/1
3.12 TABLET ORAL 2 TIMES DAILY WITH MEALS
Status: DISCONTINUED | OUTPATIENT
Start: 2021-10-26 | End: 2021-10-27 | Stop reason: HOSPADM

## 2021-10-26 RX ADMIN — MICONAZOLE NITRATE: 20 CREAM TOPICAL at 08:10

## 2021-10-26 RX ADMIN — BUMETANIDE 1 MG: 1 TABLET ORAL at 08:05

## 2021-10-26 RX ADMIN — MICONAZOLE NITRATE: 20 CREAM TOPICAL at 22:00

## 2021-10-26 RX ADMIN — SIMVASTATIN 20 MG: 10 TABLET, FILM COATED ORAL at 21:20

## 2021-10-26 RX ADMIN — Medication 50 MCG: at 16:16

## 2021-10-26 RX ADMIN — CARVEDILOL 3.12 MG: 3.12 TABLET, FILM COATED ORAL at 17:36

## 2021-10-26 RX ADMIN — PAROXETINE 40 MG: 20 TABLET, FILM COATED ORAL at 08:05

## 2021-10-26 RX ADMIN — DOCUSATE SODIUM 50 MG AND SENNOSIDES 8.6 MG 1 TABLET: 8.6; 5 TABLET, FILM COATED ORAL at 21:20

## 2021-10-26 RX ADMIN — ATORVASTATIN CALCIUM 40 MG: 40 TABLET, FILM COATED ORAL at 08:05

## 2021-10-26 RX ADMIN — PANTOPRAZOLE SODIUM 40 MG: 40 TABLET, DELAYED RELEASE ORAL at 08:05

## 2021-10-26 RX ADMIN — Medication 1 TABLET: at 08:05

## 2021-10-26 RX ADMIN — Medication 600 MG: at 16:16

## 2021-10-26 RX ADMIN — BUMETANIDE 1 MG: 1 TABLET ORAL at 16:15

## 2021-10-26 RX ADMIN — ENOXAPARIN SODIUM 40 MG: 40 INJECTION SUBCUTANEOUS at 21:21

## 2021-10-26 RX ADMIN — ASPIRIN 81 MG CHEWABLE TABLET 81 MG: 81 TABLET CHEWABLE at 08:05

## 2021-10-26 RX ADMIN — Medication 3 MG: at 21:20

## 2021-10-26 RX ADMIN — ENOXAPARIN SODIUM 40 MG: 40 INJECTION SUBCUTANEOUS at 08:05

## 2021-10-26 ASSESSMENT — ACTIVITIES OF DAILY LIVING (ADL)
ADLS_ACUITY_SCORE: 13
ADLS_ACUITY_SCORE: 13
ADLS_ACUITY_SCORE: 14
ADLS_ACUITY_SCORE: 14
ADLS_ACUITY_SCORE: 13
ADLS_ACUITY_SCORE: 14
ADLS_ACUITY_SCORE: 13
ADLS_ACUITY_SCORE: 14
ADLS_ACUITY_SCORE: 13
ADLS_ACUITY_SCORE: 13

## 2021-10-26 ASSESSMENT — MIFFLIN-ST. JEOR: SCORE: 1913.5

## 2021-10-26 NOTE — PROGRESS NOTES
WO Nurse Inpatient Wound Assessment   Reason for consultation: Evaluate and treat  Buttock, pannus and heel wounds    Assessment  Buttock and pannus wounds due to Incontinence Associated Dermatitis (IAD), Intertrigo and Moisture Associated Skin Damage (MASD) with fungal involvement   Status: improving, noted small pimple appearing open area to R fleshy buttock on assessment today      R posterior heel with pressure injury, hospital acquired, Stage Deep tissue pressure injury  Status: stable    R plantar heel wound due to pressure injury, present on admission  Resurfaced 10/26    Treatment Plan  Buttock wounds: BID apply Osmar antifungal (antifungal with skin protectant) paste (MD order) over wound sites. With incontinence, cleanse with Osmar cleanse and protect and paper washclothes. Can apply critic-aid paste in between antifungal applications to maintain barrier. If full removal needed, please use baby oil to reduce friction forces to the skin.     Pannus rash: BID cleanse with microKlenz and dry thouroughly, then apply Osmar antifungal paste (MD order) over red areas in a thin layer    R plantar and posterior heel: Every third day cleanse with microKlenz and dry, apply Cavilon no sting barrier film to skin around wound and let dry, then place mepilex. Ensure heel elevated off mattress at all times using pillow under the calves.      Orders Written  WO Nurse follow-up plan:weekly  Nursing to notify the Provider(s) and re-consult the WO Nurse if wound(s) deteriorates or new skin concern.    Patient History  According to provider note(s):  Chika Ferguson is a 61 year old female who presents with acute hypoxic respiratory failure. Her PMH includes pulmonary hypertension, history of Covid at the end of 2020, COPD, CARL, CAD with history of stenting (unknown vessel-no records). Cards consulted for evaluation of severe RV dysfunction noted on echo.    Objective Data  Containment of urine/stool: Incontinence  Protocol    Active Diet Order  Orders Placed This Encounter      Regular Diet Adult      Output:   I/O last 3 completed shifts:  In: 1080 [P.O.:1080]  Out: 150 [Urine:150]    Risk Assessment:   Sensory Perception: 3-->slightly limited  Moisture: 3-->occasionally moist  Activity: 3-->walks occasionally  Mobility: 3-->slightly limited  Nutrition: 3-->adequate  Friction and Shear: 2-->potential problem  Mychal Score: 17                          Labs:   Recent Labs   Lab 10/26/21  0553   HGB 8.8*   WBC 6.9       Physical Exam  Areas of skin assessed: focused buttock, pannus and heels      Wound Location:  R posterior heel   Date of last photo 10/19  Wound History: hospital acquired pressure injury     10/14                                                                10/19        Wound Base: 100 % blister blood filled      Palpation of the wound bed: spongy      Drainage: none     Description of drainage: none     Measurements (length x width x depth, in cm)1.5cm x 2.5cm x 0cm      Tunneling N/A     Undermining N/A  Periwound skin: dry/scaly and hyperkeratosis      Color: normal and consistent with surrounding tissue      Temperature: normal   Odor: mild  Pain: pt denies      Wound Location:  R plantar heel   Date of last photo 10/6  Wound History: patient found stuck in chair for extended period of time likely causing plantar heel pressure injury             Wound Base: 10/26 resurfaced    Pannus and buttock skin folds with bright red erythema, satellite lesions and yeast like odor consistent with fungal rash due to incontinence while stuck in chair for a number of days. --yeast has improved, noted a pea sized raised open area to R buttock, appears to be opened pimple with possible shear force damage   healing    Interventions  Visual inspection and assessment completed   Wound Care Rationale Promote moist wound healing without tissue dehydration , Provide protection  and Decrease bacterial load  Wound Care:  completed by RN  Supplies: floor stock and discussed with RN  Current off-loading measures: Pillows  Current support surface: Standard  Low air loss mattress  Education provided to: plan of care  Discussed plan of care with Nurse    Jennifer Valladares RN, CWOCN

## 2021-10-26 NOTE — PLAN OF CARE
"/69 (BP Location: Right arm)   Pulse 66   Temp 96.9  F (36.1  C) (Oral)   Resp 20   Ht 1.727 m (5' 8\")   Wt 128 kg (282 lb 3 oz)   SpO2 99%   BMI 42.91 kg/m      Assumed Cares: [id number]  Neuro: A&O x 4; cooperative but very anxious at times  Respiratory: Complains of dyspnea on exertion; O2 1.5L during the day; CPAP on over night   Cardiac: Denies chest pain  GI/: Purewick in place; no BM this shift  Skin: Several wounds covered with mepilex (coccyx, thighs, elbows); extensive bruising throughout body  Lines: L PIV SL  Pain: Denies pain  Diet: Regular diet  Activity Level: 2 assist with walker  Events: No acute events  Plan: Continue with POC; notify provider with changes      "

## 2021-10-26 NOTE — PROGRESS NOTES
Rehab Admissions:     Patient/family education completed regarding [facility] level of care, anticipated LOS, POC and visitor policy. All questions answered and contact information for [facility] provided.     Rhiannon Olvera, PT  Rehab Liaison/  [facility]

## 2021-10-26 NOTE — PLAN OF CARE
OT: pt would really like to hear from  on placement options/updates. Pt not wanting to go to [facility] as she would really like to return closer to home in a TCU in [place].

## 2021-10-26 NOTE — PROGRESS NOTES
[facility]    Medicine Progress Note - Hospitalist Service, Gold 9       Date of Admission:  10/6/2021    Assessment & Plan              Chika Ferguson is a 61 year old woman with history of CAD s/p PCI to mLAD (2016), HFpEF, HLD, suspected COPD, CARL on CPAP, morbid obesity, and anxiety who was admitted to ICU with acute hypoxic and hypercapnic respiratory failure requiring intubation secondary to COVID and MSSA pneumonia leading to ARDS.     She completed a course of antibiotics for MSSA and received Dexamethasone, Tocilizumab, and Remdesivir x1 for COVID. She has been aggressively diuresed given history of CARL/HFpEF with concern for underlying pulmonary hypertension, for which Cardiology had been following early on in her hospitalization. She was extubated 10/13 and transferred to hospital medicine for further cares. Now awaiting TCU placement with recommendation of therapies      Acute hypoxic-hypercapnic respiratory failure, multifactorial  CAP secondary to MSSA  COVID-19 pneumonia  Hx of Pulm HTN (see below)  CARL on CPAP  Intubated in ED 10/6/2021 for hypoxia with resp acidemia (O2 sats in 50's, venous pH 7.05). Bibasilar opacities on CXR, WBC 19.6, lactate 8.4. First COVID PCR negative, repeat COVID PCR + 10/6/21. Pro-BNP elevated. Given relatively clear lung fields on imaging, initial impression CHF vs possible pneumonia. Treated with diuretics and antibiotics. CTA chest negative for PE, again showed evidence of atelectasis. Sputum culture grew MSSA. Severe RV dysfunction and dilated IVC on TTE with preserved LVEF. Continued treatment for CAP, COVID-19, and HF. Extubated 10/13. Reduced oxygen needs to NC 2 lpm with CPAP use of 40% FiO2 at night. Baseline use of 2 lpm with CPAP at home, but no NC use during day.  - Discontinue isolation now 20 days after Covid positivity  - Supplemental oxygen as needed - wean as able  - Completed treatment for MSSA PNA (CTX x5  days, azithromycin x3 days)   - Continue pulmonary toilet efforts, OOB as tolerated  - Continue diuresis (bumetanide 1 mg PO BID - reduced from 2mg BID PTA)  - Completed treatment of COVID-19 as below  - Continue CPAP at bedtime, was using CPAP ATC PTA  - PT/OT consultation  - PMandR consult 10/25 - likely poor candidate for ARU     Leukocytosis, resolved  Intramuscular hematoma  On 10/17, reportedly firm tender area over RLQ; suspect intramuscular hematoma from VTE ppx. Additional indurated area noted on left lower shin. US confirming abscess vs hematoma; suspect hematoma with no systemic s/sx infection and no apparent point of bacterial entry.  - CBC daily  - Pain control for hematoma  - Enoxaparin 40mg BID now that out of ICU     Positive COVID-19 PCR  History of COVID infection in 12/2020, did not require hospitalization. Completed vaccination series 5/2021. +Exposure recently. COVID PCR negative at OSH but repeat PCR positive on admission. DDx includes reinfection vs persistent positive PCR from prior infection. CT w/o diffuse GGO to suggest COVID pneumonia. S/p Remdesivir and Tocilizumab x 1 on 10/7, as well as steroids (10 days to 10/17). Continues to remain in respiratory isolation for a total of 20 days from positive test due to classification as critically ill, but immunocompetent.  - DVT prophylaxis with subcutaneous enoxaparin BID 40 mg (weight-based)   - Continue contact/respiratory precautions      Acute on chronic HFpEF and RV failure  Pulmonary HTN, probable Group III  Prior history of HFpEF and pulmonary HTN. On bumetanide at home. NTproBNP 2264 on admission. TTE (10/6) LVEF 55-60%, mild LVH, indeterminate diastolic function, moderate to severe RV dilation w moderate to severely reduced RV function, and dilated IVC. Pulmonary pressures could not be assessed on TTE. CTA chest negative for PE but showed severely dilated pulmonary trunk c/w pulmonary HTN. Cardiology consulted. Suspect group III  "pulmonary HTN, although CTEPH is not be ruled out. RV failure likely chronic given RV dilation on imaging. Clinically improved w diuresis. Weight down > 45 lbs since admission but stable since 10/14, though she continues to diurese without signs of contraction alkalosis.  - Cardiology consulted, appreciate recommendations: not likely a candidate for pulmonary HTN therapies, schedule follow up with pulmonary HTN team (Dr. Hughes) in 2-4 weeks (referral placed in discharge orders)  - RHC to be completed in outpatient setting  - V/Q scan attempted 10/14, but unable to tolerate lying flat  - Continue bumetanide 1mg BID (?TIMMY at higher dose 10/17)  - Daily weights, I&O's  - BMP daily while admitted     Fall  Fell 10/19 after attempting to get up to the bathroom; no LOC. Denies hitting head or pain throughout. Neurologically intact. We will continue to monitor.   - Encourage call light use  - Fall precautions     CAD  Hx PCI w stenting of mLAD in 2016. Mild troponin elevation on admission felt to be demand ischemia. TTE with normal wall motion. Cardiology consulted, ACS not suspected.   - Continue ASA + statin  - Resume PTA carvedilol 3.125 mg BID 10/26 with more robust BP, with close monitoring     Chronic anemia  Hgb as low as 8.6 during hospitalization likely in the setting of infection. Improving to ~11. With resent hematoma, will continue to monitor hgb.  - CBC in AM     Moderate malnutrition in the context of acute illness  \"% Intake: Unable to assess  % Weight Loss: > 5% in 1 month (severe) - ongoing   Subcutaneous Fat Loss: None observed  Muscle Loss: Unable to assess  Fluid Accumulation/Edema: Mild\"  - SLP consulted (10/14)- regular diet   - Calorie counts started 10/16     GERD  Continue pantoprazole     Buttock and pannus wounds due to Incontinence Associated Dermatitis (IAD), Intertrigo and Moisture Associated Skin Damage (MASD) with fungal involvement   Status: improving  R posterior heel with pressure " injury, hospital acquired, Stage Deep tissue pressure injury  Status: stable   R plantar heel wound due to pressure injury, present on admission  Resurfaced 10/26  Essentially chair-bound. Gets up weekly to ambulate. Increased weakness lately.  - WOCN consulted   - PT/OT consulted, likely will need TCU/ARU at discharge     TIMMY, resolved  Cr 1.68 on admission. Baseline unknown. UA bland. Renal US negative for hydro. Likely prerenal azotemia in setting of acute CHF.   Steroid-induced hyperglycemia, resolved  resolved with discontinuation of steriods. No history of DM. A1c 6.7 on admission.    Hypernatremia - resolved with PO intake  Hypokalemia - resolved with PO intake     Diet: Regular Diet Adult  Snacks/Supplements Adult: Ensure Enlive; Between Meals    DVT Prophylaxis: Enoxaparin (Lovenox) SQ  Lopes Catheter: Not present  Central Lines: None  Code Status: Full Code      Disposition Plan   Expected discharge: 10/28/2021   recommended to transitional care unit once safe disposition plan/ TCU bed available.     The patient's care was discussed with the Bedside Nurse, Care Coordinator/ and Patient.    Jorge Luis Guadarrama DO  Hospitalist Service, [address]  Securely message with the Vocera Web Console (learn more here)  Text page via [employer] Paging/Directory  Please see sign in/sign out for up to date coverage information    Clinically Significant Risk Factors Present on Admission                ______________________________________________________________________    Interval History   No acute events overnight. Feeling overall improved and ready to leave the hospital today. Denies shortness of breath; continues on 1.5L oxygen - per bedside RN desatted at rest on room air. Denies fever, chills, chest pain, abdominal pain. Reluctant to go to TCU but amenable when seen in the context of her overall trajectory towards safe return to home.    Data reviewed today:  I reviewed all medications, new labs and imaging results over the last 24 hours. I personally reviewed no images or EKG's today.    Physical Exam   Vital Signs: Temp: 96.9  F (36.1  C) Temp src: Oral BP: 124/69 Pulse: 66   Resp: 20 SpO2: 99 % O2 Device: Nasal cannula Oxygen Delivery: 1.5 LPM  Weight: 282 lbs 3.02 oz    Gen: Awake and alert, appears comfortable, appears stated age.  HEENT: NCAT, sclerae anicteric.  CV: Regular rhythm, normal rate, S1/S2, extremities warm and well perfused. Trace BLE edema; wrinkles suggest less edema than recently.  Pulm: Normal work of breathing, lungs clear to auscultation. Scarce scattered rales b/l bases.  GI: Nontender, nondistended. +bowel sounds.  Skin: Warm, dry, no jaundice.  Neuro: Alert, oriented, speech normal, moves all extremities symmetrically.  Psych: Mood is good, affect is congruent.      Data   Recent Labs   Lab 10/26/21  0553 10/26/21  0026 10/24/21  0734 10/23/21  1610 10/23/21  0716 10/23/21  0716 10/22/21  0628 10/22/21  0628   WBC 6.9  --   --   --   --  7.4  --  11.2*   HGB 8.8*  --   --   --   --  9.5*  --  9.5*   MCV 90  --   --   --   --  90  --  89     --   --   --   --  295  --  304   NA  --  137  --   --   --  136  --  136   POTASSIUM  --  3.5 3.7 3.7   < > 3.4   < > 3.6   CHLORIDE  --  94  --   --   --  94  --  93*   CO2  --  39*  --   --   --  39*  --  40*   BUN  --  24  --   --   --  31*  --  36*   CR  --  0.88  --   --   --  0.90  --  1.01   ANIONGAP  --  4  --   --   --  3  --  3   NAE  --  9.0  --   --   --  9.2  --  9.2   GLC  --  126*  --   --   --  109*  --  109*    < > = values in this interval not displayed.       Medications     dextrose         aspirin  81 mg Oral Daily     atorvastatin  40 mg Oral Daily     bumetanide  1 mg Oral BID     enoxaparin ANTICOAGULANT  40 mg Subcutaneous Q12H     melatonin  3 mg Oral or Feeding Tube QPM     miconazole with skin protectant   Topical BID     multivitamin w/minerals  1 tablet Oral Daily      pantoprazole  40 mg Oral QAM AC     PARoxetine  40 mg Oral Daily     polyethylene glycol  17 g Oral or Feeding Tube Daily     senna-docusate  1 tablet Oral or Feeding Tube QPM

## 2021-10-26 NOTE — PLAN OF CARE
"[facility] [phone]     /63 (BP Location: Right arm)   Pulse 90   Temp (!) 96.4  F (35.8  C) (Oral)   Resp 20   Ht 1.727 m (5' 8\")   Wt 128 kg (282 lb 3 oz)   SpO2 93%   BMI 42.91 kg/m       Pain: patient denied pain  Neuro:  A&Ox4.  Respiratory: Lungs diminished in lower lobes. Dyspnea on exertion. On 1.5L NC. CPAP overnight.   Cardiac/Neurovascular: HR and pulse WDL. No numbness or tingling. Moderate edema in extremities.   GI/: [place] urine output with large BM today.   Nutrition: Regular diet. Adequate intake.   Activity: Up with assist of 1 and walker.  Skin: Sacral wound changed today and left elbow wound changed today.   Lines: PIV (SL).  Events this shift: Patient had her dressings changed today. Able to sit in chair and walk with PT in the hallway.      Plan: Possible discharge to [facility] at 1pm tomorrow.     "

## 2021-10-26 NOTE — INTERIM SUMMARY
[facility] Pre-Admission Screen    Referral Source:  formerly [facility] [address] [facility] 5221-01  Admit date to referring facility: 10/6/2021    Physical Medicine and Rehab Consult Completed: Yes completed on 10/26/21 by Dr. JORI Tucker recommends acute rehab    Rehab Diagnosis:    16 Debility (non-cardiac, non-pulmonary) acute hypoxic respiratory failure secondary to covid 19 and MSSA pneumonia    Justification for Acute Inpatient Rehabilitation  Chika Ferguson is a 61 year old woman with history of CAD s/p PCI to mLAD (2016), HFpEF, HLD, suspected COPD, CARL on CPAP, morbid obesity, and anxiety who was admitted to ICU with acute hypoxic and hypercapnic respiratory failure requiring intubation secondary to COVID and MSSA pneumonia leading to ARDS. She completed a course of antibiotics for MSSA and received Dexamethasone, Tocilizumab, and Remdesivir x1 for COVID. She has been aggressively diuresed given history of CARL/HFpEF with concern for underlying pulmonary hypertension, for which Cardiology had been following early on in her hospitalization. She was intubated in ED on 10/6/21 & extubated 10/13. Covid precautions discontinued 10/26/2021. She is now stable and ready to discharge to acute rehab.     Patient requires an intensive inpatient rehab program to address the following acute impairments:impaired strength, impaired activity tolerance, impaired tone, impaired balance, impaired coordination, impaired judgement and safety awareness and impaired weight shifting. These impairments are impacting her safety and functional independence with transfers, gait, stairs, ADL's and IADL's.     Current Active Medical Management Needs/Risks for Clinical Complications  The patient requires the high level of rehabilitation physician supervision that accompanies the provision of intensive rehabilitation therapy.  The patient needs the services of the rehabilitation physician to assess the patient  medically and functionally and to modify the course of treatment as needed to maximize the patient's capacity to benefit from the rehabilitation process.  Pulmonary: In setting of recent covid-19 pneumonia and acute hypoxic respiratory failure. Wean O2 as able. Continue pulmonary toileting. Continue diuresis (bumetanide 1 mg PO BID - reduced from 2mg BID PTA). Continue CPAP at bedtime, was using CPAP ATC PTA. Will need ongoing assessment as patient at increased risk for covid 19 related cognitive impairments and respiratory decompensation.   DVT Prophylaxis: Enoxaparin (Lovenox) subcutaneous. Will need ongoing assessment as patient at increased risk for covid related thrombosis.   Nutrition: in setting of moderate malnutrition. Regular Diet. Snacks/Supplements Adult: Ensure Enlive; Between Meals. Calorie counts.  Will need on going assessment.   Integumentary: Buttock and pannus wounds due to Incontinence Associated Dermatitis (IAD), Intertrigo and Moisture Associated Skin Damage (MASD) with fungal involvement. R plantar heel wound due to pressure injury- WOC RN following. Requires ongoing assessment/management.   GERD - Continue pantoprazole    Body Mass Index 42.91    Past Medical/Surgical History   Surgery in the past 100 days: No   Additional relevant past medical history:  CAD s/p PCI to mLAD (2016), HFpEF, HLD, suspected COPD, CARL on CPAP, morbid obesity, and anxiety     Level of Functioning Prior to Admission:    LIVING ENVIRONMENT   People in home: child(delfin), adult   Current Living Arrangements: house   Home Accessibility: stairs to enter home, stairs within home    Number of Stairs, Main Entrance: 1    Number of Stairs, Within Home, Primary:  (flight up to 2nd level)        Transportation Anticipated: family or friend will provide   Living Environment Comments: Lives on main level and doesn't use stairs normally. 1 [address]ELIA main level. Children can provide 24/7 support at discharge.     SELF-CARE   Usual  Activity Tolerance: moderate   Regular Exercise: No    Equipment Currently Used at Home: none   Activity/Exercise/Self-Care Comment: Watches TV and doesn't do much normally. More challenging to get around since  passed away.    DISABILITY/FUNCTION   Concentrating, Remembering or Making Decisions Difficulty: no   Difficulty Communicating: no   Difficulty Eating/Swallowing: no   Walking or Climbing Stairs Difficulty: no    Walking or Climbing Stairs: ambulation difficulty, assistance 1 person (indep. at home but getting more weak per son)   Dressing/Bathing Difficulty: no     Toileting issues: no   Doing Errands Independently Difficulty (such as shopping): no   Fall history within last six months: no;     Change in Functional Status Since Onset of Current Illness/Injury: yes  Additional Comments: n/a    Level of Function: GG Scale (Section GG Functional Ability and Goals; CMS's WALLACE Version 3.0 Manual effective 10.1.2019):  PT Current Function Goals for Rehab   Bed Rolling 4 Supervision or touching assitance 6 Independent   Supine to Sit 4 Supervision or touching assitance 6 Independent   Sit to Stand 3 Partial/moderate assistance 6 Independent   Transfer 3 Partial/moderate assistance 6 Independent   Ambulation 4 Supervision or touching assitance 6 Independent   Stairs Not completed 6 Independent     OT Current Function Goals for Rehab   Feeding 5 Setup or clean-up assistance 6 Independent   Grooming 5 Setup or clean-up assistance 6 Independent   Bathing Not completed 6 Independent   Upper Body Dressing Not completed 6 Independent   Lower Body Dressing Not completed 6 Independent   Toileting 1 Dependent 6 Independent   Toilet Transfer 3 Partial/moderate assistance 6 Independent   Tub/Shower Transfer Not completed 6 Independent   Cognition Not Impaired Independent     SLP Current Function Goals for Rehab   Swallow Not Impaired Not applicable   Communication Not Impaired Not applicable       Current Diet:  0-Thin  and 7-Regular/easy to chew; calorie counts    Summary Statement:  Patient has been receiving daily PT and OT services. Patient requires set up for all grooming and hygiene tasks and needs to complete these in sitting due to deconditioning and overall decreased activity tolerance. Pt transfers sit<>stand min A from recliner and w/c throughout sessions. Edu on hand palcement and pt does well lowering ot sit, some increased A into standing. She requires CGA with commode transfers and total assist for pericares. She requires Erika for sit to stands and is able to ambulate 40'x2 with FWW and CGA. Seated rest breaks needed between bouts of ambulation. She remains on 2L O2 via nasal cannula.     Expected Therapies and Services Required During Inpatient Rehab Admission  Intensity of Therapy: Patient requires intensive therapies not available in a lesser level of care. Patient is motivated, making gains, and can tolerate 3 hours of therapy a day.  Physical Therapy: 90 minutes per day, 7 days a week for 10 days  Occupational Therapy: 90 minutes per day, 7 days a week for 10 days  Speech and Language Therapy: No anticipated SLP needs.   Rehabilitation Nursing Needs: Patient requires 24 hour Rehab Nursing to manage wound care, vitals, medication education, positioning, carryover of new rehab techniques, care coordination, skin integrity, pain management, provide safe environment for patient at falls risk and monitor nutritional intake.    Precautions/restrictions/special needs:   Precautions: fall precautions, aspiration precautions   Restrictions: N/A   Special Needs: oxygen and Specialty Mattress   Designated visitor: Sarath Ferguson (son)    Expected Level of Improvement: It is anticipated that with intensive skilled therapy intervention, adequate medical management, as well as rehabilitative nursing care, the patient should be able to discharge home with family support and may require ongoing physical assist and OP  therapies.   Expected Length of time to achieve: 10 days    Anticipated Discharge Needs:  Anticipated Discharge Destination: Home  Anticipated Discharge Support: Family member  24/7 support available : Yes  Identified caregiver(s):  Son and daughter in law  Anticipated Discharge Needs: Home with homecare and Home with outpatient therapy    Identified challenges/barriers: N/A      Rehab Liaison Signature/Date/Time:     Physician statement of review and agreement:  I have reviewed and am in agreement of the need for IRF stay to address above functional and medical needs. In addition to above statements address, Patient requires intensive active and ongoing therapeutic intervention and multiple therapies; Patient requires medical supervision; Expected to actively participate in the intensive rehab program; Sufficiently stable to actively participate; Expectation for measurable improvement in functional capacity or adaption to impairments.    Physician Signature/Date/Time:

## 2021-10-26 NOTE — PROGRESS NOTES
Care Management Follow Up    Length of Stay (days): 20    Expected Discharge Date: 10/28/2021     Concerns to be Addressed: discharge planning     Patient plan of care discussed at interdisciplinary rounds: Yes    Anticipated Discharge Disposition: Transitional Care, Home Care     Anticipated Discharge Services: None  Anticipated Discharge DME: None    Patient/family educated on Medicare website which has current facility and service quality ratings: Yes  Education Provided on the Discharge Plan: Yes  Patient/Family in Agreement with the Plan: yes    Referrals Placed by CM/SW:   Pending  [facility] *[id number]  -Per PM&R consult, pt appropriate for ARU and is now agreeable. Plan to discharge to [facility] when bed is available.     [facility]   (Ph: [phone], Admissions: [phone], F: [phone])     [facility]   (Ph: [phone], Admissions: [phone], F: [phone])     [facility]   (Ph: [phone], Admissions: [phone], F: [phone])     [facility]   (Ph: [phone], Admissions: [phone], F: [phone])    [facility]   (Ph: [phone], Admissions: [phone], F: [phone])  10/26- per admissions, need to provide pt's SSN and confirm she is medically ready/off COVID precautions (SW paged provider as pt is still showing as COVID positive)    Private pay costs discussed: Not applicable    Additional Information:  SW following for placement. Per provider, pt is off COVID precautions as of today and medically ready to discharge. PM&R saw pt today and recommended ARU, per PM&R physician, pt is now agreeable to ARU (pt previously told SW she wanted a facility closer to her home in [place]).     Plan for pt to discharge to [facility] when a bed is available.     SW will continue to follow for discharge placement.     TALYA Morrissey, [facility]  [address]   [facility]  Phone: [phone]  Pager:  [phone]

## 2021-10-26 NOTE — PLAN OF CARE
Patient is alert and oriented times 4. Able to make needs known with call light. Continuous pulse oximeter shows at nurses's desk. Moving around pretty well from the recliner chair to the commode with walker and 1 person stand bye assist. Ate well for supper. Has multiple wounds. Right elbow is looking good and mepilex coming off. Trying it open to air for tonight. Back of thighs reddened from pt sitting up in the chair for so long. Has protective mepilexes on back of thighs. Getting up out of chair to use commode every 2 hours or so but otherwise doesn't like to change position when up. Has special cushion on seat of chair. Used special lotion cleanser and soft cloth wipes for all perineal care. Right heals pressure cleaned and redressed. Elevated up on pillows. C-pap put on at 2200 per pt request for sleep apnea. Otherwise mid to high 90's on 1.5 liters of oxygen per nasal cannula. Encourage turns in bed as pt allows. Encourage independence as able.

## 2021-10-27 ENCOUNTER — HOSPITAL ENCOUNTER (INPATIENT)
Facility: CLINIC | Age: 61
LOS: 9 days | Discharge: HOME HEALTH | DRG: 948 | End: 2021-11-05
Attending: PHYSICAL MEDICINE & REHABILITATION | Admitting: PHYSICAL MEDICINE & REHABILITATION
Payer: MEDICARE

## 2021-10-27 VITALS
RESPIRATION RATE: 17 BRPM | HEART RATE: 82 BPM | TEMPERATURE: 96.7 F | HEIGHT: 68 IN | WEIGHT: 288.8 LBS | DIASTOLIC BLOOD PRESSURE: 50 MMHG | SYSTOLIC BLOOD PRESSURE: 122 MMHG | OXYGEN SATURATION: 95 % | BODY MASS INDEX: 43.77 KG/M2

## 2021-10-27 DIAGNOSIS — J96.01 ACUTE RESPIRATORY FAILURE WITH HYPOXIA (H): Primary | ICD-10-CM

## 2021-10-27 DIAGNOSIS — I89.0 LYMPHEDEMA: ICD-10-CM

## 2021-10-27 DIAGNOSIS — K21.00 GASTROESOPHAGEAL REFLUX DISEASE WITH ESOPHAGITIS WITHOUT HEMORRHAGE: ICD-10-CM

## 2021-10-27 DIAGNOSIS — U09.9 POST-COVID-19 CONDITION: ICD-10-CM

## 2021-10-27 DIAGNOSIS — L21.9 SEBORRHEIC DERMATITIS: ICD-10-CM

## 2021-10-27 DIAGNOSIS — G47.33 OSA (OBSTRUCTIVE SLEEP APNEA): ICD-10-CM

## 2021-10-27 DIAGNOSIS — R52 PAIN: ICD-10-CM

## 2021-10-27 DIAGNOSIS — J15.211 PNEUMONIA OF BOTH LOWER LOBES DUE TO METHICILLIN SUSCEPTIBLE STAPHYLOCOCCUS AUREUS (MSSA) (H): ICD-10-CM

## 2021-10-27 DIAGNOSIS — U07.1 PNEUMONIA DUE TO 2019 NOVEL CORONAVIRUS: ICD-10-CM

## 2021-10-27 DIAGNOSIS — F41.8 SITUATIONAL ANXIETY: ICD-10-CM

## 2021-10-27 DIAGNOSIS — I50.33 ACUTE ON CHRONIC DIASTOLIC HEART FAILURE (H): ICD-10-CM

## 2021-10-27 DIAGNOSIS — J12.82 PNEUMONIA DUE TO 2019 NOVEL CORONAVIRUS: ICD-10-CM

## 2021-10-27 PROBLEM — G47.9 SLEEP DISTURBANCE: Status: ACTIVE | Noted: 2021-10-27

## 2021-10-27 PROBLEM — L30.4 INTERTRIGO: Status: ACTIVE | Noted: 2021-10-27

## 2021-10-27 PROBLEM — I27.20 PULMONARY HYPERTENSION (H): Status: ACTIVE | Noted: 2021-10-27

## 2021-10-27 PROCEDURE — 250N000011 HC RX IP 250 OP 636: Performed by: HOSPITALIST

## 2021-10-27 PROCEDURE — 999N000157 HC STATISTIC RCP TIME EA 10 MIN

## 2021-10-27 PROCEDURE — 5A09357 ASSISTANCE WITH RESPIRATORY VENTILATION, LESS THAN 24 CONSECUTIVE HOURS, CONTINUOUS POSITIVE AIRWAY PRESSURE: ICD-10-PCS | Performed by: PHYSICAL MEDICINE & REHABILITATION

## 2021-10-27 PROCEDURE — 99223 1ST HOSP IP/OBS HIGH 75: CPT | Mod: AI | Performed by: PHYSICAL MEDICINE & REHABILITATION

## 2021-10-27 PROCEDURE — 250N000013 HC RX MED GY IP 250 OP 250 PS 637: Performed by: STUDENT IN AN ORGANIZED HEALTH CARE EDUCATION/TRAINING PROGRAM

## 2021-10-27 PROCEDURE — 128N000003 HC R&B REHAB

## 2021-10-27 PROCEDURE — 250N000013 HC RX MED GY IP 250 OP 250 PS 637

## 2021-10-27 PROCEDURE — 94660 CPAP INITIATION&MGMT: CPT

## 2021-10-27 PROCEDURE — 250N000013 HC RX MED GY IP 250 OP 250 PS 637: Performed by: PHYSICIAN ASSISTANT

## 2021-10-27 PROCEDURE — 250N000013 HC RX MED GY IP 250 OP 250 PS 637: Performed by: INTERNAL MEDICINE

## 2021-10-27 PROCEDURE — 250N000011 HC RX IP 250 OP 636: Performed by: PHYSICIAN ASSISTANT

## 2021-10-27 PROCEDURE — 99239 HOSP IP/OBS DSCHRG MGMT >30: CPT | Performed by: STUDENT IN AN ORGANIZED HEALTH CARE EDUCATION/TRAINING PROGRAM

## 2021-10-27 RX ORDER — LANOLIN ALCOHOL/MO/W.PET/CERES
3 CREAM (GRAM) TOPICAL EVERY EVENING
DISCHARGE
Start: 2021-10-27 | End: 2023-07-17

## 2021-10-27 RX ORDER — PANTOPRAZOLE SODIUM 40 MG/1
40 TABLET, DELAYED RELEASE ORAL DAILY
Status: DISCONTINUED | OUTPATIENT
Start: 2021-10-28 | End: 2021-11-05 | Stop reason: HOSPADM

## 2021-10-27 RX ORDER — PAROXETINE 40 MG/1
40 TABLET, FILM COATED ORAL EVERY MORNING
Status: DISCONTINUED | OUTPATIENT
Start: 2021-10-28 | End: 2021-11-05 | Stop reason: HOSPADM

## 2021-10-27 RX ORDER — MULTIPLE VITAMINS W/ MINERALS TAB 9MG-400MCG
1 TAB ORAL DAILY
Status: DISCONTINUED | OUTPATIENT
Start: 2021-10-28 | End: 2021-11-05 | Stop reason: HOSPADM

## 2021-10-27 RX ORDER — BUMETANIDE 1 MG/1
1 TABLET ORAL 2 TIMES DAILY
Status: ON HOLD | DISCHARGE
Start: 2021-10-27 | End: 2021-11-04

## 2021-10-27 RX ORDER — BISACODYL 10 MG
10 SUPPOSITORY, RECTAL RECTAL DAILY PRN
Status: DISCONTINUED | OUTPATIENT
Start: 2021-10-27 | End: 2021-11-05 | Stop reason: HOSPADM

## 2021-10-27 RX ORDER — CARVEDILOL 3.12 MG/1
3.12 TABLET ORAL 2 TIMES DAILY WITH MEALS
Status: DISCONTINUED | OUTPATIENT
Start: 2021-10-27 | End: 2021-11-05 | Stop reason: HOSPADM

## 2021-10-27 RX ORDER — POTASSIUM CHLORIDE 750 MG/1
20 TABLET, EXTENDED RELEASE ORAL 2 TIMES DAILY
Status: DISCONTINUED | OUTPATIENT
Start: 2021-10-27 | End: 2021-10-29

## 2021-10-27 RX ORDER — MICONAZOLE NITRATE 20 MG/G
CREAM TOPICAL 2 TIMES DAILY
Status: DISCONTINUED | OUTPATIENT
Start: 2021-10-27 | End: 2021-11-03

## 2021-10-27 RX ORDER — ACETAMINOPHEN 325 MG/1
650 TABLET ORAL EVERY 6 HOURS PRN
Status: DISCONTINUED | OUTPATIENT
Start: 2021-10-27 | End: 2021-11-05 | Stop reason: HOSPADM

## 2021-10-27 RX ORDER — ATORVASTATIN CALCIUM 40 MG/1
40 TABLET, FILM COATED ORAL DAILY
Status: ON HOLD | DISCHARGE
Start: 2021-10-27 | End: 2021-11-04

## 2021-10-27 RX ORDER — BUMETANIDE 0.5 MG/1
1 TABLET ORAL
Status: DISCONTINUED | OUTPATIENT
Start: 2021-10-27 | End: 2021-11-05 | Stop reason: HOSPADM

## 2021-10-27 RX ORDER — SIMVASTATIN 20 MG
20 TABLET ORAL AT BEDTIME
Status: DISCONTINUED | OUTPATIENT
Start: 2021-10-27 | End: 2021-11-05 | Stop reason: HOSPADM

## 2021-10-27 RX ORDER — MICONAZOLE NITRATE 20 MG/G
CREAM TOPICAL 2 TIMES DAILY
Status: ON HOLD | DISCHARGE
Start: 2021-10-27 | End: 2021-11-04

## 2021-10-27 RX ORDER — LANOLIN ALCOHOL/MO/W.PET/CERES
3 CREAM (GRAM) TOPICAL AT BEDTIME
Status: DISCONTINUED | OUTPATIENT
Start: 2021-10-27 | End: 2021-11-05 | Stop reason: HOSPADM

## 2021-10-27 RX ORDER — ASPIRIN 81 MG/1
81 TABLET, CHEWABLE ORAL DAILY
DISCHARGE
Start: 2021-10-28

## 2021-10-27 RX ORDER — ASPIRIN 81 MG/1
81 TABLET, CHEWABLE ORAL DAILY
Status: DISCONTINUED | OUTPATIENT
Start: 2021-10-28 | End: 2021-11-05 | Stop reason: HOSPADM

## 2021-10-27 RX ORDER — HYDROXYZINE HYDROCHLORIDE 25 MG/1
50 TABLET, FILM COATED ORAL EVERY 6 HOURS PRN
Status: DISCONTINUED | OUTPATIENT
Start: 2021-10-27 | End: 2021-10-27 | Stop reason: CLARIF

## 2021-10-27 RX ORDER — VITAMIN B COMPLEX
50 TABLET ORAL DAILY
Status: DISCONTINUED | OUTPATIENT
Start: 2021-10-28 | End: 2021-11-05 | Stop reason: HOSPADM

## 2021-10-27 RX ORDER — POLYETHYLENE GLYCOL 3350 17 G/17G
17 POWDER, FOR SOLUTION ORAL DAILY PRN
Status: DISCONTINUED | OUTPATIENT
Start: 2021-10-27 | End: 2021-11-05 | Stop reason: HOSPADM

## 2021-10-27 RX ORDER — HYDROXYZINE HYDROCHLORIDE 25 MG/1
25 TABLET, FILM COATED ORAL EVERY 6 HOURS PRN
Status: DISCONTINUED | OUTPATIENT
Start: 2021-10-27 | End: 2021-11-05 | Stop reason: HOSPADM

## 2021-10-27 RX ADMIN — PANTOPRAZOLE SODIUM 40 MG: 40 TABLET, DELAYED RELEASE ORAL at 09:45

## 2021-10-27 RX ADMIN — SIMVASTATIN 20 MG: 20 TABLET, FILM COATED ORAL at 21:25

## 2021-10-27 RX ADMIN — POTASSIUM CHLORIDE 20 MEQ: 750 TABLET, EXTENDED RELEASE ORAL at 21:25

## 2021-10-27 RX ADMIN — CARVEDILOL 3.12 MG: 3.12 TABLET, FILM COATED ORAL at 17:03

## 2021-10-27 RX ADMIN — ENOXAPARIN SODIUM 40 MG: 40 INJECTION SUBCUTANEOUS at 09:45

## 2021-10-27 RX ADMIN — MICONAZOLE NITRATE: 20 CREAM TOPICAL at 21:29

## 2021-10-27 RX ADMIN — CARVEDILOL 3.12 MG: 3.12 TABLET, FILM COATED ORAL at 09:45

## 2021-10-27 RX ADMIN — Medication 50 MCG: at 09:45

## 2021-10-27 RX ADMIN — BUMETANIDE 1 MG: 1 TABLET ORAL at 09:45

## 2021-10-27 RX ADMIN — HYDROXYZINE HYDROCHLORIDE 25 MG: 25 TABLET, FILM COATED ORAL at 23:16

## 2021-10-27 RX ADMIN — MELATONIN TAB 3 MG 3 MG: 3 TAB at 21:25

## 2021-10-27 RX ADMIN — PAROXETINE 40 MG: 20 TABLET, FILM COATED ORAL at 09:45

## 2021-10-27 RX ADMIN — ASPIRIN 81 MG CHEWABLE TABLET 81 MG: 81 TABLET CHEWABLE at 09:45

## 2021-10-27 RX ADMIN — Medication 1 TABLET: at 09:45

## 2021-10-27 RX ADMIN — Medication 600 MG: at 09:45

## 2021-10-27 RX ADMIN — BUMETANIDE 1 MG: 0.5 TABLET ORAL at 17:03

## 2021-10-27 RX ADMIN — ENOXAPARIN SODIUM 40 MG: 40 INJECTION SUBCUTANEOUS at 21:24

## 2021-10-27 ASSESSMENT — ACTIVITIES OF DAILY LIVING (ADL)
ADLS_ACUITY_SCORE: 8
ADLS_ACUITY_SCORE: 13
ADLS_ACUITY_SCORE: 10
ADLS_ACUITY_SCORE: 13
ADLS_ACUITY_SCORE: 13
ADLS_ACUITY_SCORE: 12
ADLS_ACUITY_SCORE: 13
ADLS_ACUITY_SCORE: 12
ADLS_ACUITY_SCORE: 13
ADLS_ACUITY_SCORE: 12
ADLS_ACUITY_SCORE: 13
ADLS_ACUITY_SCORE: 13
ADLS_ACUITY_SCORE: 8
ADLS_ACUITY_SCORE: 13
ADLS_ACUITY_SCORE: 8
ADLS_ACUITY_SCORE: 16
ADLS_ACUITY_SCORE: 8
ADLS_ACUITY_SCORE: 12
ADLS_ACUITY_SCORE: 13
ADLS_ACUITY_SCORE: 12

## 2021-10-27 ASSESSMENT — MIFFLIN-ST. JEOR: SCORE: 2016.46

## 2021-10-27 NOTE — PLAN OF CARE
Discharged to: [facility]  Transportation: [facility]   Time: 1320  Prescriptions: None  Belongings: Packed & sent w/ pt ex grey nightgown which pt says is missing; number for pt relations given   PIV/Access: PIV removed   Care Plan and Education discontinued: Done   Paperwork: Sent w/ pt to give to ARU staff     RN assumed cares 0700. Pt AOx4. VSS on RA. Denies pain ex stiff neck from sleeping last night- declined any pain meds. Lungs dim in bases, has some DE JESUS, denies SOB or cough. Stable on 1L NC, wears CPAP at night. Up in chair for breakfast in AM. Voiding adequately, no BM this shift; using commode. Report given to staff at ARU, packet courried to facility. Pt wheeled off unit by transport staff around 1320

## 2021-10-27 NOTE — DISCHARGE SUMMARY
[facility]  Hospitalist Discharge Summary      Date of Admission:  10/6/2021  Date of Discharge:  10/27/21  Discharging Provider: Jorge Luis Guadarrama DO  Discharge Team: Hospitalist Service [place]    Discharge Diagnoses   Acute hypoxic-hypercapnic respiratory failure, multifactorial  CAP secondary to MSSA  COVID-19 pneumonia  Suspected pulmonary hypertension  CARL on CPAP   Leukocytosis, resolved  Intramuscular hematoma   Positive COVID-19 PCR, resolved  Acute on chronic HFpEF and RV failure  Pulmonary HTN, probable Group III   Fall from standing  CAD  Chronic anemia  Moderate malnutrition in the context of acute illness  GERD  Buttock and pannus wounds due to Incontinence Associated Dermatitis (IAD), Intertrigo and Moisture Associated Skin Damage (MASD) with fungal involvement   R posterior heel with pressure injury, hospital acquired, Stage Deep tissue pressure injury  R plantar heel wound due to pressure injury, present on admission  TIMMY, resolved  Steroid-induced hyperglycemia, resolved  Hypernatremia, resolved  Hypokalemia, resolved    Follow-ups Needed After Discharge   1. Monitor daily weight. Adjust diuretic as needed to maintain similar or slightly neg-negative weight.  2. Consider repeat BMP within one week; for monitoring while on loop diuretic and potassium supplement.  3. Consider 2L daily fluid restriction.  4. Therapies per PT, OT, PM and R assessments.  5. Pulmonary hypertension clinic referral placed. Will need to reestablish with general cardiology closer to her home in [place].    Unresulted Labs Ordered in the Past 30 Days of this Admission     No orders found from 9/6/2021 to 10/7/2021.      These results will be followed up by N/A.    Discharge Disposition   Discharged to rehabilitation facility  Condition at discharge: Good  Patient ready to discharge to a skilled nursing facility as soon as possible in order to create capacity for patients related to the  COVID-19 pandemic.    Hospital Course             Chika Ferguson is a 61 year old woman with history of CAD s/p PCI to mLAD (2016), HFpEF, HLD, suspected COPD, CARL on CPAP, morbid obesity, and anxiety who was admitted to ICU with acute hypoxic and hypercapnic respiratory failure requiring intubation secondary to COVID and MSSA pneumonia leading to ARDS.     She completed a course of antibiotics for MSSA and received Dexamethasone, Tocilizumab, and Remdesivir x1 for COVID. She has been aggressively diuresed given history of CARL/HFpEF with concern for underlying pulmonary hypertension, for which Cardiology had been following early on in her hospitalization. She was extubated 10/13 and transferred to hospital medicine for further cares. Now awaiting TCU placement with recommendation of therapies      Acute hypoxic-hypercapnic respiratory failure, multifactorial  CAP secondary to MSSA  COVID-19 pneumonia  Hx of Pulm HTN (see below)  CARL on CPAP  Intubated in ED 10/6/2021 for hypoxia with resp acidemia (O2 sats in 50's, venous pH 7.05). Bibasilar opacities on CXR, WBC 19.6, lactate 8.4. First COVID PCR negative, repeat COVID PCR + 10/6/21. Pro-BNP elevated. Given relatively clear lung fields on imaging, initial impression CHF vs possible pneumonia. Treated with diuretics and antibiotics. CTA chest negative for PE, again showed evidence of atelectasis. Sputum culture grew MSSA. Severe RV dysfunction and dilated IVC on TTE with preserved LVEF. Continued treatment for CAP, COVID-19, and HF. Extubated 10/13. Reduced oxygen needs to NC 2 lpm with CPAP use of 40% FiO2 at night. Baseline use of 2 lpm with CPAP at home at night only. PM and R consultation 10/26 found Ms. Ferguson a good candidate for acute rehab, and she discharged 10/27/21 to ARU.  - Discontinued isolation 20 days after Covid positivity  - Completed treatment for MSSA PNA (CTX x5 days, azithromycin x3 days)   - Completed treatment of COVID-19 as  "below    - Supplemental oxygen as needed - wean as able  - Continue pulmonary toilet efforts, OOB as tolerated  - Continue diuresis (bumetanide 1 mg PO BID - reduced from 2mg BID PTA)  - Continue CPAP at bedtime  - PT/OT consultation    Acute on chronic HFpEF and RV failure  Pulmonary HTN, probable Group III  Prior history of HFpEF and pulmonary HTN. On bumetanide at home. NTproBNP 2264 on admission. TTE (10/6) LVEF 55-60%, mild LVH, indeterminate diastolic function, moderate to severe RV dilation w moderate to severely reduced RV function, and dilated IVC. Pulmonary pressures could not be assessed on TTE. CTA chest negative for PE but showed severely dilated pulmonary trunk c/w pulmonary HTN. Cardiology consulted. Suspect group III pulmonary HTN, although CTEPH is not be ruled out. RV failure likely chronic given RV dilation on imaging. Clinically improved w diuresis. Weight down > 45 lbs since admission but stable since 10/14, though she continues to diurese without signs of contraction alkalosis.  - Cardiology consulted, appreciate recommendations: not likely a candidate for pulmonary HTN therapies, schedule follow up with pulmonary HTN team (Dr. Hughes) in 2-4 weeks (referral placed in discharge orders)  - RHC to be completed in outpatient setting  - V/Q scan attempted 10/14, but unable to tolerate lying flat  - Continue bumetanide 1mg BID  - Daily weight    CAD  Hx PCI w stenting of mLAD in 2016. Mild troponin elevation on admission felt to be demand ischemia. TTE with normal wall motion. Cardiology consulted, ACS not suspected.   - Continue ASA + statin  - Resume PTA carvedilol 3.125 mg BID 10/26     Moderate malnutrition in the context of acute illness  \"% Intake: Unable to assess  % Weight Loss: > 5% in 1 month (severe) - ongoing   Subcutaneous Fat Loss: None observed  Muscle Loss: Unable to assess  Fluid Accumulation/Edema: Mild\"  - SLP consulted (10/14)- regular diet   - Calorie counts started " 10/16      Buttock and pannus wounds due to Incontinence Associated Dermatitis (IAD), Intertrigo and Moisture Associated Skin Damage (MASD) with fungal involvement   Status: improving  R posterior heel with pressure injury, hospital acquired, Stage Deep tissue pressure injury  Status: stable   R plantar heel wound due to pressure injury, present on admission  Resurfaced 10/26  Essentially chair-bound. Gets up weekly to ambulate. Increased weakness lately.  - WOCN consulted   - PT/OT consulted, likely will need TCU/ARU at discharge    Chronic/stable/resolved  Chronic anemia  Hgb as low as 8.6 during hospitalization likely in the setting of infection. Improving to ~11. With resent hematoma, will continue to monitor hgb.  - CBC in AM  Positive COVID-19 PCR  History of COVID infection in 12/2020, did not require hospitalization. Completed vaccination series 5/2021. +Exposure recently. COVID PCR negative at OSH but repeat PCR positive on admission. DDx includes reinfection vs persistent positive PCR from prior infection. CT chest did not support this diagnosis. S/p Remdesivir and Tocilizumab x 1 on 10/7, as well as steroids (10 days to 10/17).  Fall  Fell from standing 10/19; no LOC, no injury apparent, no neurologic change.  GERD  Continue pantoprazole  Leukocytosis, resolved  Intramuscular hematoma  On 10/17, reportedly firm tender area over RLQ; suspect intramuscular hematoma from VTE ppx. Additional indurated area noted on left lower shin. US confirms abscess vs hematoma; suspect hematoma with no systemic s/sx infection and no apparent point of bacterial entry.  TIMMY, resolved  Cr 1.68 on admission. Baseline unknown. UA bland. Renal US negative for hydro. Likely prerenal azotemia in setting of acute CHF.   Steroid-induced hyperglycemia, resolved  resolved with discontinuation of steriods. No history of DM. A1c 6.7 on admission.    Hypernatremia - resolved with PO intake  Hypokalemia - resolved with PO  intake    Consultations This Hospital Stay   VASCULAR ACCESS CARE ADULT IP CONSULT  PHYSICAL THERAPY ADULT IP CONSULT  OCCUPATIONAL THERAPY ADULT IP CONSULT  WOUND OSTOMY CONTINENCE NURSE  IP CONSULT  NUTRITION SERVICES ADULT IP CONSULT  NUTRITION SERVICES ADULT IP CONSULT  PHARMACY IP CONSULT  CARDIOLOGY HEART FAILURE (HF) IP CONSULT  CARDIOLOGY GENERAL ADULT IP CONSULT  CARE MANAGEMENT / SOCIAL WORK IP CONSULT  PHARMACY TO DOSE VANCO  SPEECH LANGUAGE PATH ADULT IP CONSULT  CARE MANAGEMENT / SOCIAL WORK IP CONSULT  PHYSICAL MEDICINE & REHAB ASSESSMENT FOR REHAB PLACEMENT ADULT IP CONSULT  PHYSICAL MEDICINE & REHAB ASSESSMENT FOR REHAB PLACEMENT ADULT IP CONSULT  VASCULAR ACCESS CARE ADULT IP CONSULT  PHYSICAL THERAPY ADULT IP CONSULT  OCCUPATIONAL THERAPY ADULT IP CONSULT    Code Status   Full Code    Time Spent on this Encounter   IJorge Luis DO, personally saw the patient today and spent greater than 30 minutes discharging this patient.       Jorge Luis Guadarrama DO  [facility] [address] [address]  Phone: [phone]  ______________________________________________________________________    Physical Exam   Vital Signs: Temp: (!) 96.7  F (35.9  C) Temp src: Oral BP: 122/50 Pulse: 82   Resp: 17 SpO2: 98 % O2 Device: BiPAP/CPAP Oxygen Delivery: 1.5 LPM  Weight: 288 lbs 12.84 oz  Gen: Awake and alert, appears comfortable, appears stated age.  HEENT: NCAT, sclerae anicteric.  CV: Regular rhythm, normal rate, S1/S2, extremities warm and well perfused. Trace BLE edema; wrinkles suggest less edema than recently. Jugular vein 3cm above clavicle.  Pulm: Normal work of breathing, lungs clear to auscultation. No rales/rhonchi.  GI: Nontender, nondistended. +bowel sounds.  Skin: Warm, dry, no jaundice.  Neuro: Alert, oriented, speech normal, moves all extremities symmetrically.  Psych: Mood is good, affect is congruent.       Primary Care Physician   Edwin Joshi    Discharge  Orders      CPAP    If providing a ResMed device for this [place] patient, please add [facility] as an Integrator in [facility] along with patient's MRN: [id number] and CSN: [id number].     Adult Cardiology Eval Referral      General info for SNF    Length of Stay Estimate: Short Term Care: Estimated # of Days <30  Condition at Discharge: Improving  Level of care:skilled   Rehabilitation Potential: Excellent  Admission H&P remains valid and up-to-date: Yes  Recent Chemotherapy: N/A  Use Nursing Home Standing Orders: Yes     Follow Up and recommended labs and tests    Follow up with Acute Rehab physician.  The following labs/tests are recommended: BMP.     Reason for your hospital stay    Bacterial pneumonia and covid pneumonia     Daily weights    Call Provider for weight gain of more than 2 pounds per day or 5 pounds per week.     Wound care (specify)    Buttock wounds: BID apply Osmar antifungal (antifungal with skin protectant) paste (MD order) over wound sites. With incontinence, cleanse with Osmar cleanse and protect and paper washclothes. Can apply critic-aid paste in between antifungal applications to maintain barrier. If full removal needed, please use baby oil to reduce friction forces to the skin.      Pannus rash: BID cleanse with microKlenz and dry thouroughly, then apply Osmar antifungal paste (MD order) over red areas in a thin layer     R plantar and posterior heel: Every third day cleanse with microKlenz and dry, apply Cavilon no sting barrier film to skin around wound and let dry, then place mepilex. Ensure heel elevated off mattress at all times using pillow under the calves.     Activity - Up with nursing assistance     Patient care order    CPAP at night     Full Code     Physical Therapy Adult Consult    Evaluate and treat as clinically indicated.    Reason:  hospitalization, acute rehab     Occupational Therapy Adult Consult    Evaluate and treat as clinically indicated.    Reason:   hospitalization, deconditioning, acute rehab     Fall precautions     Oxygen Adult/Peds    Oxygen Documentation:   I certify that this patient, Chika Ferguson has been under my care (or a nurse practitioner or physican's assistant working with me). This is the face-to-face encounter for oxygen medical necessity.      Chika Ferguson is now in a chronic stable state and continues to require supplemental oxygen. Patient has continued oxygen desaturation due to Chronic Respiratory Failure with Hypoxia J93.11  Due to Covid-19 pneumonia.    Alternative treatment(s) tried or considered and deemed clinically infective for treatment of pneumonia include nebulizers and pulmonary toileting.  If portability is ordered, is the patient mobile within the home? yes    **Patients who qualify for home O2 coverage under the CMS guidelines require ABG tests or O2 sat readings obtained closest to, but no earlier than 2 days prior to the discharge, as evidence of the need for home oxygen therapy. Testing must be performed while patient is in the chronic stable state. See notes for O2 sats.**     Diet    Follow this diet upon discharge: Cardiac       Significant Results and Procedures   Most Recent 3 CBC's:  Recent Labs   Lab Test 10/26/21  0553 10/23/21  0716 10/22/21  0628   WBC 6.9 7.4 11.2*   HGB 8.8* 9.5* 9.5*   MCV 90 90 89    295 304     Most Recent 3 BMP's:  Recent Labs   Lab Test 10/26/21  0026 10/24/21  0734 10/23/21  1610 10/23/21  0716 10/23/21  0716 10/22/21  0628 10/22/21  0628     --   --   --  136  --  136   POTASSIUM 3.5 3.7 3.7   < > 3.4   < > 3.6   CHLORIDE 94  --   --   --  94  --  93*   CO2 39*  --   --   --  39*  --  40*   BUN 24  --   --   --  31*  --  36*   CR 0.88  --   --   --  0.90  --  1.01   ANIONGAP 4  --   --   --  3  --  3   NAE 9.0  --   --   --  9.2  --  9.2   *  --   --   --  109*  --  109*    < > = values in this interval not displayed.       Discharge Medications   Current  Discharge Medication List      START taking these medications    Details   aspirin (ASA) 81 MG chewable tablet Take 1 tablet (81 mg) by mouth daily    Associated Diagnoses: Acute on chronic diastolic heart failure (H)      atorvastatin (LIPITOR) 40 MG tablet Take 1 tablet (40 mg) by mouth daily    Associated Diagnoses: Acute on chronic diastolic heart failure (H)      miconazole with skin protectant (ANNABELLA ANTIFUNGAL) 2 % CREA cream Apply topically 2 times daily    Associated Diagnoses: Intertrigo         CONTINUE these medications which have CHANGED    Details   bumetanide (BUMEX) 1 MG tablet Take 1 tablet (1 mg) by mouth 2 times daily    Associated Diagnoses: Acute on chronic diastolic heart failure (H)         CONTINUE these medications which have NOT CHANGED    Details   calcium carbonate (OS-NAE) 1500 (600 Ca) MG tablet Take 600 mg by mouth daily      carvedilol (COREG) 3.125 MG tablet Take 3.125 mg by mouth 2 times daily (with meals)      melatonin 3 MG tablet 1 tablet (3 mg) by Oral or Feeding Tube route every evening    Associated Diagnoses: Sleep disturbance      multivitamin (CENTRUM SILVER) tablet Take 1 tablet by mouth daily      pantoprazole (PROTONIX) 40 MG EC tablet Take 40 mg by mouth daily      PARoxetine (PAXIL) 40 MG tablet Take 40 mg by mouth every morning      potassium chloride ER (KLOR-CON M) 20 MEQ CR tablet Take 20 mEq by mouth 2 times daily      simvastatin (ZOCOR) 20 MG tablet Take 20 mg by mouth daily      vitamin D3 (CHOLECALCIFEROL) 50 mcg (2000 units) tablet Take 1 tablet by mouth daily           Allergies   No Known Allergies

## 2021-10-27 NOTE — PLAN OF CARE
Occupational Therapy Discharge Summary    Reason for therapy discharge:    Discharged to acute rehabilitation facility.    Progress towards therapy goal(s). See goals on Care Plan in [facility] electronic health record for goal details.  Goals partially met.  Barriers to achieving goals:   discharge from facility.    Therapy recommendation(s):    Continued therapy is recommended.  Rationale/Recommendations:  Pt would benefit from continued OT services to increase activity tolerance and independence with ADL.

## 2021-10-27 NOTE — PROVIDER NOTIFICATION
Per the Pressure Injury Prevention Patient Refusal Algorithm, Providers are to be notified when patients are refusing intervention to prevent PIs.    Pt has continuously refused:   - Q2h repositioning     Pt already has sacral wound, and refuses side to side repositioning.    Please help to encourage pressure injury prevention measures.

## 2021-10-27 NOTE — LETTER
Recipient: Jacek  Intake           Sender: Juliette Moseley PH: [phone]  Ready to discharge home Fri 11/05  Needs RN (Lymph), PT, OT and HA   LMK if you can accept, thanks!           Date: November 1, 2021  Patient Name:  Chika Ferguson  Routing Message:          The documents accompanying this notice contain confidential information belonging to the sender.  This information is intended only for the use of the individual or entity named above.  The authorized recipient of this information is prohibited from disclosing this information to any other party and is required to destroy the information after its stated need has been fulfilled, unless otherwise required by state law.    If you are not the intended recipient, you are hereby notified that any disclosure, copy, distribution or action taken in reliance on the contents of these documents is strictly prohibited.  If you have received this document in error, please return it by fax to [phone] with a note on the cover sheet explaining why you are returning it (e.g. not your patient, not your provider, etc.).  If you need further assistance, please call .  Documents may also be returned by mail to [employer], , [address], [place], [place] [address].

## 2021-10-27 NOTE — PLAN OF CARE
Pt admitted this shift. Anxious but cooperative. A/O, VS stable. MD and PA came and assessed pt. Continent of bowel and bladder. No issues with transfer to unit, transferred via HE wheelchair. Pt settled and given call light. Continue POC.

## 2021-10-27 NOTE — H&P
[facility]   Acute Rehabilitation Unit  Admission History and Physical    CHIEF COMPLAINT   Generalized weakness    HISTORY OF PRESENT ILLNESS  Chika Ferguson is a 61 year old woman with past medical history of CAD s/p PCI (2016), Heart failure with preserved ejection fraction, HLD, COPD, CARL, morbid obesity, and anxiety who was admitted 10/6/21 with acute hypoxic and hypercapnic respiratory failure in setting of COVID and MSAA pneumonia complicated by ARDS. She reportedly had 5+ days of illness with falls and shortness of breath.     She received antibiotics for MSSA pneumonia and dexamethasone, tocilizumab, and remdesivir for COVID.  She was successfully extubated 10/13/21.   Course also complicated by acute on chronic heart failure, intramuscular hematoma,  Pulmonary hypertension, fall 10/19, acuate on chronic anemia, moderate malnutrition, pressure injury, incontinence associated dermatitis, TIMMY, hyperglycemia, hypernatremia, and hypokalemia.      Functionally noted to have impaired strength, impaired activity tolerance,  impaired balance, impaired coordination, and impaired judgement and safety awareness.  She is requiring set up assist for grooming and hygiene tasts.  Sit to stands with min assist.  CGA for commod transfers. Is ambulating up to 40 feet with walker and cga.      On admission to rehab reports generalized weakness, fatigue and chronic issues with poor sleep.  Describes anxiety associated with laying flat as feels she cant breathe ongoing for years. Denies sob at time of interview.  Denies headaches, dizziness, fevers, and bowel or bladder concerns.  She says appetite is ok.        PAST MEDICAL HISTORY   Reviewed and updated in Epic.  No past medical history on file.   CAD   HF preserved EF  HLD  CARL    SURGICAL HISTORY  Reviewed and updated in Epic.  No past surgical history on file.   S/p PCI    SOCIAL HISTORY  Reviewed and updated in Epic.  Marital  Status: single  Living situation: lives with son 1 santiago and flight up to 2nd lvel- doesn't need to do stairs  Family support: supportive  Vocational History: retired  Tobacco use: denies  Alcohol use: denies  Illicit drug use: denies  Social History     Socioeconomic History     Marital status: Single     Spouse name: Not on file     Number of children: Not on file     Years of education: Not on file     Highest education level: Not on file   Occupational History     Not on file   Tobacco Use     Smoking status: Not on file   Substance and Sexual Activity     Alcohol use: Not on file     Drug use: Not on file     Sexual activity: Not on file   Other Topics Concern     Not on file   Social History Narrative     Not on file     Social Determinants of Health     Financial Resource Strain:      Difficulty of Paying Living Expenses:    Food Insecurity:      Worried About Running Out of Food in the Last Year:      Ran Out of Food in the Last Year:    Transportation Needs:      Lack of Transportation (Medical):      Lack of Transportation (Non-Medical):    Physical Activity:      Days of Exercise per Week:      Minutes of Exercise per Session:    Stress:      Feeling of Stress :    Social Connections:      Frequency of Communication with Friends and Family:      Frequency of Social Gatherings with Friends and Family:      Attends Rastafarian Services:      Active Member of Clubs or Organizations:      Attends Club or Organization Meetings:      Marital Status:    Intimate Partner Violence:      Fear of Current or Ex-Partner:      Emotionally Abused:      Physically Abused:      Sexually Abused:      FAMILY HISTORY  Reviewed and updated in Epic.  No family history on file.    PRIOR FUNCTIONAL HISTORY   Pt was independent with all ADLs/IADLs, transfers, mobility and gait.  Son helped with household chores. Per chart review and several day decline and falls prior to hospitalization.     MEDICATIONS  Scheduled meds  Medications  "Prior to Admission   Medication Sig Dispense Refill Last Dose     calcium carbonate (OS-NAE) 1500 (600 Ca) MG tablet Take 600 mg by mouth daily        carvedilol (COREG) 3.125 MG tablet Take 3.125 mg by mouth 2 times daily (with meals)        multivitamin (CENTRUM SILVER) tablet Take 1 tablet by mouth daily        pantoprazole (PROTONIX) 40 MG EC tablet Take 40 mg by mouth daily        PARoxetine (PAXIL) 40 MG tablet Take 40 mg by mouth every morning        potassium chloride ER (KLOR-CON M) 20 MEQ CR tablet Take 20 mEq by mouth 2 times daily        simvastatin (ZOCOR) 20 MG tablet Take 20 mg by mouth daily        vitamin D3 (CHOLECALCIFEROL) 50 mcg (2000 units) tablet Take 1 tablet by mouth daily        [DISCONTINUED] bumetanide (BUMEX) 2 MG tablet Take 2 mg by mouth 2 times daily        ALLERGIES   No Known Allergies    REVIEW OF SYSTEMS  A 10 point ROS was performed and negative unless otherwise noted in HPI.     PHYSICAL EXAM  VITAL SIGNS:  BP (!) 154/67 (BP Location: Left arm)   Pulse 92   Temp 97.6  F (36.4  C) (Oral)   Resp 18   Ht 1.702 m (5' 7\")   Wt 142.9 kg (315 lb)   BMI 49.34 kg/m    BMI:  Estimated body mass index is 43.91 kg/m  as calculated from the following:    Height as of 10/6/21: 1.727 m (5' 8\").    Weight as of 10/26/21: 131 kg (288 lb 12.8 oz).     General: Awake alert   HEENT: mmm   Pulmonary: non labored clear diminished  Cardiovascular: rrr   Abdominal: obese non tender   Extremities: no tenderness in calves   MSK/neuro:   Mental Status:  alert and oriented x3   Cranial Nerves: grossly normal    Sensory: reported as Normal to light touch in bilateral upper and lower extremities    Strength:    SF  EF  EE  WE  G    HF  KE  DF  PF   Bilateral 4 5 5 5 5  4- 4 5 5    Abnormal movements: None    Speech:clear coherent   Cognition: alert oriented    Gait: not tested  Skin: known pressure injury, dermatitis, wounds dressed/ not examined       LABS  CBC RESULTS: Recent Labs   Lab Test " 10/26/21  0553 10/23/21  0716 10/22/21  0628   WBC 6.9 7.4 11.2*   RBC 3.33* 3.63* 3.58*   HGB 8.8* 9.5* 9.5*   HCT 29.8* 32.8* 31.9*   MCV 90 90 89   MCH 26.4* 26.2* 26.5   MCHC 29.5* 29.0* 29.8*   RDW 19.3* 19.0* 18.5*    295 304     Last Basic Metabolic Panel:  Recent Labs   Lab Test 10/26/21  0026 10/24/21  0734 10/23/21  1610 10/23/21  0716 10/23/21  0716 10/22/21  0628 10/22/21  0628     --   --   --  136  --  136   POTASSIUM 3.5 3.7 3.7   < > 3.4   < > 3.6   CHLORIDE 94  --   --   --  94  --  93*   CO2 39*  --   --   --  39*  --  40*   ANIONGAP 4  --   --   --  3  --  3   *  --   --   --  109*  --  109*   BUN 24  --   --   --  31*  --  36*   CR 0.88  --   --   --  0.90  --  1.01   GFRESTIMATED 70  --   --   --  68  --  59*   NAE 9.0  --   --   --  9.2  --  9.2    < > = values in this interval not displayed.       IMPRESSION/PLAN:  Chika Ferguson is a 61 year old woman with past medical history of chronic heart failure, pulmonary hypertension, chronic anemia, cad, gerd, tahmina, and hld who presented with acute hypoixc-hypercapnic respiratory failure in setting of COVID 19, MSSA PNA, acute on chronic heart failure.  Course further complicated by intramuscular hematoma, fall, pressure injury, electrolyte abnormalities, moderate malnutrition,  and acute kidney injury.     Admitted to acute rehab 10/27 in setting of impaired strength, impaired activity tolerance, impaired coordination,  impaired balance, and impaired safety awareness.     Admission to acute inpatient rehab Debility  Impairment group code: 16    1. PT, OT 90 minutes of each on a daily basis, in addition to rehab nursing and close management of physiatrist.      2. Impairment of ADL's: Noted to have impaired strength, impaired activity tolerance, and impaired coordination leading to decreased ability to independently complete ADL's.  Will benefit from ongoing OT with goal for MOD I with basic ADLs.     3. Impairment of mobility:   Noted to have impaired strength, impaired activity tolerance, and impaired balance leading to decreased mobility.  Will benefit from ongoing PT with goal for MESSI with basic mobility     4. Medical Conditions  Acute hypoxic-hypercapnic respiratory failure   multifactorial COVID, MSSA PNA, acute on chronic heart failure, pulmonary HTN. Intubated 10/6 extubated 10/13.   -oxygen- titrate and taper  -completed tx for covid and mssa, continues on diuretics   -encourage IS    CoVID recovered  Debility  Received tx and completed 20 day course of isolation.   -continue PT/OT  -lovenox dvt prophylaxis plan for 30 day course oral agent     CARL-   Continue cpap at bedtime     Acute on Chronic Heart Failure  Pulmonary HTN  History of HF with preserved EF and Pulmonary HTN on bumex pta.  BNP elevated on admission. TTE (10/6) LVEF 55-60%, mild LVH, indeterminate diastolic function, moderate to severe RV dilation w moderate to severely reduced RV function, and dilated IVC. Pulmonary pressures could not be assessed on TTE. CTA chest negative for PE but showed severely dilated pulmonary trunk c/w pulmonary HTN. Seen by cardiology   -f/u Dr. Hughes 2-4 weeks (pulmonary HTN/ Right heart cath  -continue bumex  -trend weights     CAD  Hx pci 2016.  Mild troponin elevation felt to be demand ischemia.   -continue asa & statin  -continue coreg 3.125 mg bid     Acute on Chronic Anemia  Hgb 8.8 baseline appears to be 9-10  -trend in am     GERD  -continue ppi    Dermatitis  Intertrigo  Pressure injury Right heel   -appreciate WOCN assistance with managment.   -continue wound care as ordered.     Anxiety  Insomnia  Reports long standing issues with anxiety and associates this with poor sleep  On paxil pta  -continue melatonin  -psychology for emotional support  -consider trazodone or alternative for sleep     5. Adjustment to disability:  Clinical psychology to eval and treat as indicated  6. FEN: reg  7. Bowel: monitor  8. Bladder:  monitor  9. DVT Prophylaxis: Lovenox plan to transition to apixaban prior to discharge  10. GI Prophylaxis: Protonix (chronic)  11. Code: full confirmed on admission   12. Disposition: home with family support  13. ELOS:  7-10 days.  14. Rehab prognosis: fair  15. Follow up Appointments on Discharge: pcp, post covid, cardiology     discussed with  , PM&R staff physician     Trista Olivares PA-C  Rehab Service

## 2021-10-27 NOTE — PLAN OF CARE
Assumed care [id number]    L PIV SL. Pt on 1.5 L NC, wearing CPAP at night. SBA/Ax1 pivoting to the commode. L PIV SL. Sleeping between cares. Purewick in place overnight. Pt is able to make needs known. Pt refused repositioning.    Plan for possible discharge to  ARU

## 2021-10-27 NOTE — DISCHARGE INSTRUCTIONS
"You were hospitalized for pneumonia - this was caused by a bacteria called MSSA, and Covid may have also contributed. You were intubated for about one week, and have steadily improved after the breathing machine was removed when not needed. You should expect, with rehab, continued improvement in your lung function over the next 1-2 weeks - hopefully getting off oxygen entirely.    You were found to have extra \"water weight\" and this was reduced with water pills (pill or IV) - this is known as an exacerbation of heart failure. You also have large pulmonary (lung) vessels and you could have what's called Pulmonary Hypertension. You are being referred to a specialist cardiology at the [facility] to help look into this possibility.    You will follow up with physicians at the Acute Rehab Unit. As we discussed, keep an open mind and do your best with therapies so you can give yourself the best shot to get back home in [place].  "

## 2021-10-27 NOTE — PROGRESS NOTES
Care Management Discharge Note    Discharge Date: 10/27/2021    Discharge Disposition: ARU  [facility]  [address]  [place]  [address]  P: [phone]  F: [phone]    Discharge Transportation: Wheelchair ride scheduled via [facility] [place] [facility] ([phone]) 10/27 1p. ANGY requested O2.    Private pay costs discussed: transportation costs    PAS Confirmation Code: N/A  Patient/family educated on Medicare website which has current facility and service quality ratings:  yes    Education Provided on the Discharge Plan:  yes  Persons Notified of Discharge Plans: provider, facility, 5B nursing, pt  Patient/Family in Agreement with the Plan: yes    Handoff Referral Completed: Yes    Additional Information:  ANGY spoke with Eliza who confirmed that [facility] can take pt today. ANGY paged Dr. Guadarrama to notify. ANGY attempted to notify pt via bedside phone- no answer.    ANGY updated bedside nurse Inez who mentioned that pt would be appropriate for a wheelchair ride.ANGY noted that pt needs to be able to make adjustments to o2 if needed and this was confirmed. ANGY called [facility] [place] [facility] ([phone]) to modify transport.    TALYA Santoyo, [facility]  5A Acute Care   PH: [phone]  Pager: [phone]  [facility] [place]

## 2021-10-27 NOTE — LETTER
Health Information Management Services               Recipient: Jacek  Intake          Sender: Jennifer Mack (covering for Juliette Moseley today).  #: [phone].          Date: November 5, 2021  Patient Name:  Chika Ferguson  Routing Message:  Discharge orders for Chika Ferguson. Thank you!          The documents accompanying this notice contain confidential information belonging to the sender.  This information is intended only for the use of the individual or entity named above.  The authorized recipient of this information is prohibited from disclosing this information to any other party and is required to destroy the information after its stated need has been fulfilled, unless otherwise required by state law.      If you are not the intended recipient, you are hereby notified that any disclosure, copy, distribution or action taken in reliance on the contents of these documents is strictly prohibited.  If you have received this document in error, please return it by fax to [phone] with a note on the cover sheet explaining why you are returning it (e.g. not your patient, not your provider, etc.).  If you need further assistance, please call [facility] Centralized Transcription at [phone].  Documents may also be returned by mail to "[employer]", , [facility] [address], [place], [place] [address].

## 2021-10-28 ENCOUNTER — APPOINTMENT (OUTPATIENT)
Dept: OCCUPATIONAL THERAPY | Facility: CLINIC | Age: 61
DRG: 948 | End: 2021-10-28
Attending: PHYSICAL MEDICINE & REHABILITATION
Payer: MEDICARE

## 2021-10-28 ENCOUNTER — APPOINTMENT (OUTPATIENT)
Dept: PHYSICAL THERAPY | Facility: CLINIC | Age: 61
DRG: 948 | End: 2021-10-28
Attending: PHYSICAL MEDICINE & REHABILITATION
Payer: MEDICARE

## 2021-10-28 LAB
ANION GAP SERPL CALCULATED.3IONS-SCNC: 4 MMOL/L (ref 3–14)
BUN SERPL-MCNC: 22 MG/DL (ref 7–30)
CALCIUM SERPL-MCNC: 9.1 MG/DL (ref 8.5–10.1)
CHLORIDE BLD-SCNC: 96 MMOL/L (ref 94–109)
CO2 SERPL-SCNC: 34 MMOL/L (ref 20–32)
CREAT SERPL-MCNC: 0.87 MG/DL (ref 0.52–1.04)
ERYTHROCYTE [DISTWIDTH] IN BLOOD BY AUTOMATED COUNT: 19 % (ref 10–15)
GFR SERPL CREATININE-BSD FRML MDRD: 71 ML/MIN/1.73M2
GLUCOSE BLD-MCNC: 106 MG/DL (ref 70–99)
HCT VFR BLD AUTO: 29.8 % (ref 35–47)
HGB BLD-MCNC: 9.1 G/DL (ref 11.7–15.7)
MCH RBC QN AUTO: 26.9 PG (ref 26.5–33)
MCHC RBC AUTO-ENTMCNC: 30.5 G/DL (ref 31.5–36.5)
MCV RBC AUTO: 88 FL (ref 78–100)
PLATELET # BLD AUTO: 259 10E3/UL (ref 150–450)
POTASSIUM BLD-SCNC: 3.8 MMOL/L (ref 3.4–5.3)
RBC # BLD AUTO: 3.38 10E6/UL (ref 3.8–5.2)
SODIUM SERPL-SCNC: 134 MMOL/L (ref 133–144)
WBC # BLD AUTO: 6.6 10E3/UL (ref 4–11)

## 2021-10-28 PROCEDURE — 97116 GAIT TRAINING THERAPY: CPT | Mod: GP | Performed by: PHYSICAL THERAPIST

## 2021-10-28 PROCEDURE — 97602 WOUND(S) CARE NON-SELECTIVE: CPT

## 2021-10-28 PROCEDURE — 97530 THERAPEUTIC ACTIVITIES: CPT | Mod: GO | Performed by: OCCUPATIONAL THERAPIST

## 2021-10-28 PROCEDURE — 999N000125 HC STATISTIC PATIENT MED CONFERENCE < 30 MIN: Performed by: OCCUPATIONAL THERAPIST

## 2021-10-28 PROCEDURE — 85027 COMPLETE CBC AUTOMATED: CPT | Performed by: PHYSICIAN ASSISTANT

## 2021-10-28 PROCEDURE — 250N000011 HC RX IP 250 OP 636: Performed by: PHYSICIAN ASSISTANT

## 2021-10-28 PROCEDURE — 97110 THERAPEUTIC EXERCISES: CPT | Mod: GP | Performed by: PHYSICAL THERAPIST

## 2021-10-28 PROCEDURE — 999N000157 HC STATISTIC RCP TIME EA 10 MIN

## 2021-10-28 PROCEDURE — 97110 THERAPEUTIC EXERCISES: CPT | Mod: GO | Performed by: OCCUPATIONAL THERAPIST

## 2021-10-28 PROCEDURE — 82374 ASSAY BLOOD CARBON DIOXIDE: CPT | Performed by: PHYSICIAN ASSISTANT

## 2021-10-28 PROCEDURE — 97161 PT EVAL LOW COMPLEX 20 MIN: CPT | Mod: GP | Performed by: PHYSICAL THERAPIST

## 2021-10-28 PROCEDURE — 97535 SELF CARE MNGMENT TRAINING: CPT | Mod: GO | Performed by: OCCUPATIONAL THERAPIST

## 2021-10-28 PROCEDURE — 999N000150 HC STATISTIC PT MED CONFERENCE < 30 MIN: Performed by: PHYSICAL THERAPIST

## 2021-10-28 PROCEDURE — 128N000003 HC R&B REHAB

## 2021-10-28 PROCEDURE — 36416 COLLJ CAPILLARY BLOOD SPEC: CPT | Performed by: PHYSICIAN ASSISTANT

## 2021-10-28 PROCEDURE — 250N000013 HC RX MED GY IP 250 OP 250 PS 637: Performed by: PHYSICIAN ASSISTANT

## 2021-10-28 PROCEDURE — 250N000013 HC RX MED GY IP 250 OP 250 PS 637: Performed by: STUDENT IN AN ORGANIZED HEALTH CARE EDUCATION/TRAINING PROGRAM

## 2021-10-28 PROCEDURE — 97166 OT EVAL MOD COMPLEX 45 MIN: CPT | Mod: GO | Performed by: OCCUPATIONAL THERAPIST

## 2021-10-28 PROCEDURE — 94660 CPAP INITIATION&MGMT: CPT

## 2021-10-28 PROCEDURE — 99233 SBSQ HOSP IP/OBS HIGH 50: CPT | Performed by: PHYSICAL MEDICINE & REHABILITATION

## 2021-10-28 PROCEDURE — 97112 NEUROMUSCULAR REEDUCATION: CPT | Mod: GP | Performed by: PHYSICAL THERAPIST

## 2021-10-28 PROCEDURE — G0463 HOSPITAL OUTPT CLINIC VISIT: HCPCS | Mod: 25

## 2021-10-28 RX ADMIN — BUMETANIDE 1 MG: 0.5 TABLET ORAL at 16:42

## 2021-10-28 RX ADMIN — HYDROXYZINE HYDROCHLORIDE 25 MG: 25 TABLET, FILM COATED ORAL at 21:43

## 2021-10-28 RX ADMIN — ASPIRIN 81 MG CHEWABLE TABLET 81 MG: 81 TABLET CHEWABLE at 08:11

## 2021-10-28 RX ADMIN — MULTIPLE VITAMINS W/ MINERALS TAB 1 TABLET: TAB at 08:11

## 2021-10-28 RX ADMIN — POTASSIUM CHLORIDE 20 MEQ: 750 TABLET, EXTENDED RELEASE ORAL at 20:24

## 2021-10-28 RX ADMIN — SIMVASTATIN 20 MG: 20 TABLET, FILM COATED ORAL at 20:24

## 2021-10-28 RX ADMIN — PANTOPRAZOLE SODIUM 40 MG: 40 TABLET, DELAYED RELEASE ORAL at 08:11

## 2021-10-28 RX ADMIN — CARVEDILOL 3.12 MG: 3.12 TABLET, FILM COATED ORAL at 08:12

## 2021-10-28 RX ADMIN — POTASSIUM CHLORIDE 20 MEQ: 750 TABLET, EXTENDED RELEASE ORAL at 08:11

## 2021-10-28 RX ADMIN — ENOXAPARIN SODIUM 40 MG: 40 INJECTION SUBCUTANEOUS at 08:11

## 2021-10-28 RX ADMIN — Medication 600 MG: at 08:11

## 2021-10-28 RX ADMIN — BUMETANIDE 1 MG: 0.5 TABLET ORAL at 08:11

## 2021-10-28 RX ADMIN — ENOXAPARIN SODIUM 40 MG: 40 INJECTION SUBCUTANEOUS at 20:31

## 2021-10-28 RX ADMIN — PAROXETINE 40 MG: 40 TABLET, FILM COATED ORAL at 08:11

## 2021-10-28 RX ADMIN — MICONAZOLE NITRATE: 20 CREAM TOPICAL at 20:32

## 2021-10-28 RX ADMIN — Medication 50 MCG: at 08:12

## 2021-10-28 RX ADMIN — CARVEDILOL 3.12 MG: 3.12 TABLET, FILM COATED ORAL at 17:59

## 2021-10-28 RX ADMIN — MELATONIN TAB 3 MG 3 MG: 3 TAB at 20:24

## 2021-10-28 ASSESSMENT — ACTIVITIES OF DAILY LIVING (ADL)
PREVIOUS_RESPONSIBILITIES: MEAL PREP;HOUSEKEEPING;LAUNDRY;MEDICATION MANAGEMENT;FINANCES;DRIVING
ADLS_ACUITY_SCORE: 14
ADLS_ACUITY_SCORE: 12
ADLS_ACUITY_SCORE: 12
ADLS_ACUITY_SCORE: 14
ADLS_ACUITY_SCORE: 12
ADLS_ACUITY_SCORE: 14
ADLS_ACUITY_SCORE: 12
ADLS_ACUITY_SCORE: 14
ADLS_ACUITY_SCORE: 12
ADLS_ACUITY_SCORE: 14

## 2021-10-28 NOTE — PROGRESS NOTES
10/28/21 0811   Quick Adds   Type of Visit Initial PT Evaluation   Living Environment   People in home child(delfin), adult   Current Living Arrangements house   Home Accessibility stairs to enter home;stairs within home   Number of Stairs, Main Entrance 1   Number of Stairs, Within Home, Primary other (see comments)   Transportation Anticipated family or friend will provide   Living Environment Comments PT: Pt lives with adult son in a 2 level home in [place], [place]. The home has 1STE and then pt able to access all living spaces on main level. Bathroom setup: walk in shower (2 benches and grab bar) and toilet with raised toilet seat. Bedroom: standard queen bed, no rails or moveable features. Home is wood pipo with open spaces. Son not working and able to assist.    Self-Care   Usual Activity Tolerance moderate   Current Activity Tolerance fair   Regular Exercise No   Equipment Currently Used at Home grab bar, tub/shower;tub bench;other (see comments)   Activity/Exercise/Self-Care Comment Pt was independent with all ADLs/IADLs, transfers, mobility and gait.  Son helped with household chores. Per chart review and several day decline and falls prior to hospitalization.   oxygen with cpap    Disability/Function   Hearing Difficulty or Deaf no   Wear Glasses or Blind yes   Vision Management Glasses for  reading   Concentrating, Remembering or Making Decisions Difficulty no   Difficulty Communicating no   Difficulty Eating/Swallowing no   Walking or Climbing Stairs Difficulty no   Walking or Climbing Stairs other (see comments)  (no issue prior)   Dressing/Bathing Difficulty no   Toileting issues no   Doing Errands Independently Difficulty (such as shopping) no   Fall history within last six months yes   Number of times patient has fallen within last six months 1   Change in Functional Status Since Onset of Current Illness/Injury yes   General Information   Onset of Illness/Injury or Date of Surgery 10/05/21    Referring Physician CARRIE Menjivar/ DR Barth    Patient/Family Therapy Goals Statement (PT) Pt wants to get home in 7 days .  Pt states she lost her fiance 9/2020 from Covid-19.     Pertinent History of Current Problem (include personal factors and/or comorbidities that impact the POC) 61 year old woman with  chronic heart failure, pulmonary hypertension, chronic anemia, cad, gerd, tahmina, and hld presented with acute hypoixc-hypercapnic respiratory failure in setting of COVID 19, MSSA PNA, acute on chronic heart failure.  Course further complicated by intramuscular hematoma, fall, pressure injury, electrolyte abnormalities, moderate malnutrition,  and acute kidney injury.    Existing Precautions/Restrictions fall;oxygen therapy device and L/min   Heart Disease Risk Factors High blood pressure;Lack of physical activity;Dislipidemia;Overweight;Stress;Medical history   Cognition   Cognitive Status Comments PT: Pt able to give details of life, hopsitalization accurately.  Alert, O x 4 .    Pain Assessment   Patient Currently in Pain Yes, see Vital Sign flowsheet   Integumentary/Edema   Integumentary/Edema other (describe)   Integumentary/Edema Comments PT: pt with coccyx  R heel pressure injury , .  Pt with anterior L lower leg hematoma, redness noted.  Pt with multiple bruises on body.   Skin discoloration, redness noted with dry skin dital lower leg B Les.    Posture    Posture Forward head position   Range of Motion (ROM)   ROM Comment WFL B LEs , pt up in recliner.    Strength   Strength Comments PT: hip flexor 2/5, knee ext 3+/5, flex 3+/5, ankle DF/PF 3+/5. B LEs.    ARC Assessment Only   Acute Rehab Functional Assessment See IP Rehab Daily Documentation Flowsheet for Functional Mobility/ADL Assessment   Balance   Balance other (describe)   Balance Comments PT: Pt able to stand about 30 seconds without UE support, sba, but needs FWW with ambulation.   Pt unable to perform SLS without UE support.  Pt able  to stand with eyes closed 10 sec, slight sway but no overt lob, cga.  Modified tandem stance with cga.    Sensory Examination   Sensory Perception Comments PT: pt reports carpal tunnel with some tingling at times.  P tintact to light touch B feet.     Coordination   Coordination no deficits were identified   Muscle Tone   Muscle Tone no deficits were identified   Clinical Impression   Criteria for Skilled Therapeutic Intervention yes, treatment indicated   PT Diagnosis (PT) PT: Impaired mobility, ADls due to debility from Covid and ARDs , complex medical history including HF with preserved EF, and pulmonary HTN .    Influenced by the following impairments PT: Pt with impaired activity tolerance, needing supplemental O2 with all activity, decreased LE strength, balance.  PT also with skin integrity issues including R heel, and coccyx ulcer.    Functional limitations due to impairments PT: Pt with difficulty with bed mobility, transfers, gait, stairs.    Clinical Presentation Evolving/Changing   Clinical Presentation Rationale see below.    Clinical Decision Making (Complexity) low complexity   Therapy Frequency (PT) Daily   Predicted Duration of Therapy Intervention (days/wks) 7 days    Planned Therapy Interventions (PT) balance training;bed mobility training;gait training;home exercise program;neuromuscular re-education;patient/family education;stair training;strengthening;transfer training;progressive activity/exercise;risk factor education;home program guidelines   Anticipated Equipment Needs at Discharge (PT) other (see comments)   Risk & Benefits of therapy have been explained evaluation/treatment results reviewed;care plan/treatment goals reviewed;risks/benefits reviewed;current/potential barriers reviewed;participants voiced agreement with care plan;participants included;patient   Clinical Impression Comments PT: Pt is s/p COvid illness, with significant PMH for lung issues, and sedentary lifestyle, who  presents with difficulty with mobility, needing supplemental O2, and functioning below her previous level of function.  Pt appears motivated to get home soon. Pt may benefit from using a 4ww for mobility.     Total Evaluation Time   Total Evaluation Time (Minutes) 20   Skin WDL   Skin WDL X   Skin Color redness blanchable   Skin Temperature warm   Skin Moisture dry,flaky   Skin Integrity/Characteristics bruised;rash;other (see comments)  (wound to coccyx/R heel)

## 2021-10-28 NOTE — CARE CONFERENCE
Acute Rehab Care Conference/Team Rounds      Type: Team Rounds    Present: Dr. Edi Acharya, Trista Olivares PA, Pao Villarreal RN, Pepper Light PT, Radha Holloway OT, Juliette NEGRO, Piper Jeff Dietician, Patient Chika Ferguson      Discharge Barriers/Treatment/Education    Rehab Diagnosis: post Covid    Active Medical Co-morbidities/Prognosis:     Patient Active Problem List   Diagnosis Code     Acute respiratory failure with hypoxia (H) J96.01     Pneumonia due to 2019 novel coronavirus U07.1, J12.82     Pneumonia of both lower lobes due to methicillin susceptible Staphylococcus aureus (MSSA) (H) J15.211     Pulmonary hypertension (H) I27.20     Intertrigo L30.4     Sleep disturbance G47.9     Acute on chronic diastolic heart failure (H) I50.33     CARL (obstructive sleep apnea) G47.33     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 Educated about the importance to call nursing staff before self transfer for safety. Pt may have severe anxiety attack at times. It takes time for nursing staff to calm her down. Assist of 1 person with gait belt and walker to bathroom.     Pain: No c/o pain. No s/s of chest pain noted.     Medications, Skin, Tubes/Lines: Regular diet and takes pills whole. Bruises noted all over the body. BLE redness noted. Pressure injuries on right coccyx and right heel. She is on 2L NC; C-PAP at nights.     Swallowing/Nutrition: reg with thins     Bowel/Bladder: Continent with bowel and bladder. LBM 10/27.     Psychosocial: SW assessment pending. See consult for more information.     ADLs/IADLs: Initial OT evaluation in progress. Pt requiring close SBA/CGA SPT and mobility with fww, assist to manage O2 lines. Pt requiring Min A to stand from lower toilet height and tolerates short duration standing at EOS. Pt requiring 2L O2 NC with activity. Performance limited by deconditioning, weakness, O2 needs,  impaired balance. ADLs to be assessed tomorrow am with shower. Goals to advance to Mod IND ADLs and simple IADLs. Pt lives with son who can provide assistance for heavier IADLs. Anticipate 7 day length of stay with HC OT follow up. DME/AE: shower bench, walker, O2, grab bars, commode overlay.     Mobility: Pt is moving pretty well, just started PT this am for a short session this am.  Pt is needing 2 L O2 with ambulation, using fww , close sba, A for O2 tank, about 130 ft x 1, samson by JOHANNY DE JESUS weakness, fatigue.   Close sba from recliner to stand. ELOS - about 7 days.  Pt may benefit from use of a 4ww at home.  Pt will likely need  Home PT at discharge.    Cognition/Language: To be assessed.    Community Re-Entry:  Pt will need to continue to work on this goal as an outpatient.     Transportation: A car transfer will not be a barrier to discharge.      Decision maker: self    Plan of Care and goals reviewed and updated.    Discharge Plan/Recommendations    Fall Precautions: continue    Patient/Family input to goals: yes     Estimated length of stay: 16 days     Overall plan for the patient: reach a level of mod I       Utilization Review and Continued Stay Justification    Medical Necessity Criteria:    For any criteria that is not met, please document reason and plan for discharge, transfer, or modification of plan of care to address.    Requires intensive rehabilitation program to treat functional deficits?: Yes    Requires 3x per week or greater involvement of rehabilitation physician to oversee rehabilitation program?: Yes    Requires rehabilitation nursing interventions?: Yes    Patient is making functional progress?: Yes    There is a potential for additional functional progress? Yes    Patient is participating in therapy 3 hours per day a minimum of 5 days per week or 15 hours per week in 7 day period?:Yes    Has discharge needs that require coordinated discharge planning approach?:Yes      Barriers/Concerns  related to meeting medical necessity criteria:  None     Team Plan to Address Concern:  As needed       Final Physician Sign off    Statement of Approval:  Edi Barth, DO      Patient Goals  Social Work Goals: Confirm discharge recommendations with therapy, coordinate safe discharge plan and remain available to support and assist as needed.      OT evaluation in progress. See POC for goals and ELOS.    PT evaluation in progress. See POC for goals and ELOS.         RN Goal: Wound Management: patient will be free from infection to wounds prior to discharge from ARU.  RN Goal: Mobility: Pt will graduate to Mod-I prior to discharge from ARU.  RN Goal: Safety Management: Patient will use call light to make needs known and to be free from falls prior to discharge from ARU

## 2021-10-28 NOTE — PLAN OF CARE
FOCUS/GOAL  Bowel management, Bladder management, Pain management, Mobility, Skin integrity, and Safety management    ASSESSMENT, INTERVENTIONS AND CONTINUING PLAN FOR GOAL:  A/O, VS stable. Regular/thin, pills whole. Ate most of meal. Continent of bowel and bladder, last BM yesterday. No complaints of pain this shift. Assist of 1 with walker to transfer. No new skin concerns. All dressings intact. Rounds today, pt discussed concerns with team. Assisting with meal orders, order updated to reflect this. Calls appropriately with light. Continue POC.

## 2021-10-28 NOTE — PROGRESS NOTES
Pt A&O x4. Uses call light to make needs known. Pt reported anxiety upon laying flat in bed and requested recliner; mason recliner delivered and pulsate mattress placed. Requested prn atarax per hospital orders; TO. Atarax admin at 2311. Showed pt the relaxation channel on 47 and encouraged to take slow, deep breaths.  CPAP machine at bedside; RT to come up to  adjust. 02 @ 1L with sats above 90%. Blanchable redness to tops of ears and posterior septum due to 02 tubing; foam applicators to 02 tubing. Wounds noted to R heel and coccyx WOC ordered and wound care orders noted.. Cont B&B; LBM 10/27 up to toilet. CGA with walker and A1. Reg/thin/meds whole. Alarms activated to chair and bed. Call light within reach.

## 2021-10-28 NOTE — CONSULTS
Social Work: Initial Assessment with Discharge Plan    Patient Name: Chika Ferguson  : 1960  Age: 61 year old  MRN: [id number]  Completed assessment with: Chart review and interview with patient   Admitted to ARU: 10/27/2021    Presenting Information   Date of SW assessment: 2021  Health Care Directive: Provided education and Health Care Directive Agent (if patient not able to make decisions)--Will bring copy of HCD and POA as pt does not have either one.   Primary Health Care Agent: Patient/self   Secondary Health Care Agent: Pt adult children, 3 adult sons.    Living Situation: Lives with son and DIL in a home in [place], [place]. 13 y/o grandson comes every other weekend. Pt also has a black lab dog at home. 1 ROMELIA and flight up to the second level. Doesn't need to do stairs.   Previous Functional Status: Pt was independent with all ADLs/IADLs, transfers, mobility and gait. Son helped with household chores. Per chart review pt reported being weaker PTA, several day decline and falls prior to hospitalization. Was driving and completing her own errands. Per Admissions PASS--Watches TV and doesn't do much normally. More challenging to get around since  passed away. Pt confirmed that she manages her own medications and finances.   DME available: [facility] O2 @ [facility] and CPAP from [facility].   Patient and family understanding of hospitalization: Appears alert but anxious, pleasant.   Cultural/Language/Spiritual Considerations: 62 y/o woman, single, english-speaking and Mu-ism-jesu.       Physical Health  Reason for admission: Chika Ferguson is a 61 year old woman with past medical history of chronic heart failure, pulmonary hypertension, chronic anemia, cad, gerd, tahmina, and hld who presented with acute hypoixc-hypercapnic respiratory failure in setting of COVID 19, MSSA PNA, acute on chronic heart failure.  Course further complicated by intramuscular hematoma, fall, pressure injury, electrolyte  "abnormalities, moderate malnutrition,  and acute kidney injury.     Provider Information   Primary Care Physician:Edwin Joshi   [facility] [address] [place] [facility]  PH: [phone]  : None reported     Mental Health/Chemical Dependency:   Diagnosis: Long hx of anxiety and poor sleep as a result. Paxil PTA. Melatonin, psychology consulted and considering other medications for sleep. Per H&P.   Per admissions PASS screen--Watches TV and doesn't do much normally. More challenging to get around since  passed away.  Alcohol/Tobacco/Narcotis: None reported   Support/Services in Place: Medication-management. Pt stated that she is interested in some resources and support. Pt reported feeling \"suffocated\" when trying to lay flat.   Services Needed/Recommended: Supportive services available by consult (Health Psychology and [place] services)   Sexuality/Intimacy: Not discussed     Support System  Marital Status: Single/ (rochelle  last year from COVID). Pt sold the house after he passed away and moved in with her son.   Family support: Lives with adult son Sarath and his wife. Pt stated that this is her long-term plan as Sarath has a large home and able to A. Pt has two other sons, Mateusz and Lewis who both live in [place], [place].   Other support available: Not discussed.     Community Resources  Current in home services: None reported  Previous services: None reported, pt denied     Financial/Employment/Education  Employment Status: Retired /disbility-worked at grocery store (quit 2 years ago)  Income Source: SSDI ($1,021) and pension ($169)  Education: 12th grade, denied any special education   Financial Concerns:  None reported   Insurance: MEDICARE/MEDICARE, no secondary insurance listed. Discussed resources and exploring options for secondary/supplemental insurance and plan to bring pt information.     Discharge Plan   Patient and family discharge goal: Home " Friday No 5th, pending progress. HC recommended, will set up.   Provided Education on discharge plan: Yes  Patient agreeable to discharge plan:  Yes  Provided education and attained signature for Medicare IM and IRF Patient Rights and Privacy Information provided to patient : YES  Provided patient with [place] [facility] [place] Resources: N/A  Barriers to discharge: None identified     Discharge Recommendations   Disposition: See above   Transportation Needs: Family assistance   Name of Transportation Company and Phone: N/A     Additional comments   Discharge target Friday 11/05 with family support and HC. Will set up HC before discharge home. Good family support reported. No immediate needs or concerns at this time. SW will remain available and continue to follow as needs arise.     Please invite to Care Conference:  JESSENIA, N/A at this time     SRUTHI Dumont, CAD-[place]  [facility]   Phone: [phone]  I   Pager: [phone]           05-Dec-2018 11:23

## 2021-10-28 NOTE — PROGRESS NOTES
[facility] Nurse Inpatient Wound Assessment   Reason for consultation: Evaluate and treat  Buttock, pannus and heel wounds    Assessment  Buttock and pannus wounds due to Incontinence Associated Dermatitis (IAD), Intertrigo and Moisture Associated Skin Damage (MASD) with fungal involvement   Status: healed except for small pimple on right fleshy buttock      Right posterior heel with pressure injury, hospital acquired, Stage Deep tissue pressure injury  Status: healed    Right plantar heel wound due to pressure injury, present on admission  Resurfaced 10/26    Treatment Plan  Right buttock wound: wash daily with wound cleanser and gauze. Cover with Mepilex 4x4.     Pannus: OK to discontinue antifungal cream, continue to perform good skin care.    Right plantar and posterior heel: Every third day cleanse with microKlenz and dry, apply Cavilon no sting barrier film to skin around wound and let dry, then place mepilex. Ensure heel elevated off mattress at all times using pillow under the calves.      Orders Updated  WOC Nurse follow-up plan:signing off  Nursing to notify the Provider(s) and re-consult the WO Nurse if wound(s) deteriorates or new skin concern.    Patient History  According to provider note(s):  Chika Ferguson is a 61 year old female who presents with acute hypoxic respiratory failure. Her PMH includes pulmonary hypertension, history of Covid at the end of 2020, COPD, CARL, CAD with history of stenting (unknown vessel-no records). Cards consulted for evaluation of severe RV dysfunction noted on echo.    Objective Data  Containment of urine/stool: Incontinence Protocol    Active Diet Order  Orders Placed This Encounter      Combination Diet Regular Diet Adult      Output:   No intake/output data recorded.    Risk Assessment:   Sensory Perception: 4-->no impairment  Moisture: 3-->occasionally moist  Activity: 3-->walks occasionally  Mobility: 3-->slightly limited  Nutrition: 3-->adequate  Friction and Shear:  2-->potential problem  Mychal Score: 18                          Labs:   Recent Labs   Lab 10/28/21  0754   HGB 9.1*   WBC 6.6       Physical Exam  Areas of skin assessed: focused buttock, pannus and heels      Wound Location:  Right posterior heel   Wound History: hospital acquired pressure injury   Healed on assessment 10/28/2021    Wound Location:  Right plantar heel   Wound History: patient found stuck in chair for extended period of time likely causing plantar heel pressure injury   Healed on assessment 10/28/2021    Wound Location: Right buttock  Pimple to buttock, healing    Interventions  Visual inspection and assessment completed   Wound Care Rationale Promote moist wound healing without tissue dehydration , Provide protection  and Decrease bacterial load  Wound Care: completed by RN  Supplies: floor stock and discussed with RN  Current off-loading measures: Pillows  Current support surface: Standard  Low air loss mattress  Education provided to: plan of care  Discussed plan of care with Nurse    Rosa Membreno RN, CWOCN

## 2021-10-28 NOTE — PROGRESS NOTES
"CLINICAL NUTRITION SERVICES - ASSESSMENT NOTE     Nutrition Prescription    RECOMMENDATIONS FOR MDs/PROVIDERS TO ORDER:  None today - pt reviewed face to face during team rounds     Malnutrition Status:    Patient does not meet two of the established criteria necessary for diagnosing malnutrition    Recommendations already ordered by Registered Dietitian (RD):  G[place] Gelatein once daily at lunch meal    Future/Additional Recommendations:  Monitor meal intakes and supplement acceptance  Monitor weight and lab trends      REASON FOR ASSESSMENT  Chika Ferguson is a/an 61 year old female assessed by the dietitian for Positive Admission Nutrition Risk Screen (>34 lb wt loss)    NUTRITION/MEDICAL HISTORY  Per chart review: Pt admits to ARU for rehabilitation in the setting of s/p critical illness from COVID-19.   past medical history of CAD s/p PCI (2016), Heart failure with preserved ejection fraction, HLD, COPD, CARL, morbid obesity, and anxiety.    Pt reviewed during team rounds and visited at bedside, reviewed weight trends below. Pt tolerating meals well, RD reviewed the importance of adequate nutritional intakes for healing of skin and for fluid mobilization, reviewed protein sources at meals and discussed supplement options, pt agreeing to Gelatein.     CURRENT NUTRITION ORDERS  Diet: Regular  Intake/Tolerance: 100% thus far per flow sheets     LABS  Labs reviewed    MEDICATIONS  Paxil, Zocor, Bumex, Oscal, Klor-con, MVI, Protonix, Vit D    ANTHROPOMETRICS  Height: 170.2 cm (5' 7\")  Most Recent Weight: 142.9 kg (315 lb)    IBW: 61.3 kg  BMI: Obesity Grade III BMI >40   UBW: Pt reports around 360 lbs prior to hospital admission   Hospital Weights:   10/07/21 0000 159.3 kg (351 lb 3.1 oz)   10/06/21 0400 162.2 kg (357 lb 9.4 oz)     Weight assessment: Significant 11.7% weight loss in not quite 1 month.     Dosing Weight: 81.8 kg    ASSESSED NUTRITION NEEDS  Estimated Energy Needs: 2291-4178 kcals/day (20 - 25 " kcals/kg)  Justification: Obese  Estimated Protein Needs: 100-120 grams protein/day (1.2-1.5 grams of pro/kg)  Justification: Wounds   Estimated Fluid Needs: 2030 mL/day (25 mL/kg)   Justification: Maintenance    PHYSICAL FINDINGS  WOC consult noted, WOC notes from hospitalization reviewed - pt with buttock and pannus wounds due to IAD/MASD, right posterior heel deep tissue pressure injury, right plantar heel wound due to pressure injury    MALNUTRITION  % Intake: No decreased intake noted  % Weight Loss: > 5% in 1 month (severe)  Subcutaneous Fat Loss: None observed  Muscle Loss: None observed  Fluid Accumulation/Edema: Mild to trace per flow sheets   Malnutrition Diagnosis: Patient does not meet two of the established criteria necessary for diagnosing malnutrition    NUTRITION DIAGNOSIS  Increased nutrient needs related to wounds as evidenced by therapeutic recommendations       INTERVENTIONS  Implementation  Nutrition Education: RD reviewed the importance of adequate protein intakes for wound healing    Medical food supplement therapy - ordered as above      Goals  Patient to consume % of nutritionally adequate meal trays TID, or the equivalent with supplements/snacks.     Monitoring/Evaluation  Progress toward goals will be monitored and evaluated per protocol.    Piper Jeff RD, CNSC, LD  ARU RD pager: [phone]

## 2021-10-28 NOTE — PLAN OF CARE
Pt is alert and oriented x4. Anxiety at times. No c/o pain. No s/s of chest pain noted. SOB noted on exertion and while anxious. O2 2L NC,  O2 sat 92%. C-PAP at nights sep up by RT, O2 sat 96%. Pt tolerated well with C-PAP on.  Assist of 1 person with gait belt and walker to bathroom. Continent bowel and bladder, LBM 10/27. Pt educated to always use call light before getting out bed or transfer. She was non compliant, not using call light appropriately; Bed alarm gave off twice this shift.     During shift report yesterday, pt was very anxious and c/o that she waited for more than one hour for C-PAP. She threatened to leave and go home without C-PAP. RT called and came to set up C-PAP. Will continue POC.

## 2021-10-28 NOTE — PHARMACY-MEDICATION REGIMEN REVIEW
Pharmacy Medication Regimen Review  Chika Ferguson is a 61 year old female who is currently in the Acute Rehab Unit.    Assessment: All medications have an appropriate indications, durations and no unnecessary use was found    Plan:   1. Continue medications as currently ordered.     Attending provider will be sent this note for review.  If there are any emergent issues noted above, pharmacist will contact provider directly by phone.      Pharmacy will periodically review the resident's medication regimen for any PRN medications not administered in > 72 hours and discontinue them. The pharmacist will discuss gradual dose reductions of psychopharmacologic medications with interdisciplinary team on a regular basis.    Please contact pharmacy if the above does not answer specific medication questions/concerns.    Background:  A pharmacist has reviewed all medications and pertinent medical history today.  Medications were reviewed for appropriate use and any irregularities found are listed with recommendations.      Current Facility-Administered Medications:      acetaminophen (TYLENOL) tablet 650 mg, 650 mg, Oral, Q6H PRN, Trista Olivares PA     aspirin (ASA) chewable tablet 81 mg, 81 mg, Oral, Daily, Trista Olivares PA, 81 mg at 10/28/21 0811     bisacodyl (DULCOLAX) Suppository 10 mg, 10 mg, Rectal, Daily PRN, Trista Olivares PA     bumetanide (BUMEX) tablet 1 mg, 1 mg, Oral, BID, Trista Olivares PA, 1 mg at 10/28/21 0811     calcium carbonate (OS-NAE) tablet 600 mg, 600 mg, Oral, Daily, Trista Olivares PA, 600 mg at 10/28/21 0811     carvedilol (COREG) tablet 3.125 mg, 3.125 mg, Oral, BID w/meals, Trista Olivares PA, 3.125 mg at 10/28/21 0812     enoxaparin ANTICOAGULANT (LOVENOX) injection 40 mg, 40 mg, Subcutaneous, Q12H, Trista Olivares PA, 40 mg at 10/28/21 0811     hydrOXYzine (ATARAX) tablet 25 mg, 25 mg, Oral, Q6H PRN, 25 mg at 10/27/21 2316 **OR** [DISCONTINUED] hydrOXYzine  (ATARAX) tablet 50 mg, 50 mg, Oral, Q6H PRN, Keith Guerra MD     melatonin tablet 3 mg, 3 mg, Oral, At Bedtime, Trista Olivares PA, 3 mg at 10/27/21 2125     miconazole with skin protectant (ANNABELLA ANTIFUNGAL) 2 % cream, , Topical, BID, Trista Olivares PA, Given at 10/27/21 2129     multivitamin w/minerals (THERA-VIT-M) tablet 1 tablet, 1 tablet, Oral, Daily, Trista Olivares PA, 1 tablet at 10/28/21 0811     pantoprazole (PROTONIX) EC tablet 40 mg, 40 mg, Oral, Daily, Trista Olivares PA, 40 mg at 10/28/21 0811     PARoxetine (PAXIL) tablet 40 mg, 40 mg, Oral, QAM, Trista Olivares PA, 40 mg at 10/28/21 0811     polyethylene glycol (MIRALAX) Packet 17 g, 17 g, Oral, Daily PRN, Trista Olivares PA     potassium chloride ER (KLOR-CON M) CR tablet 20 mEq, 20 mEq, Oral, BID, Trista Olivares PA, 20 mEq at 10/28/21 0811     simvastatin (ZOCOR) tablet 20 mg, 20 mg, Oral, At Bedtime, Trista Olivares PA, 20 mg at 10/27/21 2125     Vitamin D3 (CHOLECALCIFEROL) tablet 50 mcg, 50 mcg, Oral, Daily, Trista Olivares PA, 50 mcg at 10/28/21 0812  No current outpatient prescriptions on file.   PMH: CAD s/p PCI to mLAD (2016), HFpEF, HLD, suspected COPD, CARL on CPAP, morbid obesity, and anxiety     Nathan Durant, TrueD

## 2021-10-28 NOTE — PROGRESS NOTES
"  [facility]   Acute Rehabilitation Unit  Daily progress note    INTERVAL HISTORY  Chika Ferguson was seen and examined at bedside.  Reports feeling well overall.  Hopeful to discharge home in about one week.  Denies n/v/d, breathing stable on oxygen, denies fevers.  Sleeping in recliner which she does at home as well.     Functionally, seen and discussed in team rounds today- undergoing evaluations.  Anticipating about 1 week length of stay with goals for mod I.  Noting low medical literacy.       MEDICATIONS    aspirin  81 mg Oral Daily     bumetanide  1 mg Oral BID     calcium carbonate  600 mg Oral Daily     carvedilol  3.125 mg Oral BID w/meals     enoxaparin ANTICOAGULANT  40 mg Subcutaneous Q12H     melatonin  3 mg Oral At Bedtime     miconazole with skin protectant   Topical BID     multivitamin w/minerals  1 tablet Oral Daily     pantoprazole  40 mg Oral Daily     PARoxetine  40 mg Oral QAM     potassium chloride ER  20 mEq Oral BID     simvastatin  20 mg Oral At Bedtime     vitamin D3  50 mcg Oral Daily        acetaminophen, bisacodyl, hydrOXYzine **OR** [DISCONTINUED] hydrOXYzine, polyethylene glycol     PHYSICAL EXAM  /57 (BP Location: Left arm)   Pulse 77   Temp (!) 95.5  F (35.3  C) (Oral)   Resp 18   Ht 1.702 m (5' 7\")   Wt 142.9 kg (315 lb)   SpO2 99%   BMI 49.34 kg/m    Gen: awake alert nad  HEENT: mmm  Pulm: non labored on nasal cannula  Abd: obese non tender  Ext: le venous pooling some edema   Neuro/MSK: alert speech clear moves all extremities.     LABS  CBC RESULTS: Recent Labs   Lab Test 10/28/21  0754 10/26/21  0553 10/23/21  0716   WBC 6.6 6.9 7.4   RBC 3.38* 3.33* 3.63*   HGB 9.1* 8.8* 9.5*   HCT 29.8* 29.8* 32.8*   MCV 88 90 90   MCH 26.9 26.4* 26.2*   MCHC 30.5* 29.5* 29.0*   RDW 19.0* 19.3* 19.0*    288 295     Last Basic Metabolic Panel:  Recent Labs   Lab Test 10/28/21  0754 10/26/21  0026 10/24/21  0734 10/23/21  1610 " 10/23/21  0716    137  --   --  136   POTASSIUM 3.8 3.5 3.7   < > 3.4   CHLORIDE 96 94  --   --  94   CO2 34* 39*  --   --  39*   ANIONGAP 4 4  --   --  3   * 126*  --   --  109*   BUN 22 24  --   --  31*   CR 0.87 0.88  --   --  0.90   GFRESTIMATED 71 70  --   --  68   NAE 9.1 9.0  --   --  9.2    < > = values in this interval not displayed.         Rehabilitation - continue comprehensive acute inpatient rehabilitation program with multidisciplinary approach including therapies, rehab nursing, and physiatry following. See interval history for updates.      ASSESSMENT AND PLAN    Chika Ferguson is a 61 year old woman with past medical history of chronic heart failure, pulmonary hypertension, chronic anemia, cad, gerd, carl, and hld who presented with acute hypoixc-hypercapnic respiratory failure in setting of COVID 19, MSSA PNA, acute on chronic heart failure.  Course further complicated by intramuscular hematoma, fall, pressure injury, electrolyte abnormalities, moderate malnutrition,  and acute kidney injury.      Admitted to acute rehab 10/27 in setting of impaired strength, impaired activity tolerance, impaired coordination,  impaired balance, and impaired safety awareness.     Acute hypoxic-hypercapnic respiratory failure   multifactorial COVID, MSSA PNA, acute on chronic heart failure, pulmonary HTN. Intubated 10/6 extubated 10/13.   -oxygen- titrate and taper  -completed tx for covid and mssa, continues on diuretics   -encourage IS     COVID recovered  Debility  Received tx and completed 20 day course of isolation.   -continue PT/OT  -lovenox dvt prophylaxis plan for 30 day course oral agent      CARL-   Continue cpap at bedtime      Acute on Chronic Heart Failure  Pulmonary HTN  History of HF with preserved EF and Pulmonary HTN on bumex pta.  BNP elevated on admission. TTE (10/6) LVEF 55-60%, mild LVH, indeterminate diastolic function, moderate to severe RV dilation w moderate to severely reduced  RV function, and dilated IVC. Pulmonary pressures could not be assessed on TTE. CTA chest negative for PE but showed severely dilated pulmonary trunk c/w pulmonary HTN. Seen by cardiology   -f/u Dr. Hughes 2-4 weeks (pulmonary HTN/ Right heart cath  -continue bumex  -trend weights      CAD  Hx pci 2016.  Mild troponin elevation felt to be demand ischemia.   -continue asa & statin  -continue coreg 3.125 mg bid      Acute on Chronic Anemia  Hgb 8.8 baseline appears to be 9-10  -trend in am      GERD  -continue ppi     Dermatitis  Intertrigo  Pressure injury Right heel   -appreciate WOCN assistance with managment.   -continue wound care as ordered.      Anxiety  Insomnia  Reports long standing issues with anxiety and associates this with poor sleep  On paxil pta  -continue melatonin  -psychology for emotional support  -consider trazodone or alternative for sleep       1. Adjustment to disability:  Psychology for emotional support  2. FEN: reg  3. Bowel: monitor  4. Bladder: continent monitor  5. DVT Prophylaxis: on lovenox transition to doac prior to discharge  6. GI Prophylaxis: protonix  7. Code: full   8. Disposition:  Goal for home with support from family  9. ELOS: goal for 11/5/21  10. Follow up Appointments on Discharge: pcp, post covid clinic. Cardiology.       Patient seen and discussed with Dr. Camara  PM&R Staff Physician    I spent a total of 35 minutes face-to-face or managing the care of Chika Ferguson. Over 50% of my time on the unit was spent counseling the patient and coordinating care. See note for details.       Trista Olivares PA-C  Physical Medicine & Rehabilitation

## 2021-10-28 NOTE — PLAN OF CARE
Discharge Planner Post-Acute Rehab PT:     Discharge Plan: Home to [place], [place].  Pt  lives with her son and son's fiance. Pt with 1 ROMELIA, then can stay on the main level.  Pt was sleeping in a recliner chair.  Pt does not have any assistive devices at home. Pt has RTS. Pt used CPAP with O2 at night, sedentary lifestyle.  Pt lost her SO to Covid-19 in 9/2020.      Precautions: falls, O2 , coccyx and R heel ulcer.   70% of HR max = 135 bpm   Current Status:  Bed Mobility: NT, pt declined , prefers sitting up in recliner.   Transfer: sba from recliner, cga from 4ww seat.    Gait: 130 ft , fww, close sba, A with O2., 2LO2. Pt on 1 L o2 in pm, trialed 4ww.   Stairs: NT  Balance: not formally tested, but unable to perform SLS, needs UE support with ambulation.     Assessment:  Pt with deconditioning, significant medical history, s/p Covid , weakness, decreased activity tolerance and needing supplemental O2 at this time.   ELOS - 7 days.  Pt may need 4ww or Fww for home.Pt given [facility] handout for wide heavy duty 4ww and info about upcharge.    Pt will likely need home PT at discharge.     Other Barriers to Discharge (DME, Family Training, etc):

## 2021-10-29 ENCOUNTER — APPOINTMENT (OUTPATIENT)
Dept: PHYSICAL THERAPY | Facility: CLINIC | Age: 61
DRG: 948 | End: 2021-10-29
Attending: PHYSICAL MEDICINE & REHABILITATION
Payer: MEDICARE

## 2021-10-29 ENCOUNTER — APPOINTMENT (OUTPATIENT)
Dept: OCCUPATIONAL THERAPY | Facility: CLINIC | Age: 61
DRG: 948 | End: 2021-10-29
Attending: PHYSICAL MEDICINE & REHABILITATION
Payer: MEDICARE

## 2021-10-29 PROCEDURE — 97116 GAIT TRAINING THERAPY: CPT | Mod: GP | Performed by: REHABILITATION PRACTITIONER

## 2021-10-29 PROCEDURE — 97535 SELF CARE MNGMENT TRAINING: CPT | Mod: GO | Performed by: OCCUPATIONAL THERAPIST

## 2021-10-29 PROCEDURE — 90791 PSYCH DIAGNOSTIC EVALUATION: CPT | Performed by: PSYCHOLOGIST

## 2021-10-29 PROCEDURE — 97110 THERAPEUTIC EXERCISES: CPT | Mod: GP | Performed by: REHABILITATION PRACTITIONER

## 2021-10-29 PROCEDURE — 97530 THERAPEUTIC ACTIVITIES: CPT | Mod: GP | Performed by: REHABILITATION PRACTITIONER

## 2021-10-29 PROCEDURE — 99232 SBSQ HOSP IP/OBS MODERATE 35: CPT | Performed by: PHYSICAL MEDICINE & REHABILITATION

## 2021-10-29 PROCEDURE — 128N000003 HC R&B REHAB

## 2021-10-29 PROCEDURE — 94660 CPAP INITIATION&MGMT: CPT

## 2021-10-29 PROCEDURE — 250N000011 HC RX IP 250 OP 636: Performed by: PHYSICIAN ASSISTANT

## 2021-10-29 PROCEDURE — 999N000157 HC STATISTIC RCP TIME EA 10 MIN

## 2021-10-29 PROCEDURE — 250N000013 HC RX MED GY IP 250 OP 250 PS 637: Performed by: PHYSICIAN ASSISTANT

## 2021-10-29 RX ORDER — POTASSIUM CHLORIDE 750 MG/1
20 TABLET, EXTENDED RELEASE ORAL DAILY
Status: DISCONTINUED | OUTPATIENT
Start: 2021-10-30 | End: 2021-10-29

## 2021-10-29 RX ORDER — SPIRONOLACTONE 25 MG/1
25 TABLET ORAL DAILY
Status: DISCONTINUED | OUTPATIENT
Start: 2021-10-29 | End: 2021-11-05 | Stop reason: HOSPADM

## 2021-10-29 RX ORDER — HYDROXYZINE HYDROCHLORIDE 25 MG/1
25 TABLET, FILM COATED ORAL AT BEDTIME
Status: DISCONTINUED | OUTPATIENT
Start: 2021-10-29 | End: 2021-11-05 | Stop reason: HOSPADM

## 2021-10-29 RX ORDER — ALBUTEROL SULFATE 90 UG/1
2 AEROSOL, METERED RESPIRATORY (INHALATION) EVERY 6 HOURS PRN
Status: DISCONTINUED | OUTPATIENT
Start: 2021-10-29 | End: 2021-11-05 | Stop reason: HOSPADM

## 2021-10-29 RX ADMIN — PANTOPRAZOLE SODIUM 40 MG: 40 TABLET, DELAYED RELEASE ORAL at 08:14

## 2021-10-29 RX ADMIN — MELATONIN TAB 3 MG 3 MG: 3 TAB at 20:43

## 2021-10-29 RX ADMIN — MULTIPLE VITAMINS W/ MINERALS TAB 1 TABLET: TAB at 08:14

## 2021-10-29 RX ADMIN — CARVEDILOL 3.12 MG: 3.12 TABLET, FILM COATED ORAL at 08:14

## 2021-10-29 RX ADMIN — MICONAZOLE NITRATE: 20 CREAM TOPICAL at 20:46

## 2021-10-29 RX ADMIN — Medication 600 MG: at 08:14

## 2021-10-29 RX ADMIN — MICONAZOLE NITRATE: 20 CREAM TOPICAL at 08:15

## 2021-10-29 RX ADMIN — HYDROXYZINE HYDROCHLORIDE 25 MG: 25 TABLET, FILM COATED ORAL at 20:43

## 2021-10-29 RX ADMIN — ASPIRIN 81 MG CHEWABLE TABLET 81 MG: 81 TABLET CHEWABLE at 08:14

## 2021-10-29 RX ADMIN — BUMETANIDE 1 MG: 0.5 TABLET ORAL at 16:16

## 2021-10-29 RX ADMIN — ENOXAPARIN SODIUM 40 MG: 40 INJECTION SUBCUTANEOUS at 20:43

## 2021-10-29 RX ADMIN — CARVEDILOL 3.12 MG: 3.12 TABLET, FILM COATED ORAL at 18:06

## 2021-10-29 RX ADMIN — POTASSIUM CHLORIDE 20 MEQ: 750 TABLET, EXTENDED RELEASE ORAL at 08:14

## 2021-10-29 RX ADMIN — PAROXETINE 40 MG: 40 TABLET, FILM COATED ORAL at 08:14

## 2021-10-29 RX ADMIN — SIMVASTATIN 20 MG: 20 TABLET, FILM COATED ORAL at 20:43

## 2021-10-29 RX ADMIN — UMECLIDINIUM BROMIDE AND VILANTEROL TRIFENATATE 1 PUFF: 62.5; 25 POWDER RESPIRATORY (INHALATION) at 12:26

## 2021-10-29 RX ADMIN — ENOXAPARIN SODIUM 40 MG: 40 INJECTION SUBCUTANEOUS at 08:14

## 2021-10-29 RX ADMIN — Medication 50 MCG: at 08:14

## 2021-10-29 RX ADMIN — BUMETANIDE 1 MG: 0.5 TABLET ORAL at 08:14

## 2021-10-29 ASSESSMENT — ACTIVITIES OF DAILY LIVING (ADL)
ADLS_ACUITY_SCORE: 12
ADLS_ACUITY_SCORE: 14
ADLS_ACUITY_SCORE: 12
ADLS_ACUITY_SCORE: 14
ADLS_ACUITY_SCORE: 12

## 2021-10-29 ASSESSMENT — MIFFLIN-ST. JEOR: SCORE: 2014.19

## 2021-10-29 NOTE — PLAN OF CARE
FOCUS/GOAL  Bowel management, Bladder management, and Wound care management    ASSESSMENT, INTERVENTIONS AND CONTINUING PLAN FOR GOAL:  7am- 3pm RN:  Pt using call light appropriately.   Needing CGA, gait belt and FWW for mobility.   Had a shower w/ OT in am.   Right buttock and right heel have pressure injuries, new mepilex dressings applied.   Left shin has marked erythema, no change from sonu noted. Has very dry skin, lotion applied to legs.   Continent of bowel and bladder, used toilet. Had small soft bm this shift. PVR 53cc.  On O2 2L/NC, O2 sats %, O2 down to 1L/NC, IS encouraged.   Pt participating in therapies.     3pm-11pm RN:  Continent of bowel and bladder. PVR 0cc. Had bm x1 this pm.   BLE lymphedema wraps on.   Needing min-mod A to stand from sitting, ambulating well in room with FWW once up. Needing assist to lift both legs into bed.  Cpap applied at 2150.    Bed alarm on.

## 2021-10-29 NOTE — PROGRESS NOTES
"  [facility]   Acute Rehabilitation Unit  Daily progress note    INTERVAL HISTORY  Chika Ferguson was seen sitting up in chair.  Reports she is doing well.  Denies n/v/d, sob, headache and dizziness. Sleep better last night.  Discussed prior to admission medications, says she was take an inhaler scheduled and prn, unclear if taking consistently or last fill but will restart.  Also recalls being on aldactone in past though she does not believe she was on prior to admission in setting of heart failure, with bumex use and on potassium supplement will add back aldactone and monitor electrolytes/renal function in addition to weight.  Will consult lymphedema.      OT: Initial evaluation completed and POC established. Performance with ADLs is below baseline, impacted by deconditioning, weakness, O2 dependency, and edema. Goals to advance to Mod IND ADLs and simple IADLs. ELOS 7 days.      MEDICATIONS    aspirin  81 mg Oral Daily     bumetanide  1 mg Oral BID     calcium carbonate  600 mg Oral Daily     carvedilol  3.125 mg Oral BID w/meals     enoxaparin ANTICOAGULANT  40 mg Subcutaneous Q12H     melatonin  3 mg Oral At Bedtime     miconazole with skin protectant   Topical BID     multivitamin w/minerals  1 tablet Oral Daily     pantoprazole  40 mg Oral Daily     PARoxetine  40 mg Oral QAM     simvastatin  20 mg Oral At Bedtime     spironolactone  25 mg Oral Daily     umeclidinium-vilanterol  1 puff Inhalation Daily     vitamin D3  50 mcg Oral Daily        acetaminophen, albuterol, bisacodyl, hydrOXYzine **OR** [DISCONTINUED] hydrOXYzine, polyethylene glycol     PHYSICAL EXAM  /78 (BP Location: Left arm)   Pulse 91   Temp (!) 95.8  F (35.4  C) (Oral)   Resp 18   Ht 1.702 m (5' 7\")   Wt 142.7 kg (314 lb 8 oz)   SpO2 94%   BMI 49.26 kg/m    Gen: awake alert nad  HEENT: mmm  Pulm: non labored clear diminshed on nasal cannula  CV: rrr  Abd: obese non tender  Ext: le venous " pooling some edema   Neuro/MSK: alert speech clear moves all extremities.     LABS  CBC RESULTS:   Recent Labs   Lab Test 10/28/21  0754 10/26/21  0553 10/23/21  0716   WBC 6.6 6.9 7.4   RBC 3.38* 3.33* 3.63*   HGB 9.1* 8.8* 9.5*   HCT 29.8* 29.8* 32.8*   MCV 88 90 90   MCH 26.9 26.4* 26.2*   MCHC 30.5* 29.5* 29.0*   RDW 19.0* 19.3* 19.0*    288 295     Last Basic Metabolic Panel:  Recent Labs   Lab Test 10/28/21  0754 10/26/21  0026 10/24/21  0734 10/23/21  1610 10/23/21  0716    137  --   --  136   POTASSIUM 3.8 3.5 3.7   < > 3.4   CHLORIDE 96 94  --   --  94   CO2 34* 39*  --   --  39*   ANIONGAP 4 4  --   --  3   * 126*  --   --  109*   BUN 22 24  --   --  31*   CR 0.87 0.88  --   --  0.90   GFRESTIMATED 71 70  --   --  68   NAE 9.1 9.0  --   --  9.2    < > = values in this interval not displayed.       Rehabilitation - continue comprehensive acute inpatient rehabilitation program with multidisciplinary approach including therapies, rehab nursing, and physiatry following. See interval history for updates.      ASSESSMENT AND PLAN    Chika Ferguson is a 61 year old woman with past medical history of chronic heart failure, pulmonary hypertension, chronic anemia, cad, gerd, tahmina, and hld who presented with acute hypoixc-hypercapnic respiratory failure in setting of COVID 19, MSSA PNA, acute on chronic heart failure.  Course further complicated by intramuscular hematoma, fall, pressure injury, electrolyte abnormalities, moderate malnutrition,  and acute kidney injury.      Admitted to acute rehab 10/27 in setting of impaired strength, impaired activity tolerance, impaired coordination,  impaired balance, and impaired safety awareness.     Acute hypoxic-hypercapnic respiratory failure   multifactorial COVID, MSSA PNA, acute on chronic heart failure, pulmonary HTN. Intubated 10/6 extubated 10/13.   -oxygen- titrate and taper  -completed tx for covid and mssa, continues on diuretics   -encourage  IS  -restart dulera-(PTA)      COVID recovered  Debility  Received tx and completed 20 day course of isolation.   -continue PT/OT  -lovenox dvt prophylaxis plan for 30 day course oral agent      CARL-   Continue cpap at bedtime      Acute on Chronic Heart Failure  Pulmonary HTN  History of HF with preserved EF and Pulmonary HTN on bumex pta.  BNP elevated on admission. TTE (10/6) LVEF 55-60%, mild LVH, indeterminate diastolic function, moderate to severe RV dilation w moderate to severely reduced RV function, and dilated IVC. Pulmonary pressures could not be assessed on TTE. CTA chest negative for PE but showed severely dilated pulmonary trunk c/w pulmonary HTN. Seen by cardiology   -f/u Dr. Hughes 2-4 weeks (pulmonary HTN/ Right heart cath  -continue bumex  -restart aldactone 25 mg daily  -trend weights      CAD  Hx pci 2016.  Mild troponin elevation felt to be demand ischemia.   -continue asa & statin  -continue coreg 3.125 mg bid      Acute on Chronic Anemia  Hgb stable 9.1 10/28.  baseline appears to be 9-10     GERD  -continue ppi     Dermatitis  Intertrigo  Pressure injury Right heel   -appreciate WOCN assistance with managment.   -continue wound care as ordered.      Anxiety  Insomnia  Reports long standing issues with anxiety and associates this with poor sleep  On paxil pta  -continue melatonin  -psychology for emotional support  -consider trazodone or alternative for sleep       1. Adjustment to disability:  Psychology for emotional support  2. FEN: reg  3. Bowel: monitor  4. Bladder: continent monitor  5. DVT Prophylaxis: on lovenox transition to doac prior to discharge  6. GI Prophylaxis: protonix  7. Code: full   8. Disposition:  Goal for home with support from family  9. ELOS: goal for 11/5/21  10. Follow up Appointments on Discharge: pcp, post covid clinic. Cardiology.       discussed with Dr. Camara  PM&R Staff Physician      Trista Olivares PA-C  Physical Medicine & Rehabilitation

## 2021-10-29 NOTE — PLAN OF CARE
Discharge Planner Post-Acute Rehab PT:     Discharge Plan:  Home to [place], [place].  Pt  lives with her son and son's fiance. Pt with 1 ROMELIA, then can stay on the main level.  Pt was sleeping in a recliner chair.  Pt does not have any assistive devices at home. Pt has RTS. Pt used CPAP with O2 at night, sedentary lifestyle.  Pt lost her SO to Covid-19 in 9/2020.       Precautions: falls, O2 , coccyx and R heel ulcer.   70% of HR max = 135 bpm     Current Status:  Bed Mobility: NT today,   Transfer: CGA with WW   Gait: 40'x 8 with WW   Stairs: NT   Balance: SBA     Assessment:  pt cont to progress with mobility. Pt limited by weakness, and SOB. Pt on 2L02 during PT session. With SATs 88%-94%. PT to cont to progress functional endurance.     Other Barriers to Discharge (DME, Family Training, etc):  DME, Family Training

## 2021-10-29 NOTE — PROGRESS NOTES
Individualized Overall Plan Of Care (IOPOC)      Rehab diagnosis/Impairment Group Code: 16 debility (non-cardiac, non-pulmonary) acute hypoxic respiratory failure secondary to covid 19 and mssa pneumonia  Post-covid-19 condition       Expected functional outcome: reach a level of mod I     Clinical Impression Comments:  Patient debilitated post Covid    Mobility: PT: Pt is s/p COvid illness, with significant PMH for lung issues, and sedentary lifestyle, who presents with difficulty with mobility, needing supplemental O2, and functioning below her previous level of function.  Pt appears motivated to get home soon. Pt may benefit from using a 4ww for mobility.      ADL: The pt will benefit from skilled OT intervention to address goals in POC and maximize IND and safety in discharge environment.     Communication/Cognition/Swallow:  Intact      Intensity of therapy:   PT 90 minutes, Daily, for 7 days   OT 90 minutes, Daily, for 7-10 days          Education anticoagulation  Neuropsychology Testing: No        Medical Prognosis: good       Physician summary statement: Patient debilitated post Covid and goal is tho reach a level of mod I     Discharge destination: prior home  Discharge rehabilitation needs: home care, PT and OT      Estimated length of stay: 10 days       Rehabilitation Physician Edi Barth DO

## 2021-10-29 NOTE — PLAN OF CARE
FOCUS/GOAL  Wound care management, Skin integrity, Psychosocial needs, and Safety management    ASSESSMENT, INTERVENTIONS AND CONTINUING PLAN FOR GOAL:  Pt AOx4. Pt anxious at times. Pt wore CPAP overnight. SOB with activity. Denies pain and N/T. Wound on R heel; cleansed and mepilex dressing changed. Mepilex dressing to coccyx CDI. Bruising to BUE. Continent of bowel and bladder. LBM 10/27. Ax1 with walker and gait belt to BR. No lines/drains. Bed alarm on. Call light within reach and pt able to make needs known. Continue with POC.

## 2021-10-29 NOTE — PLAN OF CARE
Discharge Planner Post-Acute Rehab OT:     Discharge Plan: Home ([place], [place]) with support from son and son's fiances, HC OT  ELOS 11/5    Precautions: Falls, 1-2L O2 NC, coccyx and R heel ulcer    Current Status:  ADLs: Close SBA transfer and mobility using fww. Set up seated grooming, Min A to don long pajama top, dependent donning socks, CGA toileting, Min A bathing.   IADLs: NT. Pt family can provide assist as needed.  Vision/Cognition: No acute visual concerns. Recommend further assessment of cognition.    Edema: BLE gradient compression bandaging during acute IP stay. Owns custom velcro garments at home.    Assessment: Performance with ADLs limited by deconditioning, weakness, O2 dependency, and edema. Initiated BLE gradient compression bandaging for acute IP stay - plan to reassess tomorrow.     Other Barriers to Discharge (DME, Family Training, etc):   DME/AE: shower bench, 4ww, recliner, O2, reacher.  Caregiver training: Recommended prior to discharge.

## 2021-10-29 NOTE — PLAN OF CARE
Patient is A&Ox4, calm, and cooperative. Pt had anxiety with getting into bed at night, PRN atarax given per her request. Prefers to sleep with HOB highly elevated d/t anxiety. Assist of 1 with walker and gait belt. Denies pain during shift. Continent of bowel and bladder, last BM today. Regular/thin diet, takes pills whole. Wound on right coccyx, dressing changed. Wound on right heel, dressing changed. On 1L O2 NC during day. Wearing CPAP overnight. Son present during shift. Tachycardic at times, VS stable. Continue POC.

## 2021-10-29 NOTE — PLAN OF CARE
Discharge Planner Post-Acute Rehab PT:     Discharge Plan: Home to [place], [place].  Pt  lives with her son and son's fiance. Pt with 1 ROMELIA, then can stay on the main level.  Pt was sleeping in a recliner chair.  Pt does not have any assistive devices at home. Pt has RTS. Pt used CPAP with O2 at night, sedentary lifestyle.  Pt lost her SO to Covid-19 in 9/2020.       Precautions: falls, O2 , coccyx and R heel ulcer.   70% of HR max = 135 bpm     Current Status:  Bed Mobility: NT   Transfer: CGA to min A with WW.   Gait: short amb with WW. CGA limited by weakness and fatigue   Stairs: NT   Balance: CGA     Assessment:  pt willing to work with PT, pt cont to be limited by weakness fatigue and SOB. Pt on 2L02 during PT session with SATS 94%. PT to cont to progress all functional mobility with LRAD for safe return home.     Other Barriers to Discharge  DME, Family Training,

## 2021-10-29 NOTE — PLAN OF CARE
Discharge Planner Post-Acute Rehab OT:     Discharge Plan: Home ([place], [place]) with support from son and son's fiances, HC OT    Precautions: Falls, 1-2L O2 NC, coccyx and R heel ulcer    Current Status:  ADLs: SBA-CGA SPT and mobility with fww/4ww. Requiring assist toileting and CGA standing grooming. Dressing and bathing TBA 10/29.  IADLs: NT. Pt family can provide assist as needed.  Vision/Cognition: No acute visual concerns. Plan to further assess cognition.     Assessment: Initial evaluation completed and POC established. Performance with ADLs is below baseline, impacted by deconditioning, weakness, O2 dependency, and edema. Goals to advance to Mod IND ADLs and simple IADLs. ELOS 7 days.    Other Barriers to Discharge (DME, Family Training, etc):   DME/AE: shower bench, 4ww, recliner, O2, reacher.  Caregiver training: Recommended prior to discharge.
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
## 2021-10-29 NOTE — PROGRESS NOTES
10/28/21 8794   Quick Adds   Type of Visit Initial Occupational Therapy Evaluation       Present yes   Language English   Living Environment   People in home child(delfin), adult   Current Living Arrangements house   Home Accessibility stairs to enter home;stairs within home   Number of Stairs, Main Entrance 1   Number of Stairs, Within Home, Primary other (see comments)   Transportation Anticipated family or friend will provide   Living Environment Comments OT: Pt lives with adult son in a 2 level home in [place], [place]. The home has 1STE and then pt able to access all living spaces on main level. Bathroom setup: walk in shower (2 benches and grab bar) and toilet with raised toilet seat. Bedroom: standard queen bed, no rails or moveable features. Home is wood pipo with open spaces. Son not working and able to assist.    Self-Care   Usual Activity Tolerance moderate   Current Activity Tolerance fair   Regular Exercise No   Equipment Currently Used at Home tub bench;grab bar, tub/shower   Activity/Exercise/Self-Care Comment Pt was independent with all ADLs/IADLs, transfers, mobility and gait.  Son helped with household chores. Per chart review and several day decline and falls prior to hospitalization.    Instrumental Activities of Daily Living (IADL)   Previous Responsibilities meal prep;housekeeping;laundry;medication management;finances;driving   IADL Comments Shared responsibility of IADLs with son.    Disability/Function   Hearing Difficulty or Deaf no   Wear Glasses or Blind yes   Vision Management Glasses for  reading   Concentrating, Remembering or Making Decisions Difficulty no   Difficulty Communicating no   Difficulty Eating/Swallowing no   Walking or Climbing Stairs Difficulty no   Walking or Climbing Stairs other (see comments)   Dressing/Bathing Difficulty no   Toileting issues no   Doing Errands Independently Difficulty (such as shopping) no   Fall history within last six  months yes   Number of times patient has fallen within last six months 1   Change in Functional Status Since Onset of Current Illness/Injury yes   General Information   Onset of Illness/Injury or Date of Surgery 10/05/21   Referring Physician Edi Barth DO   Patient/Family Therapy Goal Statement (OT) Pt goal to return home in 7 days   Additional Occupational Profile Info/Pertinent History of Current Problem Chika Ferguson is a 61 year old woman with past medical history of chronic heart failure, pulmonary hypertension, chronic anemia, cad, gerd, tahmina, and hld who presented with acute hypoixc-hypercapnic respiratory failure in setting of COVID 19, MSSA PNA, acute on chronic heart failure.  Course further complicated by intramuscular hematoma, fall, pressure injury, electrolyte abnormalities, moderate malnutrition,  and acute kidney injury.    Performance Patterns (Routines, Roles, Habits) , lives with son, retired, IND ADLs/IADLs. a   Existing Precautions/Restrictions fall;oxygen therapy device and L/min   Left Upper Extremity (Weight-bearing Status) full weight-bearing (FWB)   Right Upper Extremity (Weight-bearing Status) full weight-bearing (FWB)   Left Lower Extremity (Weight-bearing Status) full weight-bearing (FWB)   Right Lower Extremity (Weight-bearing Status) full weight-bearing (FWB)   Heart Disease Risk Factors Overweight;Medical history;Age   Cognitive Status Examination   Orientation Status orientation to person, place and time   Affect/Mental Status (Cognitive) WFL   Follows Commands WFL   Memory Deficit short-term memory   Executive Function Deficit information processing   Cognitive Status Comments A & O. Poor health literacy. Asking repetitive questions, possible short term recall deficit. Pt declining cognitive assessment, plan to assess during IP stay as needed.   Visual Perception   Visual Acuity Glasses for reading   Impact of Vision Impairment on Function (Vision) No acute changes    Sensory   Sensory Comments Chronic carpal tunnel - mild numbness in bilateral hands.    Pain Assessment   Patient Currently in Pain No   Integumentary/Edema   Integumentary/Edema Comments BLE edema - chronic and wears velcro compression at home. Pt with redness and flaking skin on bilateral calves. Wound on R heel, hematoma on L anterior tib, coccyx wound - dressed.    Posture   Posture protracted shoulders;forward head position   Range of Motion Comprehensive   Comment, General Range of Motion BUE AROM WFL/WNL   Strength Comprehensive (MMT)   Comment, General Manual Muscle Testing (MMT) Assessment BUE strength 4/5 grossly, proximal weakness and deconditioning   Muscle Tone Assessment   Muscle Tone Quick Adds No deficits were identified   Coordination   Upper Extremity Coordination No deficits were identified   ARC Assessment Only   Acute Rehab Functional Assessment See IP Rehab Daily Documentation Flowsheet for Functional Mobility/ADL Assessment   Bed Mobility   Comment (Bed Mobility) Pt declined, prefers to sit and rest in the recliner.   Transfers   Transfer Comments CGA SPT with fww   Balance   Balance Assessment sitting balance: static;sitting balance: dynamic;sit to stand balance;standing balance: static;standing balance: dynamic   Sitting Balance: Static WNL   Sitting Balance: Dynamic WNL   Sit-to-Stand Balance good balance   Standing Balance: Static fair balance   Standing Balance: Dynamic fair balance   Clinical Impression   Criteria for Skilled Therapeutic Interventions Met (OT) yes   OT Diagnosis Decreased IND ADLs/IADLs   OT Problem List-Impairments impacting ADL problems related to;activity tolerance impaired;balance;cognition;mobility;sensation;strength;flexibility;other (see comments)  (edema)   ADL comments/analysis Performance with ADLs/IADLs is below baseline.    Assessment of Occupational Performance 5 or more Performance Deficits   Identified Performance Deficits Performance deficits include  mobility, transfers, dressing, grooming, toileting, bathing, meal prep, household management, community transportation, med admin, finances.    Planned Therapy Interventions (OT) ADL retraining;IADL retraining;balance training;bed mobility training;cognition;ROM;strengthening;transfer training;stretching;home program guidelines;progressive activity/exercise   Clinical Decision Making Complexity (OT) moderate complexity   Therapy Frequency (OT) Daily   Predicted Duration of Therapy 7-10 days   Anticipated Equipment Needs Upon Discharge (OT) bathing equipment;dressing equipment;tub bench;walker, rolling;toileting equipment   Risk & Benefits of therapy have been explained evaluation/treatment results reviewed;care plan/treatment goals reviewed;risks/benefits reviewed;current/potential barriers reviewed;participants voiced agreement with care plan;participants included;patient   Comment-Clinical Impression The pt will benefit from skilled OT intervention to address goals in POC and maximize IND and safety in discharge environment.    Total Evaluation Time (Minutes)   Total Evaluation Time (Minutes) 30

## 2021-10-30 ENCOUNTER — APPOINTMENT (OUTPATIENT)
Dept: OCCUPATIONAL THERAPY | Facility: CLINIC | Age: 61
DRG: 948 | End: 2021-10-30
Attending: PHYSICAL MEDICINE & REHABILITATION
Payer: MEDICARE

## 2021-10-30 ENCOUNTER — APPOINTMENT (OUTPATIENT)
Dept: PHYSICAL THERAPY | Facility: CLINIC | Age: 61
DRG: 948 | End: 2021-10-30
Attending: PHYSICAL MEDICINE & REHABILITATION
Payer: MEDICARE

## 2021-10-30 LAB — PLATELET # BLD AUTO: 195 10E3/UL (ref 150–450)

## 2021-10-30 PROCEDURE — 85049 AUTOMATED PLATELET COUNT: CPT | Performed by: PHYSICIAN ASSISTANT

## 2021-10-30 PROCEDURE — 97530 THERAPEUTIC ACTIVITIES: CPT | Mod: GP | Performed by: STUDENT IN AN ORGANIZED HEALTH CARE EDUCATION/TRAINING PROGRAM

## 2021-10-30 PROCEDURE — 999N000157 HC STATISTIC RCP TIME EA 10 MIN

## 2021-10-30 PROCEDURE — 97110 THERAPEUTIC EXERCISES: CPT | Mod: GP | Performed by: STUDENT IN AN ORGANIZED HEALTH CARE EDUCATION/TRAINING PROGRAM

## 2021-10-30 PROCEDURE — 97535 SELF CARE MNGMENT TRAINING: CPT | Mod: GO

## 2021-10-30 PROCEDURE — 99233 SBSQ HOSP IP/OBS HIGH 50: CPT | Performed by: PHYSICAL MEDICINE & REHABILITATION

## 2021-10-30 PROCEDURE — 250N000011 HC RX IP 250 OP 636: Performed by: PHYSICIAN ASSISTANT

## 2021-10-30 PROCEDURE — 97535 SELF CARE MNGMENT TRAINING: CPT | Mod: GO | Performed by: OCCUPATIONAL THERAPIST

## 2021-10-30 PROCEDURE — 250N000013 HC RX MED GY IP 250 OP 250 PS 637: Performed by: PHYSICIAN ASSISTANT

## 2021-10-30 PROCEDURE — 128N000003 HC R&B REHAB

## 2021-10-30 PROCEDURE — 94660 CPAP INITIATION&MGMT: CPT

## 2021-10-30 PROCEDURE — 36416 COLLJ CAPILLARY BLOOD SPEC: CPT | Performed by: PHYSICIAN ASSISTANT

## 2021-10-30 RX ADMIN — MULTIPLE VITAMINS W/ MINERALS TAB 1 TABLET: TAB at 07:42

## 2021-10-30 RX ADMIN — UMECLIDINIUM BROMIDE AND VILANTEROL TRIFENATATE 1 PUFF: 62.5; 25 POWDER RESPIRATORY (INHALATION) at 07:46

## 2021-10-30 RX ADMIN — ENOXAPARIN SODIUM 40 MG: 40 INJECTION SUBCUTANEOUS at 20:19

## 2021-10-30 RX ADMIN — MELATONIN TAB 3 MG 3 MG: 3 TAB at 20:20

## 2021-10-30 RX ADMIN — CARVEDILOL 3.12 MG: 3.12 TABLET, FILM COATED ORAL at 18:35

## 2021-10-30 RX ADMIN — MICONAZOLE NITRATE: 20 CREAM TOPICAL at 20:20

## 2021-10-30 RX ADMIN — Medication 600 MG: at 07:42

## 2021-10-30 RX ADMIN — MICONAZOLE NITRATE: 20 CREAM TOPICAL at 07:46

## 2021-10-30 RX ADMIN — CARVEDILOL 3.12 MG: 3.12 TABLET, FILM COATED ORAL at 07:34

## 2021-10-30 RX ADMIN — SPIRONOLACTONE 25 MG: 25 TABLET ORAL at 07:43

## 2021-10-30 RX ADMIN — Medication 50 MCG: at 07:42

## 2021-10-30 RX ADMIN — PAROXETINE 40 MG: 40 TABLET, FILM COATED ORAL at 07:43

## 2021-10-30 RX ADMIN — ENOXAPARIN SODIUM 40 MG: 40 INJECTION SUBCUTANEOUS at 07:45

## 2021-10-30 RX ADMIN — SIMVASTATIN 20 MG: 20 TABLET, FILM COATED ORAL at 20:20

## 2021-10-30 RX ADMIN — BUMETANIDE 1 MG: 0.5 TABLET ORAL at 07:35

## 2021-10-30 RX ADMIN — ASPIRIN 81 MG CHEWABLE TABLET 81 MG: 81 TABLET CHEWABLE at 07:34

## 2021-10-30 RX ADMIN — BUMETANIDE 1 MG: 0.5 TABLET ORAL at 16:10

## 2021-10-30 RX ADMIN — PANTOPRAZOLE SODIUM 40 MG: 40 TABLET, DELAYED RELEASE ORAL at 07:42

## 2021-10-30 RX ADMIN — HYDROXYZINE HYDROCHLORIDE 25 MG: 25 TABLET, FILM COATED ORAL at 20:20

## 2021-10-30 ASSESSMENT — ACTIVITIES OF DAILY LIVING (ADL)
ADLS_ACUITY_SCORE: 14
ADLS_ACUITY_SCORE: 15
ADLS_ACUITY_SCORE: 12
ADLS_ACUITY_SCORE: 14
ADLS_ACUITY_SCORE: 12
ADLS_ACUITY_SCORE: 14
ADLS_ACUITY_SCORE: 14
ADLS_ACUITY_SCORE: 15
ADLS_ACUITY_SCORE: 14
ADLS_ACUITY_SCORE: 15
ADLS_ACUITY_SCORE: 12
ADLS_ACUITY_SCORE: 14
ADLS_ACUITY_SCORE: 14
ADLS_ACUITY_SCORE: 15
ADLS_ACUITY_SCORE: 12
ADLS_ACUITY_SCORE: 14
ADLS_ACUITY_SCORE: 14
ADLS_ACUITY_SCORE: 12
ADLS_ACUITY_SCORE: 14
ADLS_ACUITY_SCORE: 14
ADLS_ACUITY_SCORE: 15
ADLS_ACUITY_SCORE: 14
ADLS_ACUITY_SCORE: 14
ADLS_ACUITY_SCORE: 15

## 2021-10-30 ASSESSMENT — MIFFLIN-ST. JEOR: SCORE: 2032.34

## 2021-10-30 NOTE — PLAN OF CARE
FOCUS/GOAL  Medical management    ASSESSMENT, INTERVENTIONS AND CONTINUING PLAN FOR GOAL:  Patient slept well with her CPAP on, it was alarming, RT came to fix it twice. She called for assistance to the BR. Transferred and ambulated with a walker and SBA, SOB with ambulation, relief at rest. She had O2 on at 2L while walking to and from the bathroom. No c/o pain.

## 2021-10-30 NOTE — PLAN OF CARE
FOCUS/GOAL  Bowel management, Bladder management, Medical management, and Mobility    ASSESSMENT, INTERVENTIONS AND CONTINUING PLAN FOR GOAL:  Using o2 @ 2 l/m via NC. Denies sob or chest pain. SBA with one staff with walker to bathroom. Voided in toilet. No BM so far this shift. Up in chair. Legs wrapped by OT. Denies discomfort.

## 2021-10-30 NOTE — PROGRESS NOTES
Discharge Planner Post-Acute Rehab OT:      Discharge Plan: Home ([place], [place]) with support from son and son's fiances, HC OT  ELOS 11/5     Precautions: Falls, 1-2L O2 NC, coccyx and R heel ulcer     Current Status:  ADLs: Close SBA transfer and mobility using fww. SBA with G/H tasks standing at EOS with FWW, Min A to don long pajama top, dependent donning socks, SBA/CGA with toileting, Min A bathing.   IADLs: NT. Pt family can provide assist as needed.  Vision/Cognition: No acute visual concerns. SLUMS: 14/30 indicating cognitive deficits  Edema: BLE gradient compression bandaging during acute IP stay. Owns custom velcro garments at home.     Assessment: Performance with ADLs limited by deconditioning, weakness, O2 dependency, and edema. Follow up on BLE gradient compression bandaging for acute IP stay-nursing to continue wrapping daily. Completed SLUMS assessment with pt scoring 14/30 indicating cognitive deficits, will further assess cognition and safety with IADLs prior to discharge.      Other Barriers to Discharge (DME, Family Training, etc):   DME/AE: shower bench, 4ww, recliner, O2, reacher.  Caregiver training: Recommended prior to discharge.

## 2021-10-30 NOTE — PROGRESS NOTES
"  [facility]   Acute Rehabilitation Unit  Daily progress note    INTERVAL HISTORY  Chika Ferguson was seen sitting up in chair.  Reports she is doing well.  Denies n/v/d, sob, headache and dizziness. Sleep is good.      Got seen for consult lymphedema.  RN to wrap.    ADLs: Close SBA transfer and mobility using fww. SBA with G/H tasks standing at EOS with FWW, Min A to don long pajama top, dependent donning socks, SBA/CGA with toileting, Min A bathing.   IADLs: NT. Pt family can provide assist as needed.  Vision/Cognition: No acute visual concerns. SLUMS: 14/30 indicating cognitive deficits  Edema: BLE gradient compression bandaging during acute IP stay. Owns custom velcro garments at home.. ELOS 7 days.      MEDICATIONS    aspirin  81 mg Oral Daily     bumetanide  1 mg Oral BID     calcium carbonate  600 mg Oral Daily     carvedilol  3.125 mg Oral BID w/meals     enoxaparin ANTICOAGULANT  40 mg Subcutaneous Q12H     hydrOXYzine  25 mg Oral At Bedtime     melatonin  3 mg Oral At Bedtime     miconazole with skin protectant   Topical BID     multivitamin w/minerals  1 tablet Oral Daily     pantoprazole  40 mg Oral Daily     PARoxetine  40 mg Oral QAM     simvastatin  20 mg Oral At Bedtime     spironolactone  25 mg Oral Daily     umeclidinium-vilanterol  1 puff Inhalation Daily     vitamin D3  50 mcg Oral Daily        acetaminophen, albuterol, bisacodyl, hydrOXYzine **OR** [DISCONTINUED] hydrOXYzine, polyethylene glycol     PHYSICAL EXAM  /44 (BP Location: Left arm)   Pulse 85   Temp (!) 96.3  F (35.7  C) (Oral)   Resp 22   Ht 1.702 m (5' 7\")   Wt 144.5 kg (318 lb 8 oz)   SpO2 100%   BMI 49.88 kg/m    Gen: awake alert nad  HEENT: mmm  Pulm: non labored clear diminshed on nasal cannula  CV: rrr  Abd: obese non tender  Ext: le venous pooling some edema   Neuro/MSK: alert speech clear moves all extremities.     LABS  CBC RESULTS:   Recent Labs   Lab Test 10/30/21  0602 " 10/28/21  0754 10/26/21  0553 10/23/21  0716 10/23/21  0716   WBC  --  6.6 6.9  --  7.4   RBC  --  3.38* 3.33*  --  3.63*   HGB  --  9.1* 8.8*  --  9.5*   HCT  --  29.8* 29.8*  --  32.8*   MCV  --  88 90  --  90   MCH  --  26.9 26.4*  --  26.2*   MCHC  --  30.5* 29.5*  --  29.0*   RDW  --  19.0* 19.3*  --  19.0*    259 288   < > 295    < > = values in this interval not displayed.     Last Basic Metabolic Panel:  Recent Labs   Lab Test 10/28/21  0754 10/26/21  0026 10/24/21  0734 10/23/21  1610 10/23/21  0716    137  --   --  136   POTASSIUM 3.8 3.5 3.7   < > 3.4   CHLORIDE 96 94  --   --  94   CO2 34* 39*  --   --  39*   ANIONGAP 4 4  --   --  3   * 126*  --   --  109*   BUN 22 24  --   --  31*   CR 0.87 0.88  --   --  0.90   GFRESTIMATED 71 70  --   --  68   NAE 9.1 9.0  --   --  9.2    < > = values in this interval not displayed.       Rehabilitation - continue comprehensive acute inpatient rehabilitation program with multidisciplinary approach including therapies, rehab nursing, and physiatry following. See interval history for updates.      ASSESSMENT AND PLAN    Chika Ferguson is a 61 year old woman with past medical history of chronic heart failure, pulmonary hypertension, chronic anemia, cad, gerd, tahmina, and hld who presented with acute hypoixc-hypercapnic respiratory failure in setting of COVID 19, MSSA PNA, acute on chronic heart failure.  Course further complicated by intramuscular hematoma, fall, pressure injury, electrolyte abnormalities, moderate malnutrition,  and acute kidney injury.      Admitted to acute rehab 10/27 in setting of impaired strength, impaired activity tolerance, impaired coordination,  impaired balance, and impaired safety awareness.     Acute hypoxic-hypercapnic respiratory failure   multifactorial COVID, MSSA PNA, acute on chronic heart failure, pulmonary HTN. Intubated 10/6 extubated 10/13.   -oxygen- titrate and taper  -completed tx for covid and mssa,  continues on diuretics   -encourage IS  -On dulera-(PTA)      COVID recovered  Debility  Received tx and completed 20 day course of isolation.   -continue PT/OT  -lovenox dvt prophylaxis plan for 30 day course oral agent      CARL-   Continue cpap at bedtime      Acute on Chronic Heart Failure  Pulmonary HTN  History of HF with preserved EF and Pulmonary HTN on bumex pta.  BNP elevated on admission. TTE (10/6) LVEF 55-60%, mild LVH, indeterminate diastolic function, moderate to severe RV dilation w moderate to severely reduced RV function, and dilated IVC. Pulmonary pressures could not be assessed on TTE. CTA chest negative for PE but showed severely dilated pulmonary trunk c/w pulmonary HTN. Seen by cardiology   -f/u Dr. Hughes 2-4 weeks (pulmonary HTN/ Right heart cath  -continue bumex  -On aldactone 25 mg daily  -trend weights      CAD  Hx pci 2016.  Mild troponin elevation felt to be demand ischemia.   -continue asa & statin  -continue coreg 3.125 mg bid      Acute on Chronic Anemia  Hgb stable 9.1 10/28.  baseline appears to be 9-10     GERD  -continue ppi     Dermatitis  Intertrigo  Pressure injury Right heel   -appreciate WOCN assistance with managment.   -continue wound care as ordered.      Anxiety  Insomnia  Reports long standing issues with anxiety and associates this with poor sleep  On paxil pta  -continue melatonin  -psychology for emotional support  -consider trazodone or alternative for sleep       1. Adjustment to disability:  Psychology for emotional support  2. FEN: reg  3. Bowel: monitor  4. Bladder: continent monitor  5. DVT Prophylaxis: on lovenox transition to doac prior to discharge  6. GI Prophylaxis: protonix  7. Code: full   8. Disposition:  Goal for home with support from family  9. ELOS: goal for 11/5/21  10. Follow up Appointments on Discharge: pcp, post covid clinic. Cardiology.     Doing well. Continue cares and plans outlined.    Shaw Fox MD

## 2021-10-30 NOTE — PLAN OF CARE
Discharge Planner Post-Acute Rehab PT:     Discharge Plan:  Home to [place], [place].  Pt  lives with her son and son's fiance. Pt with 1 ROMELIA, then can stay on the main level.  Pt was sleeping in a recliner chair.  Pt does not have any assistive devices at home. Pt has RTS. Pt used CPAP with O2 at night, sedentary lifestyle.  Pt lost her SO to Covid-19 in 9/2020.       Precautions: falls, O2 , coccyx and R heel ulcer.   70% of HR max = 135 bpm     Current Status:  Bed Mobility: NT today,   Transfer: distant SUP with WW   Gait: 40'x 8 with WW   Stairs: NT   Balance: sup with FWW, able to take some steps chair>FWW w/o UE support and no noted LOB     Assessment: Pt making progress in O2 weaning. RA at rest <90%, 1LPM for gait with FWW.  with quick return to 80s    Other Barriers to Discharge (DME, Family Training, etc):    FWW vs 4WW possible use of cane in home  ? Family Training

## 2021-10-31 ENCOUNTER — APPOINTMENT (OUTPATIENT)
Dept: PHYSICAL THERAPY | Facility: CLINIC | Age: 61
DRG: 948 | End: 2021-10-31
Attending: PHYSICAL MEDICINE & REHABILITATION
Payer: MEDICARE

## 2021-10-31 ENCOUNTER — APPOINTMENT (OUTPATIENT)
Dept: OCCUPATIONAL THERAPY | Facility: CLINIC | Age: 61
DRG: 948 | End: 2021-10-31
Attending: PHYSICAL MEDICINE & REHABILITATION
Payer: MEDICARE

## 2021-10-31 PROCEDURE — 250N000011 HC RX IP 250 OP 636: Performed by: PHYSICIAN ASSISTANT

## 2021-10-31 PROCEDURE — 250N000013 HC RX MED GY IP 250 OP 250 PS 637: Performed by: PHYSICIAN ASSISTANT

## 2021-10-31 PROCEDURE — 97535 SELF CARE MNGMENT TRAINING: CPT | Mod: GO

## 2021-10-31 PROCEDURE — 999N000157 HC STATISTIC RCP TIME EA 10 MIN

## 2021-10-31 PROCEDURE — 128N000003 HC R&B REHAB

## 2021-10-31 PROCEDURE — 999N000215 HC STATISTIC HFNC ADULT NON-CPAP

## 2021-10-31 PROCEDURE — 94660 CPAP INITIATION&MGMT: CPT

## 2021-10-31 PROCEDURE — 97530 THERAPEUTIC ACTIVITIES: CPT | Mod: GP | Performed by: STUDENT IN AN ORGANIZED HEALTH CARE EDUCATION/TRAINING PROGRAM

## 2021-10-31 PROCEDURE — 97110 THERAPEUTIC EXERCISES: CPT | Mod: GP | Performed by: STUDENT IN AN ORGANIZED HEALTH CARE EDUCATION/TRAINING PROGRAM

## 2021-10-31 PROCEDURE — 99233 SBSQ HOSP IP/OBS HIGH 50: CPT | Performed by: PHYSICAL MEDICINE & REHABILITATION

## 2021-10-31 PROCEDURE — 97750 PHYSICAL PERFORMANCE TEST: CPT | Mod: GP | Performed by: STUDENT IN AN ORGANIZED HEALTH CARE EDUCATION/TRAINING PROGRAM

## 2021-10-31 RX ADMIN — HYDROXYZINE HYDROCHLORIDE 25 MG: 25 TABLET, FILM COATED ORAL at 21:04

## 2021-10-31 RX ADMIN — MICONAZOLE NITRATE: 20 CREAM TOPICAL at 08:00

## 2021-10-31 RX ADMIN — MULTIPLE VITAMINS W/ MINERALS TAB 1 TABLET: TAB at 09:21

## 2021-10-31 RX ADMIN — MELATONIN TAB 3 MG 3 MG: 3 TAB at 21:04

## 2021-10-31 RX ADMIN — Medication 50 MCG: at 07:59

## 2021-10-31 RX ADMIN — UMECLIDINIUM BROMIDE AND VILANTEROL TRIFENATATE 1 PUFF: 62.5; 25 POWDER RESPIRATORY (INHALATION) at 09:21

## 2021-10-31 RX ADMIN — ENOXAPARIN SODIUM 40 MG: 40 INJECTION SUBCUTANEOUS at 08:00

## 2021-10-31 RX ADMIN — PANTOPRAZOLE SODIUM 40 MG: 40 TABLET, DELAYED RELEASE ORAL at 07:59

## 2021-10-31 RX ADMIN — Medication 600 MG: at 07:59

## 2021-10-31 RX ADMIN — SPIRONOLACTONE 25 MG: 25 TABLET ORAL at 08:02

## 2021-10-31 RX ADMIN — PAROXETINE 40 MG: 40 TABLET, FILM COATED ORAL at 07:59

## 2021-10-31 RX ADMIN — MICONAZOLE NITRATE: 20 CREAM TOPICAL at 21:05

## 2021-10-31 RX ADMIN — ENOXAPARIN SODIUM 40 MG: 40 INJECTION SUBCUTANEOUS at 21:04

## 2021-10-31 RX ADMIN — CARVEDILOL 3.12 MG: 3.12 TABLET, FILM COATED ORAL at 18:18

## 2021-10-31 RX ADMIN — SIMVASTATIN 20 MG: 20 TABLET, FILM COATED ORAL at 21:04

## 2021-10-31 RX ADMIN — CARVEDILOL 3.12 MG: 3.12 TABLET, FILM COATED ORAL at 07:59

## 2021-10-31 RX ADMIN — ASPIRIN 81 MG CHEWABLE TABLET 81 MG: 81 TABLET CHEWABLE at 07:58

## 2021-10-31 RX ADMIN — BUMETANIDE 1 MG: 0.5 TABLET ORAL at 16:20

## 2021-10-31 RX ADMIN — BUMETANIDE 1 MG: 0.5 TABLET ORAL at 09:21

## 2021-10-31 ASSESSMENT — ACTIVITIES OF DAILY LIVING (ADL)
ADLS_ACUITY_SCORE: 14
ADLS_ACUITY_SCORE: 12
ADLS_ACUITY_SCORE: 12
ADLS_ACUITY_SCORE: 14
ADLS_ACUITY_SCORE: 12
ADLS_ACUITY_SCORE: 12
ADLS_ACUITY_SCORE: 14
ADLS_ACUITY_SCORE: 14
ADLS_ACUITY_SCORE: 12
ADLS_ACUITY_SCORE: 12
ADLS_ACUITY_SCORE: 14
ADLS_ACUITY_SCORE: 12
ADLS_ACUITY_SCORE: 12
ADLS_ACUITY_SCORE: 14
ADLS_ACUITY_SCORE: 12
ADLS_ACUITY_SCORE: 12
ADLS_ACUITY_SCORE: 14
ADLS_ACUITY_SCORE: 10
ADLS_ACUITY_SCORE: 12
ADLS_ACUITY_SCORE: 14
ADLS_ACUITY_SCORE: 12
ADLS_ACUITY_SCORE: 14
ADLS_ACUITY_SCORE: 12
ADLS_ACUITY_SCORE: 14

## 2021-10-31 ASSESSMENT — MIFFLIN-ST. JEOR: SCORE: 2042.32

## 2021-10-31 NOTE — PROGRESS NOTES
Discharge Planner Post-Acute Rehab OT:      Discharge Plan: Home ([place], [place]) with support from son and son's fiances, HC OT  ELOS 11/5     Precautions: Falls, 1-2L O2 NC, coccyx and R heel ulcer     Current Status:  ADLs: Close SBA transfer and mobility using fww. SBA with G/H tasks standing at EOS with FWW, Min A to don long pajama top, dependent donning socks, SBA/CGA with toileting, Min A bathing.   IADLs: Kitchen mobility SBA, Laundry CGA  Vision/Cognition: No acute visual concerns. SLUMS: 14/30 indicating cognitive deficits  Edema: BLE gradient compression bandaging during acute IP stay. Owns custom velcro garments at home.     Assessment: Addressed functional cog tasks; med management requiring 1 VC at start of task only.  Required mod VC's for problem solving when completing menu task.  Kitchen mobility task, making toast using FWW and Crystal managing O2 tubing with min VC's for safety.    Other Barriers to Discharge (DME, Family Training, etc):   DME/AE: shower bench, 4ww, recliner, O2, reacher.  Caregiver training: Recommended prior to discharge.

## 2021-10-31 NOTE — PROGRESS NOTES
"  [facility]   Acute Rehabilitation Unit  Daily progress note    INTERVAL HISTORY  Chika Ferguson was seen sitting up in chair.  Reports she is doing well.  Denies n/v/d, sob, headache and dizziness. Sleep is good.  Mod I in room now.    Got seen for consult lymphedema.  RN to wrap. 23 hours a day.    ADLs: Close SBA transfer and mobility using fww. SBA with G/H tasks standing at EOS with FWW, Min A to don long pajama top, dependent donning socks, SBA/CGA with toileting, Min A bathing.   IADLs: Kitchen mobility SBA, Laundry CGA  Vision/Cognition: No acute visual concerns. SLUMS: 14/30 indicating cognitive deficits  Edema: BLE gradient compression bandaging during acute IP stay. Owns custom velcro garments at home.  Edema: BLE gradient compression bandaging during acute IP stay. Owns custom velcro garments at home.. ELOS 7 days.      MEDICATIONS    aspirin  81 mg Oral Daily     bumetanide  1 mg Oral BID     calcium carbonate  600 mg Oral Daily     carvedilol  3.125 mg Oral BID w/meals     enoxaparin ANTICOAGULANT  40 mg Subcutaneous Q12H     hydrOXYzine  25 mg Oral At Bedtime     melatonin  3 mg Oral At Bedtime     miconazole with skin protectant   Topical BID     multivitamin w/minerals  1 tablet Oral Daily     pantoprazole  40 mg Oral Daily     PARoxetine  40 mg Oral QAM     simvastatin  20 mg Oral At Bedtime     spironolactone  25 mg Oral Daily     umeclidinium-vilanterol  1 puff Inhalation Daily     vitamin D3  50 mcg Oral Daily        acetaminophen, albuterol, bisacodyl, hydrOXYzine **OR** [DISCONTINUED] hydrOXYzine, polyethylene glycol     PHYSICAL EXAM  /73 (BP Location: Left arm)   Pulse 90   Temp (!) 96  F (35.6  C) (Oral)   Resp 16   Ht 1.702 m (5' 7\")   Wt 145.5 kg (320 lb 11.2 oz)   SpO2 95%   BMI 50.23 kg/m    Gen: awake alert nad  HEENT: mmm  Pulm: non labored clear diminshed on nasal cannula  CV: rrr  Abd: obese non tender  Ext: le venous pooling some " edema   Neuro/MSK: alert speech clear moves all extremities.     LABS  CBC RESULTS:   Recent Labs   Lab Test 10/30/21  0602 10/28/21  0754 10/26/21  0553 10/23/21  0716 10/23/21  0716   WBC  --  6.6 6.9  --  7.4   RBC  --  3.38* 3.33*  --  3.63*   HGB  --  9.1* 8.8*  --  9.5*   HCT  --  29.8* 29.8*  --  32.8*   MCV  --  88 90  --  90   MCH  --  26.9 26.4*  --  26.2*   MCHC  --  30.5* 29.5*  --  29.0*   RDW  --  19.0* 19.3*  --  19.0*    259 288   < > 295    < > = values in this interval not displayed.     Last Basic Metabolic Panel:  Recent Labs   Lab Test 10/28/21  0754 10/26/21  0026 10/24/21  0734 10/23/21  1610 10/23/21  0716    137  --   --  136   POTASSIUM 3.8 3.5 3.7   < > 3.4   CHLORIDE 96 94  --   --  94   CO2 34* 39*  --   --  39*   ANIONGAP 4 4  --   --  3   * 126*  --   --  109*   BUN 22 24  --   --  31*   CR 0.87 0.88  --   --  0.90   GFRESTIMATED 71 70  --   --  68   NAE 9.1 9.0  --   --  9.2    < > = values in this interval not displayed.       Rehabilitation - continue comprehensive acute inpatient rehabilitation program with multidisciplinary approach including therapies, rehab nursing, and physiatry following. See interval history for updates.      ASSESSMENT AND PLAN    Chika Ferguson is a 61 year old woman with past medical history of chronic heart failure, pulmonary hypertension, chronic anemia, cad, gerd, tahmina, and hld who presented with acute hypoixc-hypercapnic respiratory failure in setting of COVID 19, MSSA PNA, acute on chronic heart failure.  Course further complicated by intramuscular hematoma, fall, pressure injury, electrolyte abnormalities, moderate malnutrition,  and acute kidney injury.      Admitted to acute rehab 10/27 in setting of impaired strength, impaired activity tolerance, impaired coordination,  impaired balance, and impaired safety awareness.     Acute hypoxic-hypercapnic respiratory failure   multifactorial COVID, MSSA PNA, acute on chronic heart  failure, pulmonary HTN. Intubated 10/6 extubated 10/13.   -oxygen- titrate and taper  -completed tx for covid and mssa, continues on diuretics   -encourage IS  -On dulera-(PTA)      COVID recovered  Debility  Received tx and completed 20 day course of isolation.   -continue PT/OT  -lovenox dvt prophylaxis plan for 30 day course oral agent      CARL-   Continue cpap at bedtime      Acute on Chronic Heart Failure  Pulmonary HTN  History of HF with preserved EF and Pulmonary HTN on bumex pta.  BNP elevated on admission. TTE (10/6) LVEF 55-60%, mild LVH, indeterminate diastolic function, moderate to severe RV dilation w moderate to severely reduced RV function, and dilated IVC. Pulmonary pressures could not be assessed on TTE. CTA chest negative for PE but showed severely dilated pulmonary trunk c/w pulmonary HTN. Seen by cardiology   -f/u Dr. Hughes 2-4 weeks (pulmonary HTN/ Right heart cath  -continue bumex  -On aldactone 25 mg daily  -trend weights      CAD  Hx pci 2016.  Mild troponin elevation felt to be demand ischemia.   -continue asa & statin  -continue coreg 3.125 mg bid      Acute on Chronic Anemia  Hgb stable 9.1 10/28.  baseline appears to be 9-10     GERD  -continue ppi     Dermatitis  Intertrigo  Pressure injury Right heel   -appreciate WOCN assistance with managment.   -continue wound care as ordered.      Anxiety  Insomnia  Reports long standing issues with anxiety and associates this with poor sleep  On paxil pta  -continue melatonin  -psychology for emotional support  -consider trazodone or alternative for sleep       1. Adjustment to disability:  Psychology for emotional support  2. FEN: reg  3. Bowel: monitor  4. Bladder: continent monitor  5. DVT Prophylaxis: on lovenox transition to doac prior to discharge  6. GI Prophylaxis: protonix  7. Code: full   8. Disposition:  Goal for home with support from family  9. ELOS: goal for 11/5/21  10. Follow up Appointments on Discharge: pcp, post covid  clinic. Cardiology.     Doing well. Continue cares and plans outlined.    Shaw Fox MD

## 2021-10-31 NOTE — PLAN OF CARE
Discharge Planner Post-Acute Rehab PT:     Discharge Plan:  Home to [place], [place].  Pt  lives with her son and son's fiance. Pt with 1 ROMELIA, then can stay on the main level.  Pt was sleeping in a recliner chair.  Pt does not have any assistive devices at home. Pt has RTS. Pt used CPAP with O2 at night, sedentary lifestyle.  Pt lost her SO to Covid-19 in 9/2020.       Precautions: falls, O2 , coccyx and R heel ulcer.   70% of HR max = 135 bpm     Current Status:  Bed Mobility: NT today,   Transfer: distant SUP with WW   Gait: up to 135' with FWW (had been using 4WW but brake broken)  Stairs: NT   Balance: Rios 41/56 on 10/31    Assessment: upon arrival to room pt sitting in chair on RA, O2 at 88-90. Edema wrap came off and needed to be re-done. Overall pt is making good progress, no track for safe discharge home 11/5 with home PT, increased gait distance today from 50' at best to 135'  Pt making progress in O2 weaning. RA at rest 88-90% today (10/30 was >90% on RA), 1LPM for gait with FWW in room, 2LPM for longer distances.  with quick return to 80s    Other Barriers to Discharge (DME, Family Training, etc):    mason- FWW and 4WW   ? Family Training for stair &/or car  Floor bike ?

## 2021-10-31 NOTE — PLAN OF CARE
FOCUS/GOAL  Mobility and Energy conservation    ASSESSMENT, INTERVENTIONS AND CONTINUING PLAN FOR GOAL:  Is now MOD I in room with FWW.  Oxygen off this am for hour et 02 sat 95% on room air. No SOB or cyanosis noted. Then requested O2 to be put back on. 02 sat 97% at 1 l/m via nasal cannula. Denied SOB states I just feel like I need it. Mepilex changed on coccyx et rt heel. Legs wrapped this am et lotion applied.

## 2021-10-31 NOTE — PLAN OF CARE
FOCUS/GOAL  Bowel management, Bladder management, and Mobility    ASSESSMENT, INTERVENTIONS AND CONTINUING PLAN FOR GOAL:  Pt is alert and oriented. On 2L of oxygen when standing/with activity. On room air at rest; pt tolerated well. Dyspnea with exertion. Regular diet with thin liquids; can take pills whole. Continent of B&B. LBM 10/29. SBA x1 with walker. Denied pain. Will continue with POC.

## 2021-10-31 NOTE — PROGRESS NOTES
10/31/21 0938   Signing Clinician's Name / Credentials   Signing clinician's name / credentials Anita Alvaradoshanealden DPT   Rios Balance Scale (SALAS BANDA, LES S, HALEY PIEDRA, CORAL GARVEY: MEASURING BALANCE IN THE ELDERLY: VALIDATION OF AN INSTRUMENT. CAN. J. PUB. HEALTH, JULY/AUGUST SUPPLEMENT 2:S7-11, 1992.)   Sit To Stand 3   Standing Unsupported 4   Sitting Unsupported 4   Stand to Sit 2   Transfers 4   Standing with Eyes Closed 4   Standing Unsupported, Feet Together 3  (unable to get NBOS d/t body habitus)   Reach Forward With Outstretched Arm 4   Retrieve Object From Floor 4   Turning to Look Behind 4   Turn 360 Degrees 2   Placing Alternate Foot on Stool (4-6 inches) 0   Unsupported Tandem Stand (Demonstrate to Subject) 2   One Leg Stand 1   Total Score (A score of 45 or less has been correlated with an increased risk of falls)   Total Score (out of 56) 41     Rios Balance Scale (BBS) Cutoff Scores: A score of < 45/56 indicates an increased risk for falls.   Patient Score: 41/56    The BBS is a measure of static and dynamic standing balance that has been validated in community dwelling elderly individuals and individuals who have Parkinson's Disease, MS, and those who are s/p CVA and TBI. The test is administered without an assistive device. Scores from the Rios are used to determine the probability of falling based on the patient's previous history of falls and their test performance.     Minimal Detectable Change = 6.5   & Minimal Detectable Change (Parkinson's Disease) = 5 according to Jacques & Osbaldoey 2008    Assessment (rationale for performing, application to patient s function & care plan): pt s/p Covid with prolonged hospitalization, generalized weakness with demonstrated impairments in balance, performed as objective outcome measure to assess risk for falls.      Minutes billed as physical performance test: 20

## 2021-10-31 NOTE — PLAN OF CARE
FOCUS/GOAL  Medical management    ASSESSMENT, INTERVENTIONS AND CONTINUING PLAN FOR GOAL:  Patient slept intermittently due to being awakened with alarms from the CPAP which occurred when her head was flexed. Her HOB was elevated and she had 3 pillows under her head. Talked to the patient about lowering the HOB, she agreed but requested to return the HOB to its original position d/t SOB. She also agreed to remove 1 pillow, but she could not tolerate having only 2 pillows under her head. The alarm stopped as soon as her neck was not flexed. No c/o pain. She called for help and communicated her needs. She used the bathroom twice, transferred and ambulated with a walker and SBA, total assistance with primo cares/hygiene. SOB with activity.  At 0630, patient was siting in a chair, no SOB, no Oxygen on. No c/o pain.

## 2021-11-01 ENCOUNTER — APPOINTMENT (OUTPATIENT)
Dept: OCCUPATIONAL THERAPY | Facility: CLINIC | Age: 61
DRG: 948 | End: 2021-11-01
Attending: PHYSICAL MEDICINE & REHABILITATION
Payer: MEDICARE

## 2021-11-01 ENCOUNTER — APPOINTMENT (OUTPATIENT)
Dept: PHYSICAL THERAPY | Facility: CLINIC | Age: 61
DRG: 948 | End: 2021-11-01
Attending: PHYSICAL MEDICINE & REHABILITATION
Payer: MEDICARE

## 2021-11-01 LAB
ANION GAP SERPL CALCULATED.3IONS-SCNC: 4 MMOL/L (ref 3–14)
BUN SERPL-MCNC: 22 MG/DL (ref 7–30)
CALCIUM SERPL-MCNC: 8.7 MG/DL (ref 8.5–10.1)
CHLORIDE BLD-SCNC: 101 MMOL/L (ref 94–109)
CO2 SERPL-SCNC: 30 MMOL/L (ref 20–32)
CREAT SERPL-MCNC: 0.85 MG/DL (ref 0.52–1.04)
GFR SERPL CREATININE-BSD FRML MDRD: 73 ML/MIN/1.73M2
GLUCOSE BLD-MCNC: 134 MG/DL (ref 70–99)
POTASSIUM BLD-SCNC: 4 MMOL/L (ref 3.4–5.3)
SODIUM SERPL-SCNC: 135 MMOL/L (ref 133–144)

## 2021-11-01 PROCEDURE — 80048 BASIC METABOLIC PNL TOTAL CA: CPT | Performed by: PHYSICIAN ASSISTANT

## 2021-11-01 PROCEDURE — 250N000011 HC RX IP 250 OP 636: Performed by: PHYSICIAN ASSISTANT

## 2021-11-01 PROCEDURE — 97110 THERAPEUTIC EXERCISES: CPT | Mod: GP | Performed by: PHYSICAL THERAPIST

## 2021-11-01 PROCEDURE — 99232 SBSQ HOSP IP/OBS MODERATE 35: CPT | Performed by: PHYSICAL MEDICINE & REHABILITATION

## 2021-11-01 PROCEDURE — 250N000013 HC RX MED GY IP 250 OP 250 PS 637: Performed by: PHYSICIAN ASSISTANT

## 2021-11-01 PROCEDURE — 97110 THERAPEUTIC EXERCISES: CPT | Mod: GO

## 2021-11-01 PROCEDURE — 97116 GAIT TRAINING THERAPY: CPT | Mod: GP | Performed by: PHYSICAL THERAPIST

## 2021-11-01 PROCEDURE — 128N000003 HC R&B REHAB

## 2021-11-01 PROCEDURE — 96125 COGNITIVE TEST BY HC PRO: CPT | Mod: GO

## 2021-11-01 PROCEDURE — 97530 THERAPEUTIC ACTIVITIES: CPT | Mod: GO

## 2021-11-01 PROCEDURE — 36416 COLLJ CAPILLARY BLOOD SPEC: CPT | Performed by: PHYSICIAN ASSISTANT

## 2021-11-01 RX ADMIN — ENOXAPARIN SODIUM 40 MG: 40 INJECTION SUBCUTANEOUS at 07:41

## 2021-11-01 RX ADMIN — BUMETANIDE 1 MG: 0.5 TABLET ORAL at 07:41

## 2021-11-01 RX ADMIN — Medication 50 MCG: at 07:41

## 2021-11-01 RX ADMIN — SPIRONOLACTONE 25 MG: 25 TABLET ORAL at 07:41

## 2021-11-01 RX ADMIN — PAROXETINE 40 MG: 40 TABLET, FILM COATED ORAL at 07:41

## 2021-11-01 RX ADMIN — HYDROXYZINE HYDROCHLORIDE 25 MG: 25 TABLET, FILM COATED ORAL at 20:18

## 2021-11-01 RX ADMIN — APIXABAN 2.5 MG: 2.5 TABLET, FILM COATED ORAL at 20:17

## 2021-11-01 RX ADMIN — CARVEDILOL 3.12 MG: 3.12 TABLET, FILM COATED ORAL at 07:41

## 2021-11-01 RX ADMIN — SIMVASTATIN 20 MG: 20 TABLET, FILM COATED ORAL at 20:17

## 2021-11-01 RX ADMIN — BUMETANIDE 1 MG: 0.5 TABLET ORAL at 16:23

## 2021-11-01 RX ADMIN — PANTOPRAZOLE SODIUM 40 MG: 40 TABLET, DELAYED RELEASE ORAL at 07:41

## 2021-11-01 RX ADMIN — MELATONIN TAB 3 MG 3 MG: 3 TAB at 20:17

## 2021-11-01 RX ADMIN — MULTIPLE VITAMINS W/ MINERALS TAB 1 TABLET: TAB at 07:41

## 2021-11-01 RX ADMIN — ASPIRIN 81 MG CHEWABLE TABLET 81 MG: 81 TABLET CHEWABLE at 07:41

## 2021-11-01 RX ADMIN — CARVEDILOL 3.12 MG: 3.12 TABLET, FILM COATED ORAL at 18:23

## 2021-11-01 RX ADMIN — MICONAZOLE NITRATE: 20 CREAM TOPICAL at 20:23

## 2021-11-01 RX ADMIN — MICONAZOLE NITRATE: 20 CREAM TOPICAL at 07:42

## 2021-11-01 RX ADMIN — Medication 600 MG: at 07:41

## 2021-11-01 RX ADMIN — UMECLIDINIUM BROMIDE AND VILANTEROL TRIFENATATE 1 PUFF: 62.5; 25 POWDER RESPIRATORY (INHALATION) at 07:42

## 2021-11-01 ASSESSMENT — ACTIVITIES OF DAILY LIVING (ADL)
ADLS_ACUITY_SCORE: 10
ADLS_ACUITY_SCORE: 11
ADLS_ACUITY_SCORE: 10
ADLS_ACUITY_SCORE: 11
ADLS_ACUITY_SCORE: 11
ADLS_ACUITY_SCORE: 10
ADLS_ACUITY_SCORE: 11
ADLS_ACUITY_SCORE: 10
ADLS_ACUITY_SCORE: 11
ADLS_ACUITY_SCORE: 10
ADLS_ACUITY_SCORE: 10
ADLS_ACUITY_SCORE: 11
ADLS_ACUITY_SCORE: 11
ADLS_ACUITY_SCORE: 10
ADLS_ACUITY_SCORE: 11
ADLS_ACUITY_SCORE: 10
ADLS_ACUITY_SCORE: 10
ADLS_ACUITY_SCORE: 11
ADLS_ACUITY_SCORE: 10
ADLS_ACUITY_SCORE: 10
ADLS_ACUITY_SCORE: 11
ADLS_ACUITY_SCORE: 10

## 2021-11-01 NOTE — PROGRESS NOTES
Discharge Planner Post-Acute Rehab OT:      Discharge Plan: Home ([place], [place]) with support from son and son's fiances, HC OT  ELOS 11/5     Precautions: Falls, 1-2L O2 NC, coccyx and R heel ulcer     Current Status:  ADLs: Mod I in room w/ FWW. SBA with G/H tasks standing at EOS with FWW, Min A to don long pajama top, dependent donning socks, Min A bathing.   IADLs: Kitchen mobility SBA, Laundry CGA  Vision/Cognition: No acute visual concerns. SLUMS: 14/30 indicating cognitive deficits. Cognistat completed on 11.1.21. Pt demonstrated moderate deficits in attention, language and visual memory. Pt scored mild deficits in orientation and absract reasoning. Pt endorses cognitive difficulties from baseline and has some awareness of deficits.   Edema: BLE gradient compression bandaging during acute IP stay. Owns custom velcro garments at home.     Assessment: Addressed cognition w/ completing Cognistat. Pt then completed UE resistive ex w/ 1L n.c. 02 and WNL for 02 saturations. Pt was wheeled to and from gym to room. Pt can continue to working on functional cognition, ambulating to and from gym<->room, LE dressing (socks/pants as appropriate) and increasing UE strength for progressing I w/ ADLs/IADLs.     Other Barriers to Discharge (DME, Family Training, etc):   DME/AE: shower bench, 4ww, recliner, O2, reacher.  Caregiver training: Recommended prior to discharge.

## 2021-11-01 NOTE — PROGRESS NOTES
Per deisy, pt on track to discharge home Friday 11/05. HC recommended and pt lives in [place]. SWer printed a list of HC agencies in her area (only 3 available) and met with pt to discuss. Pt appeared resistant to HC initially but agreeable upon discussing the recommendations from the ARU team. Pt gave SW permission to send referrals to the agencies listed on Medicare list. SWer called [facility] PH: [phone], F: [phone], spoke with intake, confirmed the service area and faxed a referral. Will confirm acceptance, update pt, add to AVS and send orders once all confirmed.     As planned, SWer provided pt with information for her [facility]  and resources for dept on aging in [place] to help/guide pt with selecting and signing up for a supplemental/seconday insurance. Pt stated that she would call to learn her options. Per pt request, SW provided SW information to pt if needs/questions arise during the process.     As planned, SW provided pt with HCD/POA. Pt stated that she will work on filling them out but prefers to take the information home and get notary at her bank. SWer will review the information with pt before discharge, per pt request.     Pt is having her son send a picture of her CPAP recall letter that she got. Pt stated that she called [facility] and is working on getting a replacement. Pt stated that she also has a home O2 concentrator and can call [facility] to get tubing, if needed at night.     Pt denied additional needs or concerns at this time. SW will continue to follow and remain available as needs arise.     Juliette Moseley [facility]   [facility]   Direct Phone: [phone]  I   Pager: [phone]  I  Fax: [phone]

## 2021-11-01 NOTE — PLAN OF CARE
Patient alert and oriented x4. Mod I in room with walker, no alarms on. Pt using 1L of oxygen when ambulating to bathroom and prn with therapy, room air when sitting in chair/bed. Pt denies any shortness of breath during shift. Regular diet, thins, pills whole. Continent of bowel and bladder, LBM: 10/31. Mepilex on coccyx, UTV area. Mepilex on R heel for pressur injury, clean dry and intact dressing. Scheduled cream applied to groin rash, pt denies any irritation on the area. Lymphedema wraps put on at 0900, wearing 23 hours/day. Pt denies pain during shift. Will continue with POC.

## 2021-11-01 NOTE — PLAN OF CARE
FOCUS/GOAL  Medical management    ASSESSMENT, INTERVENTIONS AND CONTINUING PLAN FOR GOAL:  Patient slept well with CPAP on, it was alarming most off the night due to patient neck flexed position when she is sleeping.  No c/o pain. She is modified independent in the room but called for assistance to the bathroom twice, she needed help with her socks, total assistance, moderate assistance to lift her legs into bed and pillows placement. No SOB walking to and from the BR, became SOB and very anxious during transfer back into bed, resolved at rest. No use of O2 tonight.

## 2021-11-01 NOTE — PLAN OF CARE
Discharge Planner Post-Acute Rehab PT:     Discharge Plan:  Home to [place], [place].  Pt  lives with her son and son's fiance. Pt with 1 ROMELIA in back or ramp in front , then can stay on the main level.  Pt was sleeping in a recliner chair.  Pt does not have any assistive devices at home. Pt has RTS. Pt used CPAP with O2 at night, sedentary lifestyle.  Pt lost her SO to Covid-19 in 9/2020.       Precautions: falls, O2 , coccyx and R heel ulcer.   70% of HR max = 135 bpm     Current Status:  Bed Mobility: NT today,   Transfer: distant SUP with WW   Gait: up to 135' with FWW (had been using 4WW but brake broken)  Stairs: NT   Balance: Rios 41/56 on 10/31    Assessment: Pt states she wants 4ww for home.  Spent time comparing cheaper one form [employer] and one from [employer], but [facility] has to substitute a different brand at this time.  The rehab tech [facility] is calling [facility] to get dimensions, miguel seat width to see what will work for pt.  Pt participated in stair step ups today, progressing to 6 inch, although tiring for pt, needing 2 L o2/min , 91% and HR up to 105 bpm.  Pt needing 2 L o2/min with standing therex, ambulation and stairs, and 1 L o2/min for seated biking. Pt on RA with just sitting in room.   Pt amb short distances without ad, but would benefit from assistive device for longer distances and to work on functional endurance to improve to more community level distances.      Other Barriers to Discharge (DME, Family Training, etc):    mason- FWW and 4WW   ? Family Training for stair &/or car  Floor bike ?

## 2021-11-01 NOTE — PROGRESS NOTES
"  [facility]   Acute Rehabilitation Unit  Daily progress note    INTERVAL HISTORY  Chika Ferguson was seen sitting up in chair.  Reports she is doing well.  Denies n/v/d, sob, headache and dizziness. Sleep is good.  Mod I in room now.  On room air at rest she has O2 saturations above 90 consisently which is encouraging.  Discussed pressure off loading on heels as much as possible and continued wound care for monitoring healing.     Functional   OT:  ADLs: Close SBA transfer and mobility using fww. SBA with G/H tasks standing at EOS with FWW, Min A to don long pajama top, dependent donning socks, SBA/CGA with toileting, Min A bathing.   IADLs: Kitchen mobility SBA, Laundry CGA  Vision/Cognition: No acute visual concerns. SLUMS: 14/30 indicating cognitive deficits  Edema: BLE gradient compression bandaging during acute IP stay. Owns custom velcro        ROS: 10 point ROS neg other than the symptoms noted above in the HPI.        MEDICATIONS    aspirin  81 mg Oral Daily     bumetanide  1 mg Oral BID     calcium carbonate  600 mg Oral Daily     carvedilol  3.125 mg Oral BID w/meals     enoxaparin ANTICOAGULANT  40 mg Subcutaneous Q12H     hydrOXYzine  25 mg Oral At Bedtime     melatonin  3 mg Oral At Bedtime     miconazole with skin protectant   Topical BID     multivitamin w/minerals  1 tablet Oral Daily     pantoprazole  40 mg Oral Daily     PARoxetine  40 mg Oral QAM     simvastatin  20 mg Oral At Bedtime     spironolactone  25 mg Oral Daily     umeclidinium-vilanterol  1 puff Inhalation Daily     vitamin D3  50 mcg Oral Daily        acetaminophen, albuterol, bisacodyl, hydrOXYzine **OR** [DISCONTINUED] hydrOXYzine, polyethylene glycol     PHYSICAL EXAM  BP (!) 147/71 (BP Location: Left arm)   Pulse 85   Temp (!) 96.4  F (35.8  C) (Oral)   Resp 16   Ht 1.702 m (5' 7\")   Wt 145.5 kg (320 lb 11.2 oz)   SpO2 93%   BMI 50.23 kg/m    Gen: NAD, up in chair  HEENT: NCAT, " MMM  Pulm: non labored, symmetrical chest rise  CV: rrr, 2+ radial pulse   Abd: obese, non tender  Ext: le venous pooling some edema, has bandaid on R heel   Neuro/MSK: alert and awake, speech clear, moves all extremities.     LABS  CBC RESULTS:   Recent Labs   Lab Test 10/30/21  0602 10/28/21  0754 10/26/21  0553 10/23/21  0716 10/23/21  0716   WBC  --  6.6 6.9  --  7.4   RBC  --  3.38* 3.33*  --  3.63*   HGB  --  9.1* 8.8*  --  9.5*   HCT  --  29.8* 29.8*  --  32.8*   MCV  --  88 90  --  90   MCH  --  26.9 26.4*  --  26.2*   MCHC  --  30.5* 29.5*  --  29.0*   RDW  --  19.0* 19.3*  --  19.0*    259 288   < > 295    < > = values in this interval not displayed.     Last Basic Metabolic Panel:  Recent Labs   Lab Test 11/01/21  0755 10/28/21  0754 10/26/21  0026    134 137   POTASSIUM 4.0 3.8 3.5   CHLORIDE 101 96 94   CO2 30 34* 39*   ANIONGAP 4 4 4   * 106* 126*   BUN 22 22 24   CR 0.85 0.87 0.88   GFRESTIMATED 73 71 70   NAE 8.7 9.1 9.0       Rehabilitation - continue comprehensive acute inpatient rehabilitation program with multidisciplinary approach including therapies, rehab nursing, and physiatry following. See interval history for updates.      ASSESSMENT AND PLAN    Chika Ferguson is a 61 year old woman with past medical history of chronic heart failure, pulmonary hypertension, chronic anemia, cad, gerd, tahmina, and hld who presented with acute hypoixc-hypercapnic respiratory failure in setting of COVID 19, MSSA PNA, acute on chronic heart failure.  Course further complicated by intramuscular hematoma, fall, pressure injury, electrolyte abnormalities, moderate malnutrition,  and acute kidney injury.      Admitted to acute rehab 10/27 in setting of impaired strength, impaired activity tolerance, impaired coordination,  impaired balance, and impaired safety awareness.     Acute hypoxic-hypercapnic respiratory failure   multifactorial COVID, MSSA PNA, acute on chronic heart failure, pulmonary  HTN. Intubated 10/6 extubated 10/13.   -oxygen- titrate and taper  -completed tx for covid and mssa, continues on diuretics   -encourage IS  -On dulera-(PTA)      COVID recovered  Debility  Received tx and completed 20 day course of isolation.   -continue PT/OT  -lovenox dvt prophylaxis plan for 30 day course oral agent      CARL-   Continue cpap at bedtime      Acute on Chronic Heart Failure  Pulmonary HTN  History of HF with preserved EF and Pulmonary HTN on bumex pta.  BNP elevated on admission. TTE (10/6) LVEF 55-60%, mild LVH, indeterminate diastolic function, moderate to severe RV dilation w moderate to severely reduced RV function, and dilated IVC. Pulmonary pressures could not be assessed on TTE. CTA chest negative for PE but showed severely dilated pulmonary trunk c/w pulmonary HTN. Seen by cardiology   -f/u Dr. Hughes 2-4 weeks (pulmonary HTN/ Right heart cath  -continue bumex  -On aldactone 25 mg daily  -trend weights      CAD  Hx pci 2016.  Mild troponin elevation felt to be demand ischemia.   -continue asa & statin  -continue coreg 3.125 mg bid      Acute on Chronic Anemia  Hgb stable 9.1 10/28.  baseline appears to be 9-10     GERD  -continue ppi     Dermatitis  Intertrigo  Pressure injury Right heel   -appreciate WOCN assistance with managment.   -continue wound care as ordered.      Anxiety  Insomnia  Reports long standing issues with anxiety and associates this with poor sleep  On paxil pta  -continue melatonin  -psychology for emotional support  -consider trazodone or alternative for sleep       1. Adjustment to disability:  Psychology for emotional support  2. FEN: reg  3. Bowel: monitor  4. Bladder: continent monitor  5. DVT Prophylaxis: on lovenox transition to doac prior to discharge  6. GI Prophylaxis: protonix  7. Code: full   8. Disposition:  Goal for home with support from family  9. ELOS: goal for 11/5/21  10. Follow up Appointments on Discharge: pcp, post covid clinic. Cardiology.          Edi Barth, DO        I spent a total of 25 minutes face to face and coordinating care of Chika Ferguson. Over 50% of my time on the unit was spent counseling the patient and /or coordinating care regarding post Covid.

## 2021-11-01 NOTE — PLAN OF CARE
"FOCUS/GOAL  Mobility, Skin integrity, and Safety management    ASSESSMENT, INTERVENTIONS AND CONTINUING PLAN FOR GOAL:    VS: /67   Pulse 93   Temp 97.7  F (36.5  C) (Axillary)   Resp 18   Ht 1.702 m (5' 7\")   Wt 145.5 kg (320 lb 11.2 oz)   SpO2 99%   BMI 50.23 kg/m       O2: On room air, pt weaned off oxygen this evening stats stable above 93%. Using CPAP at night.   Output: Voids spontaneously without difficulties. LBM 10/31. Continent.   Activity: Mod I in room with a walker  Needs assistance when getting to bed   Skin: Pressure wound on right heel and right buttocks  Abdominal folds and groin moisture barrier cream applied.   Pain: denies   CMS: Alert and oriented x4. Denies numbness/tingling   Dressing: Right heel dressing C/D/I and right buttocks dressing changed.   Diet: Regular/thin liquids, takes pills whole. Good appetite   LDA: none   Plan: Continue to monitor and follow POC.   Additional Info: Pt is mod I in room during the day, SBA at night needs help with putting on soaks and turning off CPAP.  Lymph wraps off at bedtime per pt request.        "

## 2021-11-02 ENCOUNTER — APPOINTMENT (OUTPATIENT)
Dept: OCCUPATIONAL THERAPY | Facility: CLINIC | Age: 61
DRG: 948 | End: 2021-11-02
Attending: PHYSICAL MEDICINE & REHABILITATION
Payer: MEDICARE

## 2021-11-02 ENCOUNTER — APPOINTMENT (OUTPATIENT)
Dept: PHYSICAL THERAPY | Facility: CLINIC | Age: 61
DRG: 948 | End: 2021-11-02
Attending: PHYSICAL MEDICINE & REHABILITATION
Payer: MEDICARE

## 2021-11-02 PROCEDURE — 999N000157 HC STATISTIC RCP TIME EA 10 MIN

## 2021-11-02 PROCEDURE — 99232 SBSQ HOSP IP/OBS MODERATE 35: CPT | Performed by: PHYSICAL MEDICINE & REHABILITATION

## 2021-11-02 PROCEDURE — 97110 THERAPEUTIC EXERCISES: CPT | Mod: GO | Performed by: OCCUPATIONAL THERAPIST

## 2021-11-02 PROCEDURE — 97110 THERAPEUTIC EXERCISES: CPT | Mod: GO

## 2021-11-02 PROCEDURE — 97530 THERAPEUTIC ACTIVITIES: CPT | Mod: GO | Performed by: OCCUPATIONAL THERAPIST

## 2021-11-02 PROCEDURE — 94660 CPAP INITIATION&MGMT: CPT

## 2021-11-02 PROCEDURE — 97530 THERAPEUTIC ACTIVITIES: CPT | Mod: GP | Performed by: PHYSICAL THERAPIST

## 2021-11-02 PROCEDURE — 97110 THERAPEUTIC EXERCISES: CPT | Mod: GP | Performed by: PHYSICAL THERAPIST

## 2021-11-02 PROCEDURE — 128N000003 HC R&B REHAB

## 2021-11-02 PROCEDURE — 250N000013 HC RX MED GY IP 250 OP 250 PS 637: Performed by: PHYSICIAN ASSISTANT

## 2021-11-02 PROCEDURE — 97535 SELF CARE MNGMENT TRAINING: CPT | Mod: GO

## 2021-11-02 RX ADMIN — PANTOPRAZOLE SODIUM 40 MG: 40 TABLET, DELAYED RELEASE ORAL at 07:53

## 2021-11-02 RX ADMIN — ASPIRIN 81 MG CHEWABLE TABLET 81 MG: 81 TABLET CHEWABLE at 07:52

## 2021-11-02 RX ADMIN — SIMVASTATIN 20 MG: 20 TABLET, FILM COATED ORAL at 19:37

## 2021-11-02 RX ADMIN — APIXABAN 2.5 MG: 2.5 TABLET, FILM COATED ORAL at 07:53

## 2021-11-02 RX ADMIN — MICONAZOLE NITRATE: 20 CREAM TOPICAL at 07:52

## 2021-11-02 RX ADMIN — Medication 600 MG: at 07:52

## 2021-11-02 RX ADMIN — MICONAZOLE NITRATE: 20 CREAM TOPICAL at 19:38

## 2021-11-02 RX ADMIN — SPIRONOLACTONE 25 MG: 25 TABLET ORAL at 07:53

## 2021-11-02 RX ADMIN — BUMETANIDE 1 MG: 0.5 TABLET ORAL at 16:54

## 2021-11-02 RX ADMIN — APIXABAN 2.5 MG: 2.5 TABLET, FILM COATED ORAL at 20:03

## 2021-11-02 RX ADMIN — Medication 50 MCG: at 07:53

## 2021-11-02 RX ADMIN — CARVEDILOL 3.12 MG: 3.12 TABLET, FILM COATED ORAL at 18:02

## 2021-11-02 RX ADMIN — CARVEDILOL 3.12 MG: 3.12 TABLET, FILM COATED ORAL at 07:52

## 2021-11-02 RX ADMIN — MULTIPLE VITAMINS W/ MINERALS TAB 1 TABLET: TAB at 07:53

## 2021-11-02 RX ADMIN — UMECLIDINIUM BROMIDE AND VILANTEROL TRIFENATATE 1 PUFF: 62.5; 25 POWDER RESPIRATORY (INHALATION) at 07:53

## 2021-11-02 RX ADMIN — PAROXETINE 40 MG: 40 TABLET, FILM COATED ORAL at 07:53

## 2021-11-02 RX ADMIN — HYDROXYZINE HYDROCHLORIDE 25 MG: 25 TABLET, FILM COATED ORAL at 20:03

## 2021-11-02 RX ADMIN — MELATONIN TAB 3 MG 3 MG: 3 TAB at 19:37

## 2021-11-02 RX ADMIN — BUMETANIDE 1 MG: 0.5 TABLET ORAL at 07:52

## 2021-11-02 ASSESSMENT — ACTIVITIES OF DAILY LIVING (ADL)
ADLS_ACUITY_SCORE: 10

## 2021-11-02 ASSESSMENT — MIFFLIN-ST. JEOR: SCORE: 2069.53

## 2021-11-02 NOTE — PROGRESS NOTES
"  [facility]   Acute Rehabilitation Unit  Daily progress note    INTERVAL HISTORY  Chika Ferguson was seen sitting up in chair. Denies n/v/d, sob at rest, denies fevers and dizziness.  Is making progress with therapy, notes ongoing chronic lower extremity edema wonders if it will ever be better, discussed diet, elevation, compression, lasix and likely improvement.      Functionally:    OT:   Addressed cognition w/ completing Cognistat. Pt then completed UE resistive ex w/ 1L n.c. 02 and WNL for 02 saturations. Pt was wheeled to and from gym to room. Pt can continue to working on functional cognition, ambulating to and from gym<->room, LE dressing (socks/pants as appropriate) and increasing UE strength for progressing I w/ ADLs/IADLs.       MEDICATIONS    apixaban ANTICOAGULANT  2.5 mg Oral BID     aspirin  81 mg Oral Daily     bumetanide  1 mg Oral BID     calcium carbonate  600 mg Oral Daily     carvedilol  3.125 mg Oral BID w/meals     hydrOXYzine  25 mg Oral At Bedtime     melatonin  3 mg Oral At Bedtime     miconazole with skin protectant   Topical BID     multivitamin w/minerals  1 tablet Oral Daily     pantoprazole  40 mg Oral Daily     PARoxetine  40 mg Oral QAM     simvastatin  20 mg Oral At Bedtime     spironolactone  25 mg Oral Daily     umeclidinium-vilanterol  1 puff Inhalation Daily     vitamin D3  50 mcg Oral Daily        acetaminophen, albuterol, bisacodyl, hydrOXYzine **OR** [DISCONTINUED] hydrOXYzine, polyethylene glycol     PHYSICAL EXAM  /75 (BP Location: Left arm, Patient Position: Chair)   Pulse 84   Temp 97.5  F (36.4  C) (Oral)   Resp 18   Ht 1.702 m (5' 7\")   Wt 148.2 kg (326 lb 11.2 oz)   SpO2 94%   BMI 51.17 kg/m    Gen: NAD, up in chair  HEENT: NCAT, MMM  Pulm: non labored, clear diminished on 1 liter nasal cannula   CV: rrr,  Abd: obese, non tender  Ext: compression on legs today  Neuro/MSK: alert and awake, speech clear, moves all " extremities.     LABS  CBC RESULTS:   Recent Labs   Lab Test 10/30/21  0602 10/28/21  0754 10/26/21  0553 10/23/21  0716 10/23/21  0716   WBC  --  6.6 6.9  --  7.4   RBC  --  3.38* 3.33*  --  3.63*   HGB  --  9.1* 8.8*  --  9.5*   HCT  --  29.8* 29.8*  --  32.8*   MCV  --  88 90  --  90   MCH  --  26.9 26.4*  --  26.2*   MCHC  --  30.5* 29.5*  --  29.0*   RDW  --  19.0* 19.3*  --  19.0*    259 288   < > 295    < > = values in this interval not displayed.     Last Basic Metabolic Panel:  Recent Labs   Lab Test 11/01/21  0755 10/28/21  0754 10/26/21  0026    134 137   POTASSIUM 4.0 3.8 3.5   CHLORIDE 101 96 94   CO2 30 34* 39*   ANIONGAP 4 4 4   * 106* 126*   BUN 22 22 24   CR 0.85 0.87 0.88   GFRESTIMATED 73 71 70   NAE 8.7 9.1 9.0       Rehabilitation - continue comprehensive acute inpatient rehabilitation program with multidisciplinary approach including therapies, rehab nursing, and physiatry following. See interval history for updates.      ASSESSMENT AND PLAN    Chika Ferguson is a 61 year old woman with past medical history of chronic heart failure, pulmonary hypertension, chronic anemia, cad, gerd, tahmina, and hld who presented with acute hypoixc-hypercapnic respiratory failure in setting of COVID 19, MSSA PNA, acute on chronic heart failure.  Course further complicated by intramuscular hematoma, fall, pressure injury, electrolyte abnormalities, moderate malnutrition,  and acute kidney injury.      Admitted to acute rehab 10/27 in setting of impaired strength, impaired activity tolerance, impaired coordination,  impaired balance, and impaired safety awareness.     Acute hypoxic-hypercapnic respiratory failure  History of COPD- in chart   multifactorial COVID, MSSA PNA, acute on chronic heart failure, pulmonary HTN. Intubated 10/6 extubated 10/13.   -oxygen- titrate and taper  -completed tx for covid and mssa, continues on diuretics   -encourage IS  -continue dulera-(PTA)      COVID  recovered  Debility  Received tx and completed 20 day course of isolation.   -continue PT/OT  -transitioned to apixaban for 30 day dvt prophylaxis post covid     CARL-   Continue cpap at bedtime      Acute on Chronic Heart Failure  Pulmonary HTN  History of HF with preserved EF and Pulmonary HTN on bumex pta.  BNP elevated on admission. TTE (10/6) LVEF 55-60%, mild LVH, indeterminate diastolic function, moderate to severe RV dilation w moderate to severely reduced RV function, and dilated IVC. Pulmonary pressures could not be assessed on TTE. CTA chest negative for PE but showed severely dilated pulmonary trunk c/w pulmonary HTN. Seen by cardiology   -f/u Dr. Hughes 2-4 weeks (pulmonary HTN/ Right heart cath  -continue bumex & aldactone   -trend weights      CAD  Hx pci 2016.  Mild troponin elevation felt to be demand ischemia.   -continue asa & statin  -continue coreg 3.125 mg bid      Acute on Chronic Anemia  Hgb stable 9.1 10/28.  baseline appears to be 9-10     GERD  -continue ppi     Dermatitis  Intertrigo  Pressure injury Right heel   -appreciate WOCN assistance with managment.   -continue wound care as ordered.      Anxiety  Insomnia  Reports long standing issues with anxiety and associates this with poor sleep  On paxil pta  -continue paxil  -continue melatonin  -psychology for emotional support  -f/u pcp       1. Adjustment to disability:  Psychology for emotional support  2. FEN: reg  3. Bowel: monitor  4. Bladder: continent monitor  5. DVT Prophylaxis: apixaban  6. GI Prophylaxis: protonix  7. Code: full   8. Disposition:  Goal for home with support from family  9. ELOS: goal for 11/5/21  10. Follow up Appointments on Discharge: pcp, post covid clinic. Cardiology.         Trista Olivares PA-C  PM&R      I spent a total of 25 minutes face to face and coordinating care of Chika Ferguson. Over 50% of my time on the unit was spent counseling the patient and /or coordinating care

## 2021-11-02 NOTE — PLAN OF CARE
Discharge Planner Post-Acute Rehab PT:      Discharge Plan:  Home with son. 1 ROMELIA with rail or ramp. Home care PT.     Precautions: falls, O2 (RA at rest, 1-2 L with activity), coccyx and R heel ulcer  70% of HR max = 135 bpm      Current Status:  Bed Mobility: IND  Transfer: Mod I with FWW. SBA without device  Gait: Mod I with FWW, up to 200 ft  Stairs: N/T   Balance: Rios on 10/31: 41/56     Assessment: Pt remains safe for mod I in room with FWW, progressing to more consistent transfers and short distance gait without device. Family training set-up for Thurs PM. HEP handout issued as well today.     Other Barriers to Discharge (DME, Family Training, etc):    Family training Thurs 11/4 1:15-2:45.  Supplemental O2 walk test to be completed.  Pt to be issued 4WW through [facility].  HEP handout issued, including recommendations for floor bike.

## 2021-11-02 NOTE — PROGRESS NOTES
[facility] accepted and can provide HC SWer to f/u with pt for additional resources/support at discharge. SW discussed this with PA. SW will send orders on day of discharge. Pt son plans to transport. SWer asked medical team to discuss CPAP with pt before discharge. SW will meet with pt, confirm HC set up and provide pt with IMM before discharge.       Home Health Care:   [facility]  PH: [phone], F: [phone] (Intake, Melisa PH: [phone])  Nurse, physical therapy, occupational therapy, home health aide and    * to help with health care directive/power of , secondary/supplemental insurance and follow-up with [facility] for recalled CPAP.       Juliette Moseley [facility] Acute Rehab   Direct Phone: [phone]  I   Pager: [phone]  I  Fax: [phone]

## 2021-11-02 NOTE — PLAN OF CARE
Patient alert and oriented x4. Mod I with walker. Using 1L of O2 when ambulating to bathroom, pt denies any shortness of breath. Using CPAP at night. Regular diet, thins, pills whole. Lymphedema wraps applied at 0900, on pt at this time. Continent of bowel and bladder, LBM: 11/1. Mepilex dressing on R heel and coccyx. Pt denies pain during shift. Will continue with POC.

## 2021-11-02 NOTE — PLAN OF CARE
FOCUS/GOAL  Bladder, Skin integrity, and Psychosocial needs    ASSESSMENT, INTERVENTIONS AND CONTINUING PLAN FOR GOAL:  Pt had one incident of bladder stress incontinence while getting into bed, otherwise was up to use the toilet.  Last BM 10/31.  Pt has bruising on bilateral upper extremities and abdomen.  Right heal has a quarter sized unblanchable area as well as skin peeling.  Note left for provider.  Pt exhibited anxiety about her c-pap and transferring into bed, used reassurance and active listening to address the anxiety.

## 2021-11-02 NOTE — PLAN OF CARE
FOCUS/GOAL  Wound care management, Skin integrity, Psychosocial needs, and Safety management    ASSESSMENT, INTERVENTIONS AND CONTINUING PLAN FOR GOAL:  Pt is AOx4. Denies pain & SOB. Denies N/T. Pt used CPAP overnight. Mod I with walker in room. Continent of bowel and bladder. LBM 11/1 per pt report. Mepilex dressing to R heel; new dressing applied, CDI. Mepilex dressing to coccyx CDI. Pt refused lymphedema wraps overnight. Pt anxious when HOB lowered. Pt call light within reach and able to make needs known.

## 2021-11-02 NOTE — PROGRESS NOTES
Discharge Planner Post-Acute Rehab OT:      Discharge Plan: Home ([place], [place]) with support from son and son's fiances, HC OT  ELOS 11/5     Precautions: Falls, 1-2L O2 NC, coccyx and R heel ulcer     Current Status:  Transfers/Mobility: Mod indep using 4ww and Indep with transfers. Session focused on Endurance/strenghtening exercises.   ADLs: Mod I in room w/ FWW. SBA with G/H tasks standing at EOS with FWW, Min A to don long pajama top, dependent donning socks, Min A bathing.   IADLs: Kitchen mobility SBA, Laundry CGA  Vision/Cognition: No acute visual concerns. SLUMS: 14/30 indicating cognitive deficits. Cognistat completed on 11.1.21. Pt demonstrated moderate deficits in attention, language and visual memory. Pt scored mild deficits in orientation and absract reasoning. Pt does endorses cognitive difficulties from baseline and has some awareness of deficits.   Edema: BLE gradient compression bandaging during acute IP stay. Owns custom velcro garments at home.     Assessment: Pt. Ordering Leg glide and 4ww for discharge. Pt. Reports already has oximeter at home. Pt. O2 sats on 1 liter of O2 with activity remained at 92-94% although when weaned to RA with activity decreased to 87-89%. Intervention focused on strength/endurance exercise with good tolerance but reports of fatigue. Frequent rest breaks needed during session.   Other Barriers to Discharge (DME, Family Training, etc):   DME/AE: shower bench, 4ww, recliner, O2, reacher.  Caregiver training: Recommended prior to discharge.

## 2021-11-03 ENCOUNTER — APPOINTMENT (OUTPATIENT)
Dept: PHYSICAL THERAPY | Facility: CLINIC | Age: 61
DRG: 948 | End: 2021-11-03
Attending: PHYSICAL MEDICINE & REHABILITATION
Payer: MEDICARE

## 2021-11-03 ENCOUNTER — APPOINTMENT (OUTPATIENT)
Dept: OCCUPATIONAL THERAPY | Facility: CLINIC | Age: 61
DRG: 948 | End: 2021-11-03
Attending: PHYSICAL MEDICINE & REHABILITATION
Payer: MEDICARE

## 2021-11-03 PROCEDURE — 128N000003 HC R&B REHAB

## 2021-11-03 PROCEDURE — 97535 SELF CARE MNGMENT TRAINING: CPT | Mod: GO | Performed by: OCCUPATIONAL THERAPIST

## 2021-11-03 PROCEDURE — 97530 THERAPEUTIC ACTIVITIES: CPT | Mod: GP

## 2021-11-03 PROCEDURE — 250N000013 HC RX MED GY IP 250 OP 250 PS 637: Performed by: PHYSICIAN ASSISTANT

## 2021-11-03 PROCEDURE — 97110 THERAPEUTIC EXERCISES: CPT | Mod: GP

## 2021-11-03 PROCEDURE — 999N000157 HC STATISTIC RCP TIME EA 10 MIN

## 2021-11-03 PROCEDURE — 99232 SBSQ HOSP IP/OBS MODERATE 35: CPT | Performed by: PHYSICAL MEDICINE & REHABILITATION

## 2021-11-03 PROCEDURE — 94660 CPAP INITIATION&MGMT: CPT

## 2021-11-03 PROCEDURE — 90832 PSYTX W PT 30 MINUTES: CPT | Performed by: PSYCHOLOGIST

## 2021-11-03 RX ADMIN — ASPIRIN 81 MG CHEWABLE TABLET 81 MG: 81 TABLET CHEWABLE at 07:55

## 2021-11-03 RX ADMIN — UMECLIDINIUM BROMIDE AND VILANTEROL TRIFENATATE 1 PUFF: 62.5; 25 POWDER RESPIRATORY (INHALATION) at 08:03

## 2021-11-03 RX ADMIN — CARVEDILOL 3.12 MG: 3.12 TABLET, FILM COATED ORAL at 17:55

## 2021-11-03 RX ADMIN — PANTOPRAZOLE SODIUM 40 MG: 40 TABLET, DELAYED RELEASE ORAL at 07:57

## 2021-11-03 RX ADMIN — MICONAZOLE NITRATE: 20 CREAM TOPICAL at 08:07

## 2021-11-03 RX ADMIN — PAROXETINE 40 MG: 40 TABLET, FILM COATED ORAL at 07:56

## 2021-11-03 RX ADMIN — HYDROXYZINE HYDROCHLORIDE 25 MG: 25 TABLET, FILM COATED ORAL at 20:26

## 2021-11-03 RX ADMIN — CARVEDILOL 3.12 MG: 3.12 TABLET, FILM COATED ORAL at 07:58

## 2021-11-03 RX ADMIN — APIXABAN 2.5 MG: 2.5 TABLET, FILM COATED ORAL at 20:26

## 2021-11-03 RX ADMIN — SPIRONOLACTONE 25 MG: 25 TABLET ORAL at 07:57

## 2021-11-03 RX ADMIN — MULTIPLE VITAMINS W/ MINERALS TAB 1 TABLET: TAB at 07:55

## 2021-11-03 RX ADMIN — BUMETANIDE 1 MG: 0.5 TABLET ORAL at 15:49

## 2021-11-03 RX ADMIN — Medication 600 MG: at 07:55

## 2021-11-03 RX ADMIN — MELATONIN TAB 3 MG 3 MG: 3 TAB at 20:26

## 2021-11-03 RX ADMIN — BUMETANIDE 1 MG: 0.5 TABLET ORAL at 07:55

## 2021-11-03 RX ADMIN — SIMVASTATIN 20 MG: 20 TABLET, FILM COATED ORAL at 20:26

## 2021-11-03 RX ADMIN — Medication 50 MCG: at 07:55

## 2021-11-03 RX ADMIN — APIXABAN 2.5 MG: 2.5 TABLET, FILM COATED ORAL at 08:03

## 2021-11-03 ASSESSMENT — ACTIVITIES OF DAILY LIVING (ADL)
ADLS_ACUITY_SCORE: 10

## 2021-11-03 ASSESSMENT — MIFFLIN-ST. JEOR: SCORE: 2071.8

## 2021-11-03 NOTE — PLAN OF CARE
Discharge Planner Post-Acute Rehab PT:      Discharge Plan:  Home with son. 1 ROMELIA with rail or ramp. Home care PT.     Precautions: falls, O2 (RA at rest, 1-2 L with activity), coccyx and R heel ulcer  70% of HR max = 135 bpm      Current Status:  Bed Mobility: IND  Transfer: Mod I with FWW. SBA without device  Gait: Mod I with FWW, up to 200 ft  Stairs: N/T   Balance: Rios on 10/31: 41/56     Assessment: Pt on track for discharge Friday, completed O2 walk test today, family training to be completed tomorrow.    Other Barriers to Discharge (DME, Family Training, etc):    Family training Thurs 11/4 1:15-2:45.  Supplemental O2 walk test to be completed.  [facility]  HEP handout issued, including recommendations for floor bike.

## 2021-11-03 NOTE — PROGRESS NOTES
Discharge Planner Post-Acute Rehab OT:      Discharge Plan: Home ([place], [place]) with support from son and son's BABITA byrd OT  ELOS 11/5     Precautions: Falls, 1-2L O2 NC, coccyx and R heel ulcer     Current Status:  ADLs/IADLs: Mod IND transfers/mobility with fww. Pt Mod IND ADLs. Pt requiring supervision IADLs.   Vision/Cognition: No acute visual concerns. SLUMS: 14/30 indicating cognitive deficits. Cognistat completed on 11.1.21. Pt demonstrated moderate deficits in attention, language and visual memory. Pt scored mild deficits in orientation and absract reasoning. Pt does endorses cognitive difficulties from baseline and has some awareness of deficits.   Edema: BLE gradient compression bandaging during acute IP stay. Owns custom velcro garments at home.     Assessment: Collaboration with pt to confirm DME/AE needs. Reviewed EC strategies. Reviewed recommendations for caregiver support with IADLs. On track for discharge home 11/5.     Other Barriers to Discharge (DME, Family Training, etc):   DME/AE: shower bench, 4ww, recliner, O2, reacher.  Caregiver training: Scheduled 11/4 with pt son.

## 2021-11-03 NOTE — PROGRESS NOTES
On track to discharge home Fri 11/05. SWer met with pt at bedside. Pt stated that the medical team address her concerns about CPAP. Pt stated that she will plan to call [facility] and f/u on getting a new one, when able. SWer notified pt of accepting HC agency, services (RN, PT, OT, HA and SW) and SOC. Pt agreeable to this plan and denied concerns. Pt given IMM, pt signed and denied concerns. Pt stated that she prefers to take the HCD/POA home and fill out with her son. SWer did send a message to the HC agency and asked that the HC SWer assist pt with this and follow-up to ensure that pt completes it. Pt aware of this. SWer also requested that the HC SWer assist pt with secondary/supplemental insurance.     PT came into the room and notified SWer that pt qualifies for O2 during the day with activity. Pt has O2 at home provided by [facility]. SWer called [facility] to discuss. Was told to fax qualifying sats, order and F2F. Those items were faxed and a request for tank at bedside before discharge was requested. Pt updated and SW waiting to confirm.     Pt stated that a rolling walker is recommended and pt was information that it's not covered by insurance. Pt stated that she is unable to pay the ~$140.00 for the walker and can't ask her son because he is also on disability. Pt asking for assistance. Discussed with therapy and therapy has one that they can donate at no cost to the pt. Therapy discussed this with pt, pt agreeable and appreciative.      Home Oxygen/CPAP:   [facility] ([place], [place])   Phone: [phone]  FAX: [phone]    Home Health Care:   [facility]  PH: [phone], F: [phone] (Intake, Melisa PH: [phone])  Nurse, physical therapy, occupational therapy, home health aide and    * to help with health care directive/power of , secondary/supplemental insurance and follow-up with [facility] for recalled CPAP.    Juliette Moseley, [facility]  Acute Rehab   Direct Phone: [phone]  I   Pager: [phone]  I  Fax: [phone]

## 2021-11-03 NOTE — PLAN OF CARE
Patient has been assessed for Home Oxygen needs. Oxygen readings:    *Pulse oximetry (SpO2) = 94% on room air at rest while awake.    *SpO2 improved to 95 % on 1 liters/minute at rest. Pt does not require supplemental O2 while at rest.      *SpO2 = 82% on room air during activity/with exercise.    *SpO2 improved to 92% on 2 liters/minute during activity/with exercise.

## 2021-11-03 NOTE — PLAN OF CARE
Pt is alert and oriented x 4, she denies pain or discomfort. She has been modified independent in the room using a walker. She has bilateral lower extremity edema wrapped with lymph wrap by the therapist this morning. Mepilex dressing to the buttocks is clean, dry, and intact. Discharge plan is on 11/4/21. We will continue to monitor skin condition, encourage independence and assist with activity of daily livings as needed.

## 2021-11-03 NOTE — PLAN OF CARE
FOCUS/GOAL  Bowel management, Bladder management, Medical management, Mobility, and Cognition/Memory/Judgment/Problem solving    ASSESSMENT, INTERVENTIONS AND CONTINUING PLAN FOR GOAL:  A&Ox4. MOD I in room with walker. Continent of B&B. Mepilex on coccyx for blanchable redness and on R heel. Using CPAP overnight. Denies pain. Calling appropriately. Slept well overnight.

## 2021-11-03 NOTE — PROGRESS NOTES
"  [facility]   Acute Rehabilitation Unit  Daily progress note    INTERVAL HISTORY  Chika Ferguson was seen sitting up in chair, reports doing better today.  Denies n/v/d, sob, headache, dizziness, and fever.  Sleeping ok.  Worried about recall on home CPAP says she will receive new device in next one month. Offers several options for treatment of CPAP while awaiting delivery,  both of which were reviewed with patient.  Planning for discharge home 11/5 denies additional questions or concerns.      PT: Current Status:  Bed Mobility: IND  Transfer: Mod I with FWW. SBA without device  Gait: Mod I with FWW, up to 200 ft  Stairs: N/T   Balance: Rios on 10/31: 41/56    MEDICATIONS    apixaban ANTICOAGULANT  2.5 mg Oral BID     aspirin  81 mg Oral Daily     bumetanide  1 mg Oral BID     calcium carbonate  600 mg Oral Daily     carvedilol  3.125 mg Oral BID w/meals     hydrOXYzine  25 mg Oral At Bedtime     melatonin  3 mg Oral At Bedtime     miconazole with skin protectant   Topical BID     multivitamin w/minerals  1 tablet Oral Daily     pantoprazole  40 mg Oral Daily     PARoxetine  40 mg Oral QAM     simvastatin  20 mg Oral At Bedtime     spironolactone  25 mg Oral Daily     umeclidinium-vilanterol  1 puff Inhalation Daily     vitamin D3  50 mcg Oral Daily        acetaminophen, albuterol, bisacodyl, hydrOXYzine **OR** [DISCONTINUED] hydrOXYzine, polyethylene glycol     PHYSICAL EXAM  /62 (BP Location: Left arm)   Pulse 73   Temp (!) 95.4  F (35.2  C) (Oral)   Resp 20   Ht 1.702 m (5' 7\")   Wt 148.4 kg (327 lb 3.2 oz)   SpO2 96%   BMI 51.25 kg/m    Gen: NAD, up in chair  HEENT: NCAT, MMM  Pulm: non labored, clear diminished on 1 liter nasal cannula   CV: rrr,  Abd: obese, non tender  Ext: compression on legs today  Neuro/MSK: alert and awake, speech clear, moves all extremities.     LABS  CBC RESULTS:   Recent Labs   Lab Test 10/30/21  0602 10/28/21  0754 10/26/21  0553 " 10/23/21  0716 10/23/21  0716   WBC  --  6.6 6.9  --  7.4   RBC  --  3.38* 3.33*  --  3.63*   HGB  --  9.1* 8.8*  --  9.5*   HCT  --  29.8* 29.8*  --  32.8*   MCV  --  88 90  --  90   MCH  --  26.9 26.4*  --  26.2*   MCHC  --  30.5* 29.5*  --  29.0*   RDW  --  19.0* 19.3*  --  19.0*    259 288   < > 295    < > = values in this interval not displayed.     Last Basic Metabolic Panel:  Recent Labs   Lab Test 11/01/21  0755 10/28/21  0754 10/26/21  0026    134 137   POTASSIUM 4.0 3.8 3.5   CHLORIDE 101 96 94   CO2 30 34* 39*   ANIONGAP 4 4 4   * 106* 126*   BUN 22 22 24   CR 0.85 0.87 0.88   GFRESTIMATED 73 71 70   NAE 8.7 9.1 9.0       Rehabilitation - continue comprehensive acute inpatient rehabilitation program with multidisciplinary approach including therapies, rehab nursing, and physiatry following. See interval history for updates.      ASSESSMENT AND PLAN    Chika Ferguson is a 61 year old woman with past medical history of chronic heart failure, pulmonary hypertension, chronic anemia, cad, gerd, tahmina, and hld who presented with acute hypoixc-hypercapnic respiratory failure in setting of COVID 19, MSSA PNA, acute on chronic heart failure.  Course further complicated by intramuscular hematoma, fall, pressure injury, electrolyte abnormalities, moderate malnutrition,  and acute kidney injury.      Admitted to acute rehab 10/27 in setting of impaired strength, impaired activity tolerance, impaired coordination,  impaired balance, and impaired safety awareness.     Acute hypoxic-hypercapnic respiratory failure  History of COPD- in chart   multifactorial COVID, MSSA PNA, acute on chronic heart failure, pulmonary HTN. Intubated 10/6 extubated 10/13.   -oxygen- titrate and taper  -completed tx for covid and mssa, continues on diuretics   -encourage IS  -continue dulera-(PTA)   Patient has been assessed for Home Oxygen needs. Oxygen readings:     *Pulse oximetry (SpO2) = 94% on room air at rest while  awake.     *SpO2 improved to 95 % on 1 liters/minute at rest. Pt does not require supplemental O2 while at rest.       *SpO2 = 82% on room air during activity/with exercise.     *SpO2 improved to 92% on 2 liters/minute during activity/with exercise.       COVID recovered  Debility  Received tx and completed 20 day course of isolation.   -continue PT/OT  -transitioned to apixaban for 30 day dvt prophylaxis post covid     CARL-   Continue cpap at bedtime      Acute on Chronic Heart Failure  Pulmonary HTN  History of HF with preserved EF and Pulmonary HTN on bumex pta.  BNP elevated on admission. TTE (10/6) LVEF 55-60%, mild LVH, indeterminate diastolic function, moderate to severe RV dilation w moderate to severely reduced RV function, and dilated IVC. Pulmonary pressures could not be assessed on TTE. CTA chest negative for PE but showed severely dilated pulmonary trunk c/w pulmonary HTN. Seen by cardiology   -f/u Dr. Hughes 2-4 weeks (pulmonary HTN/ Right heart cath  -continue bumex & aldactone   -trend weights      CAD  Hx pci 2016.  Mild troponin elevation felt to be demand ischemia.   -continue asa & statin  -continue coreg 3.125 mg bid      Acute on Chronic Anemia  Hgb stable 9.1 10/28.  baseline appears to be 9-10     GERD  -continue ppi     Dermatitis  Intertrigo  Pressure injury Right heel   -appreciate WOCN assistance with managment.   -continue wound care as ordered.      Anxiety  Insomnia  Reports long standing issues with anxiety and associates this with poor sleep  On paxil pta  -continue paxil  -continue melatonin  -psychology for emotional support  -f/u pcp       1. Adjustment to disability:  Psychology for emotional support  2. FEN: reg  3. Bowel: monitor  4. Bladder: continent monitor  5. DVT Prophylaxis: apixaban  6. GI Prophylaxis: protonix  7. Code: full   8. Disposition:  Goal for home with support from family  9. ELOS: goal for 11/5/21  10. Follow up Appointments on Discharge: pcp, post covid  clinic. Cardiology.         Trista Olivares PA-C  PM&R      I spent a total of 25 minutes face to face and coordinating care of Chika Ferguson. Over 50% of my time on the unit was spent counseling the patient and /or coordinating care

## 2021-11-03 NOTE — PLAN OF CARE
FOCUS/GOAL  Wound care management, Mobility, and Safety management    ASSESSMENT, INTERVENTIONS AND CONTINUING PLAN FOR GOAL:    Pt alert and oriented x4, calm and cooperative, easily excited/becomes anxious, directs her care. Denies pain, on regular and thin liquids diet, ate 100%. Continent of bladder and bowel, LBM : 11/2 a.m. shift per pt report, had 2 soft/medium bm. Had shower this pm shift and preferred to have lymphedema wraps off and to be put on again tomorrow a.m. Dressing on R heel and R buttock changed. CPAP on and checked by RT. Sleeping at end of shift.

## 2021-11-03 NOTE — CONSULTS
Health Psychology                  Clinic    Department of Medicine  Miroslava Mckeon, Ph.D., L.P. [phone]                          [facility]  [facility] Sunil Ardon, Ph.D.,  L.P. [phone]                 3rd Floor  [place] Mail [address]   Chika Cifuentes, Ph.D., L.P. [phone]    [address] Maria Fernanda Hawkins, Ph.D., L.P. [phone]            [place]  [place] [address]          Pilo Braswell, Ph.D., A.B.SANGEETA.SANGEETA., L.P. [phone]      Elaine Mcelroy, Ph.D., L.P. [phone]      Inpatient Health Psychology Consultation*    DOS: 11/03/2021    Clinical Information:  Chika Ferguson is a 61-year-old woman currently on the Acute Rehabilitation Center at [facility] as she recovers from a second infection with COVID-19 that was quite severe, requiring a week of intubation and multiple medications.  She is also living with acute on chronic heart failure as well as other medical issues.   When I met with her on 10/29/21 she reported a history of depression and anxiety that does back to childhood. Her siblings also report a significant level of anxiety. Her reported symptoms currently appear to fit a diagnosis of PTSD and major depressive disorder. Her sleep is particularly affected and she requested that PRN Atarax be scheduled for bedtime.  Today Ms Ferguson reports that she is doing much better both physically and emotionally. Feels that since having Atarax scheduled at bedtime that she has been sleeping much more easily and soundly. Able to see the positive progress she is making in her rehabilitation work.  Expresses some concerns about equipment needs for discharge on Friday, and assured that I will communicate these concerns to the appropriate staff.  Affect more relaxed, no tremor noted today, suggesting that it may have been triggered by anxiety.  Assessment: Ms Ferguson is doing much better  emotionally since scheduled Atarax is assisting her to sleep more effectively.   Diagnosis:    1.  Posttraumatic stress disorder (F43.10).  2.  Major depressive disorder, single episode, unspecified (F32.9).  Recommendations/Plan: I will put some options for mental health providers in the community with whom Ms Ferguson might explore establishing care. It would be advisable for there to be ongoing monitoring of her mental health concerns given her personal history and recent traumatic medical issues.  Time Spent with Patient: 19 minutes (In: 1441 Out: 1500)  Service Provided: Individual psychotherapy   Provider: Miroslava Mckeon, Ph.D, LP

## 2021-11-04 ENCOUNTER — APPOINTMENT (OUTPATIENT)
Dept: PHYSICAL THERAPY | Facility: CLINIC | Age: 61
DRG: 948 | End: 2021-11-04
Attending: PHYSICAL MEDICINE & REHABILITATION
Payer: MEDICARE

## 2021-11-04 ENCOUNTER — APPOINTMENT (OUTPATIENT)
Dept: OCCUPATIONAL THERAPY | Facility: CLINIC | Age: 61
DRG: 948 | End: 2021-11-04
Attending: PHYSICAL MEDICINE & REHABILITATION
Payer: MEDICARE

## 2021-11-04 LAB
ANION GAP SERPL CALCULATED.3IONS-SCNC: 6 MMOL/L (ref 3–14)
BUN SERPL-MCNC: 23 MG/DL (ref 7–30)
CALCIUM SERPL-MCNC: 8.6 MG/DL (ref 8.5–10.1)
CHLORIDE BLD-SCNC: 99 MMOL/L (ref 94–109)
CO2 SERPL-SCNC: 30 MMOL/L (ref 20–32)
CREAT SERPL-MCNC: 0.86 MG/DL (ref 0.52–1.04)
ERYTHROCYTE [DISTWIDTH] IN BLOOD BY AUTOMATED COUNT: 19.1 % (ref 10–15)
GFR SERPL CREATININE-BSD FRML MDRD: 72 ML/MIN/1.73M2
GLUCOSE BLD-MCNC: 106 MG/DL (ref 70–99)
HCT VFR BLD AUTO: 29.4 % (ref 35–47)
HGB BLD-MCNC: 9 G/DL (ref 11.7–15.7)
MCH RBC QN AUTO: 27 PG (ref 26.5–33)
MCHC RBC AUTO-ENTMCNC: 30.6 G/DL (ref 31.5–36.5)
MCV RBC AUTO: 88 FL (ref 78–100)
PLATELET # BLD AUTO: 227 10E3/UL (ref 150–450)
POTASSIUM BLD-SCNC: 3.6 MMOL/L (ref 3.4–5.3)
RBC # BLD AUTO: 3.33 10E6/UL (ref 3.8–5.2)
SODIUM SERPL-SCNC: 135 MMOL/L (ref 133–144)
WBC # BLD AUTO: 8.3 10E3/UL (ref 4–11)

## 2021-11-04 PROCEDURE — 36416 COLLJ CAPILLARY BLOOD SPEC: CPT | Performed by: PHYSICIAN ASSISTANT

## 2021-11-04 PROCEDURE — 97530 THERAPEUTIC ACTIVITIES: CPT | Mod: GP | Performed by: PHYSICAL THERAPIST

## 2021-11-04 PROCEDURE — 250N000013 HC RX MED GY IP 250 OP 250 PS 637: Performed by: PHYSICIAN ASSISTANT

## 2021-11-04 PROCEDURE — 80048 BASIC METABOLIC PNL TOTAL CA: CPT | Performed by: PHYSICIAN ASSISTANT

## 2021-11-04 PROCEDURE — 999N000125 HC STATISTIC PATIENT MED CONFERENCE < 30 MIN: Performed by: OCCUPATIONAL THERAPIST

## 2021-11-04 PROCEDURE — 85027 COMPLETE CBC AUTOMATED: CPT | Performed by: PHYSICIAN ASSISTANT

## 2021-11-04 PROCEDURE — 99233 SBSQ HOSP IP/OBS HIGH 50: CPT | Performed by: PHYSICAL MEDICINE & REHABILITATION

## 2021-11-04 PROCEDURE — 128N000003 HC R&B REHAB

## 2021-11-04 PROCEDURE — 97535 SELF CARE MNGMENT TRAINING: CPT | Mod: GO | Performed by: OCCUPATIONAL THERAPIST

## 2021-11-04 PROCEDURE — 97530 THERAPEUTIC ACTIVITIES: CPT | Mod: GP

## 2021-11-04 PROCEDURE — 999N000150 HC STATISTIC PT MED CONFERENCE < 30 MIN: Performed by: PHYSICAL THERAPIST

## 2021-11-04 PROCEDURE — 97110 THERAPEUTIC EXERCISES: CPT | Mod: GP

## 2021-11-04 RX ORDER — BUMETANIDE 1 MG/1
1 TABLET ORAL 2 TIMES DAILY
Qty: 20 TABLET | Refills: 0 | Status: SHIPPED | OUTPATIENT
Start: 2021-11-04 | End: 2021-11-04

## 2021-11-04 RX ORDER — HYDROXYZINE HYDROCHLORIDE 25 MG/1
25 TABLET, FILM COATED ORAL AT BEDTIME
Qty: 10 TABLET | Refills: 0 | Status: SHIPPED | OUTPATIENT
Start: 2021-11-04 | End: 2023-07-17

## 2021-11-04 RX ORDER — BUMETANIDE 0.5 MG/1
1 TABLET ORAL
Status: DISCONTINUED | OUTPATIENT
Start: 2021-11-04 | End: 2021-11-04

## 2021-11-04 RX ORDER — BENZOCAINE/MENTHOL 6 MG-10 MG
LOZENGE MUCOUS MEMBRANE 2 TIMES DAILY
Qty: 20 G | Refills: 0 | Status: SHIPPED | OUTPATIENT
Start: 2021-11-04 | End: 2023-07-17

## 2021-11-04 RX ORDER — TIOTROPIUM BROMIDE AND OLODATEROL 3.124; 2.736 UG/1; UG/1
2 SPRAY, METERED RESPIRATORY (INHALATION)
COMMUNITY
Start: 2021-11-04 | End: 2023-01-10

## 2021-11-04 RX ORDER — BENZOCAINE/MENTHOL 6 MG-10 MG
LOZENGE MUCOUS MEMBRANE 2 TIMES DAILY
Status: DISCONTINUED | OUTPATIENT
Start: 2021-11-04 | End: 2021-11-05 | Stop reason: HOSPADM

## 2021-11-04 RX ORDER — CARVEDILOL 3.12 MG/1
3.12 TABLET ORAL 2 TIMES DAILY WITH MEALS
Qty: 20 TABLET | Refills: 0 | Status: SHIPPED | OUTPATIENT
Start: 2021-11-04 | End: 2022-01-03

## 2021-11-04 RX ORDER — ALBUTEROL SULFATE 90 UG/1
2 AEROSOL, METERED RESPIRATORY (INHALATION) EVERY 6 HOURS PRN
Qty: 18 G | COMMUNITY
Start: 2021-11-04 | End: 2023-07-17

## 2021-11-04 RX ORDER — HYDROXYZINE HYDROCHLORIDE 25 MG/1
25 TABLET, FILM COATED ORAL EVERY 8 HOURS PRN
Qty: 30 TABLET | Refills: 0 | Status: SHIPPED | OUTPATIENT
Start: 2021-11-04 | End: 2022-01-03

## 2021-11-04 RX ORDER — ACETAMINOPHEN 325 MG/1
650 TABLET ORAL EVERY 6 HOURS PRN
COMMUNITY
Start: 2021-11-04 | End: 2022-01-03

## 2021-11-04 RX ORDER — CARVEDILOL 3.12 MG/1
3.12 TABLET ORAL 2 TIMES DAILY WITH MEALS
Qty: 60 TABLET | Refills: 0 | Status: SHIPPED | OUTPATIENT
Start: 2021-11-04 | End: 2022-10-24 | Stop reason: ALTCHOICE

## 2021-11-04 RX ORDER — SIMVASTATIN 20 MG
20 TABLET ORAL AT BEDTIME
Qty: 30 TABLET | Refills: 0 | Status: SHIPPED | OUTPATIENT
Start: 2021-11-04 | End: 2022-01-03

## 2021-11-04 RX ORDER — BUMETANIDE 1 MG/1
1 TABLET ORAL DAILY
Qty: 60 TABLET | Refills: 0 | Status: SHIPPED | OUTPATIENT
Start: 2021-11-04 | End: 2022-08-19 | Stop reason: ALTCHOICE

## 2021-11-04 RX ORDER — SPIRONOLACTONE 25 MG/1
25 TABLET ORAL DAILY
Qty: 10 TABLET | Refills: 0 | Status: SHIPPED | OUTPATIENT
Start: 2021-11-05 | End: 2022-01-03

## 2021-11-04 RX ORDER — BUMETANIDE 1 MG/1
1 TABLET ORAL 2 TIMES DAILY
Qty: 20 TABLET | Refills: 0 | Status: SHIPPED | OUTPATIENT
Start: 2021-11-04

## 2021-11-04 RX ORDER — SPIRONOLACTONE 25 MG/1
25 TABLET ORAL DAILY
Qty: 30 TABLET | Refills: 0 | Status: SHIPPED | OUTPATIENT
Start: 2021-11-04

## 2021-11-04 RX ORDER — SIMVASTATIN 20 MG
20 TABLET ORAL AT BEDTIME
Qty: 10 TABLET | Refills: 0 | Status: SHIPPED | OUTPATIENT
Start: 2021-11-04 | End: 2022-01-03

## 2021-11-04 RX ORDER — PANTOPRAZOLE SODIUM 40 MG/1
40 TABLET, DELAYED RELEASE ORAL DAILY
Qty: 10 TABLET | Refills: 0 | Status: SHIPPED | OUTPATIENT
Start: 2021-11-04

## 2021-11-04 RX ADMIN — MULTIPLE VITAMINS W/ MINERALS TAB 1 TABLET: TAB at 08:36

## 2021-11-04 RX ADMIN — APIXABAN 2.5 MG: 2.5 TABLET, FILM COATED ORAL at 20:11

## 2021-11-04 RX ADMIN — SPIRONOLACTONE 25 MG: 25 TABLET ORAL at 08:36

## 2021-11-04 RX ADMIN — Medication 600 MG: at 08:36

## 2021-11-04 RX ADMIN — UMECLIDINIUM BROMIDE AND VILANTEROL TRIFENATATE 1 PUFF: 62.5; 25 POWDER RESPIRATORY (INHALATION) at 08:36

## 2021-11-04 RX ADMIN — HYDROCORTISONE: 1 CREAM TOPICAL at 20:13

## 2021-11-04 RX ADMIN — ASPIRIN 81 MG CHEWABLE TABLET 81 MG: 81 TABLET CHEWABLE at 08:35

## 2021-11-04 RX ADMIN — PAROXETINE 40 MG: 40 TABLET, FILM COATED ORAL at 08:36

## 2021-11-04 RX ADMIN — HYDROCORTISONE: 1 CREAM TOPICAL at 14:45

## 2021-11-04 RX ADMIN — SIMVASTATIN 20 MG: 20 TABLET, FILM COATED ORAL at 20:11

## 2021-11-04 RX ADMIN — CARVEDILOL 3.12 MG: 3.12 TABLET, FILM COATED ORAL at 08:36

## 2021-11-04 RX ADMIN — APIXABAN 2.5 MG: 2.5 TABLET, FILM COATED ORAL at 08:36

## 2021-11-04 RX ADMIN — HYDROXYZINE HYDROCHLORIDE 25 MG: 25 TABLET, FILM COATED ORAL at 20:11

## 2021-11-04 RX ADMIN — PANTOPRAZOLE SODIUM 40 MG: 40 TABLET, DELAYED RELEASE ORAL at 08:35

## 2021-11-04 RX ADMIN — Medication 50 MCG: at 08:36

## 2021-11-04 RX ADMIN — BUMETANIDE 1 MG: 0.5 TABLET ORAL at 08:35

## 2021-11-04 RX ADMIN — CARVEDILOL 3.12 MG: 3.12 TABLET, FILM COATED ORAL at 17:57

## 2021-11-04 RX ADMIN — MELATONIN TAB 3 MG 3 MG: 3 TAB at 20:11

## 2021-11-04 RX ADMIN — BUMETANIDE 1 MG: 0.5 TABLET ORAL at 16:02

## 2021-11-04 ASSESSMENT — ACTIVITIES OF DAILY LIVING (ADL)
ADLS_ACUITY_SCORE: 10

## 2021-11-04 ASSESSMENT — MIFFLIN-ST. JEOR: SCORE: 2090.85

## 2021-11-04 NOTE — PLAN OF CARE
Occupational Therapy Discharge Summary    Reason for therapy discharge:    Discharged to home with home therapy.    Progress towards therapy goal(s). See goals on Care Plan in [facility] electronic health record for goal details.  Goals met    Therapy recommendation(s):    Continued therapy is recommended.  Rationale/Recommendations:  ADL/IADL retraining, functional strengthening/endurance, O2 titration, EC strategies, edema management, functional cognition, community reintegration.     Chika Ferguson is a 61 year old woman with past medical history of chronic heart failure, pulmonary hypertension, chronic anemia, cad, gerd, tahmina, and hld admitted to  ARU following hospital stay for acute respiratory failure secondary to severe COVID 19 infection.    ADLs/IADLs: Pt progressing with ADLs/IADLs. Pt is Mod IND transfers short distance mobility using fww or 4ww. Pt is Mod IND ADLs with exception of SBA for shower transfer. Pt is Mod IND simple IADLs with fww or 4ww, requiring assistance with heavier or more complex IADLs. Pt performance is impacted by deconditioning, weakness, impaired balance, O2 needs, and cognition. Pt requiring 1-2L O2 NC with activity, able to IND manage.    DME/AE: shower bench, bathroom grab bars, reacher, 4ww, O2. Issuing 4ww through donated source. Family planning to purchase reacher and shower bench.     Edema: Pt with acute on chronic BLE edema. Pt wearing GCB during acute care stay. Recommend use of custom velcro wraps and compression stockings upon discharge home. Recommend follow up lymphedema in HC and OP.    Cognition: Scoring 14/30 on SLUMS. Cognistat completed 11/1, showing deficits with orientation, recall, abstract reasoning, and attention. Pt acknowledges deficits at baseline. Pt would benefit from oversight with complex IADLs.     HEP: Issued BUE calesthenic and PRE HEP for UE strengthening and endurance. Issued handouts for EC strategies and breathing techniques.    Caregiver support:  Lives with son and son's fiance. Caregiver training completed 11/4. Son able to provide recommended level of support.

## 2021-11-04 NOTE — CARE CONFERENCE
Acute Rehab Care Conference/Team Rounds      Type: Team Rounds    Present: Dr. Edi Barth, Trista Olivares PA, Kalen Duong PT, Becky Mustafa OT, Juliette Moseley SW, Piper Jeff RD, Lorrie Padron RN, Patient Chika Ferguson.       Discharge Barriers/Treatment/Education    Rehab Diagnosis: post Covid    Active Medical Co-morbidities/Prognosis:     Patient Active Problem List   Diagnosis Code     Acute respiratory failure with hypoxia (H) J96.01     Pneumonia due to 2019 novel coronavirus U07.1, J12.82     Pneumonia of both lower lobes due to methicillin susceptible Staphylococcus aureus (MSSA) (H) J15.211     Pulmonary hypertension (H) I27.20     Intertrigo L30.4     Sleep disturbance G47.9     Acute on chronic diastolic heart failure (H) I50.33     CARL (obstructive sleep apnea) G47.33     Post-COVID-19 condition U09.9         Safety: Alert and orientated. MOD I with walker, but calling overnight while on CPAP for assistance. Pt requesting to have lymphedema wraps removed overnight while +3 pitting edema present.     Pain: Denies pain    Medications, Skin, Tubes/Lines: Takes pills whole. Would benefit from MAP prior to discharge. Has wound on R heel covered with mepilex. Mepilex on coccyx for prevention. Pt uses lymphedema on legs during the day but has been requesting for them to be off overnight.     Swallowing/Nutrition: reg with thins     Bowel/Bladder: Continent of B&B, transferring to toilet to void and deficate. LBM 11/2.     Psychosocial: Single, finance passed away last year from [facility]. Lives in a home with son and DIL. Home CPAP and home O2 with [facility]. Medicare only, no secondary/supplemental insurance. Given resources for this. Limited income, retired. No substance abuse concerns. Health psych following for mental health.     ADLs/IADLs: Pt progressing with ADLs/IADLs. Pt is Mod IND transfers short distance mobility using fww or 4ww. Pt is Mod IND ADLs with exception of SBA for shower transfer. Pt is Mod  IND simple IADLs with fww or 4ww, requiring assistance with heavier or more complex IADLs. Pt performance is impacted by deconditioning, weakness, impaired balance, O2 needs, and cognition. Pt requiring 1-2L O2 NC with activity, able to IND manage. Pt on track to discharge home on 11/5. Caregiver training scheduled this pm with son to review recommended level of support. Recommend follow up HC OT. DME/AE: shower bench, bathroom grab bars, reacher, 4ww, O2.    Edema: Pt with acute on chronic BLE edema. Pt wearing GCB during acute care stay. Recommend use of velcro wraps upon discharge home.     Mobility:   Bed Mobility: IND  Transfer: Mod I with FWW. SBA without device  Gait: Mod I with FWW, up to 200 ft  Stairs: N/T   Balance: Rios on 10/31: 41/56  O2 walk test completed. HEP handout provided. Pt to be issued wide 4WW through [facility]. Plan to transition to home care PT, with family training set for this afternoon.    Cognition/Language: Scoring 14/30 on SLUMS. Cognistat completed 11/1, showing deficits with orientation, recall, abstract reasoning, and attention. Pt acknowledges deficits at baseline. Pt would benefit from oversight with complex IADLs.     Community Re-Entry: Not an immediate barrier. Plan to transition to home care PT post ARU discharge to improve community mobility tolerance.    Transportation: Family to provide. Car transfer not a barrier.    Decision maker: self    Plan of Care and goals reviewed and updated.    Discharge Plan/Recommendations    Fall Precautions: continue    Patient/Family input to goals: yes     Estimated length of stay: 14 days     Overall plan for the patient: reach a level of mod I       Utilization Review and Continued Stay Justification    Medical Necessity Criteria:    For any criteria that is not met, please document reason and plan for discharge, transfer, or modification of plan of care to address.    Requires intensive rehabilitation program to treat functional deficits?:  Yes    Requires 3x per week or greater involvement of rehabilitation physician to oversee rehabilitation program?: Yes    Requires rehabilitation nursing interventions?: Yes    Patient is making functional progress?: Yes    There is a potential for additional functional progress? Yes    Patient is participating in therapy 3 hours per day a minimum of 5 days per week or 15 hours per week in 7 day period?:Yes    Has discharge needs that require coordinated discharge planning approach?:Yes      Barriers/Concerns related to meeting medical necessity criteria:  none    Team Plan to Address Concern:  As needed       Final Physician Sign off    Statement of Approval:   Edi Barth DO      Patient Goals  Social Work Goals: Home tomorrow, HC set up, bedside tank coming to bedside today, resources given, IMM completed and family transport.     OT Frequency:  min daily  OT goal: hygiene/grooming: independent  OT goal: upper body dressing: Independent  OT goal: lower body dressing: Modified independent  OT goal: upper body bathing: Supervision/stand-by assist  OT goal: lower body bathing: Supervision/stand-by assist  OT Goal: transfer: Modified independent  OT goal: toilet transfer/toileting: Modified independent  OT goal: meal preparation: Supervision/stand-by assist  OT goal: home management: Supervision/stand-by assist  OT goal: perform aerobic activity with stable cardiovascular response: 10 minutes, continuous activity  OT goal 1: Pt will demo SBA with shower transfer using DME/AE prn.    PT Frequency: PT 90 min per day x 7 days  PT goal: bed mobility: Modified independent, Supine to/from sit, Rolling, Bridging  PT goal: transfers: Modified independent, Sit to/from stand, Bed to/from chair, Assistive device (fww or 4ww)  PT goal: gait: Modified independent, Greater than 200 feet, Rolling walker  PT goal: stairs: 1 stair, Assistive device (fww or 4ww)  PT goal: perform aerobic activity with stable  cardiovascular response: intermittent activity, ambulation, 5 minutes  PT goal 1: Pt able to perform a HEP Crystal for strengthening, endurance to improve her function.  PT Goal 2: Pt able to perform a car transfer with AD and Imelda  PT Goal 3: PT: Pt able to state 3 fall prevention strategies after participating with fall prevention training for improved safety at home.    Nursing Goals  Bowel and Bladder care  Fall prevention   Medication Education  Skin Care protection

## 2021-11-04 NOTE — PLAN OF CARE
FOCUS/GOAL  Bowel management, Bladder management, Medication management, Wound care management, Mobility, Skin integrity, and Cognition/Memory/Judgment/Problem solving    ASSESSMENT, INTERVENTIONS AND CONTINUING PLAN FOR GOAL:  Pt alert and orientated. MOD I in room. Denies pain, SOB, new numbness and tingling, and dizziness or headache. Continent of B&B. LBM 11/2. Pt had lymphedema wraps off overnight by pt request. Mepilex on R heel changed, mepilex on coccyx. Pt slept well, CPAP on overnight. Diastolic BP low in AM while sitting upright in chair.

## 2021-11-04 NOTE — PLAN OF CARE
FOCUS/GOAL  Bowel management, Bladder management, Medication management, Pain management, Medical management, Mobility, Skin integrity, and Safety management    ASSESSMENT, INTERVENTIONS AND CONTINUING PLAN FOR GOAL:      Pt is alert and oriented x 4, slightly anxious. Comfortable with room air but needs oxygen with exertion. Will go home tomorrow per today's rounds. Discussed ongoing skin issues and care with pt, verbalized understanding. Received a shower with therapy this morning, barrier cream re-applied on groin skin folds after shower. Hydrocortisone cream was ordered for facial rash. Continent of bowel and bladder. MOD-I for transfers. Reg diet, thin liquids. Denied pain, nausea and vomiting, numbness or tingling, SOB. No new concerns at this time. Will continue with plan of care.

## 2021-11-04 NOTE — PROGRESS NOTES
"  [facility]   Acute Rehabilitation Unit  Daily progress note    INTERVAL HISTORY  Chika Ferguson was seen sitting up in chair.  No acute events overnight.  Denies n/v/d, sob, headache, dizziness, and fever.  Discussed diet, medications, as well as discharge planning for tomorrow as well as importance of follow-up appointments.  Seborrheic dermatitis around scalp, will add hydrocortisone cream.        Functional  PT:  Bed Mobility: IND  Transfer: Mod I with FWW. SBA without device  Gait: Mod I with FWW, up to 200 ft  Stairs: N/T   Balance: Rios on 10/31: 41/56       ROS: 10 point ROS neg other than the symptoms noted above in the HPI.        MEDICATIONS    apixaban ANTICOAGULANT  2.5 mg Oral BID     aspirin  81 mg Oral Daily     bumetanide  1 mg Oral BID     calcium carbonate  600 mg Oral Daily     carvedilol  3.125 mg Oral BID w/meals     hydrocortisone   Topical BID     hydrOXYzine  25 mg Oral At Bedtime     melatonin  3 mg Oral At Bedtime     multivitamin w/minerals  1 tablet Oral Daily     pantoprazole  40 mg Oral Daily     PARoxetine  40 mg Oral QAM     simvastatin  20 mg Oral At Bedtime     spironolactone  25 mg Oral Daily     umeclidinium-vilanterol  1 puff Inhalation Daily     vitamin D3  50 mcg Oral Daily        acetaminophen, albuterol, bisacodyl, hydrOXYzine **OR** [DISCONTINUED] hydrOXYzine, polyethylene glycol     PHYSICAL EXAM  /47 (BP Location: Left arm)   Pulse 83   Temp (!) 96.4  F (35.8  C) (Oral)   Resp 20   Ht 1.702 m (5' 7\")   Wt (!) 150.3 kg (331 lb 6.4 oz)   SpO2 93%   BMI 51.90 kg/m    Gen: NAD, up in chair  HEENT: NCAT, MMM, seborrheic dermatitis scalp area   Pulm: non labored, symmetrical chest rise on RA.   CV: well perfused, 2+ radial pulse  Abd: obese, non tender  Ext: compression on legs today, edema   Neuro/MSK: alert and awake, speech clear, moves all extremities.     LABS  CBC RESULTS:   Recent Labs   Lab Test 11/04/21  0627 " 10/30/21  0602 10/28/21  0754 10/26/21  0553 10/26/21  0553   WBC 8.3  --  6.6  --  6.9   RBC 3.33*  --  3.38*  --  3.33*   HGB 9.0*  --  9.1*  --  8.8*   HCT 29.4*  --  29.8*  --  29.8*   MCV 88  --  88  --  90   MCH 27.0  --  26.9  --  26.4*   MCHC 30.6*  --  30.5*  --  29.5*   RDW 19.1*  --  19.0*  --  19.3*    195 259   < > 288    < > = values in this interval not displayed.     Last Basic Metabolic Panel:  Recent Labs   Lab Test 11/04/21  0627 11/01/21  0755 10/28/21  0754    135 134   POTASSIUM 3.6 4.0 3.8   CHLORIDE 99 101 96   CO2 30 30 34*   ANIONGAP 6 4 4   * 134* 106*   BUN 23 22 22   CR 0.86 0.85 0.87   GFRESTIMATED 72 73 71   NAE 8.6 8.7 9.1       Rehabilitation - continue comprehensive acute inpatient rehabilitation program with multidisciplinary approach including therapies, rehab nursing, and physiatry following. See interval history for updates.      ASSESSMENT AND PLAN    Chika Ferguson is a 61 year old woman with past medical history of chronic heart failure, pulmonary hypertension, chronic anemia, cad, gerd, tahmina, and hld who presented with acute hypoixc-hypercapnic respiratory failure in setting of COVID 19, MSSA PNA, acute on chronic heart failure.  Course further complicated by intramuscular hematoma, fall, pressure injury, electrolyte abnormalities, moderate malnutrition,  and acute kidney injury.      Admitted to acute rehab 10/27 in setting of impaired strength, impaired activity tolerance, impaired coordination,  impaired balance, and impaired safety awareness.     Acute hypoxic-hypercapnic respiratory failure  History of COPD- in chart   multifactorial COVID, MSSA PNA, acute on chronic heart failure, pulmonary HTN. Intubated 10/6 extubated 10/13.   -oxygen- titrate and taper  -completed tx for covid and mssa, continues on diuretics   -encourage IS  -continue dulera-(PTA)   Patient has been assessed for Home Oxygen needs. Oxygen readings:     *Pulse oximetry (SpO2) =  94% on room air at rest while awake.     *SpO2 improved to 95 % on 1 liters/minute at rest. Pt does not require supplemental O2 while at rest.       *SpO2 = 82% on room air during activity/with exercise.     *SpO2 improved to 92% on 2 liters/minute during activity/with exercise.       COVID recovered  Debility  Received tx and completed 20 day course of isolation.   -continue PT/OT  -transitioned to apixaban for 30 day dvt prophylaxis post covid     CARL-   Continue cpap at bedtime      Acute on Chronic Heart Failure  Pulmonary HTN  History of HF with preserved EF and Pulmonary HTN on bumex pta.  BNP elevated on admission. TTE (10/6) LVEF 55-60%, mild LVH, indeterminate diastolic function, moderate to severe RV dilation w moderate to severely reduced RV function, and dilated IVC. Pulmonary pressures could not be assessed on TTE. CTA chest negative for PE but showed severely dilated pulmonary trunk c/w pulmonary HTN. Seen by cardiology   -f/u Dr. Huhges 2-4 weeks (pulmonary HTN/ Right heart cath  -continue bumex & aldactone   -trend weights      CAD  Hx pci 2016.  Mild troponin elevation felt to be demand ischemia.   -continue asa & statin  -continue coreg 3.125 mg bid      Acute on Chronic Anemia  Hgb stable 9.1 10/28.  baseline appears to be 9-10  9.0 on 11/4     GERD  -continue ppi     Dermatitis  Intertrigo  Pressure injury Right heel   -appreciate WOCN assistance with managment.   -continue wound care as ordered.   --added hydrocortisone cream      Anxiety  Insomnia  Reports long standing issues with anxiety and associates this with poor sleep  On paxil pta  -continue paxil  -continue melatonin  -psychology for emotional support  -f/u pcp       1. Adjustment to disability:  Psychology for emotional support  2. FEN: reg  3. Bowel: monitor  4. Bladder: continent monitor  5. DVT Prophylaxis: apixaban  6. GI Prophylaxis: protonix  7. Code: full   8. Disposition:  Goal for home with support from family  9. ELOS:  goal for 11/5/21  10. Follow up Appointments on Discharge: pcp, post covid clinic. Cardiology.         Edi Barth, DO  PM&R      I spent a total of 35 minutes face to face and coordinating care of Chika Ferguson. Over 50% of my time on the unit was spent counseling the patient and /or coordinating care

## 2021-11-04 NOTE — PROGRESS NOTES
CLINICAL NUTRITION SERVICES - REASSESSMENT NOTE     Nutrition Prescription    RECOMMENDATIONS FOR MDs/PROVIDERS TO ORDER:  None today - pt reviewed face to face during team rounds     Malnutrition Status:    Moderate malnutrition in the context of acute illness     Recommendations already ordered by Registered Dietitian (RD):  RD will discontinue supplement order as pt has excess stock in room and will likely discharge tomorrow     Future/Additional Recommendations:  None as pt being discharge tomorrow - suggest outpatient Providers continue to give dietary guidance as it relates to fluid status/fluid management      EVALUATION OF THE PROGRESS TOWARD GOALS   Diet: Regular  Supplement: Cherry Gelatein once daily at lunch meal  Intake: 100% per flow sheets        NEW FINDINGS   MAR reviewed. Labs reviewed. Pt reviewed during care team rounds and visited at bedside, fluid status reviewed and requested nursing re-weight pt to better assess accuracy of trends, reviewed with pt again the importance of protein intakes, noted excess stock of Gelatein supplement in room, pt reports was getting tired of taking it, but with reinforcement pt agreeing to continue to take and will take excess stock home, reviewed recommendation for about 2 L of fluid per day and continued follow up with outpatient Providers regarding any additional recommendations.     11/04/21 0625 150.3 kg (331 lb 6.4 oz)   11/03/21 0500 148.4 kg (327 lb 3.2 oz)   11/02/21 0400 148.2 kg (326 lb 11.2 oz)   10/31/21 0124 145.5 kg (320 lb 11.2 oz)   10/30/21 0648 144.5 kg (318 lb 8 oz)   10/29/21 0656 142.7 kg (314 lb 8 oz)   10/27/21 1406 142.9 kg (315 lb)     Hospital Weights:   10/07/21 0000 159.3 kg (351 lb 3.1 oz)   10/06/21 0400 162.2 kg (357 lb 9.4 oz)     Weight assessment: Possible 16 lb wt gain in 1 week, fluid related vs weigh accuracy, RN to obtain new weight to assure trends, significant 7.2% weight loss in 1 month. Pt previously reported a UBW of  around 360 lbs prior to hospital admission.     MALNUTRITION  % Intake: No decreased intake noted  % Weight Loss: > 5% in 1 month (severe)  Subcutaneous Fat Loss: None observed  Muscle Loss: None observed  Fluid Accumulation/Edema: Moderate per flow sheets   Malnutrition Diagnosis: Moderate malnutrition in the context of acute illness     Previous Goals   Patient to consume % of nutritionally adequate meal trays TID, or the equivalent with supplements/snacks  Evaluation: Met    Previous Nutrition Diagnosis  Increased nutrient needs related to wounds as evidenced by therapeutic recommendations    Evaluation: No change    CURRENT NUTRITION DIAGNOSIS  Increased nutrient needs related to wounds as evidenced by therapeutic recommendations      INTERVENTIONS  Implementation  Medical food supplement therapy - discontinued order as pt has excess stock in room and will be discharged soon     Goals  Patient to consume % of nutritionally adequate meal trays TID, or the equivalent with supplements/snacks.    Monitoring/Evaluation  Progress toward goals will be monitored and evaluated per protocol if pt were to remain admitted to ARU.    Piper Jeff RD, CNSC, LD  ARU RD pager: [phone]

## 2021-11-04 NOTE — PLAN OF CARE
Physical Therapy Discharge Summary    Reason for therapy discharge:    Discharged to home with home therapy.    Progress towards therapy goal(s). See goals on Care Plan in [facility] electronic health record for goal details.  Goals met  Pt able to demonstrate IND transfers and short distance ambulation without device, including management of O2 tank. She does require 4WW for increased distances d/t impaired diaphragmatic/core/LE strength and endurance; to be issued tomorrow AM prior to discharge. She is able to demonstrate step-to ascending, descending stairs with single rail and SBA and car transfer without device in order to access home. She has been issued a HEP.    Therapy recommendation(s):    Pt has been issued a HEP. Plan to transition to home care PT >> eventual OP pulmonary rehab in order to further progress IND with household and community mobility and decrease risk of future falls.

## 2021-11-04 NOTE — PROGRESS NOTES
On track to discharge home tomorrow, Fri 11/05. Family will transport. HC set up/confirmed. SW faxed updated progress notes this morning to the HC agency with discharge confirmation. Covering SWer notified to fax orders to HC agency before discharge. SWer called [facility] this morning to confirm home O2 set up and delivery of tank to bedside before discharge. Therapy obtained and talked to pt about a donated walker, no SW assistance RE: financial assistance to obtain the walker at this time. IMM completed with pt yesterday, 11/03. No other SW needs at this time. SW available if needs arise.     Home Oxygen/CPAP:   [facility] ([place], [place])   Phone: [phone]  FAX: [phone]     Home Health Care:   [facility]  PH: [phone], F: [phone] (Intake, Melisa PH: [phone])  Nurse, physical therapy, occupational therapy, home health aide and      Juliette Moseley [facility]   Direct Phone: [phone]  I   Pager: [phone]  I  Fax: [phone]

## 2021-11-04 NOTE — PLAN OF CARE
FOCUS/GOAL  Bowel management, Bladder management, Pain management, Medical management, Mobility, Skin integrity, and Safety management    ASSESSMENT, INTERVENTIONS AND CONTINUING PLAN FOR GOAL:  Patient is alert and oriented x 4. Denied pain, dizziness, headache, N/T, CP or SOB. Mod I in room with walker. Regular/thin diet good intake. Continent of B/B last BM 11/02. Take pills whole. Wears CPAP at night RT notified to double check. Patient was calm and cooperative but seems anxious during HS received scheduled ATARAX. VS WDL. Lymphedema wrap removed per patient's request right heel wound intact with dressing to be changed every third day. No care concern at this time. We will continue per POC.

## 2021-11-05 VITALS
WEIGHT: 293 LBS | DIASTOLIC BLOOD PRESSURE: 69 MMHG | RESPIRATION RATE: 18 BRPM | SYSTOLIC BLOOD PRESSURE: 120 MMHG | HEART RATE: 82 BPM | HEIGHT: 67 IN | BODY MASS INDEX: 45.99 KG/M2 | OXYGEN SATURATION: 92 % | TEMPERATURE: 96.4 F

## 2021-11-05 PROCEDURE — 94660 CPAP INITIATION&MGMT: CPT

## 2021-11-05 PROCEDURE — 250N000013 HC RX MED GY IP 250 OP 250 PS 637: Performed by: PHYSICIAN ASSISTANT

## 2021-11-05 PROCEDURE — 999N000157 HC STATISTIC RCP TIME EA 10 MIN

## 2021-11-05 PROCEDURE — 99239 HOSP IP/OBS DSCHRG MGMT >30: CPT | Performed by: PHYSICAL MEDICINE & REHABILITATION

## 2021-11-05 RX ORDER — PANTOPRAZOLE SODIUM 40 MG/1
40 TABLET, DELAYED RELEASE ORAL DAILY
Qty: 30 TABLET | Refills: 0 | Status: SHIPPED | OUTPATIENT
Start: 2021-11-06 | End: 2022-01-03

## 2021-11-05 RX ADMIN — HYDROCORTISONE: 1 CREAM TOPICAL at 08:48

## 2021-11-05 RX ADMIN — Medication 50 MCG: at 08:47

## 2021-11-05 RX ADMIN — SPIRONOLACTONE 25 MG: 25 TABLET ORAL at 08:47

## 2021-11-05 RX ADMIN — UMECLIDINIUM BROMIDE AND VILANTEROL TRIFENATATE 1 PUFF: 62.5; 25 POWDER RESPIRATORY (INHALATION) at 08:48

## 2021-11-05 RX ADMIN — MULTIPLE VITAMINS W/ MINERALS TAB 1 TABLET: TAB at 08:47

## 2021-11-05 RX ADMIN — ASPIRIN 81 MG CHEWABLE TABLET 81 MG: 81 TABLET CHEWABLE at 08:47

## 2021-11-05 RX ADMIN — BUMETANIDE 1 MG: 0.5 TABLET ORAL at 08:47

## 2021-11-05 RX ADMIN — Medication 600 MG: at 08:47

## 2021-11-05 RX ADMIN — APIXABAN 2.5 MG: 2.5 TABLET, FILM COATED ORAL at 08:47

## 2021-11-05 RX ADMIN — PANTOPRAZOLE SODIUM 40 MG: 40 TABLET, DELAYED RELEASE ORAL at 08:47

## 2021-11-05 RX ADMIN — PAROXETINE 40 MG: 40 TABLET, FILM COATED ORAL at 08:47

## 2021-11-05 RX ADMIN — CARVEDILOL 3.12 MG: 3.12 TABLET, FILM COATED ORAL at 08:47

## 2021-11-05 ASSESSMENT — ACTIVITIES OF DAILY LIVING (ADL)
ADLS_ACUITY_SCORE: 10

## 2021-11-05 ASSESSMENT — MIFFLIN-ST. JEOR: SCORE: 2096.75

## 2021-11-05 NOTE — DISCHARGE INSTRUCTIONS
Follow-up Appointments    -Primary Care Provider; hospital follow-up  You are scheduled to see CARRIE Bush in clinic on Thursday, November 11th, 2021 at 1:45pm.  Please check with your insurance prior to the appointment to make sure that the appointment will be covered.    Address  [facility] [place]                          [address]                          [place] [address]  Phone   [phone]  Fax                  [phone]      Cardiology: Dr. Hughes 2-4 weeks (pulmonary hypertension/Right heart cath)  You are scheduled to see Dr. Hughes in clinic on Monday, December 13th, 2021 at 4:00pm.    Address  [facility]                          [address]                          3rd Floor Cardiovascular Center  Phone   [phone]      -Post COVID- 19 ICU survivorship clinic  The clinic has received a referral and will reach out to you to schedule an appointment.    Phone   [phone]        Home Oxygen/CPAP:   [facility] ([place], [place])   Phone: [phone]  FAX: [phone]    Home Health Care:   [facility]  PH: [phone], F: [phone] (Intake, Melisa PH: [phone])  Nurse, physical therapy, occupational therapy, home health aide and    * to help with health care directive/power of , secondary/supplemental insurance and follow-up with [facility] for recalled CPAP.      Medications  All medications to continue daily/ twice daily as prescribed except  Completing 30 day course of apixaban 2.5 mg twice daily then stop  complete short course of Protonix then stop.   Hydrocortisone- stop per primary care/ resolution of rash.  Hydroxyzine/ Atarax ( take 1 pill by mouth as needed for anxiety- have been taking just at bedtime during rehab stay)

## 2021-11-05 NOTE — PROGRESS NOTES
ANGY faxed discharge orders to [facility].     Home Health Care:   [facility]  PH: [phone], F: [phone] (Intake, Melisa PH: [phone])  Nurse, physical therapy, occupational therapy, home health aide and      Jennifer BABIN, Health system   Phone: [phone]  Pager: [phone]

## 2021-11-05 NOTE — PLAN OF CARE
FOCUS/GOAL  Medical management    ASSESSMENT, INTERVENTIONS AND CONTINUING PLAN FOR GOAL:  Pt is alert and oriented. Use call light appropriately. On w/ Cpap at night. Pt sleeps w/ HOB elevated. SBA for ambulation,use FWW. Needs assist in lifting her legs back to bed and maneuver the bed so that she is on sitting position. Pt can not tolerate laying in bed. Continent of bladder. Independent w/ primo cares. Dressing to rt heel changed. No drainage noted. Discharging today.

## 2021-11-05 NOTE — PLAN OF CARE
FOCUS/GOAL  Bowel management, Bladder management, Pain management, Wound care management, Medical management, Mobility, Skin integrity, and Safety management    ASSESSMENT, INTERVENTIONS AND CONTINUING PLAN FOR GOAL:  Patient is alert and oriented x 4. Denied pain, dizziness, headache, N/T, CP or SOB. Mod I in room with walker. Regular/thin diet good intake. Continent of B/B. Take pills whole. Wears CPAP at night RT came  to double check. VS WDL. Lymphedema wrap removed per patient's request. Right heel wound cleaned and new foam dressing applied. Patient to be discharged tomorrow. No care concern at this time. We will continue per POC.

## 2021-11-05 NOTE — PLAN OF CARE
Patient was safely discharged to home with son after discharge education, home medications and oxygen.

## 2021-11-05 NOTE — DISCHARGE SUMMARY
[facility]   Acute Rehabilitation Unit  Discharge summary     Date of Admission: 10/27/2021  Date of Discharge: 11/5/21  Disposition: home  Primary Care Physician: Edwin Joshi  Attending physician: Dr. Barth      DISCHARGE DIAGNOSIS  Acute Hypoxic-Hypercapnic Respiratory Failure  impaired strength, impaired activity tolerance, impaired coordination,  impaired balance  COVID Recovered  Acute on Chronic heart failure with preserved EF  Pulmonary HTN  CARL  CAD  Anemia  GERD  Seborrheic Dermatitis  Anxiety      BRIEF SUMMARY  Chika Ferguson is a 61 year old woman with past medical history of chronic heart failure, pulmonary hypertension, chronic anemia, cad, gerd, carl, and hld who presented with acute hypoixc-hypercapnic respiratory failure in setting of COVID 19, MSSA PNA, acute on chronic heart failure.  Course further complicated by intramuscular hematoma, fall, pressure injury, electrolyte abnormalities, moderate malnutrition,  and acute kidney injury.      Admitted to acute rehab 10/27 in setting of impaired strength, impaired activity tolerance, impaired coordination,  impaired balance, and impaired safety awareness.       REHABILITATION COURSE  PT: Pt able to demonstrate IND transfers and short distance ambulation without device, including management of O2 tank. She does require 4WW for increased distances d/t impaired diaphragmatic/core/LE strength and endurance; to be issued tomorrow AM prior to discharge. She is able to demonstrate step-to ascending, descending stairs with single rail and SBA and car transfer without device in order to access home. She has been issued a HEP.     Therapy recommendation(s):    Pt has been issued a HEP. Plan to transition to home care PT >> eventual OP pulmonary rehab in order to further progress IND with household and community mobility and decrease risk of future falls.    OT: Continued therapy is recommended.   Rationale/Recommendations:  ADL/IADL retraining, functional strengthening/endurance, O2 titration, EC strategies, edema management, functional cognition, community reintegration.      Chika Ferguson is a 61 year old woman with past medical history of chronic heart failure, pulmonary hypertension, chronic anemia, cad, gerd, tahmina, and hld admitted to  ARU following hospital stay for acute respiratory failure secondary to severe COVID 19 infection.     ADLs/IADLs: Pt progressing with ADLs/IADLs. Pt is Mod IND transfers short distance mobility using fww or 4ww. Pt is Mod IND ADLs with exception of SBA for shower transfer. Pt is Mod IND simple IADLs with fww or 4ww, requiring assistance with heavier or more complex IADLs. Pt performance is impacted by deconditioning, weakness, impaired balance, O2 needs, and cognition. Pt requiring 1-2L O2 NC with activity, able to IND manage.     DME/AE: shower bench, bathroom grab bars, reacher, 4ww, O2. Issuing 4ww through donated source. Family planning to purchase reacher and shower bench.      Edema: Pt with acute on chronic BLE edema. Pt wearing GCB during acute care stay. Recommend use of custom velcro wraps and compression stockings upon discharge home. Recommend follow up lymphedema in HC and OP.     Cognition: Scoring 14/30 on SLUMS. Cognistat completed 11/1, showing deficits with orientation, recall, abstract reasoning, and attention. Pt acknowledges deficits at baseline. Pt would benefit from oversight with complex IADLs.      HEP: Issued BUE calesthenic and PRE HEP for UE strengthening and endurance. Issued handouts for EC strategies and breathing techniques.     Caregiver support: Lives with son and son's fiance. Caregiver training completed 11/4. Son able to provide recommended level of support.     MEDICAL COURSE  Acute hypoxic-hypercapnic respiratory failure  History of COPD- in chart   multifactorial COVID, MSSA PNA, acute on chronic heart failure, pulmonary HTN.  Intubated 10/6 extubated 10/13.   -oxygen- titrate and taper  -completed tx for covid and mssa, continues on diuretics   -continue dulera-- formulary sub prescribed:  stiolto respimat on discharge.   Patient has been assessed for Home Oxygen needs. Oxygen readings:     *Pulse oximetry (SpO2) = 94% on room air at rest while awake.     *SpO2 improved to 95 % on 1 liters/minute at rest. Pt does not require supplemental O2 while at rest.       *SpO2 = 82% on room air during activity/with exercise.     *SpO2 improved to 92% on 2 liters/minute during activity/with exercise.        COVID recovered  Debility  Received tx and completed 20 day course of isolation.   -continue PT/OT  -transitioned to apixaban for 30 day dvt prophylaxis post covid     CARL-   Continue cpap at bedtime      Acute on Chronic Heart Failure  Pulmonary HTN  History of HF with preserved EF and Pulmonary HTN on bumex pta.  BNP elevated on admission. TTE (10/6) LVEF 55-60%, mild LVH, indeterminate diastolic function, moderate to severe RV dilation w moderate to severely reduced RV function, and dilated IVC. Pulmonary pressures could not be assessed on TTE. CTA chest negative for PE but showed severely dilated pulmonary trunk c/w pulmonary HTN. Seen by cardiology   -f/u Dr. Hughes 2-4 weeks (pulmonary HTN/ Right heart cath)  -continue bumex & aldactone        CAD  Hx pci 2016.  Mild troponin elevation felt to be demand ischemia.   -continue asa & statin  -continue coreg 3.125 mg bid      Acute on Chronic Anemia  Hgb stable 9.1 10/28.  baseline appears to be 9-10.  9.0 on 11/4     GERD  -continue ppi     Seborrheic Dermatitis  Intertrigo  Pressure injury Right heel   -appreciate WOCN assistance with managment. Continue to protect heel and coccyx (reportedly healed prior to ARU discharge)   --added hydrocortisone cream for seborrheic dermatitis      Anxiety  Insomnia  Reports long standing issues with anxiety and associates this with poor sleep  On paxil  pta  -continue paxil  -continue melatonin  -prn atarax   -f/u pcp   DISCHARGE MEDICATIONS  Discharge Medication List as of 11/5/2021 10:00 AM      START taking these medications    Details   acetaminophen (TYLENOL) 325 MG tablet Take 2 tablets (650 mg) by mouth every 6 hours as needed for mild pain, OTC      albuterol (PROAIR HFA/PROVENTIL HFA/VENTOLIN HFA) 108 (90 Base) MCG/ACT inhaler Inhale 2 puffs into the lungs every 6 hours as needed for wheezing, Disp-18 g, HistoricalPharmacy may dispense brand covered by insurance (Proair, or proventil or ventolin or generic albuterol inhaler)      !! apixaban ANTICOAGULANT (ELIQUIS) 2.5 MG tablet Take 1 tablet (2.5 mg) by mouth 2 times daily ForDVT prophylaxis post covid, Disp-20 tablet, R-0, E-Prescribe      !! apixaban ANTICOAGULANT (ELIQUIS) 2.5 MG tablet Take 1 tablet (2.5 mg) by mouth 2 times daily To complete course then stop., Disp-26 tablet, R-0, E-Prescribe      hydrocortisone (CORTAID) 1 % external cream Apply topically 2 times daily To facial rashDisp-20 g, [id number]      !! hydrOXYzine (ATARAX) 25 MG tablet Take 1 tablet (25 mg) by mouth At Bedtime, Disp-10 tablet, R-0, E-Prescribe      !! hydrOXYzine (ATARAX) 25 MG tablet Take 1 tablet (25 mg) by mouth every 8 hours as needed for anxiety, Disp-30 tablet, R-0, E-Prescribe      !! spironolactone (ALDACTONE) 25 MG tablet Take 1 tablet (25 mg) by mouth daily, Disp-10 tablet, R-0, E-Prescribe      !! spironolactone (ALDACTONE) 25 MG tablet Take 1 tablet (25 mg) by mouth daily, Disp-30 tablet, R-0, E-Prescribe       !! - Potential duplicate medications found. Please discuss with provider.      CONTINUE these medications which have CHANGED    Details   !! bumetanide (BUMEX) 1 MG tablet Take 1 tablet (1 mg) by mouth 2 times daily, Disp-20 tablet, R-0, E-Prescribe      !! bumetanide (BUMEX) 1 MG tablet Take 1 tablet (1 mg) by mouth daily, Disp-60 tablet, R-0, E-Prescribe      !! carvedilol (COREG) 3.125 MG tablet  Take 1 tablet (3.125 mg) by mouth 2 times daily (with meals), Disp-20 tablet, R-0, E-Prescribe      !! carvedilol (COREG) 3.125 MG tablet Take 1 tablet (3.125 mg) by mouth 2 times daily (with meals), Disp-60 tablet, R-0, E-Prescribe      !! pantoprazole (PROTONIX) 40 MG EC tablet Take 1 tablet (40 mg) by mouth daily, Disp-30 tablet, R-0, E-Prescribe      !! pantoprazole (PROTONIX) 40 MG EC tablet Take 1 tablet (40 mg) by mouth daily, Disp-10 tablet, R-0, E-Prescribe      !! simvastatin (ZOCOR) 20 MG tablet Take 1 tablet (20 mg) by mouth At Bedtime, Disp-10 tablet, R-0, E-Prescribe      !! simvastatin (ZOCOR) 20 MG tablet Take 1 tablet (20 mg) by mouth At Bedtime, Disp-30 tablet, R-0, E-Prescribe      !! umeclidinium-vilanterol (ANORO ELLIPTA) 62.5-25 MCG/INH oral inhaler Inhale 1 puff into the lungs daily, Disp-14 each, R-0, E-Prescribe      !! umeclidinium-vilanterol (ANORO ELLIPTA) 62.5-25 MCG/INH oral inhaler Inhale 1 puff into the lungs daily, Disp-60 each, R-0, E-PrescribeOk for equivalent if better covered- stilto respimat       !! - Potential duplicate medications found. Please discuss with provider.      CONTINUE these medications which have NOT CHANGED    Details   tiotropium-olodaterol (STIOLTO RESPIMAT) 2.5-2.5 MCG/ACT AERS Inhale 2 puffs into the lungs, Historical      vitamin D3 (CHOLECALCIFEROL) 50 mcg (2000 units) tablet Take 1 tablet by mouth daily, Historical      aspirin (ASA) 81 MG chewable tablet Take 1 tablet (81 mg) by mouth daily, Transitional      calcium carbonate (OS-NAE) 1500 (600 Ca) MG tablet Take 600 mg by mouth daily, Historical      melatonin 3 MG tablet 1 tablet (3 mg) by Oral or Feeding Tube route every evening, Transitional      multivitamin (CENTRUM SILVER) tablet Take 1 tablet by mouth daily, Historical      PARoxetine (PAXIL) 40 MG tablet Take 40 mg by mouth every morning, Historical         STOP taking these medications       atorvastatin (LIPITOR) 40 MG tablet Comments:    Reason for Stopping:         miconazole with skin protectant (ANNABELLA ANTIFUNGAL) 2 % CREA cream Comments:   Reason for Stopping:         potassium chloride ER (KLOR-CON M) 20 MEQ CR tablet Comments:   Reason for Stopping:                 DISCHARGE INSTRUCTIONS AND FOLLOW UP  Discharge Procedure Orders   Home care nursing referral   Referral Priority: Routine Referral Type: Home Health Therapies & Aides   Number of Visits Requested: 1     Home Care PT Referral for Hospital Discharge   Referral Priority: Routine Referral Type: Home Health Therapies & Aides   Number of Visits Requested: 1     Home Care OT Referral for Hospital Discharge   Referral Priority: Routine Referral Type: Consultation   Number of Visits Requested: 1     Home Care Social Service Referral for Hospital Discharge   Referral Priority: Routine Referral Type: Consultation   Number of Visits Requested: 1     Lymphedema Therapy Referral   Standing Status: Future   Referral Priority: Routine Referral Type: Rehab Therapy Physical Therapy   Number of Visits Requested: 1     [facility] REFERRAL   Standing Status: Future   Referral Priority: Routine Referral Type: Consultation   Requested Specialty: Pulmonary Disease   Number of Visits Requested: 1     Reason for your hospital stay   Order Comments: Admitted for rehabilitation following hospitalization for COVID.     Activity   Order Comments: Your activity upon discharge: activity as tolerated.     Order Specific Question Answer Comments   Is discharge order? Yes      MD face to face encounter   Order Comments: Documentation of Face to Face and Certification for Home Health Services    I certify that patient: Chika Ferguson is under my care and that I, or a nurse practitioner or physician's assistant working with me, had a face-to-face encounter that meets the physician face-to-face encounter requirements with this patient on: 11/2/2021.    This encounter with the patient was in whole, or  in part, for the following medical condition, which is the primary reason for home health care: impaired strength/activity tolerance.    I certify that, based on my findings, the following services are medically necessary home health services: Nursing, Occupational Therapy, Physical Therapy, and Social Work.    My clinical findings support the need for the above services because: Nurse is needed: To assess home safety, medication management,  after changes in medications or other medical regimen. And wound care., Occupational Therapy Services are needed to assess and treat cognitive ability and address ADL safety due to impairment in functional mobility., and Physical Therapy Services are needed to assess and treat the following functional impairments: strength and activity tolerance.    Further, I certify that my clinical findings support that this patient is homebound (i.e. absences from home require considerable and taxing effort and are for medical reasons or [employer] services or infrequently or of short duration when for other reasons) because: Requires assistance of another person or specialized equipment to access medical services because patient: Is unable to operate assistive equipment on their own...    Based on the above findings. I certify that this patient is confined to the home and needs intermittent skilled nursing care, physical therapy and/or speech therapy.  The patient is under my care, and I have initiated the establishment of the plan of care.  This patient will be followed by a physician who will periodically review the plan of care.  Physician/Provider to provide follow up care: Edwin Joshi    Attending [facility] physician (the Medicare certified [place] provider): Edi Barth DO  Physician Signature: See electronic signature associated with these discharge orders.  Date: 11/2/2021     Activity   Order Comments: Your activity upon discharge: activity as tolerated continue  therapy as recommended     Order Specific Question Answer Comments   Is discharge order? Yes      Reason for your hospital stay   Order Comments: Admitted for rehabilitation following hospitalization for COVID 19     Adult [facility] Follow-up and recommended labs and tests   Order Comments: Primary care follow up hospitalization.  Cardiology- follow up heart failure/ pulmonary hypertension  Follow up post COVID rehab with Dr. Barth    Appointments on [place] and/or [facility] (with [facility] or [facility] provider or service). Call [phone] if you haven't heard regarding these appointments within 7 days of discharge.     CPAP for stable sleep apnea with home equipment settings   Standing Status: Future Standing Exp. Date: 11/03/22   Order Comments: Continue home cpap     Oxygen Adult/Peds   Order Comments: Oxygen Documentation:   I certify that this patient, Chika Ferguson has been under my care (or a nurse practitioner or physican's assistant working with me). This is the face-to-face encounter for oxygen medical necessity.      Chika Ferguson is now in a chronic stable state and continues to require supplemental oxygen. Patient has continued oxygen desaturation due to hypoxic respiratory failure 2/2 covid 19 pna.    Alternative treatment(s) tried or considered and deemed clinically infective for treatment of hypoxic respiratory failure 2/2 COViD 19 include nebulizers, inhalers, steroids, and pulmonary toileting.  If portability is ordered, is the patient mobile within the home? yes    **Patients who qualify for home O2 coverage under the CMS guidelines require ABG tests or O2 sat readings obtained closest to, but no earlier than 2 days prior to the discharge, as evidence of the need for home oxygen therapy. Testing must be performed while patient is in the chronic stable state. See notes for O2 sats.**     Order Specific Question Answer Comments   DME Provider: [facility]    Type: New/Recertification     Oxygen Consult Reqt: Call [facility] before patient leaves at [phone] (to ensure SATS testing is completed).    Did the patient have SpO2 (sat) testing (only needed for new oxgyen or liter flow changes)? Yes    Length of Need: Lifetime    Frequency of Use: With Activity    Mode of Delivery - With Activity Nasal Cannula    Liter Flow - With Activity (LPM): 1-2 LPM    Need for Portability: Yes    Evaluate for Conserving Device: Yes    Maintain Sats >= 90%    The face to face evaluation was performed on: 11/3/2021      Diet   Order Comments: Follow this diet upon discharge: Regular Diet Adult     Order Specific Question Answer Comments   Is discharge order? Yes           PHYSICAL EXAMINATION    Most recent Vital Signs:   Vitals:    11/04/21 1550 11/04/21 1754 11/05/21 0355 11/05/21 0846   BP: (!) 147/73 116/51  120/69   BP Location: Left arm Left arm  Left arm   Pulse: 94 93  82   Resp: 18   18   Temp: 98.9  F (37.2  C)   (!) 96.4  F (35.8  C)   TempSrc: Oral   Oral   SpO2: 94%   92%   Weight:   (!) 150.9 kg (332 lb 11.2 oz)    Height:       General: awake alert nad  REsp: non labored clear diminished  Ab: soft non tender obese  CV: reg  Extremities: with chronic venous stasis staining, lymphedema  MSK/Neuro: alert moves all extremities against gravity       40 minutes spent in discharge, including >50% in counseling and coordination of care, medication review and plan of care recommended on follow up.     Patient was evaluated on day of discharge by attending physician,    Discharge summary was forwarded to Edwin Joshi (PCP) at the time of discharge, so as to bridge from hospital to outpatient care.     It was our pleasure to care for Chika Ferguson during this hospitalization. Please do not hesitate to contact me should there be questions regarding the hospital course or discharge plan.          Trista Olivares PA-C  Physical Medicine and Rehabilitation

## 2021-11-06 DIAGNOSIS — Z71.89 OTHER SPECIFIED COUNSELING: ICD-10-CM

## 2021-11-12 ENCOUNTER — PATIENT OUTREACH (OUTPATIENT)
Dept: CARE COORDINATION | Facility: CLINIC | Age: 61
End: 2021-11-12

## 2021-11-15 NOTE — PROGRESS NOTES
Clinic Care Coordination Contact  Care Team Conversations    Patient identified for care management outreach, however patient is not on a value based contract so cannot complete outreach. Will escalate to clinic staff if specific needs or resources are indicated.    Citlalli Moreira [facility]  Clinic Care Coordinator  [facility] [[email or web address]]   [phone]

## 2021-12-07 ENCOUNTER — PRE VISIT (OUTPATIENT)
Dept: CARDIOLOGY | Facility: CLINIC | Age: 61
End: 2021-12-07
Payer: MEDICARE

## 2021-12-07 NOTE — TELEPHONE ENCOUNTER
New Pulmonary Hypertension Patient Form   Patient Name: Chika Ferguson Age: 61 year old, female, 1960 MRN: [id number]   Referring Provider:  Hospital consult  Appt:  12/13/21       PH Medications:  Eliquis 2.5 mg tablet      Patient History: Pt has a history of CARL, Pulmonary Hypertenson [place] HFpEF  and RV  Failure Sinus rhythm,      Recent Testing:       Date Test Results   10/6/21 Echo RVSP:   LVEF:  55-60%         RV:  Sinus rhythm   Inferior infarct , age undetermined   Cannot rule out Anterior infarct , age undetermined   Abnormal ECG      6MWT   Meters/lowest SaO2 O2:       Max HR:               PFT FEV1/FVC:   FEV1:       FVC:   DLCO:       RHC RA:   PA:   PVR:       RV:   PAW:   COI:       Sleep Study      10/6/21 EKG  Sinus rhythm   Inferior infarct , age undetermined   Cannot rule out Anterior infarct , age undetermined   Abnormal ECG      Chest CT       VQ Scan       Liver US       Angiogram/   Stress Test

## 2021-12-16 LAB — RHEUMATOID FACT SER NEPH-ACNC: 12 IU/ML

## 2022-01-03 ENCOUNTER — OFFICE VISIT (OUTPATIENT)
Dept: CARDIOLOGY | Facility: CLINIC | Age: 62
End: 2022-01-03
Attending: INTERNAL MEDICINE
Payer: MEDICARE

## 2022-01-03 ENCOUNTER — LAB (OUTPATIENT)
Dept: LAB | Facility: CLINIC | Age: 62
End: 2022-01-03
Payer: MEDICARE

## 2022-01-03 VITALS
OXYGEN SATURATION: 92 % | BODY MASS INDEX: 47.09 KG/M2 | WEIGHT: 293 LBS | SYSTOLIC BLOOD PRESSURE: 115 MMHG | DIASTOLIC BLOOD PRESSURE: 78 MMHG | HEART RATE: 80 BPM | HEIGHT: 66 IN

## 2022-01-03 DIAGNOSIS — I27.20 PULMONARY HYPERTENSION, MILD (H): ICD-10-CM

## 2022-01-03 DIAGNOSIS — R06.02 SHORTNESS OF BREATH: Primary | ICD-10-CM

## 2022-01-03 DIAGNOSIS — R06.02 SHORTNESS OF BREATH: ICD-10-CM

## 2022-01-03 DIAGNOSIS — Z11.59 ENCOUNTER FOR SCREENING FOR OTHER VIRAL DISEASES: ICD-10-CM

## 2022-01-03 LAB
ANION GAP SERPL CALCULATED.3IONS-SCNC: 6 MMOL/L (ref 3–14)
BUN SERPL-MCNC: 17 MG/DL (ref 7–30)
CALCIUM SERPL-MCNC: 8.9 MG/DL (ref 8.5–10.1)
CHLORIDE BLD-SCNC: 100 MMOL/L (ref 94–109)
CO2 SERPL-SCNC: 34 MMOL/L (ref 20–32)
CREAT SERPL-MCNC: 0.93 MG/DL (ref 0.52–1.04)
ERYTHROCYTE [DISTWIDTH] IN BLOOD BY AUTOMATED COUNT: 15.6 % (ref 10–15)
GFR SERPL CREATININE-BSD FRML MDRD: 69 ML/MIN/1.73M2
GLUCOSE BLD-MCNC: 96 MG/DL (ref 70–99)
HCT VFR BLD AUTO: 33.7 % (ref 35–47)
HGB BLD-MCNC: 10.3 G/DL (ref 11.7–15.7)
MCH RBC QN AUTO: 25.1 PG (ref 26.5–33)
MCHC RBC AUTO-ENTMCNC: 30.6 G/DL (ref 31.5–36.5)
MCV RBC AUTO: 82 FL (ref 78–100)
NT-PROBNP SERPL-MCNC: 85 PG/ML (ref 0–125)
PLATELET # BLD AUTO: 379 10E3/UL (ref 150–450)
POTASSIUM BLD-SCNC: 3.5 MMOL/L (ref 3.4–5.3)
RBC # BLD AUTO: 4.11 10E6/UL (ref 3.8–5.2)
SODIUM SERPL-SCNC: 140 MMOL/L (ref 133–144)
WBC # BLD AUTO: 16.1 10E3/UL (ref 4–11)

## 2022-01-03 PROCEDURE — 99215 OFFICE O/P EST HI 40 MIN: CPT | Mod: 25 | Performed by: INTERNAL MEDICINE

## 2022-01-03 PROCEDURE — G0463 HOSPITAL OUTPT CLINIC VISIT: HCPCS

## 2022-01-03 PROCEDURE — 83880 ASSAY OF NATRIURETIC PEPTIDE: CPT | Performed by: PATHOLOGY

## 2022-01-03 PROCEDURE — 80048 BASIC METABOLIC PNL TOTAL CA: CPT | Performed by: PATHOLOGY

## 2022-01-03 PROCEDURE — 36415 COLL VENOUS BLD VENIPUNCTURE: CPT | Performed by: PATHOLOGY

## 2022-01-03 PROCEDURE — 85027 COMPLETE CBC AUTOMATED: CPT | Performed by: PATHOLOGY

## 2022-01-03 RX ORDER — DIAZEPAM 5 MG
5 TABLET ORAL ONCE
Status: CANCELLED | OUTPATIENT
Start: 2022-01-03

## 2022-01-03 RX ORDER — POTASSIUM CHLORIDE 750 MG/1
10 TABLET, EXTENDED RELEASE ORAL
COMMUNITY
Start: 2021-11-16

## 2022-01-03 RX ORDER — LIDOCAINE 40 MG/G
CREAM TOPICAL
Status: CANCELLED | OUTPATIENT
Start: 2022-01-03

## 2022-01-03 ASSESSMENT — PAIN SCALES - GENERAL: PAINLEVEL: NO PAIN (0)

## 2022-01-03 ASSESSMENT — MIFFLIN-ST. JEOR: SCORE: 2044.95

## 2022-01-03 NOTE — LETTER
1/3/2022      RE: Chika Ferguson  [address] [address]       Service Date: 2022      Edwin Joshi MD  [facility]  [address]  [place] [address]     Edi Batrh MD  [address]  [place] [address]     RE:  Chika Ferguson  MRN: [id number]   : 1960    Dear Kaci Joshi and Lary:      We had the pleasure of seeing Ms. Chika Ferguson in our Pulmonary Hypertension Clinic at [facility].  Although you are familiar with her history, please allow me to summarize it for the purpose of our records.    She is a very pleasant 61-year-old female with past medical history significant for morbid obesity, obstructive sleep apnea, hypertension, coronary artery disease, status post PCI of the proximal LAD and mild COPD.  She was admitted in 10/2021 to the [facility] with acute respiratory failure.  She had hypercapnic hypoxic respiratory failure.  She was intubated and critically ill in the intensive care unit.      During this hospitalization, she had an echocardiogram that showed severely dilated right ventricle with moderate to severely reduced right ventricular function.  Her estimated PA pressure was also elevated.  Her blood pressure was low and required pressors.  She was not stable enough to undergo further evaluation for pulmonary hypertension during this hospitalization.  She tested positive for COVID and also had a viral pneumonia.  Her respiratory failure was thought to be precipitated by the COVID infection.  Subsequently, she recovered and was discharged home.  She is returning today for followup for her pulmonary hypertension.    Mr. Ferguson and states that her main symptom at present is exertional shortness of breath.  This started approximately 7 years ago.  On further evaluation at that time, she was found to have a proximal LAD stenosis for which she  underwent successful drug-eluting stent.  At that time, she also had a right heart catheterization, which showed mild pulmonary hypertension with elevated left-sided filling pressure.  Her pulmonary hypertension was thought to be secondary to diastolic dysfunction as well as obstructive sleep apnea.  She states that her shortness of breath did not completely improve after percutaneous revascularization of her LAD.  She has been having this exertional shortness of breath continuously for the last 7 years.  She has no symptoms at rest.  She can do her activities of daily living.  She cannot walk more than 1 to 2 blocks.  She cannot climb a flight of stairs.  I would currently characterize her as functional class IIB.  She has significant orthopnea and paroxysmal nocturnal dyspnea.  She is afraid to lie down flat.  She has been sleeping in a recliner for the last several years.  She has occasional lightheadedness, but no syncope.  No exertional chest pain or chest pressure.  She does have lower extremity swelling, which is currently under control with compression stockings and diuretic.    She also had COVID pneumonia last year for which she did not require hospitalization.    PAST MEDICAL HISTORY:    1.  Morbid obesity.  2.  Hypertension.  3.  Obstructive sleep apnea.  4.  LAD coronary artery disease, status post proximal LAD PCI in 2016 with a drug-eluting stent.    PAST SURGICAL HISTORY:  No significant.    MEDICATIONS:   Current Outpatient Medications   Medication Sig     albuterol (PROAIR HFA/PROVENTIL HFA/VENTOLIN HFA) 108 (90 Base) MCG/ACT inhaler Inhale 2 puffs into the lungs every 6 hours as needed for wheezing     albuterol (PROVENTIL HFA;VENTOLIN HFA) 90 mcg/actuation inhaler [ALBUTEROL (PROVENTIL HFA;VENTOLIN HFA) 90 MCG/ACTUATION INHALER] Inhale 2 puffs every 4 (four) hours as needed for wheezing or shortness of breath.     ANORO ELLIPTA 62.5-25 mcg/actuation inhaler [ANORO ELLIPTA 62.5-25 MCG/ACTUATION  INHALER]      aspirin (ASA) 81 MG chewable tablet Take 1 tablet (81 mg) by mouth daily     aspirin 81 mg chewable tablet [ASPIRIN 81 MG CHEWABLE TABLET] Chew 81 mg daily.     bumetanide (BUMEX) 1 MG tablet Take 1 tablet (1 mg) by mouth 2 times daily     bumetanide (BUMEX) 1 MG tablet Take 1 tablet (1 mg) by mouth daily (Patient taking differently: Take 2 mg by mouth 2 times daily )     bumetanide (BUMEX) 1 MG tablet [BUMETANIDE (BUMEX) 1 MG TABLET] Take 1 tablet (1 mg total) by mouth 2 (two) times a day at 9am and 6pm.     calcium carbonate (OS-NAE) 1500 (600 Ca) MG tablet Take 600 mg by mouth daily     carvedilol (COREG) 3.125 MG tablet Take 1 tablet (3.125 mg) by mouth 2 times daily (with meals) (Patient taking differently: Take 3.125 mg by mouth daily )     fluticasone (FLONASE) 50 mcg/actuation nasal spray [FLUTICASONE (FLONASE) 50 MCG/ACTUATION NASAL SPRAY] 1 spray into each nostril daily.     hydrocortisone (CORTAID) 1 % external cream Apply topically 2 times daily To facial rash     hydrOXYzine (ATARAX) 25 MG tablet Take 1 tablet (25 mg) by mouth At Bedtime     melatonin 3 MG tablet 1 tablet (3 mg) by Oral or Feeding Tube route every evening     multivitamin (CENTRUM SILVER) tablet Take 1 tablet by mouth daily     pantoprazole (PROTONIX) 40 MG EC tablet Take 1 tablet (40 mg) by mouth daily     PARoxetine (PAXIL) 40 MG tablet [PAROXETINE (PAXIL) 40 MG TABLET] Take 40 mg by mouth daily.      potassium chloride ER (K-TAB/KLOR-CON) 10 MEQ CR tablet Take 10 mEq by mouth     simvastatin (ZOCOR) 20 MG tablet [SIMVASTATIN (ZOCOR) 20 MG TABLET] Take 20 mg by mouth bedtime.     spironolactone (ALDACTONE) 25 MG tablet Take 1 tablet (25 mg) by mouth daily     tiotropium-olodaterol (STIOLTO RESPIMAT) 2.5-2.5 MCG/ACT AERS Inhale 2 puffs into the lungs     vitamin D3 (CHOLECALCIFEROL) 50 mcg (2000 units) tablet Take 1 tablet by mouth daily     lisinopril (PRINIVIL,ZESTRIL) 20 MG tablet [LISINOPRIL (PRINIVIL,ZESTRIL) 20 MG  TABLET] Take 1 tablet (20 mg total) by mouth daily. (Patient not taking: Reported on 1/3/2022)     No current facility-administered medications for this visit.       REVIEW OF SYSTEMS:  A detailed 10-point review of systems obtained as described in history of present illness.  All other systems reviewed and are negative.    FAMILY HISTORY:  No significant family history of pulmonary hypertension.  No history of cardiomyopathy or sudden cardiac death.  Her brother has atrial fibrillation.    SOCIAL HISTORY:  She is .  She has 3 grown kids.  She is living with her son.  She used to work at [employer].  She is currently retired and on disability due to her medical problems.  She quit smoking 7 years ago.  She has a 14-pack-year smoking history.  She drinks alcohol only socially.  She does not use any recreational drugs.  She is  and living with her kids.    PHYSICAL EXAMINATION:  She was comfortable.  She was in no apparent distress.  She was awake, alert, oriented x3.  She was comfortable.  She was in no apparent distress.  Her blood pressure was 115/70.  Pulse rate was 80.  Respiratory rate was 16.  She was saturating 92% on room air.  She weighed 325 pounds.  She was 5 feet 6 inches tall.  Her BMI is 53.05.  She had no pallor, cyanosis, or jaundice.  Her neck exam did not reveal any jugular venous distention.  Her carotids were 1+ bilaterally.  Her pulse was regular in rhythm.  Cardiac auscultation revealed normal S1 and S2, with no murmur, rub or gallop.  Auscultation of her lungs revealed equal air entry on both sides with no added sounds.  Her abdomen was soft with normal bowel sounds, no tenderness, no rigidity, no guarding.  She had no focal neurological deficit.  Extremities showed no edema.    Her EKG showed low voltage, otherwise unremarkable.    I reviewed her most recent echocardiogram from October.  This showed moderate to severely dilated right ventricle with moderate to severely  reduced right ventricular function.  Her interventricular septum was flattened consistent with pulmonary hypertension.  Her PA pressure could not be estimated.  Her IVC was dilated.  She had no pericardial effusion.  She had normal left ventricular size and systolic function.  She had mild concentric left ventricular hypertrophy.  She had no valvular abnormalities.    She had a right heart catheterization in 2016 at the time of her coronary angiogram.  This showed an RA pressure of 15, RV of 37/16, PA of 38/20 with a mean of 29, pulmonary capillary wedge pressure of 17, cardiac output of 10.97 liters with an index of 4.3 and calculated PVR of less than 3 Wood units.    Her coronary angiogram in 01/2016 showed a 90% lesion in the mid LAD.  She underwent successful percutaneous coronary intervention with YADY 3 flow reestablished.    She had a CT pulmonary angiogram while she was admitted to the hospital.  This showed no evidence of pulmonary embolism.  She did have bilateral areas of atelectasis.  She does not have any evidence of emphysema or interstitial lung disease.  She has not had a nuclear scan or a dual energy CT PE protocol.  She has not had any recent pulmonary function tests.  Her last pulmonary function test from 2016 showed FVC of 45%, FEV1 of 43%, FEV1 by FVC of 75%, TLC of 71% and a DLCO of 51%.    Her ASHANTI, rheumatoid factor, HIV and hepatitis serology were negative.  Her TSH was unremarkable.    ASSESSMENT AND PLAN:      In summary, Ms. Chika Ferguson is a very pleasant 61-year-old female with past medical history significant for morbid obesity, obstructive sleep apnea, hypertension and coronary artery disease, who was noted to have dilated right ventricle with reduced right ventricular function while she was acutely sick with COVID pneumonia.    Clinically, she is functional class II with exertional shortness of breath.  She is not in right heart failure.  She is euvolumic. I suspect that she has  mild pulmonary hypertension due to combination of obstructive sleep apnea as well as diastolic dysfunction.  Her last right heart catheterization did reveal elevated PA pressure.  However, she had a very high cardiac output and elevated left-sided filling pressure.  Her most recent echocardiogram that showed moderate to severe right ventricular dysfunction was done in the setting of acute illness.  She was critically ill on the ventilator with hypoxia and respiratory failure.    I have recommended her to repeat an echocardiogram to reassess her right ventricular size and function now.  We will do a CT PE dual energy protocol to exclude distal chronic thromboembolic disease.  I will also do a pulmonary function test to make sure that her lung volumes are stable.  Finally, we will also arrange for her to get a right heart catheterization.  This will help us confirm the diagnosis as well as assess the severity.  She has severe orthopnea and not able to lie down flat.  She is willing to do this if she has an antianxiety medication.  We will give her Valium 5 mg 1 hour before procedure.  During the procedure, if needed, we will use mild sedation.      She is currently on Bumex 2 mg twice a day on which she is euvolemic.  I discussed with her the importance of low-salt diet and fluid restriction.  Her blood pressure is adequately controlled.  She is using her CPAP therapy adequately.  We will decide on the need for pulmonary vasodilator therapy after she completes the workup.    It was a pleasure meeting Ms. Chika Ferguson in our Pulmonary Hypertension Clinic at [facility].  We thank you for involving us in her care.  Please do not hesitate to call us in the interim if you have any are questions.    Total time today was 75 minutes reviewing notes, imaging, labs, patient visit, orders and documentation         Sincerely,  Saul Hughes MD   [facility]  Heart  Failure, Transplant, and Mechanical Circulatory Support Cardiology   Cardiovascular Division  [facility]   [phone]        D: 2022   T: 2022   MT: [id number]    Name:     JASKARAN JOSEPH  MRN:      [id number]        Account:      [id number]   :      1960           Service Date: 2022       Document: [id number]

## 2022-01-03 NOTE — NURSING NOTE
Chief Complaint   Patient presents with     New Patient     [facility]   Vitals were taken and medications reconciled.    Kash Stewart, EMT  2:59 PM

## 2022-01-03 NOTE — LETTER
1/3/2022      RE: Chika Ferguson  [address] [address]       Dear Colleague,    Thank you for the opportunity to participate in the care of your patient, Chika Ferguson, at the [facility] [place] [facility]. Please see a copy of my visit note below.    Service Date: 2022    Edwin Joshi MD  [facility]  [address]  [place] [address]     Edi Barth MD  [facility]  [address]  [place] [address]     RE:  Chika Ferguson  MRN: [id number]  : 1960    Dear Kaci Joshi and Lary:      We had the pleasure of seeing Ms. Chika Ferguson in our Pulmonary Hypertension Clinic at [facility].  Although you are familiar with her history, please allow me to summarize it for the purpose of our records.    She is a very pleasant 61-year-old female with past medical history significant for morbid obesity, obstructive sleep apnea, hypertension, coronary artery disease, status post PCI of the proximal LAD and mild COPD.  She was admitted in 10/2021 to the [facility] with acute respiratory failure.  She had hypercapnic hypoxic respiratory failure.  She was intubated and critically ill in the intensive care unit.      During this hospitalization, she had an echocardiogram that showed severely dilated right ventricle with moderate to severely reduced right ventricular function.  Her estimated PA pressure was also elevated.  Her blood pressure was low and required pressors.  She was not stable enough to undergo further evaluation for pulmonary hypertension during this hospitalization.  She tested positive for COVID and also had a viral pneumonia.  Her respiratory failure was thought to be precipitated by the COVID infection.  Subsequently, she recovered and was discharged home.  She is returning today for followup for her  pulmonary hypertension.    Mr. Ferguson and states that her main symptom at present is exertional shortness of breath.  This started approximately 7 years ago.  On further evaluation at that time, she was found to have a proximal LAD stenosis for which she underwent successful drug-eluting stent.  At that time, she also had a right heart catheterization, which showed mild pulmonary hypertension with elevated left-sided filling pressure.  Her pulmonary hypertension was thought to be secondary to diastolic dysfunction as well as obstructive sleep apnea.  She states that her shortness of breath did not completely improve after percutaneous revascularization of her LAD.  She has been having this exertional shortness of breath continuously for the last 7 years.  She has no symptoms at rest.  She can do her activities of daily living.  She cannot walk more than 1 to 2 blocks.  She cannot climb a flight of stairs.  I would currently characterize her as functional class IIB.  She has significant orthopnea and paroxysmal nocturnal dyspnea.  She is afraid to lie down flat.  She has been sleeping in a recliner for the last several years.  She has occasional lightheadedness, but no syncope.  No exertional chest pain or chest pressure.  She does have lower extremity swelling, which is currently under control with compression stockings and diuretic.    She also had COVID pneumonia last year for which she did not require hospitalization.    PAST MEDICAL HISTORY:    1.  Morbid obesity.  2.  Hypertension.  3.  Obstructive sleep apnea.  4.  LAD coronary artery disease, status post proximal LAD PCI in 2016 with a drug-eluting stent.    PAST SURGICAL HISTORY:  No significant.    MEDICATIONS:   Current Outpatient Medications   Medication Sig     albuterol (PROAIR HFA/PROVENTIL HFA/VENTOLIN HFA) 108 (90 Base) MCG/ACT inhaler Inhale 2 puffs into the lungs every 6 hours as needed for wheezing     albuterol (PROVENTIL HFA;VENTOLIN HFA) 90  mcg/actuation inhaler [ALBUTEROL (PROVENTIL HFA;VENTOLIN HFA) 90 MCG/ACTUATION INHALER] Inhale 2 puffs every 4 (four) hours as needed for wheezing or shortness of breath.     ANORO ELLIPTA 62.5-25 mcg/actuation inhaler [ANORO ELLIPTA 62.5-25 MCG/ACTUATION INHALER]      aspirin (ASA) 81 MG chewable tablet Take 1 tablet (81 mg) by mouth daily     aspirin 81 mg chewable tablet [ASPIRIN 81 MG CHEWABLE TABLET] Chew 81 mg daily.     bumetanide (BUMEX) 1 MG tablet Take 1 tablet (1 mg) by mouth 2 times daily     bumetanide (BUMEX) 1 MG tablet Take 1 tablet (1 mg) by mouth daily (Patient taking differently: Take 2 mg by mouth 2 times daily )     bumetanide (BUMEX) 1 MG tablet [BUMETANIDE (BUMEX) 1 MG TABLET] Take 1 tablet (1 mg total) by mouth 2 (two) times a day at 9am and 6pm.     calcium carbonate (OS-NAE) 1500 (600 Ca) MG tablet Take 600 mg by mouth daily     carvedilol (COREG) 3.125 MG tablet Take 1 tablet (3.125 mg) by mouth 2 times daily (with meals) (Patient taking differently: Take 3.125 mg by mouth daily )     fluticasone (FLONASE) 50 mcg/actuation nasal spray [FLUTICASONE (FLONASE) 50 MCG/ACTUATION NASAL SPRAY] 1 spray into each nostril daily.     hydrocortisone (CORTAID) 1 % external cream Apply topically 2 times daily To facial rash     hydrOXYzine (ATARAX) 25 MG tablet Take 1 tablet (25 mg) by mouth At Bedtime     melatonin 3 MG tablet 1 tablet (3 mg) by Oral or Feeding Tube route every evening     multivitamin (CENTRUM SILVER) tablet Take 1 tablet by mouth daily     pantoprazole (PROTONIX) 40 MG EC tablet Take 1 tablet (40 mg) by mouth daily     PARoxetine (PAXIL) 40 MG tablet [PAROXETINE (PAXIL) 40 MG TABLET] Take 40 mg by mouth daily.      potassium chloride ER (K-TAB/KLOR-CON) 10 MEQ CR tablet Take 10 mEq by mouth     simvastatin (ZOCOR) 20 MG tablet [SIMVASTATIN (ZOCOR) 20 MG TABLET] Take 20 mg by mouth bedtime.     spironolactone (ALDACTONE) 25 MG tablet Take 1 tablet (25 mg) by mouth daily      tiotropium-olodaterol (STIOLTO RESPIMAT) 2.5-2.5 MCG/ACT AERS Inhale 2 puffs into the lungs     vitamin D3 (CHOLECALCIFEROL) 50 mcg (2000 units) tablet Take 1 tablet by mouth daily     lisinopril (PRINIVIL,ZESTRIL) 20 MG tablet [LISINOPRIL (PRINIVIL,ZESTRIL) 20 MG TABLET] Take 1 tablet (20 mg total) by mouth daily. (Patient not taking: Reported on 1/3/2022)     No current facility-administered medications for this visit.       REVIEW OF SYSTEMS:  A detailed 10-point review of systems obtained as described in history of present illness.  All other systems reviewed and are negative.    FAMILY HISTORY:  No significant family history of pulmonary hypertension.  No history of cardiomyopathy or sudden cardiac death.  Her brother has atrial fibrillation.    SOCIAL HISTORY:  She is .  She has 3 grown kids.  She is living with her son.  She used to work at [employer].  She is currently retired and on disability due to her medical problems.  She quit smoking 7 years ago.  She has a 14-pack-year smoking history.  She drinks alcohol only socially.  She does not use any recreational drugs.  She is  and living with her kids.    PHYSICAL EXAMINATION:  She was comfortable.  She was in no apparent distress.  She was awake, alert, oriented x3.  She was comfortable.  She was in no apparent distress.  Her blood pressure was 115/70.  Pulse rate was 80.  Respiratory rate was 16.  She was saturating 92% on room air.  She weighed 325 pounds.  She was 5 feet 6 inches tall.  Her BMI is 53.05.  She had no pallor, cyanosis, or jaundice.  Her neck exam did not reveal any jugular venous distention.  Her carotids were 1+ bilaterally.  Her pulse was regular in rhythm.  Cardiac auscultation revealed normal S1 and S2, with no murmur, rub or gallop.  Auscultation of her lungs revealed equal air entry on both sides with no added sounds.  Her abdomen was soft with normal bowel sounds, no tenderness, no rigidity, no guarding.  She  had no focal neurological deficit.  Extremities showed no edema.    Her EKG showed low voltage, otherwise unremarkable.    I reviewed her most recent echocardiogram from October.  This showed moderate to severely dilated right ventricle with moderate to severely reduced right ventricular function.  Her interventricular septum was flattened consistent with pulmonary hypertension.  Her PA pressure could not be estimated.  Her IVC was dilated.  She had no pericardial effusion.  She had normal left ventricular size and systolic function.  She had mild concentric left ventricular hypertrophy.  She had no valvular abnormalities.    She had a right heart catheterization in 2016 at the time of her coronary angiogram.  This showed an RA pressure of 15, RV of 37/16, PA of 38/20 with a mean of 29, pulmonary capillary wedge pressure of 17, cardiac output of 10.97 liters with an index of 4.3 and calculated PVR of less than 3 Wood units.    Her coronary angiogram in 01/2016 showed a 90% lesion in the mid LAD.  She underwent successful percutaneous coronary intervention with YADY 3 flow reestablished.    She had a CT pulmonary angiogram while she was admitted to the hospital.  This showed no evidence of pulmonary embolism.  She did have bilateral areas of atelectasis.  She does not have any evidence of emphysema or interstitial lung disease.  She has not had a nuclear scan or a dual energy CT PE protocol.  She has not had any recent pulmonary function tests.  Her last pulmonary function test from 2016 showed FVC of 45%, FEV1 of 43%, FEV1 by FVC of 75%, TLC of 71% and a DLCO of 51%.    Her ASHANTI, rheumatoid factor, HIV and hepatitis serology were negative.  Her TSH was unremarkable.    ASSESSMENT AND PLAN:      In summary, Ms. Chika Ferguson is a very pleasant 61-year-old female with past medical history significant for morbid obesity, obstructive sleep apnea, hypertension and coronary artery disease, who was noted to have dilated  right ventricle with reduced right ventricular function while she was acutely sick with COVID pneumonia.    Clinically, she is functional class II with exertional shortness of breath.  She is not in right heart failure.  She is euvolumic. I suspect that she has mild pulmonary hypertension due to combination of obstructive sleep apnea as well as diastolic dysfunction.  Her last right heart catheterization did reveal elevated PA pressure.  However, she had a very high cardiac output and elevated left-sided filling pressure.  Her most recent echocardiogram that showed moderate to severe right ventricular dysfunction was done in the setting of acute illness.  She was critically ill on the ventilator with hypoxia and respiratory failure.    I have recommended her to repeat an echocardiogram to reassess her right ventricular size and function now.  We will do a CT PE dual energy protocol to exclude distal chronic thromboembolic disease.  I will also do a pulmonary function test to make sure that her lung volumes are stable.  Finally, we will also arrange for her to get a right heart catheterization.  This will help us confirm the diagnosis as well as assess the severity.  She has severe orthopnea and not able to lie down flat.  She is willing to do this if she has an antianxiety medication.  We will give her Valium 5 mg 1 hour before procedure.  During the procedure, if needed, we will use mild sedation.      She is currently on Bumex 2 mg twice a day on which she is euvolemic.  I discussed with her the importance of low-salt diet and fluid restriction.  Her blood pressure is adequately controlled.  She is using her CPAP therapy adequately.  We will decide on the need for pulmonary vasodilator therapy after she completes the workup.    It was a pleasure meeting Ms. Chika Ferguson in our Pulmonary Hypertension Clinic at [facility].  We thank you for involving us in her care.  Please do not  hesitate to call us in the interim if you have any are questions.    Total time today was 75 minutes reviewing notes, imaging, labs, patient visit, orders and documentation         Sincerely,  Saul Hughes MD   [facility]  Heart Failure, Transplant, and Mechanical Circulatory Support Cardiology   Cardiovascular Division  [facility]   [phone]            D: 2022   T: 2022   MT:     Name:     JASKARAN JOSEPH  MRN:      [id number]        Account:      [id number]   :      1960           Service Date: 2022       Document: [id number]        Please do not hesitate to contact me if you have any questions/concerns.     Sincerely,     Saul Hughes MD

## 2022-01-03 NOTE — PROGRESS NOTES
Service Date: 2022    Edwin Joshi MD  [facility]  [address]  [place] [address]     Edi Barth MD  [address]  [place] [address]     RE:  Chika Ferguson  MRN: [id number]  : 1960    Dear Kaci Joshi and Lary:      We had the pleasure of seeing Ms. Chika Ferguson in our Pulmonary Hypertension Clinic at [facility].  Although you are familiar with her history, please allow me to summarize it for the purpose of our records.    She is a very pleasant 61-year-old female with past medical history significant for morbid obesity, obstructive sleep apnea, hypertension, coronary artery disease, status post PCI of the proximal LAD and mild COPD.  She was admitted in 10/2021 to the [facility] with acute respiratory failure.  She had hypercapnic hypoxic respiratory failure.  She was intubated and critically ill in the intensive care unit.      During this hospitalization, she had an echocardiogram that showed severely dilated right ventricle with moderate to severely reduced right ventricular function.  Her estimated PA pressure was also elevated.  Her blood pressure was low and required pressors.  She was not stable enough to undergo further evaluation for pulmonary hypertension during this hospitalization.  She tested positive for COVID and also had a viral pneumonia.  Her respiratory failure was thought to be precipitated by the COVID infection.  Subsequently, she recovered and was discharged home.  She is returning today for followup for her pulmonary hypertension.    Mr. Ferguson and states that her main symptom at present is exertional shortness of breath.  This started approximately 7 years ago.  On further evaluation at that time, she was found to have a proximal LAD stenosis for which she underwent successful drug-eluting stent.  At that time, she also had a right heart  catheterization, which showed mild pulmonary hypertension with elevated left-sided filling pressure.  Her pulmonary hypertension was thought to be secondary to diastolic dysfunction as well as obstructive sleep apnea.  She states that her shortness of breath did not completely improve after percutaneous revascularization of her LAD.  She has been having this exertional shortness of breath continuously for the last 7 years.  She has no symptoms at rest.  She can do her activities of daily living.  She cannot walk more than 1 to 2 blocks.  She cannot climb a flight of stairs.  I would currently characterize her as functional class IIB.  She has significant orthopnea and paroxysmal nocturnal dyspnea.  She is afraid to lie down flat.  She has been sleeping in a recliner for the last several years.  She has occasional lightheadedness, but no syncope.  No exertional chest pain or chest pressure.  She does have lower extremity swelling, which is currently under control with compression stockings and diuretic.    She also had COVID pneumonia last year for which she did not require hospitalization.    PAST MEDICAL HISTORY:    1.  Morbid obesity.  2.  Hypertension.  3.  Obstructive sleep apnea.  4.  LAD coronary artery disease, status post proximal LAD PCI in 2016 with a drug-eluting stent.    PAST SURGICAL HISTORY:  No significant.    MEDICATIONS:   Current Outpatient Medications   Medication Sig     albuterol (PROAIR HFA/PROVENTIL HFA/VENTOLIN HFA) 108 (90 Base) MCG/ACT inhaler Inhale 2 puffs into the lungs every 6 hours as needed for wheezing     albuterol (PROVENTIL HFA;VENTOLIN HFA) 90 mcg/actuation inhaler [ALBUTEROL (PROVENTIL HFA;VENTOLIN HFA) 90 MCG/ACTUATION INHALER] Inhale 2 puffs every 4 (four) hours as needed for wheezing or shortness of breath.     ANORO ELLIPTA 62.5-25 mcg/actuation inhaler [ANORO ELLIPTA 62.5-25 MCG/ACTUATION INHALER]      aspirin (ASA) 81 MG chewable tablet Take 1 tablet (81 mg) by mouth  daily     aspirin 81 mg chewable tablet [ASPIRIN 81 MG CHEWABLE TABLET] Chew 81 mg daily.     bumetanide (BUMEX) 1 MG tablet Take 1 tablet (1 mg) by mouth 2 times daily     bumetanide (BUMEX) 1 MG tablet Take 1 tablet (1 mg) by mouth daily (Patient taking differently: Take 2 mg by mouth 2 times daily )     bumetanide (BUMEX) 1 MG tablet [BUMETANIDE (BUMEX) 1 MG TABLET] Take 1 tablet (1 mg total) by mouth 2 (two) times a day at 9am and 6pm.     calcium carbonate (OS-NAE) 1500 (600 Ca) MG tablet Take 600 mg by mouth daily     carvedilol (COREG) 3.125 MG tablet Take 1 tablet (3.125 mg) by mouth 2 times daily (with meals) (Patient taking differently: Take 3.125 mg by mouth daily )     fluticasone (FLONASE) 50 mcg/actuation nasal spray [FLUTICASONE (FLONASE) 50 MCG/ACTUATION NASAL SPRAY] 1 spray into each nostril daily.     hydrocortisone (CORTAID) 1 % external cream Apply topically 2 times daily To facial rash     hydrOXYzine (ATARAX) 25 MG tablet Take 1 tablet (25 mg) by mouth At Bedtime     melatonin 3 MG tablet 1 tablet (3 mg) by Oral or Feeding Tube route every evening     multivitamin (CENTRUM SILVER) tablet Take 1 tablet by mouth daily     pantoprazole (PROTONIX) 40 MG EC tablet Take 1 tablet (40 mg) by mouth daily     PARoxetine (PAXIL) 40 MG tablet [PAROXETINE (PAXIL) 40 MG TABLET] Take 40 mg by mouth daily.      potassium chloride ER (K-TAB/KLOR-CON) 10 MEQ CR tablet Take 10 mEq by mouth     simvastatin (ZOCOR) 20 MG tablet [SIMVASTATIN (ZOCOR) 20 MG TABLET] Take 20 mg by mouth bedtime.     spironolactone (ALDACTONE) 25 MG tablet Take 1 tablet (25 mg) by mouth daily     tiotropium-olodaterol (STIOLTO RESPIMAT) 2.5-2.5 MCG/ACT AERS Inhale 2 puffs into the lungs     vitamin D3 (CHOLECALCIFEROL) 50 mcg (2000 units) tablet Take 1 tablet by mouth daily     lisinopril (PRINIVIL,ZESTRIL) 20 MG tablet [LISINOPRIL (PRINIVIL,ZESTRIL) 20 MG TABLET] Take 1 tablet (20 mg total) by mouth daily. (Patient not taking: Reported  on 1/3/2022)     No current facility-administered medications for this visit.       REVIEW OF SYSTEMS:  A detailed 10-point review of systems obtained as described in history of present illness.  All other systems reviewed and are negative.    FAMILY HISTORY:  No significant family history of pulmonary hypertension.  No history of cardiomyopathy or sudden cardiac death.  Her brother has atrial fibrillation.    SOCIAL HISTORY:  She is .  She has 3 grown kids.  She is living with her son.  She used to work at [employer].  She is currently retired and on disability due to her medical problems.  She quit smoking 7 years ago.  She has a 14-pack-year smoking history.  She drinks alcohol only socially.  She does not use any recreational drugs.  She is  and living with her kids.    PHYSICAL EXAMINATION:  She was comfortable.  She was in no apparent distress.  She was awake, alert, oriented x3.  She was comfortable.  She was in no apparent distress.  Her blood pressure was 115/70.  Pulse rate was 80.  Respiratory rate was 16.  She was saturating 92% on room air.  She weighed 325 pounds.  She was 5 feet 6 inches tall.  Her BMI is 53.05.  She had no pallor, cyanosis, or jaundice.  Her neck exam did not reveal any jugular venous distention.  Her carotids were 1+ bilaterally.  Her pulse was regular in rhythm.  Cardiac auscultation revealed normal S1 and S2, with no murmur, rub or gallop.  Auscultation of her lungs revealed equal air entry on both sides with no added sounds.  Her abdomen was soft with normal bowel sounds, no tenderness, no rigidity, no guarding.  She had no focal neurological deficit.  Extremities showed no edema.    Her EKG showed low voltage, otherwise unremarkable.    I reviewed her most recent echocardiogram from October.  This showed moderate to severely dilated right ventricle with moderate to severely reduced right ventricular function.  Her interventricular septum was flattened  consistent with pulmonary hypertension.  Her PA pressure could not be estimated.  Her IVC was dilated.  She had no pericardial effusion.  She had normal left ventricular size and systolic function.  She had mild concentric left ventricular hypertrophy.  She had no valvular abnormalities.    She had a right heart catheterization in 2016 at the time of her coronary angiogram.  This showed an RA pressure of 15, RV of 37/16, PA of 38/20 with a mean of 29, pulmonary capillary wedge pressure of 17, cardiac output of 10.97 liters with an index of 4.3 and calculated PVR of less than 3 Wood units.    Her coronary angiogram in 01/2016 showed a 90% lesion in the mid LAD.  She underwent successful percutaneous coronary intervention with YADY 3 flow reestablished.    She had a CT pulmonary angiogram while she was admitted to the hospital.  This showed no evidence of pulmonary embolism.  She did have bilateral areas of atelectasis.  She does not have any evidence of emphysema or interstitial lung disease.  She has not had a nuclear scan or a dual energy CT PE protocol.  She has not had any recent pulmonary function tests.  Her last pulmonary function test from 2016 showed FVC of 45%, FEV1 of 43%, FEV1 by FVC of 75%, TLC of 71% and a DLCO of 51%.    Her ASHANTI, rheumatoid factor, HIV and hepatitis serology were negative.  Her TSH was unremarkable.    ASSESSMENT AND PLAN:      In summary, Ms. Chika Ferguson is a very pleasant 61-year-old female with past medical history significant for morbid obesity, obstructive sleep apnea, hypertension and coronary artery disease, who was noted to have dilated right ventricle with reduced right ventricular function while she was acutely sick with COVID pneumonia.    Clinically, she is functional class II with exertional shortness of breath.  She is not in right heart failure.  She is euvolumic. I suspect that she has mild pulmonary hypertension due to combination of obstructive sleep apnea as well  as diastolic dysfunction.  Her last right heart catheterization did reveal elevated PA pressure.  However, she had a very high cardiac output and elevated left-sided filling pressure.  Her most recent echocardiogram that showed moderate to severe right ventricular dysfunction was done in the setting of acute illness.  She was critically ill on the ventilator with hypoxia and respiratory failure.    I have recommended her to repeat an echocardiogram to reassess her right ventricular size and function now.  We will do a CT PE dual energy protocol to exclude distal chronic thromboembolic disease.  I will also do a pulmonary function test to make sure that her lung volumes are stable.  Finally, we will also arrange for her to get a right heart catheterization.  This will help us confirm the diagnosis as well as assess the severity.  She has severe orthopnea and not able to lie down flat.  She is willing to do this if she has an antianxiety medication.  We will give her Valium 5 mg 1 hour before procedure.  During the procedure, if needed, we will use mild sedation.      She is currently on Bumex 2 mg twice a day on which she is euvolemic.  I discussed with her the importance of low-salt diet and fluid restriction.  Her blood pressure is adequately controlled.  She is using her CPAP therapy adequately.  We will decide on the need for pulmonary vasodilator therapy after she completes the workup.    It was a pleasure meeting Ms. Chika Ferguson in our Pulmonary Hypertension Clinic at [facility].  We thank you for involving us in her care.  Please do not hesitate to call us in the interim if you have any are questions.    Total time today was 75 minutes reviewing notes, imaging, labs, patient visit, orders and documentation         Sincerely,  Saul Hughes MD   [facility]  Heart Failure, Transplant, and Mechanical Circulatory Support Cardiology   Cardiovascular  Division  [facility]   [phone]            D: 2022   T: 2022   MT: BENJI    Name:     JASKARAN JOSEPH  MRN:      [id number]        Account:      [id number]   :      1960           Service Date: 2022       Document: [id number]

## 2022-01-03 NOTE — PATIENT INSTRUCTIONS
Medication Changes:  No medication changes at this time. Please continue current medication regiment.    Patient Instructions:  1. Continue staying active and eat a heart healthy diet (2 gram Sodium and 2 L fluid restriction).    2. Please keep current list of medications with you at all times.    3. Remember to weigh yourself daily after voiding and before you consume any food or beverages and log the numbers.  If you have gained 2 pounds overnight or 5 pounds in a week contact us immediately for medication adjustments or further instructions.    4. **Please call us immediately if you have any syncope (fainting or passing out), chest pain, edema (swelling or weight gain), or decline in your functional status (general decline in how you are feeling).    5. Patients on Remodulin (treprostinil) or Veletri (epoprostenol): Please make sure that you have your backup pump and supplies with you at all times, your mixing instructions, and contact information for your specialty pharmacy.    Follow up Appointment Information:  Labs today on the way out    Echocardiogram with Bubble Study  Chest CT (at the Hospital)  PFT  Right heart catheterization     *Mandatory COVID Testing:   Pt will need to complete a COVID test no sooner than 4 days prior to their procedure.      To schedule COVID testing Please call [phone]    If you want to complete this at an outside facility please call them to find out if they will have the results within the appropriate time frame and their fax number.  You will need to provide us with that information so we can send them the order.    They will need to fax the results to [phone]    If you are running into and issues please call us.       Check-In  Time Check-In Location Estimated Length Procedure   Name        GOLD  waiting room 60-90 minutes Right Heart Catheterization**     Procedure Preparations & Instructions     This is an invasive procedure that DOES require  preparation:    - Nothing to eat for 6 hours   - You may have clear liquids up until the time of your procedure  - You can take your morning medications (except diabetic and blood thinners) with sips of water  - Please arrange for a ride to drop you off and pick you up in the instance you are unable to drive home, however you should be able to function as you normally would after the procedure         We are located on the third floor [facility] the [facility].  Our address is     [address] [place] [address] on [address] [place] [address]    Thank you for allowing us to be a part of your care here at the [facility]    If you have questions or concerns please contact us at:    Sterling Elmore, RN, BSN   Marilyn Meadows (Schedule,Prior Auth)  Nurse Coordinator     Clinic   Pulmonary Hypertension   Pulmonary Hypertension  [facility]  [facility]  (Phone)[phone]    (Phone) [phone]        (Fax) [phone]    ** Please note that you will NOT receive a reminder call regarding your scheduled testing, reminder calls are for provider appointments only.  If you are scheduled for testing within the [facility] system you may receive a call regarding pre-registration for billing purposes only.**     Remember to weigh yourself daily after voiding and before you consume any food or beverages and log the numbers.  If you have gained/lost 2 pounds overnight or 5 pounds in a week contact us immediately for medication adjustments or further instructions.   **Please call us immediately if you have any syncope, chest pain, edema, or decline in your functional status.    Support Group:  Pulmonary Hypertension Association  Https://www.phassociation.org/  **Look at the Events Tab** They even have Support Groups that you can call into    [place] - [facility] PH Support Group  Second  Saturday of the Month from 1-3 PM   [facility] [address]  Leader: Zaida Ferraro and Chiquita Hernández  Phone: [phone] or [phone]  Email: [email or web address]   Patient Education     Low-Salt Diet  This diet removes foods that are high in salt. It also limits the amount of salt you use when cooking. It is most often used for people with high blood pressure, fluid retention (edema), and kidney, liver, or heart disease.   Table salt has the mineral sodium. Your body needs sodium to work normally. But too much sodium can make your health problems worse. Your healthcare provider advises a low-salt (low-sodium) diet for you. Your total daily allowed amount of salt is 1,500 to 2,300 milligrams (mg). This is less than 1 teaspoon of table salt. This means you can have only about 500 to 700 mg of sodium at each meal. People with certain health problems should limit salt intake to the lower end of the advised range.     When you cook, don t add much salt. If you can cook without using salt, even better. Don t add salt to your food at the table.   When shopping, read food labels. Salt is often called sodium on the label. Choose foods that are salt-free, low salt, or very low salt. Note that foods with reduced salt may not lower your salt intake enough.     Beans, potatoes, and pasta  OK: Dry beans, split peas, lentils, potatoes, rice, macaroni, pasta, spaghetti with no added salt, canned beans with no added salt   Avoid: Salted potato chips; regular (salt added) canned beans   Breads and grains  OK: Low-sodium breads, rolls, cereals, and cakes; low-salt crackers, matzo crackers   Avoid: Salted crackers, pretzels, tortilla chips, popcorn, and other salty snacks; Tajik toast, pancakes, muffins, regular bread   Dairy  OK: Milk, chocolate milk, hot chocolate mix, low-salt cheeses, yogurt   Avoid: Processed cheese and cheese spreads; Roquefort, Camembert, and cottage cheese; buttermilk, instant breakfast  drink   Desserts  OK: Ice cream, frozen yogurt, juice bars, gelatin, cookies and pies, sugar, honey, jelly, hard candy   Avoid: Most pies, cakes and cookies made with salt; instant pudding   Drinks  OK: Tea, coffee, fizzy (carbonated) drinks, juices   Avoid: Flavored coffees, electrolyte replacement drinks, sports drinks   Meats  OK: All fresh meat, fish, poultry, low-salt tuna, eggs, egg substitute   Avoid: Smoked, pickled, brine-cured, or salted meats and fish, and processed poultry injected with salt or marinade. This includes sepulveda, chipped beef, corned beef, hot dogs, deli meats, ham, kosher meats, salt pork, sausage, canned tuna, salted codfish, smoked salmon, herring, sardines, and anchovies.   Seasonings  OK: Most seasonings are okay. Good substitutes for salt include: fresh herb blends, hot sauce, lemon, garlic, isaac, vinegar, dry mustard, parsley, cilantro, horseradish, tomato paste, margarine, mayonnaise, unsalted butter, cream cheese, vegetable and olive oil, cream, low-salt salad dressing and gravy.   Avoid: Regular ketchup, relishes, pickles, soy sauce, teriyaki sauce, Worcestershire sauce, BBQ sauce, tartar sauce, meat tenderizer, chili sauce, regular gravy, regular salad dressing, salted butter   Soups  OK: Low-salt soups and broths made with allowed foods   Avoid: Bouillon cubes, soups with smoked or salted meats, regular soup and broth   Vegetables  OK: Most vegetables are okay, including canned vegetables with no salt added, and plain frozen vegetables; low-salt tomato and vegetable juices   Avoid: Sauerkraut and other brined vegetables; pickles and pickled vegetables; tomato juice, olives, canned vegetables with salt, frozen vegetables with sauces   Luis last reviewed this educational content on 2/1/2020 2000-2021 [employer]. All rights reserved. This information is not intended as a substitute for professional medical care. Always follow your healthcare professional's  instructions.

## 2022-01-03 NOTE — LETTER
1/3/2022      RE: Chika Ferguson  [address] [address]       Service Date: 2022    Edwin Joshi MD  [facility]  [address]  [place] [address]     Edi Barth MD  [address]  [place] [address]     RE:  Chika Ferguson  MRN: [id number]  : 1960    Dear Kaci Joshi and Lary:      We had the pleasure of seeing Ms. Chika Ferguson in our Pulmonary Hypertension Clinic at [facility].  Although you are familiar with her history, please allow me to summarize it for the purpose of our records.    She is a very pleasant 61-year-old female with past medical history significant for morbid obesity, obstructive sleep apnea, hypertension, coronary artery disease, status post PCI of the proximal LAD and mild COPD.  She was admitted in 10/2021 to the [facility] with acute respiratory failure.  She had hypercapnic hypoxic respiratory failure.  She was intubated and critically ill in the intensive care unit.      During this hospitalization, she had an echocardiogram that showed severely dilated right ventricle with moderate to severely reduced right ventricular function.  Her estimated PA pressure was also elevated.  Her blood pressure was low and required pressors.  She was not stable enough to undergo further evaluation for pulmonary hypertension during this hospitalization.  She tested positive for COVID and also had a viral pneumonia.  Her respiratory failure was thought to be precipitated by the COVID infection.  Subsequently, she recovered and was discharged home.  She is returning today for followup for her pulmonary hypertension.    Mr. Ferguson and states that her main symptom at present is exertional shortness of breath.  This started approximately 7 years ago.  On further evaluation at that time, she was found to have a proximal LAD stenosis for which she  underwent successful drug-eluting stent.  At that time, she also had a right heart catheterization, which showed mild pulmonary hypertension with elevated left-sided filling pressure.  Her pulmonary hypertension was thought to be secondary to diastolic dysfunction as well as obstructive sleep apnea.  She states that her shortness of breath did not completely improve after percutaneous revascularization of her LAD.  She has been having this exertional shortness of breath continuously for the last 7 years.  She has no symptoms at rest.  She can do her activities of daily living.  She cannot walk more than 1 to 2 blocks.  She cannot climb a flight of stairs.  I would currently characterize her as functional class IIB.  She has significant orthopnea and paroxysmal nocturnal dyspnea.  She is afraid to lie down flat.  She has been sleeping in a recliner for the last several years.  She has occasional lightheadedness, but no syncope.  No exertional chest pain or chest pressure.  She does have lower extremity swelling, which is currently under control with compression stockings and diuretic.    She also had COVID pneumonia last year for which she did not require hospitalization.    PAST MEDICAL HISTORY:    1.  Morbid obesity.  2.  Hypertension.  3.  Obstructive sleep apnea.  4.  LAD coronary artery disease, status post proximal LAD PCI in 2016 with a drug-eluting stent.    PAST SURGICAL HISTORY:  No significant.    MEDICATIONS:   Current Outpatient Medications   Medication Sig     albuterol (PROAIR HFA/PROVENTIL HFA/VENTOLIN HFA) 108 (90 Base) MCG/ACT inhaler Inhale 2 puffs into the lungs every 6 hours as needed for wheezing     albuterol (PROVENTIL HFA;VENTOLIN HFA) 90 mcg/actuation inhaler [ALBUTEROL (PROVENTIL HFA;VENTOLIN HFA) 90 MCG/ACTUATION INHALER] Inhale 2 puffs every 4 (four) hours as needed for wheezing or shortness of breath.     ANORO ELLIPTA 62.5-25 mcg/actuation inhaler [ANORO ELLIPTA 62.5-25 MCG/ACTUATION  INHALER]      aspirin (ASA) 81 MG chewable tablet Take 1 tablet (81 mg) by mouth daily     aspirin 81 mg chewable tablet [ASPIRIN 81 MG CHEWABLE TABLET] Chew 81 mg daily.     bumetanide (BUMEX) 1 MG tablet Take 1 tablet (1 mg) by mouth 2 times daily     bumetanide (BUMEX) 1 MG tablet Take 1 tablet (1 mg) by mouth daily (Patient taking differently: Take 2 mg by mouth 2 times daily )     bumetanide (BUMEX) 1 MG tablet [BUMETANIDE (BUMEX) 1 MG TABLET] Take 1 tablet (1 mg total) by mouth 2 (two) times a day at 9am and 6pm.     calcium carbonate (OS-NAE) 1500 (600 Ca) MG tablet Take 600 mg by mouth daily     carvedilol (COREG) 3.125 MG tablet Take 1 tablet (3.125 mg) by mouth 2 times daily (with meals) (Patient taking differently: Take 3.125 mg by mouth daily )     fluticasone (FLONASE) 50 mcg/actuation nasal spray [FLUTICASONE (FLONASE) 50 MCG/ACTUATION NASAL SPRAY] 1 spray into each nostril daily.     hydrocortisone (CORTAID) 1 % external cream Apply topically 2 times daily To facial rash     hydrOXYzine (ATARAX) 25 MG tablet Take 1 tablet (25 mg) by mouth At Bedtime     melatonin 3 MG tablet 1 tablet (3 mg) by Oral or Feeding Tube route every evening     multivitamin (CENTRUM SILVER) tablet Take 1 tablet by mouth daily     pantoprazole (PROTONIX) 40 MG EC tablet Take 1 tablet (40 mg) by mouth daily     PARoxetine (PAXIL) 40 MG tablet [PAROXETINE (PAXIL) 40 MG TABLET] Take 40 mg by mouth daily.      potassium chloride ER (K-TAB/KLOR-CON) 10 MEQ CR tablet Take 10 mEq by mouth     simvastatin (ZOCOR) 20 MG tablet [SIMVASTATIN (ZOCOR) 20 MG TABLET] Take 20 mg by mouth bedtime.     spironolactone (ALDACTONE) 25 MG tablet Take 1 tablet (25 mg) by mouth daily     tiotropium-olodaterol (STIOLTO RESPIMAT) 2.5-2.5 MCG/ACT AERS Inhale 2 puffs into the lungs     vitamin D3 (CHOLECALCIFEROL) 50 mcg (2000 units) tablet Take 1 tablet by mouth daily     lisinopril (PRINIVIL,ZESTRIL) 20 MG tablet [LISINOPRIL (PRINIVIL,ZESTRIL) 20 MG  TABLET] Take 1 tablet (20 mg total) by mouth daily. (Patient not taking: Reported on 1/3/2022)     No current facility-administered medications for this visit.       REVIEW OF SYSTEMS:  A detailed 10-point review of systems obtained as described in history of present illness.  All other systems reviewed and are negative.    FAMILY HISTORY:  No significant family history of pulmonary hypertension.  No history of cardiomyopathy or sudden cardiac death.  Her brother has atrial fibrillation.    SOCIAL HISTORY:  She is .  She has 3 grown kids.  She is living with her son.  She used to work at [employer].  She is currently retired and on disability due to her medical problems.  She quit smoking 7 years ago.  She has a 14-pack-year smoking history.  She drinks alcohol only socially.  She does not use any recreational drugs.  She is  and living with her kids.    PHYSICAL EXAMINATION:  She was comfortable.  She was in no apparent distress.  She was awake, alert, oriented x3.  She was comfortable.  She was in no apparent distress.  Her blood pressure was 115/70.  Pulse rate was 80.  Respiratory rate was 16.  She was saturating 92% on room air.  She weighed 325 pounds.  She was 5 feet 6 inches tall.  Her BMI is 53.05.  She had no pallor, cyanosis, or jaundice.  Her neck exam did not reveal any jugular venous distention.  Her carotids were 1+ bilaterally.  Her pulse was regular in rhythm.  Cardiac auscultation revealed normal S1 and S2, with no murmur, rub or gallop.  Auscultation of her lungs revealed equal air entry on both sides with no added sounds.  Her abdomen was soft with normal bowel sounds, no tenderness, no rigidity, no guarding.  She had no focal neurological deficit.  Extremities showed no edema.    Her EKG showed low voltage, otherwise unremarkable.    I reviewed her most recent echocardiogram from October.  This showed moderate to severely dilated right ventricle with moderate to severely  reduced right ventricular function.  Her interventricular septum was flattened consistent with pulmonary hypertension.  Her PA pressure could not be estimated.  Her IVC was dilated.  She had no pericardial effusion.  She had normal left ventricular size and systolic function.  She had mild concentric left ventricular hypertrophy.  She had no valvular abnormalities.    She had a right heart catheterization in 2016 at the time of her coronary angiogram.  This showed an RA pressure of 15, RV of 37/16, PA of 38/20 with a mean of 29, pulmonary capillary wedge pressure of 17, cardiac output of 10.97 liters with an index of 4.3 and calculated PVR of less than 3 Wood units.    Her coronary angiogram in 01/2016 showed a 90% lesion in the mid LAD.  She underwent successful percutaneous coronary intervention with YADY 3 flow reestablished.    She had a CT pulmonary angiogram while she was admitted to the hospital.  This showed no evidence of pulmonary embolism.  She did have bilateral areas of atelectasis.  She does not have any evidence of emphysema or interstitial lung disease.  She has not had a nuclear scan or a dual energy CT PE protocol.  She has not had any recent pulmonary function tests.  Her last pulmonary function test from 2016 showed FVC of 45%, FEV1 of 43%, FEV1 by FVC of 75%, TLC of 71% and a DLCO of 51%.    Her ASHANTI, rheumatoid factor, HIV and hepatitis serology were negative.  Her TSH was unremarkable.    ASSESSMENT AND PLAN:      In summary, Ms. Chika Ferguson is a very pleasant 61-year-old female with past medical history significant for morbid obesity, obstructive sleep apnea, hypertension and coronary artery disease, who was noted to have dilated right ventricle with reduced right ventricular function while she was acutely sick with COVID pneumonia.    Clinically, she is functional class II with exertional shortness of breath.  She is not in right heart failure.  She is euvolumic. I suspect that she has  mild pulmonary hypertension due to combination of obstructive sleep apnea as well as diastolic dysfunction.  Her last right heart catheterization did reveal elevated PA pressure.  However, she had a very high cardiac output and elevated left-sided filling pressure.  Her most recent echocardiogram that showed moderate to severe right ventricular dysfunction was done in the setting of acute illness.  She was critically ill on the ventilator with hypoxia and respiratory failure.    I have recommended her to repeat an echocardiogram to reassess her right ventricular size and function now.  We will do a CT PE dual energy protocol to exclude distal chronic thromboembolic disease.  I will also do a pulmonary function test to make sure that her lung volumes are stable.  Finally, we will also arrange for her to get a right heart catheterization.  This will help us confirm the diagnosis as well as assess the severity.  She has severe orthopnea and not able to lie down flat.  She is willing to do this if she has an antianxiety medication.  We will give her Valium 5 mg 1 hour before procedure.  During the procedure, if needed, we will use mild sedation.      She is currently on Bumex 2 mg twice a day on which she is euvolemic.  I discussed with her the importance of low-salt diet and fluid restriction.  Her blood pressure is adequately controlled.  She is using her CPAP therapy adequately.  We will decide on the need for pulmonary vasodilator therapy after she completes the workup.    It was a pleasure meeting Ms. Chika Ferguson in our Pulmonary Hypertension Clinic at [facility].  We thank you for involving us in her care.  Please do not hesitate to call us in the interim if you have any are questions.    Total time today was 75 minutes reviewing notes, imaging, labs, patient visit, orders and documentation         Sincerely,  Saul Hughes MD   [facility]  Heart  Failure, Transplant, and Mechanical Circulatory Support Cardiology   Cardiovascular Division  [facility]   [phone]            D: 2022   T: 2022   MT: [id number]    Name:     JASKARAN JOSEPH  MRN:      [id number]        Account:      [id number]   :      1960           Service Date: 2022       Document: [id number]        Saul Hughes MD

## 2022-03-16 ENCOUNTER — TELEPHONE (OUTPATIENT)
Dept: CARDIOLOGY | Facility: CLINIC | Age: 62
End: 2022-03-16
Payer: MEDICARE

## 2022-03-16 NOTE — TELEPHONE ENCOUNTER
Spoke to patient about scheduling her cardiology follow up. She also has a procedure to reschedule. She said she will call the care team to get everything scheduled.   Cardio Team

## 2022-06-23 ENCOUNTER — TELEPHONE (OUTPATIENT)
Dept: CARDIOLOGY | Facility: CLINIC | Age: 62
End: 2022-06-23

## 2022-07-11 ENCOUNTER — TELEPHONE (OUTPATIENT)
Dept: CARDIOLOGY | Facility: CLINIC | Age: 62
End: 2022-07-11

## 2022-07-11 NOTE — TELEPHONE ENCOUNTER
Call complete for pre procedure reminder and updated visitor policy.  COVID Negative per pt on home test.

## 2022-07-12 ENCOUNTER — LAB (OUTPATIENT)
Dept: LAB | Facility: CLINIC | Age: 62
End: 2022-07-12
Attending: INTERNAL MEDICINE
Payer: MEDICARE

## 2022-07-12 ENCOUNTER — HOSPITAL ENCOUNTER (OUTPATIENT)
Facility: CLINIC | Age: 62
Discharge: HOME | End: 2022-07-12
Attending: INTERNAL MEDICINE | Admitting: INTERNAL MEDICINE
Payer: MEDICARE

## 2022-07-12 ENCOUNTER — APPOINTMENT (OUTPATIENT)
Dept: MEDSURG UNIT | Facility: CLINIC | Age: 62
End: 2022-07-12
Attending: INTERNAL MEDICINE
Payer: MEDICARE

## 2022-07-12 ENCOUNTER — HOSPITAL ENCOUNTER (OUTPATIENT)
Dept: CARDIOLOGY | Facility: CLINIC | Age: 62
Discharge: HOME | End: 2022-07-12
Attending: INTERNAL MEDICINE
Payer: MEDICARE

## 2022-07-12 VITALS
DIASTOLIC BLOOD PRESSURE: 89 MMHG | OXYGEN SATURATION: 94 % | BODY MASS INDEX: 45.99 KG/M2 | HEART RATE: 94 BPM | SYSTOLIC BLOOD PRESSURE: 163 MMHG | HEIGHT: 67 IN | RESPIRATION RATE: 16 BRPM | WEIGHT: 293 LBS | TEMPERATURE: 99 F

## 2022-07-12 DIAGNOSIS — I27.20 PULMONARY HYPERTENSION, MILD (H): ICD-10-CM

## 2022-07-12 DIAGNOSIS — R06.02 SHORTNESS OF BREATH: ICD-10-CM

## 2022-07-12 LAB
ALBUMIN SERPL BCG-MCNC: 3.7 G/DL (ref 3.5–5.2)
ALP SERPL-CCNC: 129 U/L (ref 35–104)
ALT SERPL W P-5'-P-CCNC: 11 U/L (ref 10–35)
ANION GAP SERPL CALCULATED.3IONS-SCNC: 13 MMOL/L (ref 7–15)
AST SERPL W P-5'-P-CCNC: 18 U/L (ref 10–35)
BASOPHILS # BLD AUTO: 0.1 10E3/UL (ref 0–0.2)
BASOPHILS NFR BLD AUTO: 1 %
BILIRUB SERPL-MCNC: 0.6 MG/DL
BUN SERPL-MCNC: 16.5 MG/DL (ref 8–23)
CALCIUM SERPL-MCNC: 9.6 MG/DL (ref 8.8–10.2)
CHLORIDE SERPL-SCNC: 98 MMOL/L (ref 98–107)
CREAT SERPL-MCNC: 1.05 MG/DL (ref 0.51–0.95)
DEPRECATED HCO3 PLAS-SCNC: 29 MMOL/L (ref 22–29)
EOSINOPHIL # BLD AUTO: 0.2 10E3/UL (ref 0–0.7)
EOSINOPHIL NFR BLD AUTO: 2 %
ERYTHROCYTE [DISTWIDTH] IN BLOOD BY AUTOMATED COUNT: 16.2 % (ref 10–15)
GFR SERPL CREATININE-BSD FRML MDRD: 59 ML/MIN/1.73M2
GLUCOSE SERPL-MCNC: 134 MG/DL (ref 70–99)
HCT VFR BLD AUTO: 36.1 % (ref 35–47)
HGB BLD-MCNC: 11.1 G/DL (ref 11.7–15.7)
IMM GRANULOCYTES # BLD: 0.1 10E3/UL
IMM GRANULOCYTES NFR BLD: 0 %
LYMPHOCYTES # BLD AUTO: 2 10E3/UL (ref 0.8–5.3)
LYMPHOCYTES NFR BLD AUTO: 15 %
MCH RBC QN AUTO: 24.6 PG (ref 26.5–33)
MCHC RBC AUTO-ENTMCNC: 30.7 G/DL (ref 31.5–36.5)
MCV RBC AUTO: 80 FL (ref 78–100)
MONOCYTES # BLD AUTO: 0.8 10E3/UL (ref 0–1.3)
MONOCYTES NFR BLD AUTO: 6 %
NEUTROPHILS # BLD AUTO: 10.4 10E3/UL (ref 1.6–8.3)
NEUTROPHILS NFR BLD AUTO: 76 %
NRBC # BLD AUTO: 0 10E3/UL
NRBC BLD AUTO-RTO: 0 /100
NT-PROBNP SERPL-MCNC: 64 PG/ML (ref 0–900)
PLATELET # BLD AUTO: 382 10E3/UL (ref 150–450)
POTASSIUM SERPL-SCNC: 3.3 MMOL/L (ref 3.4–5.3)
PROT SERPL-MCNC: 7.7 G/DL (ref 6.4–8.3)
RBC # BLD AUTO: 4.52 10E6/UL (ref 3.8–5.2)
SODIUM SERPL-SCNC: 140 MMOL/L (ref 136–145)
WBC # BLD AUTO: 13.5 10E3/UL (ref 4–11)

## 2022-07-12 PROCEDURE — 999N000208 ECHOCARDIOGRAM COMPLETE

## 2022-07-12 PROCEDURE — 85025 COMPLETE CBC W/AUTO DIFF WBC: CPT | Performed by: INTERNAL MEDICINE

## 2022-07-12 PROCEDURE — 80053 COMPREHEN METABOLIC PANEL: CPT | Performed by: INTERNAL MEDICINE

## 2022-07-12 PROCEDURE — 999N000132 HC STATISTIC PP CARE STAGE 1

## 2022-07-12 PROCEDURE — 93306 TTE W/DOPPLER COMPLETE: CPT | Mod: 26 | Performed by: STUDENT IN AN ORGANIZED HEALTH CARE EDUCATION/TRAINING PROGRAM

## 2022-07-12 PROCEDURE — 83880 ASSAY OF NATRIURETIC PEPTIDE: CPT | Performed by: INTERNAL MEDICINE

## 2022-07-12 PROCEDURE — 250N000009 HC RX 250: Performed by: INTERNAL MEDICINE

## 2022-07-12 PROCEDURE — 272N000001 HC OR GENERAL SUPPLY STERILE: Performed by: INTERNAL MEDICINE

## 2022-07-12 PROCEDURE — 250N000013 HC RX MED GY IP 250 OP 250 PS 637: Performed by: INTERNAL MEDICINE

## 2022-07-12 PROCEDURE — 999N000142 HC STATISTIC PROCEDURE PREP ONLY

## 2022-07-12 PROCEDURE — 999N000072

## 2022-07-12 PROCEDURE — C1894 INTRO/SHEATH, NON-LASER: HCPCS | Performed by: INTERNAL MEDICINE

## 2022-07-12 PROCEDURE — 36415 COLL VENOUS BLD VENIPUNCTURE: CPT | Performed by: INTERNAL MEDICINE

## 2022-07-12 PROCEDURE — 255N000002 HC RX 255 OP 636: Performed by: INTERNAL MEDICINE

## 2022-07-12 RX ORDER — POTASSIUM CHLORIDE 750 MG/1
40 TABLET, EXTENDED RELEASE ORAL ONCE
Status: COMPLETED | OUTPATIENT
Start: 2022-07-12 | End: 2022-07-12

## 2022-07-12 RX ORDER — LIDOCAINE 40 MG/G
CREAM TOPICAL
Status: COMPLETED | OUTPATIENT
Start: 2022-07-12 | End: 2022-07-12

## 2022-07-12 RX ORDER — DIAZEPAM 5 MG
5 TABLET ORAL ONCE
Status: COMPLETED | OUTPATIENT
Start: 2022-07-12 | End: 2022-07-12

## 2022-07-12 RX ADMIN — POTASSIUM CHLORIDE 40 MEQ: 750 TABLET, EXTENDED RELEASE ORAL at 13:54

## 2022-07-12 RX ADMIN — DIAZEPAM 5 MG: 5 TABLET ORAL at 13:33

## 2022-07-12 RX ADMIN — LIDOCAINE: 40 CREAM TOPICAL at 13:55

## 2022-07-12 RX ADMIN — HUMAN ALBUMIN MICROSPHERES AND PERFLUTREN 5 ML: 10; .22 INJECTION, SOLUTION INTRAVENOUS at 11:36

## 2022-07-12 NOTE — PROGRESS NOTES
Pt arrived to  for right heart catheterization. Denies pain. Pt very anxious, valium given per MAR. Consent signed. Labs resulted. PO potassium given per MAR. Pt prepped.   Sarath, son, is ride home- one hour away. [phone]

## 2022-07-12 NOTE — DISCHARGE INSTRUCTIONS
[facility]  Going Home after Sedation    For 24 hours:  An adult should stay with you.  Relax and take it easy.  DO NOT make any important legal decisions.  DO NOT drive or operate machines at home or at work.  Resume your regular diet and drink plenty of fluids.    CALL THE PHYSICIAN IF:  You develop nausea or vomiting  You develop hives or a rash or any unexplained itching          Procedure Physician: Dr. Hughes                          Date of procedure:July 12, 2022        Telephone numbers:     [phone]     Monday-Friday 8:00 am to 4:30 pm                                              [phone]      After 4:30 pm Monday-Friday, Weekends & Holidays. Ask for the Cardiology Fellow on call. Someone is  available 24 hours/day [facility] toll free number: [phone]  Monday-Friday 8:00 am to 4:30 pm

## 2022-07-12 NOTE — PROGRESS NOTES
Patient scheduled for RHC. This writer spoke with patient on 2A, patient reported she was unable to sit/lay on the stretcher due to dyspnea but wanted to attempt procedure. Patient elected to walk over to CCL. Attempted to assist patient onto the procedure table, patient unable to sit or lay with both legs on the procedure table. Patient asking if we can give her sedation while she is standing and then get on the table, or if she can complete procedure with one leg hanging off the table. Educated patient about safety concerns with both those options; including risk of falls. Dr. Hughes at bedside - all in agreement that it would be unsafe to attempt procedure with patient unable to lay back at all or have all appendages on the procedure table. Procedure cancelled. Escorted patient back to 2A safely. Dr. Hughes at bedside to discuss next steps.

## 2022-07-12 NOTE — PROGRESS NOTES
Patient returned today to complete her work-up for pulmonary hypertension including serological testing, echocardiogram, CT dual-energy pulm angiogram, and right heart catheterization.  Unfortunately, she was not able to lie down flat to get her CT dual-energy pulm angiogram as well as right heart catheterization.    Based on her echocardiogram and laboratory testing, she very likely has heart failure with preserved ejection fraction, obstructive sleep apnea and secondary pulmonary hypertension.    I discussed the risk and benefits of sodium glucose cord transport to receptor inhibitors.  She does not have urinary tract infection.  She is willing to try this.    Plan  1.  We will start her on empagliflozin 10 mg daily.  2.  Recheck chemistry in the month after starting empagliflozin.  3.  Return to clinic in 3 months  4.  Patient will call us in the interim if you have any further worsening symptoms.  5.  She is using CPAP therapy as well as supplemental oxygen at nighttime.  6.  She is following a low-salt diet and fluid restriction.  7.  Her blood pressure is adequately controlled.  8.   No concern for active coronary disease.  She is on optimal medical therapy.    Sincerely,  Saul Hughes MD   [facility]  Heart Failure, Transplant, and Mechanical Circulatory Support Cardiology   Cardiovascular Division  [facility]   [phone]

## 2022-07-12 NOTE — PRE-PROCEDURE
GENERAL PRE-PROCEDURE:   Procedure:  Right heart catheterization with sedation  Date/Time:  7/12/2022 2:18 PM    Written consent obtained?: Yes    Risks and benefits: Risks, benefits and alternatives were discussed    Consent given by:  Patient  Patient states understanding of procedure being performed: Yes    Patient's understanding of procedure matches consent: Yes    Procedure consent matches procedure scheduled: Yes    Expected level of sedation:  Moderate  Appropriately NPO:  Yes  Mallampati  :  Grade 2- soft palate, base of uvula, tonsillar pillars, and portion of posterior pharyngeal wall visible  Lungs:  Lungs clear with good breath sounds bilaterally  Heart:  Normal heart sounds and rate  History & Physical reviewed:  History and physical reviewed and no updates needed  Statement of review:  I have reviewed the lab findings, diagnostic data, medications, and the plan for sedation

## 2022-07-12 NOTE — PROGRESS NOTES
Pt returned to 2A following attempted RHC. Dr. Hughes verbalized okay for pt to eat and drink. Pt given food and drink. Denies pain. Pt has called her son to bring her home, he is an hour drive. Pt's PIV removed. Discharge instructions given to pt to advise pt after receiving Valium today. Pt denies questions and concerns. Will escort pt to son's car when he arrives to hospital.

## 2022-07-13 DIAGNOSIS — I51.89 RIGHT VENTRICULAR SYSTOLIC DYSFUNCTION: Primary | ICD-10-CM

## 2022-07-13 DIAGNOSIS — R06.09 DOE (DYSPNEA ON EXERTION): ICD-10-CM

## 2022-07-13 DIAGNOSIS — I50.33 ACUTE ON CHRONIC DIASTOLIC HEART FAILURE (H): ICD-10-CM

## 2022-07-18 ENCOUNTER — TELEPHONE (OUTPATIENT)
Dept: CARDIOLOGY | Facility: CLINIC | Age: 62
End: 2022-07-18

## 2022-07-18 NOTE — TELEPHONE ENCOUNTER
Called and left voicemail regarding plans from recent testing as follows: Start on Jardiance 10mg daily. Prescription sent to pharmacy on  7/13 and voicemail left on that day regarding plan. Recheck BMP x1 month after starting. Follow-up 3 months with labs prior after starting jardiance. Requested patient call back regarding plan    Jennifer Alarcon RN on 7/18/2022 at 12:57 PM

## 2022-07-20 ENCOUNTER — TELEPHONE (OUTPATIENT)
Dept: CARDIOLOGY | Facility: CLINIC | Age: 62
End: 2022-07-20

## 2022-07-20 DIAGNOSIS — I27.20 PULMONARY HYPERTENSION, MILD (H): Primary | ICD-10-CM

## 2022-07-20 NOTE — TELEPHONE ENCOUNTER
Reviewed results of recent labs. Creatinine slightly elevated to 1.24 from 1.05 previous draw. Pt denies increased swelling or SOB. Does not weigh daily or check her blood pressures at home. Discussed to weigh daily and obtain a BP cuff for closer monitoring dt medication changes. Pt is agreeable to this plan. Will review results with MD Jennifer Alarcon RN on 8/19/2022 at 12:55 PM     ------------------------------------------------  [facility] and [facility] with [facility]. Received fax lab orders    Jennifer Alarcon RN on 7/20/2022 at 3:23 PM    ---------------------------------------------  Patient started on Jardiance on 7/20. Plan for BMP x1MO following starting Jardiance. [facility] [phone], patient has labs on 8/17/22. Follow-up  scheduled for 3MO with labs prior with Dr. Saul Alarcon RN on 7/20/2022 at 9:30 AM

## 2022-08-17 ENCOUNTER — TRANSFERRED RECORDS (OUTPATIENT)
Dept: HEALTH INFORMATION MANAGEMENT | Facility: CLINIC | Age: 62
End: 2022-08-17

## 2022-10-24 ENCOUNTER — TELEPHONE (OUTPATIENT)
Dept: CARDIOLOGY | Facility: CLINIC | Age: 62
End: 2022-10-24

## 2022-10-24 ENCOUNTER — VIRTUAL VISIT (OUTPATIENT)
Dept: CARDIOLOGY | Facility: CLINIC | Age: 62
End: 2022-10-24
Attending: INTERNAL MEDICINE
Payer: MEDICARE

## 2022-10-24 DIAGNOSIS — R06.09 DOE (DYSPNEA ON EXERTION): ICD-10-CM

## 2022-10-24 DIAGNOSIS — I27.20 PULMONARY HYPERTENSION (H): Primary | ICD-10-CM

## 2022-10-24 DIAGNOSIS — I51.89 RIGHT VENTRICULAR SYSTOLIC DYSFUNCTION: ICD-10-CM

## 2022-10-24 DIAGNOSIS — I50.33 ACUTE ON CHRONIC DIASTOLIC HEART FAILURE (H): ICD-10-CM

## 2022-10-24 PROCEDURE — 99214 OFFICE O/P EST MOD 30 MIN: CPT | Mod: 95 | Performed by: INTERNAL MEDICINE

## 2022-10-24 NOTE — PROGRESS NOTES
Document: [id number]  Chika is a 62 year old who is being evaluated via a billable telephone visit.      What phone number would you like to be contacted at? [phone]  How would you like to obtain your AVS? Mail a copy   Patient states is currently in the [facility]:  - [place]    Phone call duration: 25 minutes    Service Date: 2022    Edwin Joshi MD  [facility]  [address]  [place] [address]     Edi Barth MD  [facility]  [address]  [place] [address]     RE:  Chika Ferguson  MRN: [id number]  : 1960    Dear Kaci Joshi and Lary:      We had the pleasure of seeing Ms. Chika Ferguson who is a 62-year-old female with past medical history for morbid obesity, hypertension, obstructive sleep apnea, coronary artery disease, heart failure with preserved ejection fraction, and mild pulmonary hypertension who returns today for follow-up.    We saw her approximately 3 months ago.  She could not lie flat to get a right heart catheterization or cardiac MRI.  Her echocardiogram was of poor quality.  We started her on empagliflozin 10 mg daily which she has been on for 3 months.  She states that she has been feeling better after starting empagliflozin.  She has less shortness of breath.  She is able to do her activities of daily living.  She lives alone.  I would currently characterize her as functional class IIb.  No exertional chest pain or chest pressure.  No exertional presyncope or syncope.  She is still struggles with fluid overload on and off based on her diet and fluid restriction.  She remains mostly euvolemic on Bumex 4 mg twice a day.  She takes an extra dose as needed.  She has not had any hospitalizations or ER visits.      PAST MEDICAL HISTORY:    1.  Morbid obesity.  2.  Hypertension.  3.  Obstructive sleep apnea.  4.  LAD coronary artery disease, status post proximal LAD PCI in 2016 with a drug-eluting  stent.    PAST SURGICAL HISTORY:  No significant.    MEDICATIONS  Current Outpatient Medications   Medication Sig     albuterol (PROAIR HFA/PROVENTIL HFA/VENTOLIN HFA) 108 (90 Base) MCG/ACT inhaler Inhale 2 puffs into the lungs every 6 hours as needed for wheezing     ANORO ELLIPTA 62.5-25 mcg/actuation inhaler [ANORO ELLIPTA 62.5-25 MCG/ACTUATION INHALER]      aspirin (ASA) 81 MG chewable tablet Take 1 tablet (81 mg) by mouth daily     bumetanide (BUMEX) 1 MG tablet Take 1 tablet (1 mg) by mouth 2 times daily     calcium carbonate (OS-NAE) 1500 (600 Ca) MG tablet Take 600 mg by mouth daily     carvedilol (COREG) 3.125 MG tablet Take 1 tablet (3.125 mg) by mouth 2 times daily (with meals) (Patient taking differently: Take 3.125 mg by mouth daily)     empagliflozin (JARDIANCE) 10 MG TABS tablet Take 1 tablet (10 mg) by mouth daily     fluticasone (FLONASE) 50 mcg/actuation nasal spray [FLUTICASONE (FLONASE) 50 MCG/ACTUATION NASAL SPRAY] 1 spray into each nostril daily.     hydrocortisone (CORTAID) 1 % external cream Apply topically 2 times daily To facial rash     hydrOXYzine (ATARAX) 25 MG tablet Take 1 tablet (25 mg) by mouth At Bedtime     lisinopril (PRINIVIL,ZESTRIL) 20 MG tablet [LISINOPRIL (PRINIVIL,ZESTRIL) 20 MG TABLET] Take 1 tablet (20 mg total) by mouth daily.     melatonin 3 MG tablet 1 tablet (3 mg) by Oral or Feeding Tube route every evening     multivitamin (CENTRUM SILVER) tablet Take 1 tablet by mouth daily     pantoprazole (PROTONIX) 40 MG EC tablet Take 1 tablet (40 mg) by mouth daily     PARoxetine (PAXIL) 40 MG tablet [PAROXETINE (PAXIL) 40 MG TABLET] Take 40 mg by mouth daily.      potassium chloride ER (K-TAB/KLOR-CON) 10 MEQ CR tablet Take 10 mEq by mouth     simvastatin (ZOCOR) 20 MG tablet [SIMVASTATIN (ZOCOR) 20 MG TABLET] Take 20 mg by mouth bedtime.     spironolactone (ALDACTONE) 25 MG tablet Take 1 tablet (25 mg) by mouth daily     vitamin D3 (CHOLECALCIFEROL) 50 mcg (2000 units) tablet  Take 1 tablet by mouth daily     albuterol (PROVENTIL HFA;VENTOLIN HFA) 90 mcg/actuation inhaler [ALBUTEROL (PROVENTIL HFA;VENTOLIN HFA) 90 MCG/ACTUATION INHALER] Inhale 2 puffs every 4 (four) hours as needed for wheezing or shortness of breath.     aspirin 81 mg chewable tablet [ASPIRIN 81 MG CHEWABLE TABLET] Chew 81 mg daily.     tiotropium-olodaterol (STIOLTO RESPIMAT) 2.5-2.5 MCG/ACT AERS Inhale 2 puffs into the lungs (Patient not taking: Reported on 10/24/2022)     No current facility-administered medications for this visit.         REVIEW OF SYSTEMS:  A detailed 10-point review of systems obtained as described in history of present illness.  All other systems reviewed and are negative.    PHYSICAL EXAMINATION: Not done due to the telephonic nature of the visit.      CBC RESULTS: Recent Labs   Lab Test 07/12/22  1221   WBC 13.5*   RBC 4.52   HGB 11.1*   HCT 36.1   MCV 80   MCH 24.6*   MCHC 30.7*   RDW 16.2*        Recent Labs   Lab Test 07/12/22  1221 01/03/22  1627    140   POTASSIUM 3.3* 3.5   CHLORIDE 98 100   CO2 29 34*   ANIONGAP 13 6   * 96   BUN 16.5 17   CR 1.05* 0.93   NAE 9.6 8.9     Liver Function Studies - Recent Labs   Lab Test 07/12/22  1221   PROTTOTAL 7.7   ALBUMIN 3.7   BILITOTAL 0.6   ALKPHOS 129*   AST 18   ALT 11     Lab Results   Component Value Date    NTBNPI 64 07/12/2022     Lab Results   Component Value Date    NTBNP 85 01/03/2022     Her EKG showed low voltage, otherwise unremarkable.    Echocardiogram (07/2022)  Technically difficult study.Extremely poor acoustic windows. Limited  information was obtained during study.  The RV is not well visualized, the RV function appears moderately reduced on  limited views.  No pericardial effusion is present.    ASSESSMENT AND PLAN:      In summary, Ms. Chika Ferguson is a very pleasant 62-year-old female with past medical history significant for morbid obesity, obstructive sleep apnea, hypertension, coronary artery disease,  HFpEF, and mild PH.     Clinically, she is feeling better on empagliflozin 10 mg daily.  She is functional class IIb.  She has had any hospitalizations or ER visits. I have recommended her to stop her carvedilol.  She will check her blood pressure at home and call us if it is elevated with systolics more than 140 mmHg.  She will continue on Bumex 1 mg twice a day and take an extra dose as needed.  She is compliant with her BiPAP therapy.  She will get labs done locally next week.    I recommend her to return to see my colleague Carlos Hsu in 3 months with labs.  She will call us in the interim for any further worsening symptoms.    It was a pleasure meeting Ms. Chika Ferguson in our Pulmonary Hypertension Clinic at [facility].  We thank you for involving us in her care.  Please do not hesitate to call us in the interim if you have any are questions.    Total time today was 25 minutes reviewing notes, imaging, labs, patient visit, orders and documentation         Sincerely,  Saul Hughes MD   Center for Pulmonary Hypertension  Heart Failure, Transplant, and Mechanical Circulatory Support Cardiology   Cardiovascular Division  [facility]   [phone]

## 2022-10-24 NOTE — PATIENT INSTRUCTIONS
Medication Changes & Instructions:  STOP carvedilol    Results:  Please complete labs locally- orders were faxed    Follow up Appointment Information:  Schedule for 3MO follow-up with Shama Hsu with labs prior  Schedule with Dr. Hughes in 6 months with labs and echocardiogram prior    [address] [place] [address]      Thank you for allowing us to be a part of your care here at the [facility].      If you have questions or concerns please contact us at:    Nurse Coordinator: Jennifer Alarcon RN -[phone]  Cardiovascular Clinic  [facility]   [phone] Press #1 to send a message to your care team  On Call Cardiologist for after hours or on weekends: [phone]  option #4     *All co-payments are due at the time of services, if financial concerns are keeping you from attending scheduled clinic visits please contact our financial counselors at [phone] for further assistance.   * Please note that you will NOT receive a reminder call regarding your scheduled testing, reminder calls are for provider appointments only.  If you are scheduled for testing within the [facility] system you may receive a call regarding pre-registration for billing purposes only.**

## 2022-10-24 NOTE — TELEPHONE ENCOUNTER
Health Call Center    Phone Message    May a detailed message be left on voicemail: yes     Reason for Call: Other: Pt states she was told by Dr. Hughes to make changes to her blood pressure medication dosage and she is confused about this and wants to confirm with a nurse. Pt unsure of name of medication. Please return call to discuss. Pt states next dosing of medication is tonight.     Action Taken: Other: Cardiology    Travel Screening: Not Applicable     Thank you!  [facility]

## 2022-10-24 NOTE — LETTER
10/24/2022      RE: Chika Ferguson  [address] [address]       Dear Colleague,    Thank you for the opportunity to participate in the care of your patient, Chika Ferguson, at the [facility] HEART [facility]. Please see a copy of my visit note below.      Service Date: 2022    Edwin Joshi MD  [facility]  [address]  [place] [address]     Edi Barth MD  [facility]  [address]  [place] [address]     RE:  Chika Ferguson  MRN: [id number]  : 1960    Dear Kaci Joshi and Lary:      We had the pleasure of seeing Ms. Chika Ferguson who is a 62-year-old female with past medical history for morbid obesity, hypertension, obstructive sleep apnea, coronary artery disease, heart failure with preserved ejection fraction, and mild pulmonary hypertension who returns today for follow-up.    We saw her approximately 3 months ago.  She could not lie flat to get a right heart catheterization or cardiac MRI.  Her echocardiogram was of poor quality.  We started her on empagliflozin 10 mg daily which she has been on for 3 months.  She states that she has been feeling better after starting empagliflozin.  She has less shortness of breath.  She is able to do her activities of daily living.  She lives alone.  I would currently characterize her as functional class IIb.  No exertional chest pain or chest pressure.  No exertional presyncope or syncope.  She is still struggles with fluid overload on and off based on her diet and fluid restriction.  She remains mostly euvolemic on Bumex 4 mg twice a day.  She takes an extra dose as needed.  She has not had any hospitalizations or ER visits.      PAST MEDICAL HISTORY:    1.  Morbid obesity.  2.  Hypertension.  3.  Obstructive sleep apnea.  4.  LAD coronary artery disease, status post proximal LAD PCI in  with a drug-eluting  stent.    PAST SURGICAL HISTORY:  No significant.    MEDICATIONS  Current Outpatient Medications   Medication Sig     albuterol (PROAIR HFA/PROVENTIL HFA/VENTOLIN HFA) 108 (90 Base) MCG/ACT inhaler Inhale 2 puffs into the lungs every 6 hours as needed for wheezing     ANORO ELLIPTA 62.5-25 mcg/actuation inhaler [ANORO ELLIPTA 62.5-25 MCG/ACTUATION INHALER]      aspirin (ASA) 81 MG chewable tablet Take 1 tablet (81 mg) by mouth daily     bumetanide (BUMEX) 1 MG tablet Take 1 tablet (1 mg) by mouth 2 times daily     calcium carbonate (OS-NAE) 1500 (600 Ca) MG tablet Take 600 mg by mouth daily     carvedilol (COREG) 3.125 MG tablet Take 1 tablet (3.125 mg) by mouth 2 times daily (with meals) (Patient taking differently: Take 3.125 mg by mouth daily)     empagliflozin (JARDIANCE) 10 MG TABS tablet Take 1 tablet (10 mg) by mouth daily     fluticasone (FLONASE) 50 mcg/actuation nasal spray [FLUTICASONE (FLONASE) 50 MCG/ACTUATION NASAL SPRAY] 1 spray into each nostril daily.     hydrocortisone (CORTAID) 1 % external cream Apply topically 2 times daily To facial rash     hydrOXYzine (ATARAX) 25 MG tablet Take 1 tablet (25 mg) by mouth At Bedtime     lisinopril (PRINIVIL,ZESTRIL) 20 MG tablet [LISINOPRIL (PRINIVIL,ZESTRIL) 20 MG TABLET] Take 1 tablet (20 mg total) by mouth daily.     melatonin 3 MG tablet 1 tablet (3 mg) by Oral or Feeding Tube route every evening     multivitamin (CENTRUM SILVER) tablet Take 1 tablet by mouth daily     pantoprazole (PROTONIX) 40 MG EC tablet Take 1 tablet (40 mg) by mouth daily     PARoxetine (PAXIL) 40 MG tablet [PAROXETINE (PAXIL) 40 MG TABLET] Take 40 mg by mouth daily.      potassium chloride ER (K-TAB/KLOR-CON) 10 MEQ CR tablet Take 10 mEq by mouth     simvastatin (ZOCOR) 20 MG tablet [SIMVASTATIN (ZOCOR) 20 MG TABLET] Take 20 mg by mouth bedtime.     spironolactone (ALDACTONE) 25 MG tablet Take 1 tablet (25 mg) by mouth daily     vitamin D3 (CHOLECALCIFEROL) 50 mcg (2000 units) tablet  Take 1 tablet by mouth daily     albuterol (PROVENTIL HFA;VENTOLIN HFA) 90 mcg/actuation inhaler [ALBUTEROL (PROVENTIL HFA;VENTOLIN HFA) 90 MCG/ACTUATION INHALER] Inhale 2 puffs every 4 (four) hours as needed for wheezing or shortness of breath.     aspirin 81 mg chewable tablet [ASPIRIN 81 MG CHEWABLE TABLET] Chew 81 mg daily.     tiotropium-olodaterol (STIOLTO RESPIMAT) 2.5-2.5 MCG/ACT AERS Inhale 2 puffs into the lungs (Patient not taking: Reported on 10/24/2022)     No current facility-administered medications for this visit.         REVIEW OF SYSTEMS:  A detailed 10-point review of systems obtained as described in history of present illness.  All other systems reviewed and are negative.    PHYSICAL EXAMINATION: Not done due to the telephonic nature of the visit.      CBC RESULTS: Recent Labs   Lab Test 07/12/22  1221   WBC 13.5*   RBC 4.52   HGB 11.1*   HCT 36.1   MCV 80   MCH 24.6*   MCHC 30.7*   RDW 16.2*        Recent Labs   Lab Test 07/12/22  1221 01/03/22  1627    140   POTASSIUM 3.3* 3.5   CHLORIDE 98 100   CO2 29 34*   ANIONGAP 13 6   * 96   BUN 16.5 17   CR 1.05* 0.93   NAE 9.6 8.9     Liver Function Studies - Recent Labs   Lab Test 07/12/22  1221   PROTTOTAL 7.7   ALBUMIN 3.7   BILITOTAL 0.6   ALKPHOS 129*   AST 18   ALT 11     Lab Results   Component Value Date    NTBNPI 64 07/12/2022     Lab Results   Component Value Date    NTBNP 85 01/03/2022     Her EKG showed low voltage, otherwise unremarkable.    Echocardiogram (07/2022)  Technically difficult study.Extremely poor acoustic windows. Limited  information was obtained during study.  The RV is not well visualized, the RV function appears moderately reduced on  limited views.  No pericardial effusion is present.    ASSESSMENT AND PLAN:      In summary, Ms. Chika Ferguson is a very pleasant 62-year-old female with past medical history significant for morbid obesity, obstructive sleep apnea, hypertension, coronary artery disease,  HFpEF, and mild PH.     Clinically, she is feeling better on empagliflozin 10 mg daily.  She is functional class IIb.  She has had any hospitalizations or ER visits. I have recommended her to stop her carvedilol.  She will check her blood pressure at home and call us if it is elevated with systolics more than 140 mmHg.  She will continue on Bumex 1 mg twice a day and take an extra dose as needed.  She is compliant with her BiPAP therapy.  She will get labs done locally next week.    I recommend her to return to see my colleague Carlos Hsu in 3 months with labs.  She will call us in the interim for any further worsening symptoms.    It was a pleasure meeting Ms. Chika Ferguson in our Pulmonary Hypertension Clinic at [facility].  We thank you for involving us in her care.  Please do not hesitate to call us in the interim if you have any are questions.    Total time today was 25 minutes reviewing notes, imaging, labs, patient visit, orders and documentation         Sincerely,  Saul Hughes MD   Center for Pulmonary Hypertension  Heart Failure, Transplant, and Mechanical Circulatory Support Cardiology   Cardiovascular Division  [facility]   [phone]

## 2022-10-24 NOTE — NURSING NOTE
Chief Complaint   Patient presents with     Follow Up       Patient confirms medications and allergies are accurate via patients echeck in completion, and or denies any changes since last reviewed/verified.     Shannon Barriga, Virtual Facilitator

## 2022-10-25 NOTE — TELEPHONE ENCOUNTER
Called patient to clarify discontinue of carvedilol. PT verbalized understanding.     Jennifer Alarcon RN on 10/25/2022 at 9:25 AM

## 2022-11-01 ENCOUNTER — TELEPHONE (OUTPATIENT)
Dept: CARDIOLOGY | Facility: CLINIC | Age: 62
End: 2022-11-01

## 2022-11-01 NOTE — TELEPHONE ENCOUNTER
"Patient states that she did get \"two tubes\" drawn today; will follow up to ensure our labs were drawn. BP remains at or below 140. Asked patient to call our office immediately if SBP goes over 140. Patient verbalized understanding. Su Elmore RN on 11/1/2022 at 12:17 PM    Spoke with Dr. Perez's office; confirmed CMP, BNP, and CBC were drawn but still in process. Asked results to be faxed to our office; provided fax number. Su Elmore RN on 11/1/2022 at 12:31 PM    ----- Message from Jennifer Alarcon RN sent at 10/24/2022  2:12 PM CDT -----  Regarding: check for labs  Hey team-    Could you look and see if she had labs done on 11/1. Also call and see how her BP is doing  ( We stopped her carvedilol on 10/24- plan to call in 1 week for BP readings. If BP is less that 140 systolic, no changes. If it has continued to be high update TT)    labs locally- orders were faxed. Will complete on 11/1.  Dr. Perez- let Dr. Hughes know how creat is        "

## 2022-11-11 ENCOUNTER — TELEPHONE (OUTPATIENT)
Dept: CARDIOLOGY | Facility: CLINIC | Age: 62
End: 2022-11-11

## 2022-11-11 NOTE — TELEPHONE ENCOUNTER
PT to complete labs locally prior to next appointment. PT verbalized understanding. Labs faxed to [facility] per request    Jennifer Alarcon RN on 11/11/2022 at 2:12 PM

## 2023-01-09 NOTE — PROGRESS NOTES
Chika is a 62 year old who is being evaluated via a billable telephone visit.      Pt states in [place] for visit.     What phone number would you like to be contacted at? [phone]   How would you like to obtain your AVS? Mail a copy   ALDO Germain/ELZBIETA    Vitals - Patient Reported  Systolic (Patient Reported): 120 (yesterday)  Diastolic (Patient Reported): 68  Weight (Patient Reported): 136.1 kg (300 lb)  Pain Score: No Pain (0)          CARDIOLOGY PH CLINIC TELEPHONE VISIT    Date of telephone visit: 01/10/23      Chika Ferguson is a 62 year old female who is being evaluated via a billable telephone visit.      I have reviewed and updated the patient's Past Medical History, Social History, Family History and Medication List.      MEDICATIONS:  Current Outpatient Medications   Medication Sig Dispense Refill     albuterol (PROAIR HFA/PROVENTIL HFA/VENTOLIN HFA) 108 (90 Base) MCG/ACT inhaler Inhale 2 puffs into the lungs every 6 hours as needed for wheezing 18 g      albuterol (PROVENTIL HFA;VENTOLIN HFA) 90 mcg/actuation inhaler [ALBUTEROL (PROVENTIL HFA;VENTOLIN HFA) 90 MCG/ACTUATION INHALER] Inhale 2 puffs every 4 (four) hours as needed for wheezing or shortness of breath.       ANORO ELLIPTA 62.5-25 mcg/actuation inhaler [ANORO ELLIPTA 62.5-25 MCG/ACTUATION INHALER]        aspirin (ASA) 81 MG chewable tablet Take 1 tablet (81 mg) by mouth daily       aspirin 81 mg chewable tablet [ASPIRIN 81 MG CHEWABLE TABLET] Chew 81 mg daily.       bumetanide (BUMEX) 1 MG tablet Take 1 tablet (1 mg) by mouth 2 times daily 20 tablet 0     calcium carbonate (OS-NAE) 1500 (600 Ca) MG tablet Take 600 mg by mouth daily       empagliflozin (JARDIANCE) 10 MG TABS tablet Take 1 tablet (10 mg) by mouth daily 90 tablet 3     fluticasone (FLONASE) 50 mcg/actuation nasal spray [FLUTICASONE (FLONASE) 50 MCG/ACTUATION NASAL SPRAY] 1 spray into each nostril daily.       hydrocortisone (CORTAID) 1 % external cream Apply topically 2  times daily To facial rash 20 g 0     hydrOXYzine (ATARAX) 25 MG tablet Take 1 tablet (25 mg) by mouth At Bedtime 10 tablet 0     lisinopril (PRINIVIL,ZESTRIL) 20 MG tablet [LISINOPRIL (PRINIVIL,ZESTRIL) 20 MG TABLET] Take 1 tablet (20 mg total) by mouth daily. 90 tablet 1     melatonin 3 MG tablet 1 tablet (3 mg) by Oral or Feeding Tube route every evening       multivitamin (CENTRUM SILVER) tablet Take 1 tablet by mouth daily       pantoprazole (PROTONIX) 40 MG EC tablet Take 1 tablet (40 mg) by mouth daily 10 tablet 0     PARoxetine (PAXIL) 40 MG tablet [PAROXETINE (PAXIL) 40 MG TABLET] Take 40 mg by mouth daily.        potassium chloride ER (K-TAB/KLOR-CON) 10 MEQ CR tablet Take 10 mEq by mouth       simvastatin (ZOCOR) 20 MG tablet [SIMVASTATIN (ZOCOR) 20 MG TABLET] Take 20 mg by mouth bedtime.       spironolactone (ALDACTONE) 25 MG tablet Take 1 tablet (25 mg) by mouth daily 30 tablet 0     tiotropium-olodaterol (STIOLTO RESPIMAT) 2.5-2.5 MCG/ACT AERS Inhale 2 puffs into the lungs (Patient not taking: Reported on 10/24/2022)       vitamin D3 (CHOLECALCIFEROL) 50 mcg (2000 units) tablet Take 1 tablet by mouth daily         ALLERGIES  Penicillins    Primary PH cardiologist: Dr. Hughes        HPI:  Ms. Ferguson is a pleasant 62 year old female with a PMhx including morbid obesity, hypertension, CAD s/p LAD PCI in 2016, obstructive sleep apnea on Bipap, coronary artery disease, HFpEF, and mild pulmonary hypertension. With her initial workup for PH, she had difficulty lying flat to get a CMRI or RHC. She was then started on empagliflozin 10 mg daily with good result. When she met last virtually with Dr Hughes in October, she was feeling better and no changes were made.    Today, I am visiting virtually with Chika. She tells me that overall she has been doing well on Jardiance. She tells me that with this along with trying harder to lower sodium in her diet, her weight has been coming down and she notes less  swelling. She has not taken extra bumex. She still has some DE JESUS, but it is less than previous. She denies any new CP, palpitations, or dizziness.      She had some labs drawn locally yesterday, but were not immediately available for viewing at the time of our visit. Results have been requested.       CURRENT PULMONARY HYPERTENSION REGIMEN:    PAH Rx: None    Diuretics: Bumex 1mg BID, spironolactone 25mg daily    Oxygen: None    Anticoagulation: None      ASSESSMENT/PLAN:      1. Pulmonary hypertension:   --Ms. Ferguson has pulmonary hypertension, felt likely due to HFpEF. She is not currently on pulmonary vasodilators. She was placed on Jardiance with good symptomatic improvement. She sounds to be stable from a cardiopulmonary standpoint.   --As today is a virtual visit I cannot formally assess her volume status, but she reports weight is down with the Jardiance and also watching her salt intake more closely. Await labs drawn yesterday, which we will review when available. For now, will continue bumex 1mg BID as is.    --Continue Bipap for known CARL.     2. Coronary artery disease.   --Known CAD, s/p proximal LAD PCI in 2016. She is free of angina currently.   --Continue ASA 81mg daily.   --Continue simvastatin 20mg daily.    Follow up plan: Return in 4 months to clinic with repeat labs. We are happy to see the patient back sooner with any new concerns.         Testing/labs:      Most recent labs:   PENDING from outside labs      Other recent pertinent testing:    Echo 7/12/22  Interpretation Summary  Technically difficult study.Extremely poor acoustic windows. Limited  information was obtained during study.  The RV is not well visualized, the RV function appears moderately reduced on  limited views.  No pericardial effusion is present.      NYHA Functional Class:  2-3      I have reviewed the note as documented above.  This accurately captures the substance of my conversation with the patient.      Phone call  contact time  Call Started at 0930  Call Ended at 0938    An additional 15 minutes was spent today performing chart and history review, pre and post visit documentation, review of recent testing, and care coordination.      Shama Hsu PA-C  [facility]  Pager [phone]

## 2023-01-10 ENCOUNTER — VIRTUAL VISIT (OUTPATIENT)
Dept: CARDIOLOGY | Facility: CLINIC | Age: 63
End: 2023-01-10
Attending: PHYSICIAN ASSISTANT
Payer: COMMERCIAL

## 2023-01-10 DIAGNOSIS — I27.20 PULMONARY HYPERTENSION (H): ICD-10-CM

## 2023-01-10 DIAGNOSIS — R06.02 SOB (SHORTNESS OF BREATH): Primary | ICD-10-CM

## 2023-01-10 DIAGNOSIS — I50.33 ACUTE ON CHRONIC DIASTOLIC HEART FAILURE (H): ICD-10-CM

## 2023-01-10 PROCEDURE — 99441 PR PHYSICIAN TELEPHONE EVALUATION 5-10 MIN: CPT | Mod: 95 | Performed by: PHYSICIAN ASSISTANT

## 2023-01-10 NOTE — Clinical Note
1/10/2023      RE: Chika Ferguson  [address] [facility] [address]       Dear Colleague,    Thank you for the opportunity to participate in the care of your patient, Chika Ferguson, at the [facility] [place] [facility]. Please see a copy of my visit note below.    No notes on file    Please do not hesitate to contact me if you have any questions/concerns.     Sincerely,     CARRIE Wheeler

## 2023-01-10 NOTE — NURSING NOTE
Chief Complaint   Patient presents with     Follow Up     Pt states she will be getting new pharmacy but not sure which yet so will update once able, states also takes ibuprofen sometimes but not often     Esperanza Mcrae, VF/CMA

## 2023-01-10 NOTE — PATIENT INSTRUCTIONS
Medication Changes:   -none    Patient Instructions:  1. Continue staying active and eat a heart healthy diet.    2. Please keep current list of medications with you at all times.    3. Remember to weigh yourself daily after voiding and before you consume any food or beverages and log the numbers.  If you have gained 2 pounds overnight or 5 pounds in a week contact us immediately for medication adjustments or further instructions.    4. **Please call us immediately if you have any syncope (fainting or passing out), chest pain, edema (swelling or weight gain), or decline in your functional status (general decline in how you are feeling).    5. Patients on Remodulin (treprostinil) or Veletri (epoprostenol): Please make sure that you have your backup pump and supplies with you at all times, your mixing instructions, and contact information for your specialty pharmacy.    Follow up Appointment Information:  Follow up in July w/labs prior        We are located on the third floor of the [facility] Center ([facility] on the [facility].  Our address is     [address] [place] [address] on [address]rd [place] [address]      Thank you for allowing us to be a part of your care here at the [facility]    If you have questions or concerns please contact us at:    Jennifer Alarcon RN, BSN      Nurse Coordinator         Pulmonary Hypertension       [facility]     (Phone)[phone]         ELZBIETA De León   (Prior Authorizations)   ()  Clinic   Clinic   Pulmonary Hypertension   Pulmonary Hypertension  [facility]  (P)[phone]    (P) [phone]  (F) [phone]          ** Please note that you will NOT receive a reminder call regarding your scheduled testing, reminder calls are for provider appointments  only.  If you are scheduled for testing within the [employer] system you may receive a call regarding pre-registration for billing purposes only.**     Support Group:  Pulmonary Hypertension Association  Https://www.phassociation.org/  **Look at the Events Tab** They even have Support Groups that you can call into    [facility] PH Support Group  Second Saturday of the Month from 1-3 PM   Location: [address] [place] [address]  Leader: Zaida Ferraro  Phone: [phone]  Email: [email or web address]     Great Videos about Pulmonary Hypertension!!  Scan ME!    Website: bit.ly/UnderstandingPA

## 2023-01-25 NOTE — PROGRESS NOTES
Calorie Count  Intake recorded for: 10/18  Total Kcals: 0 Total Protein: 0g  Kcals from Hospital Food: 0   Protein: 0g  Kcals from Outside Food (average):0 Protein: 0g  # Meals Ordered from Kitchen: 3 meals   # Meals Recorded: no intake recorded.   # Supplements Recorded: no intake recorded.      [facility] Pregnancy And Lactation Text: This medication is Pregnancy Category X and is absolutely contraindicated during pregnancy. It is unknown if it is excreted in breast milk.

## 2023-03-16 ENCOUNTER — TRANSFERRED RECORDS (OUTPATIENT)
Dept: HEALTH INFORMATION MANAGEMENT | Facility: CLINIC | Age: 63
End: 2023-03-16
Payer: COMMERCIAL

## 2023-03-20 ENCOUNTER — TELEPHONE (OUTPATIENT)
Dept: CARDIOLOGY | Facility: CLINIC | Age: 63
End: 2023-03-20
Payer: COMMERCIAL

## 2023-07-04 DIAGNOSIS — I51.89 RIGHT VENTRICULAR SYSTOLIC DYSFUNCTION: ICD-10-CM

## 2023-07-04 DIAGNOSIS — I50.33 ACUTE ON CHRONIC DIASTOLIC HEART FAILURE (H): ICD-10-CM

## 2023-07-04 DIAGNOSIS — R06.09 DOE (DYSPNEA ON EXERTION): ICD-10-CM

## 2023-07-07 NOTE — TELEPHONE ENCOUNTER
Jardiance 10 mg tablet      Last Written Prescription Date:  7-13-22  Last Fill Quantity: 90,   # refills: 3  Last Office Visit : 1-10-23  Future Office visit:  7-17-23 1-9-23 Outside lab:  Potassium, P 3.6 - 5.2 mmol/L 3.6              Routing refill request to provider for review/approval because:  Med not on cards protocol

## 2023-07-10 RX ORDER — EMPAGLIFLOZIN 10 MG/1
TABLET, FILM COATED ORAL
Qty: 90 TABLET | Refills: 0 | Status: SHIPPED | OUTPATIENT
Start: 2023-07-10 | End: 2023-11-13

## 2023-07-10 NOTE — TELEPHONE ENCOUNTER
Medication Refill double check:    Last virtual visit was on 1/10/23 with Shama Hsu PA-C.    Follow up was recommended for 4 months. (OVERDUE)    Any additional encounters with changes to requested med? no    Authorizing provider is: Dr. Saul Canales was approved.     Additional orders/notes: Has upcoming appt next week.    Rosa Wu RN on 7/10/2023 at 2:59 PM

## 2023-07-17 ENCOUNTER — LAB (OUTPATIENT)
Dept: LAB | Facility: CLINIC | Age: 63
End: 2023-07-17
Attending: PHYSICIAN ASSISTANT
Payer: COMMERCIAL

## 2023-07-17 ENCOUNTER — OFFICE VISIT (OUTPATIENT)
Dept: CARDIOLOGY | Facility: CLINIC | Age: 63
End: 2023-07-17
Attending: PHYSICIAN ASSISTANT
Payer: COMMERCIAL

## 2023-07-17 VITALS
OXYGEN SATURATION: 95 % | DIASTOLIC BLOOD PRESSURE: 87 MMHG | HEART RATE: 76 BPM | BODY MASS INDEX: 42.62 KG/M2 | SYSTOLIC BLOOD PRESSURE: 132 MMHG | WEIGHT: 255.8 LBS | HEIGHT: 65 IN

## 2023-07-17 DIAGNOSIS — I50.33 ACUTE ON CHRONIC DIASTOLIC HEART FAILURE (H): ICD-10-CM

## 2023-07-17 DIAGNOSIS — I27.20 PULMONARY HYPERTENSION (H): ICD-10-CM

## 2023-07-17 DIAGNOSIS — R06.02 SOB (SHORTNESS OF BREATH): ICD-10-CM

## 2023-07-17 LAB
ANION GAP SERPL CALCULATED.3IONS-SCNC: 12 MMOL/L (ref 7–15)
BUN SERPL-MCNC: 13.9 MG/DL (ref 8–23)
CALCIUM SERPL-MCNC: 9.9 MG/DL (ref 8.8–10.2)
CHLORIDE SERPL-SCNC: 98 MMOL/L (ref 98–107)
CREAT SERPL-MCNC: 0.98 MG/DL (ref 0.51–0.95)
DEPRECATED HCO3 PLAS-SCNC: 28 MMOL/L (ref 22–29)
ERYTHROCYTE [DISTWIDTH] IN BLOOD BY AUTOMATED COUNT: 15.5 % (ref 10–15)
GFR SERPL CREATININE-BSD FRML MDRD: 64 ML/MIN/1.73M2
GLUCOSE SERPL-MCNC: 124 MG/DL (ref 70–99)
HCT VFR BLD AUTO: 42.8 % (ref 35–47)
HGB BLD-MCNC: 13.8 G/DL (ref 11.7–15.7)
MCH RBC QN AUTO: 27.9 PG (ref 26.5–33)
MCHC RBC AUTO-ENTMCNC: 32.2 G/DL (ref 31.5–36.5)
MCV RBC AUTO: 87 FL (ref 78–100)
NT-PROBNP SERPL-MCNC: 72 PG/ML (ref 0–900)
PLATELET # BLD AUTO: 319 10E3/UL (ref 150–450)
POTASSIUM SERPL-SCNC: 3.5 MMOL/L (ref 3.4–5.3)
RBC # BLD AUTO: 4.95 10E6/UL (ref 3.8–5.2)
SODIUM SERPL-SCNC: 138 MMOL/L (ref 136–145)
WBC # BLD AUTO: 11.3 10E3/UL (ref 4–11)

## 2023-07-17 PROCEDURE — 85027 COMPLETE CBC AUTOMATED: CPT | Performed by: PATHOLOGY

## 2023-07-17 PROCEDURE — G0463 HOSPITAL OUTPT CLINIC VISIT: HCPCS | Performed by: INTERNAL MEDICINE

## 2023-07-17 PROCEDURE — 99215 OFFICE O/P EST HI 40 MIN: CPT | Performed by: INTERNAL MEDICINE

## 2023-07-17 PROCEDURE — 36415 COLL VENOUS BLD VENIPUNCTURE: CPT | Performed by: PATHOLOGY

## 2023-07-17 PROCEDURE — 80048 BASIC METABOLIC PNL TOTAL CA: CPT | Performed by: PATHOLOGY

## 2023-07-17 PROCEDURE — 83880 ASSAY OF NATRIURETIC PEPTIDE: CPT | Performed by: PATHOLOGY

## 2023-07-17 RX ORDER — FERROUS GLUCONATE 324(38)MG
TABLET ORAL
COMMUNITY
Start: 2023-06-05

## 2023-07-17 ASSESSMENT — PAIN SCALES - GENERAL: PAINLEVEL: NO PAIN (0)

## 2023-07-17 NOTE — LETTER
2023      RE: Chika Ferguson  [address] [facility] [address]       Dear Colleague,    Thank you for the opportunity to participate in the care of your patient, Chika Ferguson, at the [facility]. Please see a copy of my visit note below.    Service Date: 2023    Edwin Joshi MD  [facility]  [address]  [place] [address]     Edi Barth MD  [facility]  [address]  [place] [address]     RE:  Chika Ferguson  MRN: [id number]  : 1960    Dear Kaci Joshi and Lary:      We had the pleasure of seeing Ms. Chika Ferguson who is a 63-year-old female with past medical history for morbid obesity, hypertension, obstructive sleep apnea, coronary artery disease, heart failure with preserved ejection fraction, and mild pulmonary hypertension who returns today for follow-up.    She has been doing really well in the last year since our last clinic visit.  She has lost close to 80 pounds.  With without much limitations.  I would currently characterize her as functional class II.    No exertional chest pain or chest pressure.  No exertional presyncope or syncope.  She no longer has lower extremity swelling or abdominal distention.. She has not had any hospitalizations or ER visits.  She follows a low salt diet closely.    PAST MEDICAL HISTORY:    1.  Morbid obesity.  2.  Hypertension.  3.  Obstructive sleep apnea.  4.  LAD coronary artery disease, status post proximal LAD PCI in 2016 with a drug-eluting stent.    PAST SURGICAL HISTORY:  No significant.    MEDICATIONS  Current Outpatient Medications   Medication Sig    albuterol (PROAIR HFA/PROVENTIL HFA/VENTOLIN HFA) 108 (90 Base) MCG/ACT inhaler Inhale 2 puffs into the lungs every 6 hours as needed for wheezing    albuterol (PROVENTIL HFA;VENTOLIN HFA) 90 mcg/actuation inhaler [ALBUTEROL (PROVENTIL  HFA;VENTOLIN HFA) 90 MCG/ACTUATION INHALER] Inhale 2 puffs every 4 (four) hours as needed for wheezing or shortness of breath.    ANORO ELLIPTA 62.5-25 mcg/actuation inhaler [ANORO ELLIPTA 62.5-25 MCG/ACTUATION INHALER]     aspirin (ASA) 81 MG chewable tablet Take 1 tablet (81 mg) by mouth daily    bumetanide (BUMEX) 1 MG tablet Take 1 tablet (1 mg) by mouth 2 times daily    calcium carbonate (OS-NAE) 1500 (600 Ca) MG tablet Take 600 mg by mouth daily    ferrous gluconate (FERGON) 324 (38 Fe) MG tablet TAKE ONE TABLET BY MOUTH EVERY OTHER DAY (IRON)    JARDIANCE 10 MG TABS tablet TAKE ONE TABLET BY MOUTH EVERY DAY    lisinopril (PRINIVIL,ZESTRIL) 20 MG tablet [LISINOPRIL (PRINIVIL,ZESTRIL) 20 MG TABLET] Take 1 tablet (20 mg total) by mouth daily.    multivitamin (CENTRUM SILVER) tablet Take 1 tablet by mouth daily    pantoprazole (PROTONIX) 40 MG EC tablet Take 1 tablet (40 mg) by mouth daily    PARoxetine (PAXIL) 40 MG tablet [PAROXETINE (PAXIL) 40 MG TABLET] Take 40 mg by mouth daily.     potassium chloride ER (K-TAB/KLOR-CON) 10 MEQ CR tablet Take 10 mEq by mouth    simvastatin (ZOCOR) 20 MG tablet [SIMVASTATIN (ZOCOR) 20 MG TABLET] Take 20 mg by mouth bedtime.    spironolactone (ALDACTONE) 25 MG tablet Take 1 tablet (25 mg) by mouth daily    vitamin D3 (CHOLECALCIFEROL) 50 mcg (2000 units) tablet Take 1 tablet by mouth daily    hydrocortisone (CORTAID) 1 % external cream Apply topically 2 times daily To facial rash (Patient not taking: Reported on 7/17/2023)    hydrOXYzine (ATARAX) 25 MG tablet Take 1 tablet (25 mg) by mouth At Bedtime (Patient not taking: Reported on 7/17/2023)    melatonin 3 MG tablet 1 tablet (3 mg) by Oral or Feeding Tube route every evening (Patient not taking: Reported on 1/10/2023)     No current facility-administered medications for this visit.         REVIEW OF SYSTEMS:  A detailed 10-point review of systems obtained as described in history of present illness.  All other systems  "reviewed and are negative.    PHYSICAL EXAMINATION:   /87 (BP Location: Right arm, Patient Position: Sitting, Cuff Size: Adult Regular)   Pulse 76   Ht 1.651 m (5' 5\")   Wt 116 kg (255 lb 12.8 oz)   SpO2 95%   BMI 42.57 kg/m    She was awake, alert, oriented x3.  She was comfortable.  She was in no apparent distress.  She had no pallor, cyanosis or jaundice.  Her neck exam revealed no jugular venous distention.  Carotids were 2+ bilaterally.  Pulse was regular and rhythm.  Cardiac auscultation revealed normal S1 and S2 with no murmur rub or gallop.  Auscultation of the lungs revealed equal air entry on both sides with no added sounds.  No guarding.  She had no focal neurologic deficit.  Extremities showed no edema.    Recent Results (from the past 168 hour(s))   N terminal pro BNP outpatient    Collection Time: 07/17/23 10:41 AM   Result Value Ref Range    N Terminal Pro BNP Outpatient 72 0 - 900 pg/mL   CBC with platelets    Collection Time: 07/17/23 10:41 AM   Result Value Ref Range    WBC Count 11.3 (H) 4.0 - 11.0 10e3/uL    RBC Count 4.95 3.80 - 5.20 10e6/uL    Hemoglobin 13.8 11.7 - 15.7 g/dL    Hematocrit 42.8 35.0 - 47.0 %    MCV 87 78 - 100 fL    MCH 27.9 26.5 - 33.0 pg    MCHC 32.2 31.5 - 36.5 g/dL    RDW 15.5 (H) 10.0 - 15.0 %    Platelet Count 319 150 - 450 10e3/uL   Basic metabolic panel    Collection Time: 07/17/23 10:41 AM   Result Value Ref Range    Sodium 138 136 - 145 mmol/L    Potassium 3.5 3.4 - 5.3 mmol/L    Chloride 98 98 - 107 mmol/L    Carbon Dioxide (CO2) 28 22 - 29 mmol/L    Anion Gap 12 7 - 15 mmol/L    Urea Nitrogen 13.9 8.0 - 23.0 mg/dL    Creatinine 0.98 (H) 0.51 - 0.95 mg/dL    Calcium 9.9 8.8 - 10.2 mg/dL    Glucose 124 (H) 70 - 99 mg/dL    GFR Estimate 64 >60 mL/min/1.73m2       Echocardiogram (07/2022)  Technically difficult study.Extremely poor acoustic windows. Limited  information was obtained during study.  The RV is not well visualized, the RV function appears " moderately reduced on  limited views.  No pericardial effusion is present.    ASSESSMENT AND PLAN:      In summary, Ms. Chika Ferguson is a very pleasant 63-year-old female with past medical history significant for morbid obesity, obstructive sleep apnea, hypertension, coronary artery disease, HFpEF, and mild PH.     Clinically, she is feeling better on empagliflozin 10 mg daily.  She is functional class IIb.  She is not volume overloaded.  Her renal and liver function are normal.    I recommend her to continue empagliflozin 10 mg daily, Bumex 1 mg twice a day, and low-salt diet.  She is compliant with her BiPAP therapy.  She has lost already 80 pounds.  She would like to more weight.  I have encouraged her to discuss with her primary care physician regarding Ozempic.    I have recommended her to return to see us in a year.  She will call us in the interim should any further worsening symptoms.    It was a pleasure meeting Ms. Chika Ferguson in our Pulmonary Hypertension Clinic at [facility].  We thank you for involving us in her care.  Please do not hesitate to call us in the interim if you have any are questions.    Total time today was 40 minutes reviewing notes, imaging, labs, patient visit, orders and documentation         Sincerely,  Saul Hughes MD   Center for Pulmonary Hypertension  Heart Failure, Transplant, and Mechanical Circulatory Support Cardiology   Cardiovascular Division  [facility]   [phone]

## 2023-07-17 NOTE — PATIENT INSTRUCTIONS
Follow up Appointment Information:  6 months with alma patino with labs prior        Thank you for allowing us to be a part of your care here at the [facility]    If you have questions or concerns please contact us at:      Jennifer Alarcon RN, BSN  Chana Joseph ()  Nurse Coordinator     Clinic   (Phone)[phone]    (Phone) [phone]  (Main Number) [phone]  Socorro Naranjo (Prior Authorizations)                   Phone: [phone]                                      On Call Cardiologist for after hours or on weekends: [phone]    option #4    IMPORTANT TIPS BELOW  Scan below for understanding PH or visit the website below  Website: bit.ly/UnderstandingPAH      2. Medication webpages and education videos   1. Remodulin: https://www.remodulin.com/   2. Tyvaso: Tyvaso.com    3. Pharmacy Tips- please let your doctor know if you are using these  1.[employer]- low cost prescriptions (Sildenafil, tadalafil)  https://[email or web address]/  2. Good RX- www.goodrx.com (sildenafil, tadalafil)       4. Support Group:  Pulmonary Hypertension Association  Https://www.phassociation.org/  **Look at the Events Tab** They even have Support Groups that you can call into    [facility] PH Support Group  Second Saturday of the Month from 1-3 PM   Location: [address] [place] [address]  Leader: Zadia Ferraro  Phone: [phone]  Email: [email or web address]     Patient Instructions:  1. Continue staying active and eat a heart healthy diet.    2. Please keep current list of medications with you at all times.    3. Remember to weigh yourself daily after voiding and before you consume any food or beverages and log the numbers.  If you have gained 2 pounds overnight or 5 pounds in a week contact us immediately for medication adjustments or further instructions.    4. **Please call us immediately if you have any syncope (fainting or  passing out), chest pain, edema (swelling or weight gain), or decline in your functional status (general decline in how you are feeling).    5. Patients on Remodulin (treprostinil) or Veletri (epoprostenol): Please make sure that you have your backup pump and supplies with you at all times, your mixing instructions, and contact information for your specialty pharmacy.

## 2023-07-17 NOTE — NURSING NOTE
Chief Complaint   Patient presents with     Follow Up     4MO follow-up with labs       Vitals were taken, medications reconciled.    Amie Salcido, EMT   11:32 AM

## 2023-07-17 NOTE — PROGRESS NOTES
Service Date: 2023    Edwin Joshi MD  [facility]  [address]  [place] [address]     Edi Barth MD  [address],   [place] [address]     RE:  Chika Ferguson  MRN: [id number]  : 1960    Dear Kaci Joshi and Lary:      We had the pleasure of seeing Ms. Chika Ferguson who is a 63-year-old female with past medical history for morbid obesity, hypertension, obstructive sleep apnea, coronary artery disease, heart failure with preserved ejection fraction, and mild pulmonary hypertension who returns today for follow-up.    She has been doing really well in the last year since our last clinic visit.  She has lost close to 80 pounds.  With without much limitations.  I would currently characterize her as functional class II.    No exertional chest pain or chest pressure.  No exertional presyncope or syncope.  She no longer has lower extremity swelling or abdominal distention.. She has not had any hospitalizations or ER visits.  She follows a low salt diet closely.    PAST MEDICAL HISTORY:    1.  Morbid obesity.  2.  Hypertension.  3.  Obstructive sleep apnea.  4.  LAD coronary artery disease, status post proximal LAD PCI in 2016 with a drug-eluting stent.    PAST SURGICAL HISTORY:  No significant.    MEDICATIONS  Current Outpatient Medications   Medication Sig     albuterol (PROAIR HFA/PROVENTIL HFA/VENTOLIN HFA) 108 (90 Base) MCG/ACT inhaler Inhale 2 puffs into the lungs every 6 hours as needed for wheezing     albuterol (PROVENTIL HFA;VENTOLIN HFA) 90 mcg/actuation inhaler [ALBUTEROL (PROVENTIL HFA;VENTOLIN HFA) 90 MCG/ACTUATION INHALER] Inhale 2 puffs every 4 (four) hours as needed for wheezing or shortness of breath.     ANORO ELLIPTA 62.5-25 mcg/actuation inhaler [ANORO ELLIPTA 62.5-25 MCG/ACTUATION INHALER]      aspirin (ASA) 81 MG chewable tablet Take 1 tablet (81 mg) by mouth daily     bumetanide (BUMEX) 1 MG tablet Take 1 tablet (1  "mg) by mouth 2 times daily     calcium carbonate (OS-NAE) 1500 (600 Ca) MG tablet Take 600 mg by mouth daily     ferrous gluconate (FERGON) 324 (38 Fe) MG tablet TAKE ONE TABLET BY MOUTH EVERY OTHER DAY (IRON)     JARDIANCE 10 MG TABS tablet TAKE ONE TABLET BY MOUTH EVERY DAY     lisinopril (PRINIVIL,ZESTRIL) 20 MG tablet [LISINOPRIL (PRINIVIL,ZESTRIL) 20 MG TABLET] Take 1 tablet (20 mg total) by mouth daily.     multivitamin (CENTRUM SILVER) tablet Take 1 tablet by mouth daily     pantoprazole (PROTONIX) 40 MG EC tablet Take 1 tablet (40 mg) by mouth daily     PARoxetine (PAXIL) 40 MG tablet [PAROXETINE (PAXIL) 40 MG TABLET] Take 40 mg by mouth daily.      potassium chloride ER (K-TAB/KLOR-CON) 10 MEQ CR tablet Take 10 mEq by mouth     simvastatin (ZOCOR) 20 MG tablet [SIMVASTATIN (ZOCOR) 20 MG TABLET] Take 20 mg by mouth bedtime.     spironolactone (ALDACTONE) 25 MG tablet Take 1 tablet (25 mg) by mouth daily     vitamin D3 (CHOLECALCIFEROL) 50 mcg (2000 units) tablet Take 1 tablet by mouth daily     hydrocortisone (CORTAID) 1 % external cream Apply topically 2 times daily To facial rash (Patient not taking: Reported on 7/17/2023)     hydrOXYzine (ATARAX) 25 MG tablet Take 1 tablet (25 mg) by mouth At Bedtime (Patient not taking: Reported on 7/17/2023)     melatonin 3 MG tablet 1 tablet (3 mg) by Oral or Feeding Tube route every evening (Patient not taking: Reported on 1/10/2023)     No current facility-administered medications for this visit.         REVIEW OF SYSTEMS:  A detailed 10-point review of systems obtained as described in history of present illness.  All other systems reviewed and are negative.    PHYSICAL EXAMINATION:   /87 (BP Location: Right arm, Patient Position: Sitting, Cuff Size: Adult Regular)   Pulse 76   Ht 1.651 m (5' 5\")   Wt 116 kg (255 lb 12.8 oz)   SpO2 95%   BMI 42.57 kg/m    She was awake, alert, oriented x3.  She was comfortable.  She was in no apparent distress.  She had no " pallor, cyanosis or jaundice.  Her neck exam revealed no jugular venous distention.  Carotids were 2+ bilaterally.  Pulse was regular and rhythm.  Cardiac auscultation revealed normal S1 and S2 with no murmur rub or gallop.  Auscultation of the lungs revealed equal air entry on both sides with no added sounds.  No guarding.  She had no focal neurologic deficit.  Extremities showed no edema.    Recent Results (from the past 168 hour(s))   N terminal pro BNP outpatient    Collection Time: 07/17/23 10:41 AM   Result Value Ref Range    N Terminal Pro BNP Outpatient 72 0 - 900 pg/mL   CBC with platelets    Collection Time: 07/17/23 10:41 AM   Result Value Ref Range    WBC Count 11.3 (H) 4.0 - 11.0 10e3/uL    RBC Count 4.95 3.80 - 5.20 10e6/uL    Hemoglobin 13.8 11.7 - 15.7 g/dL    Hematocrit 42.8 35.0 - 47.0 %    MCV 87 78 - 100 fL    MCH 27.9 26.5 - 33.0 pg    MCHC 32.2 31.5 - 36.5 g/dL    RDW 15.5 (H) 10.0 - 15.0 %    Platelet Count 319 150 - 450 10e3/uL   Basic metabolic panel    Collection Time: 07/17/23 10:41 AM   Result Value Ref Range    Sodium 138 136 - 145 mmol/L    Potassium 3.5 3.4 - 5.3 mmol/L    Chloride 98 98 - 107 mmol/L    Carbon Dioxide (CO2) 28 22 - 29 mmol/L    Anion Gap 12 7 - 15 mmol/L    Urea Nitrogen 13.9 8.0 - 23.0 mg/dL    Creatinine 0.98 (H) 0.51 - 0.95 mg/dL    Calcium 9.9 8.8 - 10.2 mg/dL    Glucose 124 (H) 70 - 99 mg/dL    GFR Estimate 64 >60 mL/min/1.73m2       Echocardiogram (07/2022)  Technically difficult study.Extremely poor acoustic windows. Limited  information was obtained during study.  The RV is not well visualized, the RV function appears moderately reduced on  limited views.  No pericardial effusion is present.    ASSESSMENT AND PLAN:      In summary, Ms. Chika Ferguson is a very pleasant 63-year-old female with past medical history significant for morbid obesity, obstructive sleep apnea, hypertension, coronary artery disease, HFpEF, and mild PH.     Clinically, she is feeling  better on empagliflozin 10 mg daily.  She is functional class IIb.  She is not volume overloaded.  Her renal and liver function are normal.    I recommend her to continue empagliflozin 10 mg daily, Bumex 1 mg twice a day, and low-salt diet.  She is compliant with her BiPAP therapy.  She has lost already 80 pounds.  She would like to more weight.  I have encouraged her to discuss with her primary care physician regarding Ozempic.    I have recommended her to return to see us in a year.  She will call us in the interim should any further worsening symptoms.    It was a pleasure meeting Ms. Chika Ferguson in our Pulmonary Hypertension Clinic at [facility].  We thank you for involving us in her care.  Please do not hesitate to call us in the interim if you have any are questions.    Total time today was 40 minutes reviewing notes, imaging, labs, patient visit, orders and documentation         Sincerely,  Saul Hughes MD   Center for Pulmonary Hypertension  Heart Failure, Transplant, and Mechanical Circulatory Support Cardiology   Cardiovascular Division  [facility]   [phone]

## 2023-11-13 DIAGNOSIS — I50.33 ACUTE ON CHRONIC DIASTOLIC HEART FAILURE (H): ICD-10-CM

## 2023-11-13 DIAGNOSIS — R06.09 DOE (DYSPNEA ON EXERTION): ICD-10-CM

## 2023-11-13 DIAGNOSIS — I51.89 RIGHT VENTRICULAR SYSTOLIC DYSFUNCTION: ICD-10-CM

## 2023-11-13 RX ORDER — EMPAGLIFLOZIN 10 MG/1
TABLET, FILM COATED ORAL
Qty: 180 TABLET | Refills: 3 | Status: SHIPPED | OUTPATIENT
Start: 2023-11-13

## 2023-12-01 NOTE — TELEPHONE ENCOUNTER
Called and scheduled patient for testing: Echocardiogram, CT PE, RHC. Instructions reviewed with patient, times, date of exams and pre-procedure covid requirements. Patient plans to take home covid test. Patient verbalized understanding.       *Mandatory COVID Testing:   We require COVID testing prior to their procedure regardless of vaccination status.     This can be completed via PCR or Rapid (at lab or at home).  If you are doing an at home test please complete 1-2 days prior.  If you are completing a PCR Lab, this can be completed no sooner than 4 days prior.    If you are staying overnight a PCR completed at a LAB is required and a Rapid will not be accepted.    To schedule COVID testing at a [facility] lab, please call [phone])    If completing the COVID Test at an Outside facility please have them fax the results to [phone]    Please bring in a picture of your results if a rapid is completed at home.    If you are running into and issues please call us.         Pre procedure instructions:     1.  Do not eat any solid food or milk products for 6 hours prior to the exam. You may drink clear liquids until 2 hours prior to the exam. Clear liquids include the following: water, Jell-O, clear broth, apple juice or any non-carbonated drink that you can see through (no soda).   2. Do not drink alcohol or smoke 24 hours prior to test.  3. Hold these meds: None       4. You may take your other morning medications as prescribed with a sip of water. You may hold your diuretic that morning.   5. Please arrange for a ride to drop you off and pick you up in the instance you are unable to drive home, however you should be able to function as you normally would after the procedure  6. Take your temperature in the morning prior to coming in.  If your temperature is 100 F please call [phone] (Opt. 1) and notify them.  If you do not have access to a thermometer at home, please come in for testing.  If  you are running a temperature your procedure may be rescheduled    Jennifer Alarcon RN on 6/23/2022 at 3:32 PM       Detail Level: Zone Detail Level: Generalized Detail Level: Detailed Prescription Strength Graduated Compression Stockings Recommendations: The patient was counseled that prescription strength graduated compression stockings should be worn for all waking hours. They will follow up with a venous specialist to monitor graduated compression stocking usage and their symptoms.

## 2024-01-03 ENCOUNTER — TELEPHONE (OUTPATIENT)
Dept: CARDIOLOGY | Facility: CLINIC | Age: 64
End: 2024-01-03
Payer: COMMERCIAL

## 2024-01-03 NOTE — TELEPHONE ENCOUNTER
Called and left voicemail for patient to complete labs locally prior to follow-up on 1/23, these were faxed to [facility]. Direct number given for questions.     Jennifer Alarcon RN on 1/3/2024 at 11:25 AM

## 2024-01-04 NOTE — TELEPHONE ENCOUNTER
Patient called and updated she is going to get lab next week    Jennifer Alarcon RN on 1/4/2024 at 10:20 AM

## 2024-01-23 ENCOUNTER — VIRTUAL VISIT (OUTPATIENT)
Dept: CARDIOLOGY | Facility: CLINIC | Age: 64
End: 2024-01-23
Attending: PHYSICIAN ASSISTANT
Payer: COMMERCIAL

## 2024-01-23 VITALS
SYSTOLIC BLOOD PRESSURE: 125 MMHG | HEIGHT: 65 IN | WEIGHT: 225 LBS | DIASTOLIC BLOOD PRESSURE: 68 MMHG | BODY MASS INDEX: 37.49 KG/M2

## 2024-01-23 DIAGNOSIS — I27.20 PULMONARY HYPERTENSION (H): ICD-10-CM

## 2024-01-23 DIAGNOSIS — R06.02 SOB (SHORTNESS OF BREATH): ICD-10-CM

## 2024-01-23 PROCEDURE — 99442 PR PHYSICIAN TELEPHONE EVALUATION 11-20 MIN: CPT | Mod: 93 | Performed by: PHYSICIAN ASSISTANT

## 2024-01-23 ASSESSMENT — PAIN SCALES - GENERAL: PAINLEVEL: NO PAIN (0)

## 2024-01-23 NOTE — NURSING NOTE
Patient confirms medications and allergies are accurate via patients echeck in completion, and or denies any changes since last reviewed/verified.     Is the patient currently in the state of [place]? YES    Visit mode:TELEPHONE    If the visit is dropped, the patient can be reconnected by: TELEPHONE VISIT: Phone number:   Telephone Information:   Mobile [phone]       Will anyone else be joining the visit? NO  (If patient encounters technical issues they should call [phone]    How would you like to obtain your AVS? Mail a copy    Are changes needed to the allergy or medication list? No    Reason for visit: RECHECK    Shannon DURAND

## 2024-01-23 NOTE — PATIENT INSTRUCTIONS
You were seen today in the Pulmonary Hypertension Clinic at the [facility].     Cardiology Provider you saw during your visit:    CARRIE Wheeler    Medication Changes:  Reduce your Bumex to 0.5mg once daily in the AM    Follow-up:   1 year follow-up with Dr Hughes with labs and echocardiogram.    Please call us immediately if you have any syncope (fainting or passing out), chest pain, edema (swelling or weight gain), or decline in your functional status (general decline in how you are feeling).    If you have emergent concerns after hours or on the weekend, please call our on-call Cardiologist at [phone], option 4. For emergencies call [phone].     Thank you for allowing us to be a part of your care here at the [facility] Care    If you have questions or concerns please contact us at:    Jennifer Alarcon RN (P: [phone])    Nurse Coordinator       Pulmonary Hypertension     [facility]         ELZBIETA Caputo   (Prior Auths & Pt Assistance)   ()  Clinic   Clinic   Pulmonary Hypertension   Pulmonary Hypertension  [facility]  [facility]  (P)[phone]    (P) [phone]  (F) [phone]

## 2024-01-23 NOTE — LETTER
"1/23/2024      RE: Chika Ferguson  [address] [address]       Dear Colleague,    Thank you for the opportunity to participate in the care of your patient, Chika Ferguson, at the [facility]IN[facility] [place] [facility]. Please see a copy of my visit note below.      CARDIOLOGY  CLINIC TELEPHONE VISIT    Date of telephone visit: 01/23/24    Chika Ferguson is a 63 year old female who is being evaluated via a billable telephone visit.      /68   Ht 1.651 m (5' 5\")   Wt 102.1 kg (225 lb)   BMI 37.44 kg/m        MEDICATIONS:  Current Outpatient Medications   Medication Sig Dispense Refill    albuterol (PROVENTIL HFA;VENTOLIN HFA) 90 mcg/actuation inhaler [ALBUTEROL (PROVENTIL HFA;VENTOLIN HFA) 90 MCG/ACTUATION INHALER] Inhale 2 puffs every 4 (four) hours as needed for wheezing or shortness of breath.      ANORO ELLIPTA 62.5-25 mcg/actuation inhaler Inhale 1 puff into the lungs daily      aspirin (ASA) 81 MG chewable tablet Take 1 tablet (81 mg) by mouth daily      bumetanide (BUMEX) 1 MG tablet Take 1 tablet (1 mg) by mouth 2 times daily 20 tablet 0    calcium carbonate (OS-NAE) 1500 (600 Ca) MG tablet Take 600 mg by mouth daily      empagliflozin (JARDIANCE) 10 MG TABS tablet Take 1 tablet (10 mg total) by mouth daily. Needs appointment 180 tablet 3    ferrous gluconate (FERGON) 324 (38 Fe) MG tablet TAKE ONE TABLET BY MOUTH EVERY OTHER DAY (IRON)      lisinopril (PRINIVIL,ZESTRIL) 20 MG tablet [LISINOPRIL (PRINIVIL,ZESTRIL) 20 MG TABLET] Take 1 tablet (20 mg total) by mouth daily. 90 tablet 1    multivitamin (CENTRUM SILVER) tablet Take 1 tablet by mouth daily      pantoprazole (PROTONIX) 40 MG EC tablet Take 1 tablet (40 mg) by mouth daily 10 tablet 0    PARoxetine (PAXIL) 40 MG tablet [PAROXETINE (PAXIL) 40 MG TABLET] Take 40 mg by mouth daily.       potassium chloride ER (K-TAB/KLOR-CON) 10 MEQ CR tablet Take 10 mEq by " mouth      simvastatin (ZOCOR) 20 MG tablet [SIMVASTATIN (ZOCOR) 20 MG TABLET] Take 20 mg by mouth bedtime.      spironolactone (ALDACTONE) 25 MG tablet Take 1 tablet (25 mg) by mouth daily 30 tablet 0    vitamin D3 (CHOLECALCIFEROL) 50 mcg (2000 units) tablet Take 1 tablet by mouth daily         Primary PH cardiologist: Dr. Hughes         HPI:  Ms. Ferguson is a pleasant 63 year old female with a PMhx including obesity, hypertension, CAD s/p LAD PCI in 2016, obstructive sleep apnea on Bipap, coronary artery disease, HFpEF, and mild pulmonary hypertension. She was started on empagliflozin with good result. She has also been working on weight loss which has also helped her symptomatically. When she was seen by Dr. Hughes in July of 2023, she was doing well, continued to lose weight, and no changes were made.    Today, I'm meeting virtually with Chika to follow up. She has continued to lose weight, and is down nearly 30 lbs since we saw her last. However, she tells me that this time it is mostly due to the inability to eat for awhile over the holidays when she had a dental infection. She ultimately had a few teeth pulled and now she is back to normal eating and feeling well. She reports no LE edema, and is feeling good with no new DE JESUS or other concerns.      Recent local labs were reviewed as below.         CURRENT PULMONARY HYPERTENSION REGIMEN:     PAH Rx: None     Diuretics: Bumex 1mg BID, spironolactone 25mg daily     Oxygen: None     Anticoagulation: None        ASSESSMENT/PLAN:        1. Pulmonary hypertension:              --Ms. Ferguson has mild pulmonary hypertension, felt likely due to HFpEF. She is not currently on pulmonary vasodilators. She has done well on SGLT2 inhibitors and has lost a significant amount of weight which has improved her overall cardiopulmonary status.               --As today is a virtual visit I cannot formally assess her volume status, but she reports no edema and is on  Bumex 1mg BID with spironolactone 25mg daily. Her SCr is up on her recent labs and I suspect she may just not need as much diuretic given her ongoing weight loss. Today, I will reduce her bumex from 1mg BID to just 0.5mg daily but asked her to reach out if she starts to notice any fluid retention. She has an upcoming appt with her primary team, so I asked her to coordinate with them for recheck of a BMP in the next week or two.               --Continue Bipap for known CARL. As she has lost a significant amount of weight, I encouraged her to touch base with her sleep team to ensure her current settings remain appropriate.   --She reports systemic BP under good control at home, and sometimes a bit on the lower side. I will keep her lisinopril and spironolactone as is for now but can adjust this is the future if needed as well.     2. Coronary artery disease.              --Known CAD, s/p proximal LAD PCI in 2016. She is free of angina currently.              --Continue ASA 81mg daily.              --Continue simvastatin 20mg daily. Recommend repeat FLP with next routine lab draw.        Follow up plan: Return in 1 year to see Dr. Hughes with repeat labs and echocardiogram. I asked the patient to call sooner with any new concerns.       Testing/labs:      Most recent labs:             Other recent pertinent testing:    Echo 7/2022  Interpretation Summary  Technically difficult study.Extremely poor acoustic windows. Limited  information was obtained during study.  The RV is not well visualized, the RV function appears moderately reduced on  limited views.  No pericardial effusion is present.      NYHA Functional Class:  2    I have reviewed the note as documented above.  This accurately captures the substance of my conversation with the patient.      Phone call contact time  Call Started at 1037  Call Ended at 1051    An additional 15 minutes was spent today performing chart and history review, pre and post visit  documentation, review of recent testing, patient education, and care coordination.      Shama Hsu PA-C  [facility]  Pager [phone]      Please do not hesitate to contact me if you have any questions/concerns.     Sincerely,     CARRIE Wheeler

## 2024-01-23 NOTE — PROGRESS NOTES
"    CARDIOLOGY PH CLINIC TELEPHONE VISIT    Date of telephone visit: 01/23/24    Chika Ferguson is a 63 year old female who is being evaluated via a billable telephone visit.      /68   Ht 1.651 m (5' 5\")   Wt 102.1 kg (225 lb)   BMI 37.44 kg/m        MEDICATIONS:  Current Outpatient Medications   Medication Sig Dispense Refill    albuterol (PROVENTIL HFA;VENTOLIN HFA) 90 mcg/actuation inhaler [ALBUTEROL (PROVENTIL HFA;VENTOLIN HFA) 90 MCG/ACTUATION INHALER] Inhale 2 puffs every 4 (four) hours as needed for wheezing or shortness of breath.      ANORO ELLIPTA 62.5-25 mcg/actuation inhaler Inhale 1 puff into the lungs daily      aspirin (ASA) 81 MG chewable tablet Take 1 tablet (81 mg) by mouth daily      bumetanide (BUMEX) 1 MG tablet Take 1 tablet (1 mg) by mouth 2 times daily 20 tablet 0    calcium carbonate (OS-NAE) 1500 (600 Ca) MG tablet Take 600 mg by mouth daily      empagliflozin (JARDIANCE) 10 MG TABS tablet Take 1 tablet (10 mg total) by mouth daily. Needs appointment 180 tablet 3    ferrous gluconate (FERGON) 324 (38 Fe) MG tablet TAKE ONE TABLET BY MOUTH EVERY OTHER DAY (IRON)      lisinopril (PRINIVIL,ZESTRIL) 20 MG tablet [LISINOPRIL (PRINIVIL,ZESTRIL) 20 MG TABLET] Take 1 tablet (20 mg total) by mouth daily. 90 tablet 1    multivitamin (CENTRUM SILVER) tablet Take 1 tablet by mouth daily      pantoprazole (PROTONIX) 40 MG EC tablet Take 1 tablet (40 mg) by mouth daily 10 tablet 0    PARoxetine (PAXIL) 40 MG tablet [PAROXETINE (PAXIL) 40 MG TABLET] Take 40 mg by mouth daily.       potassium chloride ER (K-TAB/KLOR-CON) 10 MEQ CR tablet Take 10 mEq by mouth      simvastatin (ZOCOR) 20 MG tablet [SIMVASTATIN (ZOCOR) 20 MG TABLET] Take 20 mg by mouth bedtime.      spironolactone (ALDACTONE) 25 MG tablet Take 1 tablet (25 mg) by mouth daily 30 tablet 0    vitamin D3 (CHOLECALCIFEROL) 50 mcg (2000 units) tablet Take 1 tablet by mouth daily         Primary  cardiologist: Dr. Hughes       "   HPI:  Ms. Ferguson is a pleasant 63 year old female with a PMhx including obesity, hypertension, CAD s/p LAD PCI in 2016, obstructive sleep apnea on Bipap, coronary artery disease, HFpEF, and mild pulmonary hypertension. She was started on empagliflozin with good result. She has also been working on weight loss which has also helped her symptomatically. When she was seen by Dr. Hughes in July of 2023, she was doing well, continued to lose weight, and no changes were made.    Today, I'm meeting virtually with Chika to follow up. She has continued to lose weight, and is down nearly 30 lbs since we saw her last. However, she tells me that this time it is mostly due to the inability to eat for awhile over the holidays when she had a dental infection. She ultimately had a few teeth pulled and now she is back to normal eating and feeling well. She reports no LE edema, and is feeling good with no new DE JESUS or other concerns.      Recent local labs were reviewed as below.         CURRENT PULMONARY HYPERTENSION REGIMEN:     PAH Rx: None     Diuretics: Bumex 1mg BID, spironolactone 25mg daily     Oxygen: None     Anticoagulation: None        ASSESSMENT/PLAN:        1. Pulmonary hypertension:              --Ms. Ferguson has mild pulmonary hypertension, felt likely due to HFpEF. She is not currently on pulmonary vasodilators. She has done well on SGLT2 inhibitors and has lost a significant amount of weight which has improved her overall cardiopulmonary status.               --As today is a virtual visit I cannot formally assess her volume status, but she reports no edema and is on Bumex 1mg BID with spironolactone 25mg daily. Her SCr is up on her recent labs and I suspect she may just not need as much diuretic given her ongoing weight loss. Today, I will reduce her bumex from 1mg BID to just 0.5mg daily but asked her to reach out if she starts to notice any fluid retention. She has an upcoming appt with her primary team,  so I asked her to coordinate with them for recheck of a BMP in the next week or two.               --Continue Bipap for known CARL. As she has lost a significant amount of weight, I encouraged her to touch base with her sleep team to ensure her current settings remain appropriate.   --She reports systemic BP under good control at home, and sometimes a bit on the lower side. I will keep her lisinopril and spironolactone as is for now but can adjust this is the future if needed as well.     2. Coronary artery disease.              --Known CAD, s/p proximal LAD PCI in 2016. She is free of angina currently.              --Continue ASA 81mg daily.              --Continue simvastatin 20mg daily. Recommend repeat FLP with next routine lab draw.        Follow up plan: Return in 1 year to see Dr. Hughes with repeat labs and echocardiogram. I asked the patient to call sooner with any new concerns.       Testing/labs:      Most recent labs:             Other recent pertinent testing:    Echo 7/2022  Interpretation Summary  Technically difficult study.Extremely poor acoustic windows. Limited  information was obtained during study.  The RV is not well visualized, the RV function appears moderately reduced on  limited views.  No pericardial effusion is present.      NYHA Functional Class:  2    I have reviewed the note as documented above.  This accurately captures the substance of my conversation with the patient.      Phone call contact time  Call Started at 1037  Call Ended at 1051    An additional 15 minutes was spent today performing chart and history review, pre and post visit documentation, review of recent testing, patient education, and care coordination.      Shama Hsu PA-C  [facility]  Pager [phone]

## 2024-08-30 NOTE — PROGRESS NOTES
Date: 7/13/2022    Time of Call: 2:10 PM     Diagnosis:  CHF     [ VORB ] Ordering provider: Dr. Hughes  Order: Jardiance 10mg daily. Recheck BMP x1 month after starting. Follow-up 3 months with labs prior after starting jardiance     Order received by: Jennifer Alarcon RN       Follow-up/additional notes: Prescription sent to pharmacy, verified a 9$ copay for 90day supply. Orders entered for follow-up. Attempted to call patient, left a VM to return call regarding changes  Jennifer Alarcon RN on 7/13/2022 at 2:19 PM           Medication Authorization forms were received from [facility], they were printed off in clinic and are awaiting provider signature/completion.

## 2024-12-01 DIAGNOSIS — I51.89 RIGHT VENTRICULAR SYSTOLIC DYSFUNCTION: ICD-10-CM

## 2024-12-01 DIAGNOSIS — I50.33 ACUTE ON CHRONIC DIASTOLIC HEART FAILURE (H): ICD-10-CM

## 2024-12-01 DIAGNOSIS — R06.09 DOE (DYSPNEA ON EXERTION): ICD-10-CM

## 2024-12-06 NOTE — TELEPHONE ENCOUNTER
empagliflozin (JARDIANCE) 10 MG TABS tablet 180 tablet 3 11/13/2023     Last Office Visit: 1/23/24  Future Office visit:   2/24/25      Sodium Glucose Co-Transport Inhibitor Agents [id number]/01/2024 11:02 AM   Protocol Details Patient has documented A1c within the specified period of time.    Has GFR on file in past 12 months and most recent value is >30    Patient has documented normal Potassium within the last 12 mos.          Routing refill request to provider for review/approval because:  Abnormal GFR  NOT ON CARDIOLOGY REFILL PROTOCOL    Josie Sellers RN  [facility] Central Nursing/Red Flag Triage & Med Refill Team

## 2024-12-09 RX ORDER — EMPAGLIFLOZIN 10 MG/1
10 TABLET, FILM COATED ORAL DAILY
Qty: 90 TABLET | Refills: 3 | Status: SHIPPED | OUTPATIENT
Start: 2024-12-09

## 2024-12-09 NOTE — TELEPHONE ENCOUNTER
Medication Refill double check:    Last virtual visit was on 1/23/24 with Shama Hsu PA-C.    Follow up was recommended for 1 year.    Any additional encounters with changes to requested med? no    Authorizing provider is: Dr. Saul Canales was approved.     Additional orders/notes:       Rosa Wu RN on 12/9/2024 at 2:15 PM

## 2025-02-26 ENCOUNTER — TELEPHONE (OUTPATIENT)
Dept: CARDIOLOGY | Facility: CLINIC | Age: 65
End: 2025-02-26
Payer: COMMERCIAL

## 2025-02-26 DIAGNOSIS — I27.20 PULMONARY HTN (H): Primary | ICD-10-CM

## 2025-02-26 DIAGNOSIS — R06.09 DOE (DYSPNEA ON EXERTION): ICD-10-CM

## 2025-02-26 NOTE — TELEPHONE ENCOUNTER
Patient Contacted for the patient to call back and schedule the following:    Appointment type:  rtn ph   Provider: carey   Return date: 04/08/25  Specialty phone number: [phone] opt 1   Additional appointment(s) needed: labs ,echo   Additonal Notes: n/a

## 2025-04-08 ENCOUNTER — LAB (OUTPATIENT)
Dept: LAB | Facility: CLINIC | Age: 65
End: 2025-04-08
Payer: COMMERCIAL

## 2025-04-08 ENCOUNTER — OFFICE VISIT (OUTPATIENT)
Dept: CARDIOLOGY | Facility: CLINIC | Age: 65
End: 2025-04-08
Attending: PHYSICIAN ASSISTANT
Payer: COMMERCIAL

## 2025-04-08 VITALS
DIASTOLIC BLOOD PRESSURE: 79 MMHG | SYSTOLIC BLOOD PRESSURE: 118 MMHG | BODY MASS INDEX: 40.15 KG/M2 | HEART RATE: 95 BPM | OXYGEN SATURATION: 96 % | WEIGHT: 241.3 LBS

## 2025-04-08 DIAGNOSIS — I27.20 PULMONARY HTN (H): ICD-10-CM

## 2025-04-08 DIAGNOSIS — R06.02 SOB (SHORTNESS OF BREATH): Primary | ICD-10-CM

## 2025-04-08 DIAGNOSIS — I27.20 PULMONARY HYPERTENSION, MILD (H): ICD-10-CM

## 2025-04-08 DIAGNOSIS — R94.30 ELEVATED LEFT VENTRICULAR END-DIASTOLIC PRESSURE (LVEDP): ICD-10-CM

## 2025-04-08 DIAGNOSIS — I25.10 CORONARY ARTERY DISEASE INVOLVING NATIVE CORONARY ARTERY OF NATIVE HEART WITHOUT ANGINA PECTORIS: ICD-10-CM

## 2025-04-08 DIAGNOSIS — I50.33 ACUTE ON CHRONIC DIASTOLIC HEART FAILURE (H): ICD-10-CM

## 2025-04-08 DIAGNOSIS — I51.89 RIGHT VENTRICULAR SYSTOLIC DYSFUNCTION: ICD-10-CM

## 2025-04-08 DIAGNOSIS — R06.09 DOE (DYSPNEA ON EXERTION): ICD-10-CM

## 2025-04-08 LAB
ANION GAP SERPL CALCULATED.3IONS-SCNC: 12 MMOL/L (ref 7–15)
BUN SERPL-MCNC: 17.2 MG/DL (ref 8–23)
CALCIUM SERPL-MCNC: 10.2 MG/DL (ref 8.8–10.4)
CHLORIDE SERPL-SCNC: 103 MMOL/L (ref 98–107)
CREAT SERPL-MCNC: 1.03 MG/DL (ref 0.51–0.95)
EGFRCR SERPLBLD CKD-EPI 2021: 59 ML/MIN/1.73M2
ERYTHROCYTE [DISTWIDTH] IN BLOOD BY AUTOMATED COUNT: 12.8 % (ref 10–15)
GLUCOSE SERPL-MCNC: 109 MG/DL (ref 70–99)
HCO3 SERPL-SCNC: 25 MMOL/L (ref 22–29)
HCT VFR BLD AUTO: 44.3 % (ref 35–47)
HGB BLD-MCNC: 14.7 G/DL (ref 11.7–15.7)
LVEF ECHO: NORMAL
MCH RBC QN AUTO: 30.7 PG (ref 26.5–33)
MCHC RBC AUTO-ENTMCNC: 33.2 G/DL (ref 31.5–36.5)
MCV RBC AUTO: 93 FL (ref 78–100)
NT-PROBNP SERPL-MCNC: 86 PG/ML (ref 0–900)
PLATELET # BLD AUTO: 272 10E3/UL (ref 150–450)
POTASSIUM SERPL-SCNC: 4.6 MMOL/L (ref 3.4–5.3)
RBC # BLD AUTO: 4.79 10E6/UL (ref 3.8–5.2)
SODIUM SERPL-SCNC: 140 MMOL/L (ref 135–145)
WBC # BLD AUTO: 8.4 10E3/UL (ref 4–11)

## 2025-04-08 PROCEDURE — 93306 TTE W/DOPPLER COMPLETE: CPT | Performed by: INTERNAL MEDICINE

## 2025-04-08 PROCEDURE — 80048 BASIC METABOLIC PNL TOTAL CA: CPT | Performed by: PATHOLOGY

## 2025-04-08 PROCEDURE — 3074F SYST BP LT 130 MM HG: CPT | Performed by: PHYSICIAN ASSISTANT

## 2025-04-08 PROCEDURE — 1126F AMNT PAIN NOTED NONE PRSNT: CPT | Performed by: PHYSICIAN ASSISTANT

## 2025-04-08 PROCEDURE — 83880 ASSAY OF NATRIURETIC PEPTIDE: CPT | Performed by: PATHOLOGY

## 2025-04-08 PROCEDURE — 99214 OFFICE O/P EST MOD 30 MIN: CPT | Mod: 25 | Performed by: PHYSICIAN ASSISTANT

## 2025-04-08 PROCEDURE — 36415 COLL VENOUS BLD VENIPUNCTURE: CPT | Performed by: PATHOLOGY

## 2025-04-08 PROCEDURE — G0463 HOSPITAL OUTPT CLINIC VISIT: HCPCS | Performed by: PHYSICIAN ASSISTANT

## 2025-04-08 PROCEDURE — 85027 COMPLETE CBC AUTOMATED: CPT | Performed by: PATHOLOGY

## 2025-04-08 PROCEDURE — 3078F DIAST BP <80 MM HG: CPT | Performed by: PHYSICIAN ASSISTANT

## 2025-04-08 RX ORDER — LISINOPRIL 20 MG/1
20 TABLET ORAL DAILY
COMMUNITY
Start: 2025-04-08

## 2025-04-08 RX ORDER — CHOLECALCIFEROL (VITAMIN D3) 50 MCG
1 TABLET ORAL DAILY
COMMUNITY
Start: 2025-04-08

## 2025-04-08 RX ORDER — ACETAMINOPHEN 325 MG/1
650 TABLET ORAL EVERY 6 HOURS PRN
COMMUNITY
Start: 2025-04-08

## 2025-04-08 RX ORDER — BUMETANIDE 0.5 MG/1
0.5 TABLET ORAL DAILY
COMMUNITY
Start: 2025-04-08

## 2025-04-08 RX ORDER — SPIRONOLACTONE 25 MG/1
37.5 TABLET ORAL DAILY
COMMUNITY
Start: 2025-04-08

## 2025-04-08 ASSESSMENT — PAIN SCALES - GENERAL: PAINLEVEL_OUTOF10: NO PAIN (0)

## 2025-04-08 NOTE — PROGRESS NOTES
Date of Visit:  04/08/25      [facility] Pulmonary Hypertension Clinic      Primary PH cardiologist: Dr. Hughes         HPI:  Ms. Ferguson is a pleasant 65 year old female with a PMhx including obesity, hypertension, CAD s/p LAD PCI in 2016, obstructive sleep apnea on Bipap, coronary artery disease, HFpEF, and mild pulmonary hypertension. She was started on empagliflozin with good result.     I met with Chika dhillon virtually in Jan of 2024 at which time she was doing well and actively losing weight. She was feeling well in regard to breathing and I reduced her diuretic in the setting of ongoing weight loss. Plan was for annual follow up.     Today, I'm meeting Chika back in clinic to follow up. She tells me that overall she is doing well. Breathing is much improved with her weight loss, though she says she has plateaued with her weight loss. She denies any new chest discomfort or palpitations, and has not had any dizziness. Since she was here last, her diuretics have been reduced and she relays that edema has remained under good control.     She had an echo done prior to our visit which I reviewed. EF remains preserved with EF 65-70%. RV function is normal and there are no new valvular abnormalities.     Labs performed prior to our visit today were reviewed as below.        CURRENT PULMONARY HYPERTENSION REGIMEN:     PAH Rx: None     Diuretics: Bumex 0.5mg daily, spironolactone 37.5mg daily      Oxygen: None     Anticoagulation: None        ASSESSMENT/PLAN:        1. Pulmonary hypertension:              --Ms. Ferguson has mild group 2 pulmonary hypertension, secondary to HFpEF. She is not currently on pulmonary vasodilators. She has done well on SGLT2 inhibitors and has lost a significant amount of weight which has improved her overall cardiopulmonary status.  Echo today remains normal and clinically she is doing very well.    --Today on exam she appears euvolemic on reduced bumex 0.5mg daily  and remains on spironolactone and Jardiance as well. NT-proBNP is normal, and renal function preserved.              --BP is very good today. Her antihypertensives have been adjusted per PCP and she sounds to be doing well with BP control as well.      2. Coronary artery disease.              --Known CAD, s/p proximal LAD PCI in 2016. She is free of angina currently.              --Continue ASA 81mg daily.              --Continue simvastatin 20mg daily. Last LDL Jan 2025 was 97. This is being managed by PCP as well.         Follow up plan: Overall she is doing well--she can follow up with us on a PRN basis. I asked her to reach out with any new concerns and we are happy to see her back.        Orders this Visit:  Orders Placed This Encounter   Procedures    Med Therapy Management Referral     Orders Placed This Encounter   Medications    bumetanide (BUMEX) 0.5 MG tablet     Sig: Take 1 tablet (0.5 mg) by mouth daily.    acetaminophen (TYLENOL) 325 MG tablet     Sig: Take 2 tablets (650 mg) by mouth every 6 hours as needed for mild pain.    apixaban ANTICOAGULANT (ELIQUIS) 2.5 MG tablet     Sig: Take 1 tablet (2.5 mg) by mouth 2 times daily.    vitamin D3 (CHOLECALCIFEROL) 50 mcg (2000 units) tablet     Sig: Take 1 tablet (50 mcg) by mouth daily.    spironolactone (ALDACTONE) 25 MG tablet     Sig: Take 1.5 tablets (37.5 mg) by mouth daily.    lisinopril (ZESTRIL) 20 MG tablet     Sig: Take 1 tablet (20 mg) by mouth daily.     Medications Discontinued During This Encounter   Medication Reason    bumetanide (BUMEX) 1 MG tablet     lisinopril (PRINIVIL,ZESTRIL) 20 MG tablet Stopped by Patient (No AVS)    spironolactone (ALDACTONE) 25 MG tablet        Review of Systems:  POS ROS ARE BOLDED, all other negative.    Cardiovascular: Chest pain, palpitations, orthopnea, LE edema  Resp: Dyspnea on exertion, cough, known chronic lung disease  Hematologic/lymphatic: Current systemic anticoagulation, hx of blood clots, new  bleeding concerns.  Neurological: Dizziness, syncope/presyncope     Physical Exam:  Vitals: /79 (BP Location: Right arm, Patient Position: Chair, Cuff Size: Adult Large)   Pulse 95   Wt 109.5 kg (241 lb 4.8 oz)   SpO2 96%   BMI 40.15 kg/m     Wt Readings from Last 4 Encounters:   04/08/25 109.5 kg (241 lb 4.8 oz)   01/23/24 102.1 kg (225 lb)   07/17/23 116 kg (255 lb 12.8 oz)   07/12/22 (!) 152 kg (335 lb)       GEN:  In general, this is a well nourished female in no acute distress on RA.  Patient ambulatory, unaccompanied.   NECK: Supple, No JVD with patient upright.  C/V:  Regular rate and rhythm, no murmur, rub or gallop. No S3 or RV heave.   RESP: Respirations are unlabored. No use of accessory muscles. Clear to auscultation bilaterally without wheezing, rales, or rhonchi.  EXTREM: No LE edema.   NEURO: Alert and oriented, cooperative. Gait not assessed.      Recent Lab Results:    LIVER ENZYME RESULTS:  Lab Results   Component Value Date    AST 18 07/12/2022    ALT 11 07/12/2022       CBC RESULTS:  Lab Results   Component Value Date    WBC 8.4 04/08/2025    RBC 4.79 04/08/2025    HGB 14.7 04/08/2025    HCT 44.3 04/08/2025    MCV 93 04/08/2025    MCH 30.7 04/08/2025    MCHC 33.2 04/08/2025    RDW 12.8 04/08/2025     04/08/2025       BMP RESULTS:  Lab Results   Component Value Date     04/08/2025    POTASSIUM 4.6 04/08/2025    POTASSIUM 3.5 01/03/2022    CHLORIDE 103 04/08/2025    CHLORIDE 100 01/09/2023    CO2 25 04/08/2025    CO2 34 (H) 01/03/2022    ANIONGAP 12 04/08/2025    ANIONGAP 6 01/03/2022     (H) 04/08/2025    GLC 96 01/03/2022    BUN 17.2 04/08/2025    BUN 17 01/03/2022    CR 1.03 (H) 04/08/2025    GFRESTIMATED 59 (L) 04/08/2025    GFRESTIMATED 22 (L) 02/03/2021    GFRESTBLACK 26 (L) 02/03/2021    NAE 10.2 04/08/2025        Recent Labs   Lab Test 04/08/25  1317 07/17/23  1041 07/12/22  1221 01/03/22  1627 10/15/21  0300 10/14/21  0322   NTBNPI  --   --  64  --  [phone]    NTBNP 86 72  --  85  --   --          Most recent pertinent testing:      Echocardiogram 4/8/25  Interpretation Summary  Poor acoustic windows.     Global and regional left ventricular function is hyperkinetic with an EF of  65-70%.  Global right ventricular function is normal.  No significant valvular abnormalities present.  Estimated mean right atrial pressure is normal.  No pericardial effusion is present.      NYHA Functional Class:  2      A total of 30 minutes was spent today performing chart and history review, gathering HPI, physical exam, patient education, pre and post visit documentation, and care coordination.    Shama Hsu PA-C  [facility] Cardiology~Pulmonary Hypertension Clinic

## 2025-04-08 NOTE — NURSING NOTE
Chief Complaint   Patient presents with    Follow Up     RETURN PULMONARY HYPERTENSION         Vitals were taken, medications reconciled.    Cheko Zamorano, THERESA    2:59 PM

## 2025-04-08 NOTE — LETTER
4/8/2025      RE: Chika Ferguson  [address] [id number]       Dear Colleague,    Thank you for the opportunity to participate in the care of your patient, Chika Ferguson, at the [facility] [place] at [facility]. Please see a copy of my visit note below.        Date of Visit:  04/08/25      [facility] Pulmonary Hypertension Clinic      Primary PH cardiologist: Dr. Hughes         HPI:  Ms. Ferguson is a pleasant 65 year old female with a PMhx including obesity, hypertension, CAD s/p LAD PCI in 2016, obstructive sleep apnea on Bipap, coronary artery disease, HFpEF, and mild pulmonary hypertension. She was started on empagliflozin with good result.     I met with Chika last virtually in Jan of 2024 at which time she was doing well and actively losing weight. She was feeling well in regard to breathing and I reduced her diuretic in the setting of ongoing weight loss. Plan was for annual follow up.     Today, I'm meeting Chika back in clinic to follow up. She tells me that overall she is doing well. Breathing is much improved with her weight loss, though she says she has plateaued with her weight loss. She denies any new chest discomfort or palpitations, and has not had any dizziness. Since she was here last, her diuretics have been reduced and she relays that edema has remained under good control.     She had an echo done prior to our visit which I reviewed. EF remains preserved with EF 65-70%. RV function is normal and there are no new valvular abnormalities.     Labs performed prior to our visit today were reviewed as below.        CURRENT PULMONARY HYPERTENSION REGIMEN:     PAH Rx: None     Diuretics: Bumex 0.5mg daily, spironolactone 37.5mg daily      Oxygen: None     Anticoagulation: None        ASSESSMENT/PLAN:        1. Pulmonary hypertension:              --Ms. Ferguson has mild group 2 pulmonary hypertension,  secondary to HFpEF. She is not currently on pulmonary vasodilators. She has done well on SGLT2 inhibitors and has lost a significant amount of weight which has improved her overall cardiopulmonary status.  Echo today remains normal and clinically she is doing very well.    --Today on exam she appears euvolemic on reduced bumex 0.5mg daily and remains on spironolactone and Jardiance as well. NT-proBNP is normal, and renal function preserved.              --BP is very good today. Her antihypertensives have been adjusted per PCP and she sounds to be doing well with BP control as well.      2. Coronary artery disease.              --Known CAD, s/p proximal LAD PCI in 2016. She is free of angina currently.              --Continue ASA 81mg daily.              --Continue simvastatin 20mg daily. Last LDL Jan 2025 was 97. This is being managed by PCP as well.         Follow up plan: Overall she is doing well--she can follow up with us on a PRN basis. I asked her to reach out with any new concerns and we are happy to see her back.        Orders this Visit:  Orders Placed This Encounter   Procedures     Med Therapy Management Referral     Orders Placed This Encounter   Medications     bumetanide (BUMEX) 0.5 MG tablet     Sig: Take 1 tablet (0.5 mg) by mouth daily.     acetaminophen (TYLENOL) 325 MG tablet     Sig: Take 2 tablets (650 mg) by mouth every 6 hours as needed for mild pain.     apixaban ANTICOAGULANT (ELIQUIS) 2.5 MG tablet     Sig: Take 1 tablet (2.5 mg) by mouth 2 times daily.     vitamin D3 (CHOLECALCIFEROL) 50 mcg (2000 units) tablet     Sig: Take 1 tablet (50 mcg) by mouth daily.     spironolactone (ALDACTONE) 25 MG tablet     Sig: Take 1.5 tablets (37.5 mg) by mouth daily.     lisinopril (ZESTRIL) 20 MG tablet     Sig: Take 1 tablet (20 mg) by mouth daily.     Medications Discontinued During This Encounter   Medication Reason     bumetanide (BUMEX) 1 MG tablet      lisinopril (PRINIVIL,ZESTRIL) 20 MG tablet  Stopped by Patient (No AVS)     spironolactone (ALDACTONE) 25 MG tablet        Review of Systems:  POS ROS ARE BOLDED, all other negative.    Cardiovascular: Chest pain, palpitations, orthopnea, LE edema  Resp: Dyspnea on exertion, cough, known chronic lung disease  Hematologic/lymphatic: Current systemic anticoagulation, hx of blood clots, new bleeding concerns.  Neurological: Dizziness, syncope/presyncope     Physical Exam:  Vitals: /79 (BP Location: Right arm, Patient Position: Chair, Cuff Size: Adult Large)   Pulse 95   Wt 109.5 kg (241 lb 4.8 oz)   SpO2 96%   BMI 40.15 kg/m     Wt Readings from Last 4 Encounters:   04/08/25 109.5 kg (241 lb 4.8 oz)   01/23/24 102.1 kg (225 lb)   07/17/23 116 kg (255 lb 12.8 oz)   07/12/22 (!) 152 kg (335 lb)       GEN:  In general, this is a well nourished female in no acute distress on RA.  Patient ambulatory, unaccompanied.   NECK: Supple, No JVD with patient upright.  C/V:  Regular rate and rhythm, no murmur, rub or gallop. No S3 or RV heave.   RESP: Respirations are unlabored. No use of accessory muscles. Clear to auscultation bilaterally without wheezing, rales, or rhonchi.  EXTREM: No LE edema.   NEURO: Alert and oriented, cooperative. Gait not assessed.      Recent Lab Results:    LIVER ENZYME RESULTS:  Lab Results   Component Value Date    AST 18 07/12/2022    ALT 11 07/12/2022       CBC RESULTS:  Lab Results   Component Value Date    WBC 8.4 04/08/2025    RBC 4.79 04/08/2025    HGB 14.7 04/08/2025    HCT 44.3 04/08/2025    MCV 93 04/08/2025    MCH 30.7 04/08/2025    MCHC 33.2 04/08/2025    RDW 12.8 04/08/2025     04/08/2025       BMP RESULTS:  Lab Results   Component Value Date     04/08/2025    POTASSIUM 4.6 04/08/2025    POTASSIUM 3.5 01/03/2022    CHLORIDE 103 04/08/2025    CHLORIDE 100 01/09/2023    CO2 25 04/08/2025    CO2 34 (H) 01/03/2022    ANIONGAP 12 04/08/2025    ANIONGAP 6 01/03/2022     (H) 04/08/2025    GLC 96 01/03/2022     BUN 17.2 04/08/2025    BUN 17 01/03/2022    CR 1.03 (H) 04/08/2025    GFRESTIMATED 59 (L) 04/08/2025    GFRESTIMATED 22 (L) 02/03/2021    GFRESTBLACK 26 (L) 02/03/2021    NAE 10.2 04/08/2025        Recent Labs   Lab Test 04/08/25  1317 07/17/23  1041 07/12/22  1221 01/03/22  1627 10/15/21  0300 10/14/21  0322   NTBNPI  --   --  64  --  616 428   NTBNP 86 72  --  85  --   --          Most recent pertinent testing:      Echocardiogram 4/8/25  Interpretation Summary  Poor acoustic windows.     Global and regional left ventricular function is hyperkinetic with an EF of  65-70%.  Global right ventricular function is normal.  No significant valvular abnormalities present.  Estimated mean right atrial pressure is normal.  No pericardial effusion is present.      NYHA Functional Class:  2      A total of 30 minutes was spent today performing chart and history review, gathering HPI, physical exam, patient education, pre and post visit documentation, and care coordination.    Shama Hsu PA-C  [facility] Cardiology~Pulmonary Hypertension Clinic          Please do not hesitate to contact me if you have any questions/concerns.     Sincerely,     CARRIE Wheeler

## 2025-04-08 NOTE — PATIENT INSTRUCTIONS
You were seen today in the Pulmonary Hypertension Clinic at the [facility].     Cardiology Provider you saw during your visit:    CARRIE Wheeler    Medication Changes:  We updated your med list today per your current list you gave to us.    Follow-up:   Only need to follow-up with Pulmonary Hypertension PRN.    Please call us immediately if you have any syncope (fainting or passing out), chest pain, edema (swelling or weight gain), or decline in your functional status (general decline in how you are feeling).    If you have emergent concerns after hours or on the weekend, please call our on-call Cardiologist at [phone], option [phone]. For emergencies call [phone].     Thank you for allowing us to be a part of your care here at the [facility]    If you have questions or concerns please contact us at:    Jennifer Alarcon RN (P: [phone])    Nurse Coordinator       Pulmonary Hypertension     [facility]         ELZBIETA Caputo   (Prior Auths & Pt Assistance)   ()  Clinic   Clinic   Pulmonary Hypertension   Pulmonary Hypertension  [facility]  [facility]  (P)[phone]    (P) [phone]  (F) [phone]

## 2025-04-09 ENCOUNTER — TELEPHONE (OUTPATIENT)
Dept: CARDIOLOGY | Facility: CLINIC | Age: 65
End: 2025-04-09

## 2025-04-09 NOTE — TELEPHONE ENCOUNTER
MTM referral from: [place] clinic visit (referral by provider)    MTM referral outreach attempt #2 on April 9, 2025 at 12:16 PM      Outcome: Patient will call us back to schedule when she knows she'll be in [place]     Use hbc for the carrier/Plan on the flowsheet          Sujata Ritchie  MTM

## (undated) RX ORDER — LIDOCAINE 40 MG/G
CREAM TOPICAL
Status: DISPENSED
Start: 2022-07-12

## (undated) RX ORDER — DIAZEPAM 5 MG
TABLET ORAL
Status: DISPENSED
Start: 2022-07-12

## (undated) RX ORDER — POTASSIUM CHLORIDE 750 MG/1
TABLET, EXTENDED RELEASE ORAL
Status: DISPENSED
Start: 2022-07-12